# Patient Record
Sex: MALE | Race: WHITE | NOT HISPANIC OR LATINO | Employment: OTHER | URBAN - METROPOLITAN AREA
[De-identification: names, ages, dates, MRNs, and addresses within clinical notes are randomized per-mention and may not be internally consistent; named-entity substitution may affect disease eponyms.]

---

## 2017-01-06 DIAGNOSIS — R56.9 CONVULSIONS, UNSPECIFIED CONVULSION TYPE: ICD-10-CM

## 2017-01-06 RX ORDER — LEVETIRACETAM 750 MG/1
750 TABLET ORAL 2 TIMES DAILY
Qty: 60 TABLET | Refills: 11 | Status: SHIPPED | OUTPATIENT
Start: 2017-01-06 | End: 2018-01-15 | Stop reason: SDUPTHER

## 2017-01-18 ENCOUNTER — PATIENT MESSAGE (OUTPATIENT)
Dept: FAMILY MEDICINE | Facility: CLINIC | Age: 47
End: 2017-01-18

## 2017-01-18 RX ORDER — DEXTROAMPHETAMINE SACCHARATE, AMPHETAMINE ASPARTATE MONOHYDRATE, DEXTROAMPHETAMINE SULFATE AND AMPHETAMINE SULFATE 7.5; 7.5; 7.5; 7.5 MG/1; MG/1; MG/1; MG/1
CAPSULE, EXTENDED RELEASE ORAL
Qty: 60 CAPSULE | Refills: 0 | Status: SHIPPED | OUTPATIENT
Start: 2017-01-18 | End: 2017-03-14 | Stop reason: SDUPTHER

## 2017-02-13 ENCOUNTER — PATIENT MESSAGE (OUTPATIENT)
Dept: SPORTS MEDICINE | Facility: CLINIC | Age: 47
End: 2017-02-13

## 2017-02-17 ENCOUNTER — TELEPHONE (OUTPATIENT)
Dept: SPORTS MEDICINE | Facility: CLINIC | Age: 47
End: 2017-02-17

## 2017-02-20 RX ORDER — HYDROCHLOROTHIAZIDE 25 MG/1
TABLET ORAL
Qty: 90 TABLET | Refills: 0 | Status: SHIPPED | OUTPATIENT
Start: 2017-02-20 | End: 2017-05-14 | Stop reason: SDUPTHER

## 2017-02-24 ENCOUNTER — HOSPITAL ENCOUNTER (OUTPATIENT)
Dept: RADIOLOGY | Facility: HOSPITAL | Age: 47
Discharge: HOME | End: 2017-02-24
Attending: ORTHOPAEDIC SURGERY
Payer: COMMERCIAL

## 2017-02-24 ENCOUNTER — OFFICE VISIT (OUTPATIENT)
Dept: SPORTS MEDICINE | Facility: CLINIC | Age: 47
End: 2017-02-24
Payer: COMMERCIAL

## 2017-02-24 VITALS
WEIGHT: 229 LBS | HEIGHT: 71 IN | DIASTOLIC BLOOD PRESSURE: 98 MMHG | SYSTOLIC BLOOD PRESSURE: 153 MMHG | BODY MASS INDEX: 32.06 KG/M2 | HEART RATE: 96 BPM

## 2017-02-24 DIAGNOSIS — M25.561 RIGHT KNEE PAIN, UNSPECIFIED CHRONICITY: ICD-10-CM

## 2017-02-24 DIAGNOSIS — M25.561 ACUTE PAIN OF RIGHT KNEE: Primary | ICD-10-CM

## 2017-02-24 DIAGNOSIS — M25.461 KNEE EFFUSION, RIGHT: ICD-10-CM

## 2017-02-24 DIAGNOSIS — M17.11 PRIMARY OSTEOARTHRITIS OF RIGHT KNEE: ICD-10-CM

## 2017-02-24 PROCEDURE — 73564 X-RAY EXAM KNEE 4 OR MORE: CPT | Mod: 26,50,, | Performed by: RADIOLOGY

## 2017-02-24 PROCEDURE — 20610 DRAIN/INJ JOINT/BURSA W/O US: CPT | Mod: S$PBB,RT,, | Performed by: PHYSICIAN ASSISTANT

## 2017-02-24 PROCEDURE — 99214 OFFICE O/P EST MOD 30 MIN: CPT | Mod: 25,S$PBB,, | Performed by: PHYSICIAN ASSISTANT

## 2017-02-24 PROCEDURE — 73564 X-RAY EXAM KNEE 4 OR MORE: CPT | Mod: TC,50,PO

## 2017-02-24 PROCEDURE — 97110 THERAPEUTIC EXERCISES: CPT | Mod: PBBFAC,PO | Performed by: PHYSICIAN ASSISTANT

## 2017-02-24 PROCEDURE — 20610 DRAIN/INJ JOINT/BURSA W/O US: CPT | Mod: PBBFAC,PO | Performed by: PHYSICIAN ASSISTANT

## 2017-02-24 PROCEDURE — 99999 PR PBB SHADOW E&M-EST. PATIENT-LVL IV: CPT | Mod: PBBFAC,,, | Performed by: PHYSICIAN ASSISTANT

## 2017-02-24 RX ORDER — TRIAMCINOLONE ACETONIDE 40 MG/ML
80 INJECTION, SUSPENSION INTRA-ARTICULAR; INTRAMUSCULAR
Status: COMPLETED | OUTPATIENT
Start: 2017-02-24 | End: 2017-02-24

## 2017-02-24 RX ORDER — LIDOCAINE HYDROCHLORIDE 10 MG/ML
4 INJECTION INFILTRATION; PERINEURAL
Status: COMPLETED | OUTPATIENT
Start: 2017-02-24 | End: 2017-02-24

## 2017-02-24 RX ORDER — BUPIVACAINE HYDROCHLORIDE 2.5 MG/ML
1 INJECTION, SOLUTION INFILTRATION; PERINEURAL
Status: COMPLETED | OUTPATIENT
Start: 2017-02-24 | End: 2017-02-24

## 2017-02-24 RX ADMIN — TRIAMCINOLONE ACETONIDE 80 MG: 40 INJECTION, SUSPENSION INTRA-ARTICULAR; INTRAMUSCULAR at 05:02

## 2017-02-24 RX ADMIN — LIDOCAINE HYDROCHLORIDE 4 ML: 10 INJECTION INFILTRATION; PERINEURAL at 05:02

## 2017-02-24 RX ADMIN — BUPIVACAINE HYDROCHLORIDE 2.5 MG: 2.5 INJECTION, SOLUTION INFILTRATION; PERINEURAL at 05:02

## 2017-02-24 NOTE — LETTER
February 25, 2017      Gwendolyn Jeong MD  [address]  [place] [address]           [facility]  [address]  [facility] [address]  Phone: [phone]          Patient: Nirmal Moss   MR Number: [id number]   YOB: 1970   Date of Visit: 2/24/2017       Dear Dr. Gwendolyn Jeong:    Thank you for referring Nirmal Moss to me for evaluation. Attached you will find relevant portions of my assessment and plan of care.    If you have questions, please do not hesitate to call me. I look forward to following Nirmal Moss along with you.    Sincerely,    Jayant Flores III, PA-C    Enclosure  CC:  No Recipients    If you would like to receive this communication electronically, please contact [email or web address] or [phone] to request more information on [employer] access.    For providers and/or their staff who would like to refer a patient to [facility], please contact us through our one-stop-shop provider referral line, [facility] , at [phone].    If you feel you have received this communication in error or would no longer like to receive these types of communications, please e-mail [email or web address]

## 2017-02-24 NOTE — PROGRESS NOTES
CC: Right knee pain    46 y.o. Male with a history of Right knee pain. He states that the pain is severe and not responding to any conservative care.  Pain began on 2/4/17 when he was laying on the floor working on his washing machine and twisted his knee in the wrong direction and felt a pop in the knee. He describes his pain as sharp and aching of the anterior and posterior knee and it feels similar to his previous pain in his right knee which was 3 years ago when he has a medial and lateral meniscectomy with Dr. Jeong. He has noticed intermittent swelling in his knee recently. He has tried ibuprofen, tylenol, and ice with limited knee pain improvement. His pain has been worsening since onset.     He has been wearing a hinged knee brace with some improvement but he is still having pain.    Knee surgery from 2013 revealed medial and lateral meniscus tears and  In the patellofemoral compartment, there was chondral damage to:  Patella 10 x 10 mm grade 2  Chondroplasty was performed using arthroscopic shaver.     There was chondral damage to:  Medial femoral condyle 30 x 20 mm grade 2  Chondroplasty was performed using arthroscopic shaver.     There was chondral damage to:  Lateral tibial plateau 15 x 5 mm grade 2  Chondroplasty was performed using arthroscopic shaver.    + mechanical symptoms, no instability    He is feeling popping in the knee.     Is affecting ADLs.      Review of Systems   Constitution: Negative. Negative for chills, fever and night sweats.   HENT: Negative for congestion and headaches.    Eyes: Negative for blurred vision, left vision loss and right vision loss.   Cardiovascular: Negative for chest pain and syncope.   Respiratory: Negative for cough and shortness of breath.    Endocrine: Negative for polydipsia, polyphagia and polyuria.   Hematologic/Lymphatic: Negative for bleeding problem. Does not bruise/bleed easily.   Skin: Negative for dry skin, itching and rash.   Musculoskeletal: Negative  for falls. Positive for knee pain and muscle weakness.   Gastrointestinal: Negative for abdominal pain and bowel incontinence.   Genitourinary: Negative for bladder incontinence and nocturia.   Neurological: Negative for disturbances in coordination, loss of balance and seizures.   Psychiatric/Behavioral: Negative for depression. The patient does not have insomnia.    Allergic/Immunologic: Negative for hives and persistent infections.     PAST MEDICAL HISTORY:   Past Medical History:   Diagnosis Date    ADD (attention deficit disorder)     Anxiety     Depression     Gastritis     EGD 2014    HDL deficiency     Hypertension     Kidney stone     Seizures      PAST SURGICAL HISTORY:   Past Surgical History:   Procedure Laterality Date    KNEE SURGERY       FAMILY HISTORY:   Family History   Problem Relation Age of Onset    Diabetes Father     Cancer Father     Skin cancer Father     Cancer Maternal Grandfather      SOCIAL HISTORY:   Social History     Social History    Marital status:      Spouse name: N/A    Number of children: 1    Years of education: N/A     Occupational History     [employer]     Social History Main Topics    Smoking status: Never Smoker    Smokeless tobacco: Not on file    Alcohol use 0.0 oz/week     0 Standard drinks or equivalent per week      Comment: social    Drug use: No    Sexual activity: Yes     Partners: Male     Other Topics Concern    Not on file     Social History Narrative    [employer] out of [place],  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       MEDICATIONS:   Current Outpatient Prescriptions:     amlodipine (NORVASC) 10 MG tablet, Take 1 tablet (10 mg total) by mouth once daily., Disp: 90 tablet, Rfl: 12    dextroamphetamine-amphetamine (ADDERALL XR) 30 MG 24 hr capsule, 2 a day, Disp: 60 capsule, Rfl: 0    dextroamphetamine-amphetamine (ADDERALL XR) 30 MG 24 hr capsule, 2 a day, Disp: 60 capsule, Rfl: 0     "dextroamphetamine-amphetamine (ADDERALL XR) 30 MG 24 hr capsule, 2 a day, Disp: 60 capsule, Rfl: 0    hydrochlorothiazide (HYDRODIURIL) 25 MG tablet, TAKE ONE Tablet BY MOUTH DAILY, Disp: 90 tablet, Rfl: 0    irbesartan (AVAPRO) 300 MG tablet, , Disp: , Rfl:     irbesartan (AVAPRO) 300 MG tablet, TAKE ONE TABLET BY MOUTH EVERY EVENING *IN PLACE OF LOSARTAN*, Disp: 30 tablet, Rfl: 12    levetiracetam (KEPPRA) 750 MG Tab, Take 1 tablet (750 mg total) by mouth 2 (two) times daily., Disp: 60 tablet, Rfl: 11    losartan (COZAAR) 100 MG tablet, TAKE ONE TABLET BY MOUTH DAILY, Disp: 90 tablet, Rfl: 3    montelukast (SINGULAIR) 10 mg tablet, TAKE ONE TABLET BY MOUTH EVERY EVENING, Disp: 30 tablet, Rfl: 12    omeprazole (PRILOSEC OTC) 20 MG tablet, Take 20 mg by mouth once daily., Disp: , Rfl:     oxycodone (ROXICODONE) 15 MG Tab, Take 1 tablet (15 mg total) by mouth every 4 (four) hours as needed., Disp: 30 tablet, Rfl: 0    oxycodone-acetaminophen (PERCOCET) 5-325 mg per tablet, , Disp: , Rfl:     paroxetine (PAXIL) 40 MG tablet, Take 1 tablet (40 mg total) by mouth every morning., Disp: 90 tablet, Rfl: 11    paroxetine (PAXIL) 40 MG tablet, TAKE ONE Tablet BY MOUTH EVERY MORNING, Disp: 90 tablet, Rfl: 12    zaleplon (SONATA) 5 MG Cap, , Disp: , Rfl:   ALLERGIES:   Review of patient's allergies indicates:   Allergen Reactions    Hydrocodone Rash       VITAL SIGNS: BP (!) 153/98  Pulse 96  Ht 5' 11" (1.803 m)  Wt 103.9 kg (229 lb)  BMI 31.94 kg/m2     PHYSICAL EXAMINATION  VITAL SIGNS: BP (!) 153/98  Pulse 96  Ht 5' 11" (1.803 m)  Wt 103.9 kg (229 lb)  BMI 31.94 kg/m2   General:  The patient is alert and oriented x 3.  Mood is pleasant.  Observation of ears, eyes and nose reveal no gross abnormalities.  HEENT: NCAT, sclera nonicteric  Lungs: Respirations are equal and unlabored.    Right KNEE EXAMINATION     OBSERVATION / INSPECTION   Gait:   Nonantalgic   Alignment:  Neutral   Scars:   None   Muscle " atrophy: Mild  Effusion:  moderate  Warmth:  None   Discoloration:   none     TENDERNESS / CREPITUS (T / C):          T / C      T / C   Patella   - / -   Lateral joint line   + / -    Peripatellar medial  +  Medial joint line    + / -    Peripatellar lateral +  Medial plica   - / -    Patellar tendon -   Popliteal fossa  - / -    Quad tendon   -   Gastrocnemius   -   Prepatellar Bursa - / -   Quadricep   -   Tibial tubercle  -  Thigh/hamstring  -   Pes anserine/HS -  Fibula    -   ITB   - / -  Tibia     -   Tib/fib joint  - / -  LCL    -     MFC   - / -   MCL: Proximal  -    LFC   - / -    Distal   -          ROM: (* = pain)  PASSIVE   ACTIVE    Left :   5 / 0 / 135   5 / 0 / 135*     Right :    5 / 0 / 145   5 / 0 / 145    Patellofemoral examination:  See above noted areas of tenderness.   Patella position    Subluxation / dislocation: Centered           Sup. / Inf;   Normal   Crepitus (PF):    Absent   Patellar Mobility:       Medial-lateral:   Normal    Superior-inferior:  Normal    Inferior tilt   Normal    Patellar tendon:  Normal   Lateral tilt:    Normal   J-sign:     None   Patellofemoral grind:   No pain       MENISCAL SIGNS:     Pain on terminal extension:  +  Pain on terminal flexion:  +  Rosas maneuver:  + for pain  Squat     + posterior joint pain    LIGAMENT EXAMINATION:  ACL / Lachman:  normal (-1 to 2mm)    PCL-Post.  drawer: normal 0 to 2mm  MCL- Valgus:  normal 0 to 2mm  LCL- Varus:  normal 0 to 2mm  Pivot shift: normal (Equal)   Dial Test: difference c/w other side   At 30° flexion: normal (< 5°)    At 90° flexion: normal (< 5°)   Reverse Pivot Shift:   normal (Equal)     STRENGTH: (* = with pain) PAINFUL SIDE   Quadricep   4/5   Hamstrin/5    EXTREMITY NEURO-VASCULAR EXAMINATION:   Sensation:  Grossly intact to light touch all dermatomal regions.   Motor Function:  Fully intact motor function at hip, knee, foot and ankle    DTRs;  quadriceps and  achilles 2+.  No clonus and  downgoing Babinski.    Vascular status:  DP and PT pulses 2+, brisk capillary refill, symmetric.     Other Findings:  + bridge and step down test     I feel that some of his popping is due to patella hitting the anterior lateral femoral condyle. Could have a new meniscus tear       X-rays:  including standing, weight bearing AP and flexion bilateral knees, lateral and merchant views ordered and images reviewed by me show:  No fracture, dislocation.     ASSESSMENT:    Acute Right Knee pain, Probable Meniscus tear  lateral     Right knee effusion  Right knee osteoarthritis     PLAN:   1. PROCEDURE NOTE:   right KNEE ASPIRATION and INJECTION  After consent was obtained, time out was performed, including verification of patient ID, procedure, site and side, availability of information and equipment, review of safety issues, and agreement with consent, the procedure site was marked and the patient was prepped aseptically.    Using a sterile technique the right knee was prepped and 3 ml's of 1% plain Lidocaine used to anesthetize the needle tract into the joint from the superior lateral knee approach. The knee joint was entered with a 18 gauge needle and 0 ml's withdrawn. Could not drain any fluid after multiple attempts. The procedure was well tolerated.       A diagnostic and therapeutic injection of 2cc 40 mg kenalog and 1% lidocaine/0.5% sensorcaine was given under sterile technique using the same 18 gauge needle into the superior lateral knee site with patient in supine position.     The patient had no adverse reactions to the medication. Pain decreased. The patient was instructed to apply ice to the joint for 20 minutes and avoid strenuous activities for 24-36 hours following the injection. Patient was warned of possible blood sugar and/or blood pressure changes during that time. Following that time, patient can resume regular activities.  Watch for fever, or increased swelling or persistent pain in knee. Call or  return to clinic prn if such symptoms occur or the knee fails to improve as anticipated.    2. RICE  3. Continue wearing hinged knee brace.  4. HEP 05033 - Mello Flores PA-C, instructed and demonstrated a patellofemoral HEP. The patient then demonstrated understanding of exercises and proper technique. This program was performed for 15 minutes.   5. RTC in 1-2 week; if no improvement then will get MRI of knee

## 2017-02-24 NOTE — MR AVS SNAPSHOT
[facility]  [address]  [facility] [address]  Phone: [phone]                  Nirmal Moss   2017 1:00 PM   Appointment    Description:  Male : 1970   Provider:  Jayant Flores III, PA-C   Department:  [facility]                To Do List           Goals (5 Years of Data)     None      [facility] On Call     [facility] On Call Nurse Care Line -  Assistance  Registered nurses in the [facility] On Call Center provide clinical advisement, health education, appointment booking, and other advisory services.  Call for this free service at [phone].             Medications           Message regarding Medications     Verify the changes and/or additions to your medication regime listed below are the same as discussed with your clinician today.  If any of these changes or additions are incorrect, please notify your healthcare provider.             Verify that the below list of medications is an accurate representation of the medications you are currently taking.  If none reported, the list may be blank. If incorrect, please contact your healthcare provider. Carry this list with you in case of emergency.           Current Medications     amlodipine (NORVASC) 10 MG tablet Take 1 tablet (10 mg total) by mouth once daily.    dextroamphetamine-amphetamine (ADDERALL XR) 30 MG 24 hr capsule 2 a day    dextroamphetamine-amphetamine (ADDERALL XR) 30 MG 24 hr capsule 2 a day    dextroamphetamine-amphetamine (ADDERALL XR) 30 MG 24 hr capsule 2 a day    hydrochlorothiazide (HYDRODIURIL) 25 MG tablet TAKE ONE Tablet BY MOUTH DAILY    irbesartan (AVAPRO) 300 MG tablet     irbesartan (AVAPRO) 300 MG tablet TAKE ONE TABLET BY MOUTH EVERY EVENING *IN PLACE OF LOSARTAN*    levetiracetam (KEPPRA) 750 MG Tab Take 1 tablet (750 mg total) by mouth 2 (two) times daily.    losartan (COZAAR) 100 MG tablet TAKE ONE TABLET BY MOUTH DAILY    montelukast (SINGULAIR) 10 mg  tablet TAKE ONE TABLET BY MOUTH EVERY EVENING    omeprazole (PRILOSEC OTC) 20 MG tablet Take 20 mg by mouth once daily.    oxycodone (ROXICODONE) 15 MG Tab Take 1 tablet (15 mg total) by mouth every 4 (four) hours as needed.    oxycodone-acetaminophen (PERCOCET) 5-325 mg per tablet     paroxetine (PAXIL) 40 MG tablet Take 1 tablet (40 mg total) by mouth every morning.    paroxetine (PAXIL) 40 MG tablet TAKE ONE Tablet BY MOUTH EVERY MORNING    zaleplon (SONATA) 5 MG Cap            Clinical Reference Information           Allergies as of 2/24/2017     Hydrocodone      Immunizations Administered on Date of Encounter - 2/24/2017     None      Language Assistance Services     ATTENTION: Language assistance services are available, free of charge. Please call [phone].      ATENCIÓN: Si wendie remigio, tiene a johnson disposición servicios gratuitos de asistencia lingüística. Llame al [phone].     CHÚ Ý: N?u b?n nói Ti?ng Vi?t, có các d?ch v? h? tr? ngôn ng? mi?n phí dành cho b?n. G?i s? [phone].         [facility] complies with applicable Federal civil rights laws and does not discriminate on the basis of race, color, national origin, age, disability, or sex.

## 2017-03-06 ENCOUNTER — OFFICE VISIT (OUTPATIENT)
Dept: SPORTS MEDICINE | Facility: CLINIC | Age: 47
End: 2017-03-06
Payer: COMMERCIAL

## 2017-03-06 VITALS
HEIGHT: 71 IN | DIASTOLIC BLOOD PRESSURE: 82 MMHG | WEIGHT: 229 LBS | HEART RATE: 87 BPM | SYSTOLIC BLOOD PRESSURE: 128 MMHG | BODY MASS INDEX: 32.06 KG/M2

## 2017-03-06 DIAGNOSIS — M25.561 ACUTE PAIN OF RIGHT KNEE: Primary | ICD-10-CM

## 2017-03-06 DIAGNOSIS — M17.11 PRIMARY OSTEOARTHRITIS OF RIGHT KNEE: ICD-10-CM

## 2017-03-06 PROCEDURE — 3074F SYST BP LT 130 MM HG: CPT | Mod: S$GLB,,, | Performed by: PHYSICIAN ASSISTANT

## 2017-03-06 PROCEDURE — 99999 PR PBB SHADOW E&M-EST. PATIENT-LVL IV: CPT | Mod: PBBFAC,,, | Performed by: PHYSICIAN ASSISTANT

## 2017-03-06 PROCEDURE — 3079F DIAST BP 80-89 MM HG: CPT | Mod: S$GLB,,, | Performed by: PHYSICIAN ASSISTANT

## 2017-03-06 PROCEDURE — 99214 OFFICE O/P EST MOD 30 MIN: CPT | Mod: S$GLB,,, | Performed by: PHYSICIAN ASSISTANT

## 2017-03-06 PROCEDURE — 1160F RVW MEDS BY RX/DR IN RCRD: CPT | Mod: S$GLB,,, | Performed by: PHYSICIAN ASSISTANT

## 2017-03-06 RX ORDER — MELOXICAM 15 MG/1
TABLET ORAL
Qty: 14 TABLET | Refills: 0 | Status: SHIPPED | OUTPATIENT
Start: 2017-03-06 | End: 2017-03-24 | Stop reason: CLARIF

## 2017-03-07 ENCOUNTER — PATIENT MESSAGE (OUTPATIENT)
Dept: SPORTS MEDICINE | Facility: CLINIC | Age: 47
End: 2017-03-07

## 2017-03-08 ENCOUNTER — TELEPHONE (OUTPATIENT)
Dept: SPORTS MEDICINE | Facility: CLINIC | Age: 47
End: 2017-03-08

## 2017-03-08 NOTE — TELEPHONE ENCOUNTER
"       Patient states that....     "The doctor that performed my wrist surgery indicated the plate and screws are surgical-grade stainless steel.  Therefore, he stated that I should be fine receiving an MRI. "    Patient checked with his doctor and emailed me this response.        "

## 2017-03-09 ENCOUNTER — HOSPITAL ENCOUNTER (OUTPATIENT)
Dept: RADIOLOGY | Facility: HOSPITAL | Age: 47
Discharge: HOME | End: 2017-03-09
Attending: ORTHOPAEDIC SURGERY
Payer: COMMERCIAL

## 2017-03-09 DIAGNOSIS — M25.561 ACUTE PAIN OF RIGHT KNEE: ICD-10-CM

## 2017-03-09 PROCEDURE — 73721 MRI JNT OF LWR EXTRE W/O DYE: CPT | Mod: TC,RT

## 2017-03-09 PROCEDURE — 73721 MRI JNT OF LWR EXTRE W/O DYE: CPT | Mod: 26,RT,, | Performed by: RADIOLOGY

## 2017-03-09 NOTE — PROGRESS NOTES
CC: Right knee pain    46 y.o. Male with a history of Right knee pain. He states that the pain is severe and not responding to any conservative care.  Pain began on 2/4/17 when he was laying on the floor working on his washing machine and twisted his knee in the wrong direction and felt a pop in the knee. He describes his pain as sharp and aching of the anterior and posterior knee and it feels similar to his previous pain in his right knee which was 3 years ago when he has a medial and lateral meniscectomy with Dr. Jeong. He has noticed intermittent swelling in his knee recently. He has tried ibuprofen, tylenol, and ice with limited knee pain improvement. His pain has been worsening since onset. He was given a intra-articular steroid injection at his last visit 2 weeks ago and it did not help to improve his knee pain.   Rates his pain at a 7/10 on a pain scale today.     He has been wearing a hinged knee brace with some improvement but he is still having pain.    Knee surgery from 2013 revealed medial and lateral meniscus tears and  In the patellofemoral compartment, there was chondral damage to:  Patella 10 x 10 mm grade 2  Chondroplasty was performed using arthroscopic shaver.     There was chondral damage to:  Medial femoral condyle 30 x 20 mm grade 2  Chondroplasty was performed using arthroscopic shaver.     There was chondral damage to:  Lateral tibial plateau 15 x 5 mm grade 2  Chondroplasty was performed using arthroscopic shaver.    + mechanical symptoms, no instability    He is feeling popping in the knee.     Is affecting ADLs.      Review of Systems   Constitution: Negative. Negative for chills, fever and night sweats.   HENT: Negative for congestion and headaches.    Eyes: Negative for blurred vision, left vision loss and right vision loss.   Cardiovascular: Negative for chest pain and syncope.   Respiratory: Negative for cough and shortness of breath.    Endocrine: Negative for polydipsia, polyphagia  and polyuria.   Hematologic/Lymphatic: Negative for bleeding problem. Does not bruise/bleed easily.   Skin: Negative for dry skin, itching and rash.   Musculoskeletal: Negative for falls. Positive for knee pain and muscle weakness.   Gastrointestinal: Negative for abdominal pain and bowel incontinence.   Genitourinary: Negative for bladder incontinence and nocturia.   Neurological: Negative for disturbances in coordination, loss of balance and seizures.   Psychiatric/Behavioral: Negative for depression. The patient does not have insomnia.    Allergic/Immunologic: Negative for hives and persistent infections.     PAST MEDICAL HISTORY:   Past Medical History:   Diagnosis Date    ADD (attention deficit disorder)     Anxiety     Depression     Gastritis     EGD 2014    HDL deficiency     Hypertension     Kidney stone     Seizures      PAST SURGICAL HISTORY:   Past Surgical History:   Procedure Laterality Date    KNEE SURGERY       FAMILY HISTORY:   Family History   Problem Relation Age of Onset    Diabetes Father     Cancer Father     Skin cancer Father     Cancer Maternal Grandfather      SOCIAL HISTORY:   Social History     Social History    Marital status:      Spouse name: N/A    Number of children: 1    Years of education: N/A     Occupational History     [employer]     Social History Main Topics    Smoking status: Never Smoker    Smokeless tobacco: Not on file    Alcohol use 0.0 oz/week     0 Standard drinks or equivalent per week      Comment: social    Drug use: No    Sexual activity: Yes     Partners: Male     Other Topics Concern    Not on file     Social History Narrative    [email or web address] out of [place],  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       MEDICATIONS:   Current Outpatient Prescriptions:     amlodipine (NORVASC) 10 MG tablet, Take 1 tablet (10 mg total) by mouth once daily., Disp: 90 tablet, Rfl: 12    dextroamphetamine-amphetamine (ADDERALL  "XR) 30 MG 24 hr capsule, 2 a day, Disp: 60 capsule, Rfl: 0    dextroamphetamine-amphetamine (ADDERALL XR) 30 MG 24 hr capsule, 2 a day, Disp: 60 capsule, Rfl: 0    dextroamphetamine-amphetamine (ADDERALL XR) 30 MG 24 hr capsule, 2 a day, Disp: 60 capsule, Rfl: 0    hydrochlorothiazide (HYDRODIURIL) 25 MG tablet, TAKE ONE Tablet BY MOUTH DAILY, Disp: 90 tablet, Rfl: 0    irbesartan (AVAPRO) 300 MG tablet, , Disp: , Rfl:     irbesartan (AVAPRO) 300 MG tablet, TAKE ONE TABLET BY MOUTH EVERY EVENING *IN PLACE OF LOSARTAN*, Disp: 30 tablet, Rfl: 12    levetiracetam (KEPPRA) 750 MG Tab, Take 1 tablet (750 mg total) by mouth 2 (two) times daily., Disp: 60 tablet, Rfl: 11    losartan (COZAAR) 100 MG tablet, TAKE ONE TABLET BY MOUTH DAILY, Disp: 90 tablet, Rfl: 3    montelukast (SINGULAIR) 10 mg tablet, TAKE ONE TABLET BY MOUTH EVERY EVENING, Disp: 30 tablet, Rfl: 12    omeprazole (PRILOSEC OTC) 20 MG tablet, Take 20 mg by mouth once daily., Disp: , Rfl:     oxycodone (ROXICODONE) 15 MG Tab, Take 1 tablet (15 mg total) by mouth every 4 (four) hours as needed., Disp: 30 tablet, Rfl: 0    oxycodone-acetaminophen (PERCOCET) 5-325 mg per tablet, , Disp: , Rfl:     paroxetine (PAXIL) 40 MG tablet, Take 1 tablet (40 mg total) by mouth every morning., Disp: 90 tablet, Rfl: 11    paroxetine (PAXIL) 40 MG tablet, TAKE ONE Tablet BY MOUTH EVERY MORNING, Disp: 90 tablet, Rfl: 12    zaleplon (SONATA) 5 MG Cap, , Disp: , Rfl:     meloxicam (MOBIC) 15 MG tablet, Take 1 tablet by mouth once daily with food. Take 20mg prilosec once daily on days that you are taking the mobic to protect your stomach., Disp: 14 tablet, Rfl: 0  ALLERGIES:   Review of patient's allergies indicates:   Allergen Reactions    Hydrocodone Rash       VITAL SIGNS: /82  Pulse 87  Ht 5' 11" (1.803 m)  Wt 103.9 kg (229 lb)  BMI 31.94 kg/m2     PHYSICAL EXAMINATION  VITAL SIGNS: /82  Pulse 87  Ht 5' 11" (1.803 m)  Wt 103.9 kg (229 lb)  " BMI 31.94 kg/m2   General:  The patient is alert and oriented x 3.  Mood is pleasant.  Observation of ears, eyes and nose reveal no gross abnormalities.  HEENT: NCAT, sclera nonicteric  Lungs: Respirations are equal and unlabored.    Right KNEE EXAMINATION     OBSERVATION / INSPECTION   Gait:   Nonantalgic   Alignment:  Neutral   Scars:   None   Muscle atrophy: Mild  Effusion:  moderate  Warmth:  None   Discoloration:   none     TENDERNESS / CREPITUS (T / C):          T / C      T / C   Patella   - / -   Lateral joint line   + / -    Peripatellar medial  +  Medial joint line    + / -    Peripatellar lateral +  Medial plica   - / -    Patellar tendon -   Popliteal fossa  - / -    Quad tendon   -   Gastrocnemius   -   Prepatellar Bursa - / -   Quadricep   -   Tibial tubercle  -  Thigh/hamstring  -   Pes anserine/HS -  Fibula    -   ITB   - / -  Tibia     -   Tib/fib joint  - / -  LCL    -     MFC   - / -   MCL: Proximal  -    LFC   - / -    Distal   -          ROM: (* = pain)  PASSIVE   ACTIVE    Left :   5 / 0 / 135   5 / 0 / 135*     Right :    5 / 0 / 145   5 / 0 / 145    Patellofemoral examination:  See above noted areas of tenderness.   Patella position    Subluxation / dislocation: Centered           Sup. / Inf;   Normal   Crepitus (PF):    Absent   Patellar Mobility:       Medial-lateral:   Normal    Superior-inferior:  Normal    Inferior tilt   Normal    Patellar tendon:  Normal   Lateral tilt:    Normal   J-sign:     None   Patellofemoral grind:   No pain       MENISCAL SIGNS:     Pain on terminal extension:  +  Pain on terminal flexion:  +  Rosas maneuver:  + for pain  Squat     + posterior joint pain    LIGAMENT EXAMINATION:  ACL / Lachman:  normal (-1 to 2mm)    PCL-Post.  drawer: normal 0 to 2mm  MCL- Valgus:  normal 0 to 2mm  LCL- Varus:  normal 0 to 2mm  Pivot shift: normal (Equal)   Dial Test: difference c/w other side   At 30° flexion: normal (< 5°)    At 90° flexion: normal (< 5°)   Reverse  Pivot Shift:   normal (Equal)     STRENGTH: (* = with pain) PAINFUL SIDE   Quadricep   4/5   Hamstrin/5    EXTREMITY NEURO-VASCULAR EXAMINATION:   Sensation:  Grossly intact to light touch all dermatomal regions.   Motor Function:  Fully intact motor function at hip, knee, foot and ankle    DTRs;  quadriceps and  achilles 2+.  No clonus and downgoing Babinski.    Vascular status:  DP and PT pulses 2+, brisk capillary refill, symmetric.     Other Findings:  + bridge and step down test     I feel that some of his popping is due to patella hitting the anterior lateral femoral condyle. Could have a new meniscus tear       X-rays:  including standing, weight bearing AP and flexion bilateral knees, lateral and merchant views ordered and images reviewed by me show:  No fracture, dislocation.     ASSESSMENT:    Acute Right Knee pain, Probable Meniscus tear  lateral     Right knee effusion  Right knee osteoarthritis     PLAN:   1. MRI of right knee ordered.   2. RICE  3. Continue wearing hinged knee brace.  4. Continue HEP   5. Will call him with MRI results to develop treatment plan.   All patients questions were answered. Patient was advised to call us with any concerns or questions.

## 2017-03-13 ENCOUNTER — PATIENT MESSAGE (OUTPATIENT)
Dept: SPORTS MEDICINE | Facility: CLINIC | Age: 47
End: 2017-03-13

## 2017-03-13 ENCOUNTER — TELEPHONE (OUTPATIENT)
Dept: SPORTS MEDICINE | Facility: CLINIC | Age: 47
End: 2017-03-13

## 2017-03-13 NOTE — TELEPHONE ENCOUNTER
MRI reviewed personally by me:  Shows right knee bucket handle medial meniscus tear, chondromalacia.     ASSESSMENT:    Right Knee lateral meniscus bucket handle tear/     he would benefit from knee arthroscopy, possible plica excision, possible chondroplasty with possible meniscectomy given the above.     PLAN: We have discussed the surgery and recovery of arthroscopic knee surgery. he understands that there may be limited weightbearing up to several weeks after surgery depending on procedures that are performed at the time of surgery.    The spectrum of treatment options were discussed with the patient, including nonoperative and operative options.  After thorough discussion, the patient has elected to undergo surgical treatment to include:  right      A. Knee arthroscopic lateral meniscectomy  possible meniscus repair with possible medial meniscectomy   B. Knee arthroscopic possible synovectomy   C. Knee arthroscopic possible plica excision   D. Knee arthroscopic possible chondroplasty    The details of the surgical procedure were explained, including the location of probable incisions and a description of likely hardware and/or grafts to be used.  The patient understands the likely convalescence after surgery.  Also, we have thoroughly discussed the risks, benefits and alternatives to surgery, including, but not limited to, the risk of infection, joint stiffness, blood clot (including DVT and/or pulmonary embolus), neurologic and vascular injury.  It was explained that, if tissue has been repaired or reconstructed, there is a chance of failure, which may require further management.

## 2017-03-14 ENCOUNTER — PATIENT MESSAGE (OUTPATIENT)
Dept: FAMILY MEDICINE | Facility: CLINIC | Age: 47
End: 2017-03-14

## 2017-03-14 RX ORDER — LOSARTAN POTASSIUM 100 MG/1
100 TABLET ORAL DAILY
Qty: 90 TABLET | Refills: 3 | Status: SHIPPED | OUTPATIENT
Start: 2017-03-14 | End: 2017-08-29

## 2017-03-14 RX ORDER — DEXTROAMPHETAMINE SACCHARATE, AMPHETAMINE ASPARTATE MONOHYDRATE, DEXTROAMPHETAMINE SULFATE AND AMPHETAMINE SULFATE 7.5; 7.5; 7.5; 7.5 MG/1; MG/1; MG/1; MG/1
CAPSULE, EXTENDED RELEASE ORAL
Qty: 60 CAPSULE | Refills: 0 | Status: SHIPPED | OUTPATIENT
Start: 2017-03-14 | End: 2017-03-20 | Stop reason: SDUPTHER

## 2017-03-14 NOTE — TELEPHONE ENCOUNTER
"Looks like patient may need a "referral" for surgery.  There is nothing there about needing a preop.  Clarify that.        Also, please do not send such messages as a refill request as the message will be overlooked.  I happened to see this message.      "

## 2017-03-15 ENCOUNTER — OFFICE VISIT (OUTPATIENT)
Dept: SPORTS MEDICINE | Facility: CLINIC | Age: 47
End: 2017-03-15
Payer: COMMERCIAL

## 2017-03-15 ENCOUNTER — TELEPHONE (OUTPATIENT)
Dept: SPORTS MEDICINE | Facility: CLINIC | Age: 47
End: 2017-03-15

## 2017-03-15 ENCOUNTER — CLINICAL SUPPORT (OUTPATIENT)
Dept: CARDIOLOGY | Facility: CLINIC | Age: 47
End: 2017-03-15
Payer: COMMERCIAL

## 2017-03-15 VITALS
WEIGHT: 229 LBS | BODY MASS INDEX: 32.06 KG/M2 | DIASTOLIC BLOOD PRESSURE: 101 MMHG | SYSTOLIC BLOOD PRESSURE: 149 MMHG | HEART RATE: 74 BPM | HEIGHT: 71 IN

## 2017-03-15 DIAGNOSIS — M79.661 RIGHT CALF PAIN: ICD-10-CM

## 2017-03-15 DIAGNOSIS — M79.89 PAIN AND SWELLING OF RIGHT LOWER LEG: Primary | ICD-10-CM

## 2017-03-15 DIAGNOSIS — M79.661 PAIN AND SWELLING OF RIGHT LOWER LEG: Primary | ICD-10-CM

## 2017-03-15 DIAGNOSIS — M25.561 ACUTE PAIN OF RIGHT KNEE: ICD-10-CM

## 2017-03-15 DIAGNOSIS — M79.89 PAIN AND SWELLING OF RIGHT LOWER LEG: ICD-10-CM

## 2017-03-15 DIAGNOSIS — S83.203A COMPLEX TEAR OF MENISCUS OF RIGHT KNEE AS CURRENT INJURY, UNSPECIFIED MENISCUS, INITIAL ENCOUNTER: ICD-10-CM

## 2017-03-15 DIAGNOSIS — M79.661 PAIN AND SWELLING OF RIGHT LOWER LEG: ICD-10-CM

## 2017-03-15 PROCEDURE — 3080F DIAST BP >= 90 MM HG: CPT | Mod: S$GLB,,, | Performed by: PHYSICIAN ASSISTANT

## 2017-03-15 PROCEDURE — 99214 OFFICE O/P EST MOD 30 MIN: CPT | Mod: S$GLB,,, | Performed by: PHYSICIAN ASSISTANT

## 2017-03-15 PROCEDURE — 93971 EXTREMITY STUDY: CPT | Mod: S$GLB,,, | Performed by: INTERNAL MEDICINE

## 2017-03-15 PROCEDURE — 1160F RVW MEDS BY RX/DR IN RCRD: CPT | Mod: S$GLB,,, | Performed by: PHYSICIAN ASSISTANT

## 2017-03-15 PROCEDURE — 3077F SYST BP >= 140 MM HG: CPT | Mod: S$GLB,,, | Performed by: PHYSICIAN ASSISTANT

## 2017-03-15 PROCEDURE — 99999 PR PBB SHADOW E&M-EST. PATIENT-LVL IV: CPT | Mod: PBBFAC,,, | Performed by: PHYSICIAN ASSISTANT

## 2017-03-15 RX ORDER — TRAMADOL HYDROCHLORIDE 50 MG/1
50-100 TABLET ORAL EVERY 6 HOURS PRN
Qty: 30 TABLET | Refills: 0 | Status: SHIPPED | OUTPATIENT
Start: 2017-03-15 | End: 2017-03-24 | Stop reason: CLARIF

## 2017-03-15 NOTE — MR AVS SNAPSHOT
[facility]  [address]  [facility] [address]  Phone: [phone]                  Nirmal Reardonen   3/15/2017 11:30 AM   Appointment    Description:  Male : 1970   Provider:  Jayant Flores III, PA-C   Department:  [facility]                To Do List           Future Appointments        Provider Department Dept Phone    3/15/2017 11:30 AM Jayant Flores III, PA-C [facility] [phone]      Goals (5 Years of Data)     None      [facility]     [facility] -  Assistance  Registered nurses in the [facility] provide clinical advisement, health education, appointment booking, and other advisory services.  Call for this free service at [phone].             Medications           Message regarding Medications     Verify the changes and/or additions to your medication regime listed below are the same as discussed with your clinician today.  If any of these changes or additions are incorrect, please notify your healthcare provider.             Verify that the below list of medications is an accurate representation of the medications you are currently taking.  If none reported, the list may be blank. If incorrect, please contact your healthcare provider. Carry this list with you in case of emergency.           Current Medications     amlodipine (NORVASC) 10 MG tablet Take 1 tablet (10 mg total) by mouth once daily.    dextroamphetamine-amphetamine (ADDERALL XR) 30 MG 24 hr capsule 2 a day    dextroamphetamine-amphetamine (ADDERALL XR) 30 MG 24 hr capsule 2 a day    dextroamphetamine-amphetamine (ADDERALL XR) 30 MG 24 hr capsule 2 a day    hydrochlorothiazide (HYDRODIURIL) 25 MG tablet TAKE ONE Tablet BY MOUTH DAILY    irbesartan (AVAPRO) 300 MG tablet     irbesartan (AVAPRO) 300 MG tablet TAKE ONE TABLET BY MOUTH EVERY EVENING *IN PLACE OF LOSARTAN*    levetiracetam (KEPPRA) 750 MG Tab Take 1  tablet (750 mg total) by mouth 2 (two) times daily.    losartan (COZAAR) 100 MG tablet Take 1 tablet (100 mg total) by mouth once daily.    meloxicam (MOBIC) 15 MG tablet Take 1 tablet by mouth once daily with food. Take 20mg prilosec once daily on days that you are taking the mobic to protect your stomach.    montelukast (SINGULAIR) 10 mg tablet TAKE ONE TABLET BY MOUTH EVERY EVENING    omeprazole (PRILOSEC OTC) 20 MG tablet Take 20 mg by mouth once daily.    oxycodone (ROXICODONE) 15 MG Tab Take 1 tablet (15 mg total) by mouth every 4 (four) hours as needed.    oxycodone-acetaminophen (PERCOCET) 5-325 mg per tablet     paroxetine (PAXIL) 40 MG tablet Take 1 tablet (40 mg total) by mouth every morning.    paroxetine (PAXIL) 40 MG tablet TAKE ONE Tablet BY MOUTH EVERY MORNING    zaleplon (SONATA) 5 MG Cap            Clinical Reference Information           Allergies as of 3/15/2017     Hydrocodone      Immunizations Administered on Date of Encounter - 3/15/2017     None      Language Assistance Services     ATTENTION: Language assistance services are available, free of charge. Please call [phone].      ATENCIÓN: Si samla remigio, tiene a johnson disposición servicios gratuitos de asistencia lingüística. Llame al [phone].     [facility] Ý: N?u b?n nói Ti?ng Vi?t, có các d?ch v? h? tr? ngôn ng? mi?n phí dành cho b?n. G?i s? [phone].         [facility] complies with applicable Federal civil rights laws and does not discriminate on the basis of race, color, national origin, age, disability, or sex.

## 2017-03-15 NOTE — PROGRESS NOTES
CC: Right knee pain and sudden bruising and swelling of right calf    46 y.o. Male with a history of Right knee pain. He presents to clinic today due to sudden and unexplained bruising, swelling, and pain of the right lower leg and calf. He has never had this before and denies previous blood clots. He has been more sedentary since his knee injury about 3 weeks ago. He denies numbness, tingling, palor or the lower legs.      He states that the pain is severe and not responding to any conservative care.  Pain began on 2/4/17 when he was laying on the floor working on his washing machine and twisted his knee in the wrong direction and felt a pop in the knee. He describes his pain as sharp and aching of the anterior and posterior knee and it feels similar to his previous pain in his right knee which was 3 years ago when he has a medial and lateral meniscectomy with Dr. Jeong. He has noticed intermittent swelling in his knee recently. He has tried ibuprofen, tylenol, and ice with limited knee pain improvement. His pain has been worsening since onset. He was given a intra-articular steroid injection at his last visit 2-3 weeks ago and it did not help to improve his knee pain.   Rates his pain at a 7/10 on a pain scale today.     He has been wearing a hinged knee brace with some improvement but he is still having pain.    Knee surgery from 2013 revealed medial and lateral meniscus tears and  In the patellofemoral compartment, there was chondral damage to:  Patella 10 x 10 mm grade 2  Chondroplasty was performed using arthroscopic shaver.     There was chondral damage to:  Medial femoral condyle 30 x 20 mm grade 2  Chondroplasty was performed using arthroscopic shaver.     There was chondral damage to:  Lateral tibial plateau 15 x 5 mm grade 2  Chondroplasty was performed using arthroscopic shaver.    + mechanical symptoms, no instability    He is feeling popping in the knee.     Is affecting ADLs.      Review of Systems    Constitution: Negative. Negative for chills, fever and night sweats.   HENT: Negative for congestion and headaches.    Eyes: Negative for blurred vision, left vision loss and right vision loss.   Cardiovascular: Negative for chest pain and syncope.   Respiratory: Negative for cough and shortness of breath.    Endocrine: Negative for polydipsia, polyphagia and polyuria.   Hematologic/Lymphatic: Negative for bleeding problem. Does not bruise/bleed easily.   Skin: Negative for dry skin, itching and rash.   Musculoskeletal: Negative for falls. Positive for knee pain and muscle weakness.   Gastrointestinal: Negative for abdominal pain and bowel incontinence.   Genitourinary: Negative for bladder incontinence and nocturia.   Neurological: Negative for disturbances in coordination, loss of balance and seizures.   Psychiatric/Behavioral: Negative for depression. The patient does not have insomnia.    Allergic/Immunologic: Negative for hives and persistent infections.     PAST MEDICAL HISTORY:   Past Medical History:   Diagnosis Date    ADD (attention deficit disorder)     Anxiety     Depression     Gastritis     EGD 2014    HDL deficiency     Hypertension     Kidney stone     Seizures      PAST SURGICAL HISTORY:   Past Surgical History:   Procedure Laterality Date    KNEE SURGERY       FAMILY HISTORY:   Family History   Problem Relation Age of Onset    Diabetes Father     Cancer Father     Skin cancer Father     Cancer Maternal Grandfather      SOCIAL HISTORY:   Social History     Social History    Marital status:      Spouse name: N/A    Number of children: 1    Years of education: N/A     Occupational History     [employer]     Social History Main Topics    Smoking status: Never Smoker    Smokeless tobacco: Not on file    Alcohol use 0.0 oz/week     0 Standard drinks or equivalent per week      Comment: social    Drug use: No    Sexual activity: Yes     Partners: Male      Other Topics Concern    Not on file     Social History Narrative    [employer] out of [place],  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       MEDICATIONS:   Current Outpatient Prescriptions:     amlodipine (NORVASC) 10 MG tablet, Take 1 tablet (10 mg total) by mouth once daily., Disp: 90 tablet, Rfl: 12    dextroamphetamine-amphetamine (ADDERALL XR) 30 MG 24 hr capsule, 2 a day, Disp: 60 capsule, Rfl: 0    dextroamphetamine-amphetamine (ADDERALL XR) 30 MG 24 hr capsule, 2 a day, Disp: 60 capsule, Rfl: 0    dextroamphetamine-amphetamine (ADDERALL XR) 30 MG 24 hr capsule, 2 a day, Disp: 60 capsule, Rfl: 0    hydrochlorothiazide (HYDRODIURIL) 25 MG tablet, TAKE ONE Tablet BY MOUTH DAILY, Disp: 90 tablet, Rfl: 0    irbesartan (AVAPRO) 300 MG tablet, , Disp: , Rfl:     irbesartan (AVAPRO) 300 MG tablet, TAKE ONE TABLET BY MOUTH EVERY EVENING *IN PLACE OF LOSARTAN*, Disp: 30 tablet, Rfl: 12    levetiracetam (KEPPRA) 750 MG Tab, Take 1 tablet (750 mg total) by mouth 2 (two) times daily., Disp: 60 tablet, Rfl: 11    losartan (COZAAR) 100 MG tablet, Take 1 tablet (100 mg total) by mouth once daily., Disp: 90 tablet, Rfl: 3    meloxicam (MOBIC) 15 MG tablet, Take 1 tablet by mouth once daily with food. Take 20mg prilosec once daily on days that you are taking the mobic to protect your stomach., Disp: 14 tablet, Rfl: 0    montelukast (SINGULAIR) 10 mg tablet, TAKE ONE TABLET BY MOUTH EVERY EVENING, Disp: 30 tablet, Rfl: 12    omeprazole (PRILOSEC OTC) 20 MG tablet, Take 20 mg by mouth once daily., Disp: , Rfl:     paroxetine (PAXIL) 40 MG tablet, Take 1 tablet (40 mg total) by mouth every morning., Disp: 90 tablet, Rfl: 11    paroxetine (PAXIL) 40 MG tablet, TAKE ONE Tablet BY MOUTH EVERY MORNING, Disp: 90 tablet, Rfl: 12    oxycodone (ROXICODONE) 15 MG Tab, Take 1 tablet (15 mg total) by mouth every 4 (four) hours as needed., Disp: 30 tablet, Rfl: 0    oxycodone-acetaminophen (PERCOCET) 5-325 mg  "per tablet, , Disp: , Rfl:     tramadol (ULTRAM) 50 mg tablet, Take 1-2 tablets ( mg total) by mouth every 6 (six) hours as needed for Pain., Disp: 30 tablet, Rfl: 0    zaleplon (SONATA) 5 MG Cap, , Disp: , Rfl:   ALLERGIES:   Review of patient's allergies indicates:   Allergen Reactions    Hydrocodone Rash       VITAL SIGNS: BP (!) 149/101  Pulse 74  Ht 5' 11" (1.803 m)  Wt 103.9 kg (229 lb)  BMI 31.94 kg/m2     PHYSICAL EXAMINATION  VITAL SIGNS: BP (!) 149/101  Pulse 74  Ht 5' 11" (1.803 m)  Wt 103.9 kg (229 lb)  BMI 31.94 kg/m2   General:  The patient is alert and oriented x 3.  Mood is pleasant.  Observation of ears, eyes and nose reveal no gross abnormalities.  HEENT: NCAT, sclera nonicteric  Lungs: Respirations are equal and unlabored.    Right KNEE EXAMINATION     OBSERVATION / INSPECTION   Gait:   Nonantalgic   Alignment:  Neutral   Scars:   None   Muscle atrophy: Mild  Effusion:  moderate  Warmth:  None   Discoloration:   none     TENDERNESS / CREPITUS (T / C):          T / C      T / C   Patella   - / -   Lateral joint line   + / -    Peripatellar medial  +  Medial joint line    + / -    Peripatellar lateral +  Medial plica   - / -    Patellar tendon -   Popliteal fossa  - / -    Quad tendon   -   Gastrocnemius   +   Prepatellar Bursa - / -   Quadricep   -   Tibial tubercle  -  Thigh/hamstring  -   Pes anserine/HS -  Fibula    -   ITB   - / -  Tibia     -   Tib/fib joint  - / -  LCL    -     MFC   - / -   MCL: Proximal  -    LFC   - / -    Distal   -          ROM: (* = pain)  PASSIVE   ACTIVE    Left :   5 / 0 / 135   5 / 0 / 135*     Right :    5 / 0 / 145   5 / 0 / 145    Patellofemoral examination:  See above noted areas of tenderness.   Patella position    Subluxation / dislocation: Centered           Sup. / Inf;   Normal   Crepitus (PF):    Absent   Patellar Mobility:       Medial-lateral:   Normal    Superior-inferior:  Normal    Inferior tilt   Normal    Patellar tendon:  Normal "   Lateral tilt:    Normal   J-sign:     None   Patellofemoral grind:   No pain       MENISCAL SIGNS:     Pain on terminal extension:  +  Pain on terminal flexion:  +  Rosas maneuver:  + for pain  Squat     + posterior joint pain    LIGAMENT EXAMINATION:  ACL / Lachman:  normal (-1 to 2mm)    PCL-Post.  drawer: normal 0 to 2mm  MCL- Valgus:  normal 0 to 2mm  LCL- Varus:  normal 0 to 2mm  Pivot shift: normal (Equal)   Dial Test: difference c/w other side   At 30° flexion: normal (< 5°)    At 90° flexion: normal (< 5°)   Reverse Pivot Shift:   normal (Equal)     STRENGTH: (* = with pain) PAINFUL SIDE   Quadricep   4/5   Hamstrin/5    EXTREMITY NEURO-VASCULAR EXAMINATION:   Sensation:  Grossly intact to light touch all dermatomal regions.   Motor Function:  Fully intact motor function at hip, knee, foot and ankle    DTRs;  quadriceps and  achilles 2+.  No clonus and downgoing Babinski.    Vascular status:  DP and PT pulses 2+, brisk capillary refill, symmetric.     Other Findings:  + juarez sign on right  + ecchymosis of the posterior medial right lower leg  + 2cm circumference difference of right lower leg compared to leg.   No signs of vascular compromise or compartment syndrome today of lower legs.     + bridge and step down test     I feel that some of his popping is due to patella hitting the anterior lateral femoral condyle. He has a new meniscus tear       X-rays:  including standing, weight bearing AP and flexion bilateral knees, lateral and merchant views reviewed by me show:  No fracture, dislocation.    MRI: 3/9/17  Impression     1. Complex global tear of the lateral meniscus with associated bucket-handle component and displaced meniscal fragment within the intercondylar notch.    2. Truncated appearance of the posterior horn of the medial meniscus presumably reflecting partial meniscectomy.    3. Mild patellofemoral and lateral tibiofemoral chondromalacia.    4. Small joint effusion. Popliteal  cyst.            ASSESSMENT:    Acute Right Knee pain, Meniscus tear  lateral     Right lower leg calf pain and swelling    PLAN:   1. MRI results have been reviewed. We have decided to proceed with surgery on the knee, however we will postpone this for now.   2. RICE  3. Continue wearing hinged knee brace.  4. Continue HEP   5. STAT DVT ultrasound of lower extremities with plan to start Xarelto and have him f/u with vascular clinic if positive  6. Pulmonary embolism precautions given  All patients questions were answered. Patient was advised to call us with any concerns or questions.

## 2017-03-15 NOTE — TELEPHONE ENCOUNTER
Spoke to patient about his negative right lower extremity DVT study. No blood clots seen.     I have told him to elevated the leg and use gentle compression for now. Monitor things and call us if issues worsen.     He was given precautions to watch for the development of worsening or severe lower leg pain, pallor of his foot, or if he develops numbness or tingling of the right lower leg or extremity.     Will discuss with Dr. Jeong. Bleeding and ecchymosis could be coming from knee joint and possible ruptured hemarthrosis or synovial leak

## 2017-03-16 ENCOUNTER — TELEPHONE (OUTPATIENT)
Dept: SPORTS MEDICINE | Facility: CLINIC | Age: 47
End: 2017-03-16

## 2017-03-16 NOTE — TELEPHONE ENCOUNTER
Pt informed that script ready for pickup here at clinic  Also pt set pre op appt for Monday with Dr. LEA

## 2017-03-20 ENCOUNTER — OFFICE VISIT (OUTPATIENT)
Dept: FAMILY MEDICINE | Facility: CLINIC | Age: 47
End: 2017-03-20
Payer: COMMERCIAL

## 2017-03-20 ENCOUNTER — LAB VISIT (OUTPATIENT)
Dept: LAB | Facility: HOSPITAL | Age: 47
End: 2017-03-20
Attending: INTERNAL MEDICINE
Payer: COMMERCIAL

## 2017-03-20 VITALS
DIASTOLIC BLOOD PRESSURE: 90 MMHG | OXYGEN SATURATION: 98 % | HEART RATE: 78 BPM | BODY MASS INDEX: 32.29 KG/M2 | TEMPERATURE: 98 F | SYSTOLIC BLOOD PRESSURE: 132 MMHG | WEIGHT: 230.63 LBS | HEIGHT: 71 IN

## 2017-03-20 DIAGNOSIS — I10 ESSENTIAL HYPERTENSION: ICD-10-CM

## 2017-03-20 DIAGNOSIS — Z83.3 FAMILY HISTORY OF DIABETES MELLITUS: ICD-10-CM

## 2017-03-20 DIAGNOSIS — Z01.818 PREOPERATIVE EXAMINATION, UNSPECIFIED: Primary | ICD-10-CM

## 2017-03-20 DIAGNOSIS — M25.561 ACUTE PAIN OF RIGHT KNEE: ICD-10-CM

## 2017-03-20 DIAGNOSIS — F98.8 ADD (ATTENTION DEFICIT DISORDER): ICD-10-CM

## 2017-03-20 DIAGNOSIS — E78.6 HIGH-DENSITY LIPOPROTEIN DEFICIENCY: ICD-10-CM

## 2017-03-20 LAB
ALBUMIN SERPL BCP-MCNC: 3.9 G/DL
ALP SERPL-CCNC: 87 U/L
ALT SERPL W/O P-5'-P-CCNC: 31 U/L
ANION GAP SERPL CALC-SCNC: 10 MMOL/L
AST SERPL-CCNC: 26 U/L
BASOPHILS # BLD AUTO: 0.02 K/UL
BASOPHILS NFR BLD: 0.3 %
BILIRUB SERPL-MCNC: 0.8 MG/DL
BUN SERPL-MCNC: 23 MG/DL
CALCIUM SERPL-MCNC: 8.9 MG/DL
CHLORIDE SERPL-SCNC: 108 MMOL/L
CHOLEST/HDLC SERPL: 3.8 {RATIO}
CO2 SERPL-SCNC: 22 MMOL/L
CREAT SERPL-MCNC: 1.1 MG/DL
DIFFERENTIAL METHOD: ABNORMAL
EOSINOPHIL # BLD AUTO: 0 K/UL
EOSINOPHIL NFR BLD: 0.2 %
ERYTHROCYTE [DISTWIDTH] IN BLOOD BY AUTOMATED COUNT: 15.1 %
EST. GFR  (AFRICAN AMERICAN): >60 ML/MIN/1.73 M^2
EST. GFR  (NON AFRICAN AMERICAN): >60 ML/MIN/1.73 M^2
GLUCOSE SERPL-MCNC: 87 MG/DL
HCT VFR BLD AUTO: 45.5 %
HDL/CHOLESTEROL RATIO: 26.2 %
HDLC SERPL-MCNC: 149 MG/DL
HDLC SERPL-MCNC: 39 MG/DL
HGB BLD-MCNC: 14.6 G/DL
LDLC SERPL CALC-MCNC: 90.6 MG/DL
LYMPHOCYTES # BLD AUTO: 1.4 K/UL
LYMPHOCYTES NFR BLD: 23.7 %
MCH RBC QN AUTO: 31.8 PG
MCHC RBC AUTO-ENTMCNC: 32.1 %
MCV RBC AUTO: 99 FL
MONOCYTES # BLD AUTO: 0.9 K/UL
MONOCYTES NFR BLD: 15.1 %
NEUTROPHILS # BLD AUTO: 3.6 K/UL
NEUTROPHILS NFR BLD: 60.5 %
NONHDLC SERPL-MCNC: 110 MG/DL
PLATELET # BLD AUTO: 275 K/UL
PMV BLD AUTO: 11.2 FL
POTASSIUM SERPL-SCNC: 4.7 MMOL/L
PROT SERPL-MCNC: 7.1 G/DL
RBC # BLD AUTO: 4.59 M/UL
SODIUM SERPL-SCNC: 140 MMOL/L
TRIGL SERPL-MCNC: 97 MG/DL
TSH SERPL DL<=0.005 MIU/L-ACNC: 1.95 UIU/ML
WBC # BLD AUTO: 5.91 K/UL

## 2017-03-20 PROCEDURE — 99999 PR PBB SHADOW E&M-EST. PATIENT-LVL III: CPT | Mod: PBBFAC,,, | Performed by: INTERNAL MEDICINE

## 2017-03-20 PROCEDURE — 80061 LIPID PANEL: CPT

## 2017-03-20 PROCEDURE — 85025 COMPLETE CBC W/AUTO DIFF WBC: CPT

## 2017-03-20 PROCEDURE — 36415 COLL VENOUS BLD VENIPUNCTURE: CPT | Mod: PO

## 2017-03-20 PROCEDURE — 80053 COMPREHEN METABOLIC PANEL: CPT

## 2017-03-20 PROCEDURE — 83036 HEMOGLOBIN GLYCOSYLATED A1C: CPT

## 2017-03-20 PROCEDURE — 99244 OFF/OP CNSLTJ NEW/EST MOD 40: CPT | Mod: S$GLB,,, | Performed by: INTERNAL MEDICINE

## 2017-03-20 PROCEDURE — 84443 ASSAY THYROID STIM HORMONE: CPT

## 2017-03-20 RX ORDER — OXYCODONE AND ACETAMINOPHEN 5; 325 MG/1; MG/1
1 TABLET ORAL EVERY 6 HOURS PRN
Qty: 80 TABLET | Refills: 0 | Status: SHIPPED | OUTPATIENT
Start: 2017-03-20 | End: 2017-08-29

## 2017-03-20 RX ORDER — DEXTROAMPHETAMINE SACCHARATE, AMPHETAMINE ASPARTATE MONOHYDRATE, DEXTROAMPHETAMINE SULFATE AND AMPHETAMINE SULFATE 7.5; 7.5; 7.5; 7.5 MG/1; MG/1; MG/1; MG/1
CAPSULE, EXTENDED RELEASE ORAL
Qty: 60 CAPSULE | Refills: 0 | Status: SHIPPED | OUTPATIENT
Start: 2017-04-16 | End: 2017-06-21 | Stop reason: SDUPTHER

## 2017-03-20 RX ORDER — DEXTROAMPHETAMINE SACCHARATE, AMPHETAMINE ASPARTATE MONOHYDRATE, DEXTROAMPHETAMINE SULFATE AND AMPHETAMINE SULFATE 7.5; 7.5; 7.5; 7.5 MG/1; MG/1; MG/1; MG/1
CAPSULE, EXTENDED RELEASE ORAL
Qty: 60 CAPSULE | Refills: 0 | Status: SHIPPED | OUTPATIENT
Start: 2017-05-16 | End: 2017-06-21 | Stop reason: SDUPTHER

## 2017-03-20 RX ORDER — DEXTROAMPHETAMINE SACCHARATE, AMPHETAMINE ASPARTATE MONOHYDRATE, DEXTROAMPHETAMINE SULFATE AND AMPHETAMINE SULFATE 7.5; 7.5; 7.5; 7.5 MG/1; MG/1; MG/1; MG/1
CAPSULE, EXTENDED RELEASE ORAL
Qty: 60 CAPSULE | Refills: 0 | Status: SHIPPED | OUTPATIENT
Start: 2017-06-16 | End: 2017-03-24 | Stop reason: CLARIF

## 2017-03-20 NOTE — PROGRESS NOTES
Chief complaint: Discuss preop, seen in consultation from Dr. Jeong in orthopedics    46-year-old white male who a few weeks ago while twisting injury to his right knee.  He apparently has a complex medial meniscus tear.  He has a moderate amount of pain swelling locking and the sensation of giving out.  He will be having general anesthesia and surgery on the meniscus.  He will have a reduction in activity for about 6 weeks and I encouraged him to communicate further with orthopedics about the details of that.  Tramadol is not controlling his pain and he tolerated Percocet in the past and is ALLERGIC to hydrocodone.  I will represcribed his Percocet.  We will update his labs which have not been checked in about 2 years.  He has a remote history of a low HDL.  He has a family history diabetes with a slight glucose elevation in the past.  He has no other cardiopulmonary or infectious symptoms.  He does not need any medication adjustment prior to surgery.  His prior EKG was reviewed and was normal.  Stress testing reviewed and was normal in 2015.    ROS:   CONST: weight stable. EYES: no vision change. ENT: no sore throat. CV: no chest pain w/ exertion. RESP: no shortness of breath. GI: no nausea, vomiting, diarrhea. No dysphagia. : no urinary issues. MUSCULOSKELETAL: no new myalgias or arthralgias. SKIN: no new changes. NEURO: no focal deficits. PSYCH: no new issues. ENDOCRINE: no polyuria. HEME: no lymph nodes. ALLERGY: no general pruritis.    PAST MEDICAL HISTORY:                                                        1.  Hypertension.                                                            2.  Depression, sees Psychiatry.                                             3.  ADD, sees Psychiatry.  Dr Emery                                                  4.  Right knee scope.   5.  Low HDL 1999   6.  Insomnia  7.  Sleep eating with Ambien   8.  probable seizure disorder 2015  9. Orthostatic hypotension, he worked  up by cardiology 2015, may relate to seizures   10.  Left Colles' fracture with surgical repair 2016  11.  Right medial meniscus injury 2017                                                                                                                                 SOCIAL HISTORY:  Does not smoke, social alcohol, never smoked.                                                                                            FAMILY HISTORY:  Mom with seizures.  Father with hypertension, diabetes, .     He has no siblings.           Vital signs as above,Gen: no distress  EYES: conjunctiva clear, non-icteric, PERRL  ENT: nose clear, nasal mucosa normal, oropharynx clear and moist, teeth good  NECK:supple, thyroid non-palpable  RESP: effort is good, lungs clear  CV: heart RRR w/o murmur, gallops or rubs; no carotid bruits, no edema  GI: abdomen soft, non-distended, non-tender, no hepatosplenomegaly  MS: gait normal, no clubbing or cyanosis of the digits, no effusions  SKIN: no rashes, warm to touch, some bruising in the anterior and posterior right lower extremity but nontender and no edema         EKG personally reviewed by me, normal previously by my interpretation.  Labs and prior x-ray reports reviewed      Nirmal was seen today for pre-op exam.    Diagnoses and all orders for this visit:    Preoperative examination, unspecified, low cardiopulmonary risk and patient is cleared for surgery.  I will update his labs    Acute pain of right knee, prescription pain control was done    ADD (attention deficit disorder) prescription management done    Essential hypertension, chronic and stable  -     Comprehensive metabolic panel; Future  -     CBC auto differential; Future  -     TSH; Future  -     Lipid panel; Future    Family history of diabetes mellitus  -     Hemoglobin A1c; Future    High-density lipoprotein deficiency, reassess    Other orders  -     Cancel: Lipid panel; Future  -     Cancel: Influenza - Quadrivalent (3  "years & older) (PF)  -     dextroamphetamine-amphetamine (ADDERALL XR) 30 MG 24 hr capsule; 2 a day  -     dextroamphetamine-amphetamine (ADDERALL XR) 30 MG 24 hr capsule; 2 a day  -     dextroamphetamine-amphetamine (ADDERALL XR) 30 MG 24 hr capsule; 2 a day  -     oxycodone-acetaminophen (PERCOCET) 5-325 mg per tablet; Take 1 tablet by mouth every 6 (six) hours as needed for Pain.        Clinical note will be sensitive as doing controlled substance management/monitoring."This note will not be shared with the patient."  "

## 2017-03-21 LAB
ESTIMATED AVG GLUCOSE: 105 MG/DL
HBA1C MFR BLD HPLC: 5.3 %

## 2017-03-23 DIAGNOSIS — S83.289S TEAR OF LATERAL CARTILAGE OR MENISCUS OF KNEE, CURRENT, UNSPECIFIED LATERALITY, SEQUELA: Primary | ICD-10-CM

## 2017-03-23 DIAGNOSIS — M23.90 UNSPECIFIED INTERNAL DERANGEMENT OF KNEE: ICD-10-CM

## 2017-03-23 DIAGNOSIS — M17.10 LOCALIZED OSTEOARTHROSIS NOT SPECIFIED WHETHER PRIMARY OR SECONDARY, LOWER LEG: ICD-10-CM

## 2017-03-24 ENCOUNTER — OFFICE VISIT (OUTPATIENT)
Dept: SPORTS MEDICINE | Facility: CLINIC | Age: 47
End: 2017-03-24
Payer: COMMERCIAL

## 2017-03-24 ENCOUNTER — ANESTHESIA EVENT (OUTPATIENT)
Dept: SURGERY | Facility: OTHER | Age: 47
End: 2017-03-24
Payer: COMMERCIAL

## 2017-03-24 ENCOUNTER — HOSPITAL ENCOUNTER (OUTPATIENT)
Dept: PREADMISSION TESTING | Facility: OTHER | Age: 47
Discharge: HOME | End: 2017-03-24
Attending: ORTHOPAEDIC SURGERY
Payer: COMMERCIAL

## 2017-03-24 VITALS
HEIGHT: 71 IN | WEIGHT: 229 LBS | BODY MASS INDEX: 32.06 KG/M2 | HEART RATE: 116 BPM | SYSTOLIC BLOOD PRESSURE: 164 MMHG | DIASTOLIC BLOOD PRESSURE: 105 MMHG

## 2017-03-24 VITALS
WEIGHT: 229 LBS | SYSTOLIC BLOOD PRESSURE: 156 MMHG | TEMPERATURE: 98 F | HEIGHT: 71 IN | HEART RATE: 119 BPM | DIASTOLIC BLOOD PRESSURE: 97 MMHG | OXYGEN SATURATION: 100 % | BODY MASS INDEX: 32.06 KG/M2

## 2017-03-24 DIAGNOSIS — S83.271A COMPLEX TEAR OF LATERAL MENISCUS OF RIGHT KNEE AS CURRENT INJURY, INITIAL ENCOUNTER: Primary | ICD-10-CM

## 2017-03-24 PROCEDURE — 99999 PR PBB SHADOW E&M-EST. PATIENT-LVL IV: CPT | Mod: PBBFAC,,, | Performed by: PHYSICIAN ASSISTANT

## 2017-03-24 PROCEDURE — 99499 UNLISTED E&M SERVICE: CPT | Mod: S$GLB,,, | Performed by: PHYSICIAN ASSISTANT

## 2017-03-24 RX ORDER — OXYCODONE HYDROCHLORIDE 5 MG/1
5 TABLET ORAL ONCE AS NEEDED
Status: CANCELLED | OUTPATIENT
Start: 2017-03-24 | End: 2017-03-24

## 2017-03-24 RX ORDER — SODIUM CHLORIDE, SODIUM LACTATE, POTASSIUM CHLORIDE, CALCIUM CHLORIDE 600; 310; 30; 20 MG/100ML; MG/100ML; MG/100ML; MG/100ML
INJECTION, SOLUTION INTRAVENOUS CONTINUOUS
Status: CANCELLED | OUTPATIENT
Start: 2017-03-24

## 2017-03-24 RX ORDER — LIDOCAINE HYDROCHLORIDE 10 MG/ML
1 INJECTION, SOLUTION EPIDURAL; INFILTRATION; INTRACAUDAL; PERINEURAL ONCE
Status: CANCELLED | OUTPATIENT
Start: 2017-03-24 | End: 2017-03-24

## 2017-03-24 RX ORDER — MIDAZOLAM HYDROCHLORIDE 5 MG/ML
4 INJECTION INTRAMUSCULAR; INTRAVENOUS
Status: CANCELLED | OUTPATIENT
Start: 2017-03-24

## 2017-03-24 RX ORDER — FAMOTIDINE 20 MG/1
20 TABLET, FILM COATED ORAL
Status: CANCELLED | OUTPATIENT
Start: 2017-03-24 | End: 2017-03-24

## 2017-03-24 RX ORDER — OXYCODONE AND ACETAMINOPHEN 10; 325 MG/1; MG/1
TABLET ORAL
Qty: 30 TABLET | Refills: 0 | Status: SHIPPED | OUTPATIENT
Start: 2017-03-24 | End: 2017-08-29

## 2017-03-24 RX ORDER — TRAMADOL HYDROCHLORIDE 50 MG/1
50-100 TABLET ORAL EVERY 6 HOURS PRN
Qty: 30 TABLET | Refills: 0 | Status: SHIPPED | OUTPATIENT
Start: 2017-03-24 | End: 2017-08-29

## 2017-03-24 NOTE — DISCHARGE INSTRUCTIONS
PRE-ADMIT TESTING -  [phone]    [address]  [facility]        OUTPATIENT SURGERY UNIT - [phone]    Your surgery has been scheduled at [facility]. We are pleased to have the opportunity to serve you. For Further Information please call [phone].    On the day of surgery please report to the Information Desk on the 1st floor.    CONTACT YOUR PHYSICIAN'S OFFICE THE DAY PRIOR TO YOUR SURGERY TO OBTAIN YOUR ARRIVAL TIME.     The evening before surgery do not eat anything after 9 p.m. ( this includes hard candy, chewing gum and mints).  You may have GATORADE, POWERADE AND WATER FROM 9 p.m. until leaving home to come to the hospital.   DO NOT DRINK ANY LIQUIDS ON THE WAY TO THE HOSPITAL.     SPECIAL MEDICATION INSTRUCTIONS: TAKE medications checked off by the Anesthesiologist on your Medication List.    Angiogram Patients: Take medications as instructed by your physician, including aspirin.     Surgery Patients:    If you take ASPIRIN - Your PHYSICIAN/SURGEON will need to inform you IF/OR when you need to stop taking aspirin prior to your surgery.     Do Not take any medications containing IBUPROFEN.  Do Not Wear any make-up or dark nail polish   (especially eye make-up) to surgery. If you come to surgery with makeup on you will be required to remove the makeup or nail polish.    Do not shave your surgical area at least 5 days prior to your surgery. The surgical prep will be performed at the hospital according to Infection Control regulations.    Leave all valuables at home.   Do Not wear any jewelry or watches, including any metal in body piercings.  Contact Lens must be removed before surgery. Either do not wear the contact lens or bring a case and solution for storage.  Please bring a container for eyeglasses or dentures as required.  Bring any paperwork your physician has provided, such as consent forms,  history and physicals, doctor's orders, etc.   Bring comfortable  clothes that are loose fitting to wear upon discharge. Take into consideration the type of surgery being performed.  Maintain your diet as advised per your physician the day prior to surgery.      Adequate rest the night before surgery is advised.   Park in the Parking lot behind the hospital or in the [facility] across the street from the parking lot. Parking is complimentary.  If you will be discharged the same day as your procedure, please arrange for a responsible adult to drive you home or to accompany you if traveling by taxi.   YOU WILL NOT BE PERMITTED TO DRIVE OR TO LEAVE THE HOSPITAL ALONE AFTER SURGERY.   It is strongly recommended that you arrange for someone to remain with you for the first 24 hrs following your surgery.       Thank you for your cooperation.  The Staff of [facility].        Bathing Instructions                                                                 Please shower the evening before and morning of your procedure with    ANTIBACTERIAL SOAP. ( DIAL, etc )  Concentrate on the surgical area   for at least 3 minutes and rinse completely. Dry off as usual.   Do not use any deodorant, powder, body lotions, perfume, after shave or    cologne.

## 2017-03-24 NOTE — MR AVS SNAPSHOT
[facility]  [address]  [facility] [address]  Phone: [phone]                  Nirmal Reardonen   3/24/2017 2:30 PM   Appointment    Description:  Male : 1970   Provider:  Jayant Flores III, PA-C   Department:  [facility]                To Do List           Future Appointments        Provider Department Dept Phone    3/24/2017 2:30 PM Jayant Flores III, PA-C [facility] [phone]    2017 8:15 AM Gwendolyn Jeong MD [facility] [phone]      Your Future Surgeries/Procedures     Mar 28, 2017   Surgery with Gwendolyn Jeong MD   [facility]    [address]  [facility] [id number] [phone]              Goals (5 Years of Data)     None      [facility]     [facility] Line -  Assistance  Registered nurses in the [facility] provide clinical advisement, health education, appointment booking, and other advisory services.  Call for this free service at [phone].             Medications           Message regarding Medications     Verify the changes and/or additions to your medication regime listed below are the same as discussed with your clinician today.  If any of these changes or additions are incorrect, please notify your healthcare provider.             Verify that the below list of medications is an accurate representation of the medications you are currently taking.  If none reported, the list may be blank. If incorrect, please contact your healthcare provider. Carry this list with you in case of emergency.           Current Medications     amlodipine (NORVASC) 10 MG tablet Take 1 tablet (10 mg total) by mouth once daily.    dextroamphetamine-amphetamine (ADDERALL XR) 30 MG 24 hr capsule Starting on 2017. 2 a day    dextroamphetamine-amphetamine (ADDERALL XR) 30 MG 24 hr capsule Starting on May 16, 2017. 2 a day     hydrochlorothiazide (HYDRODIURIL) 25 MG tablet TAKE ONE Tablet BY MOUTH DAILY    irbesartan (AVAPRO) 300 MG tablet TAKE ONE TABLET BY MOUTH EVERY EVENING *IN PLACE OF LOSARTAN*    levetiracetam (KEPPRA) 750 MG Tab Take 1 tablet (750 mg total) by mouth 2 (two) times daily.    losartan (COZAAR) 100 MG tablet Take 1 tablet (100 mg total) by mouth once daily.    montelukast (SINGULAIR) 10 mg tablet TAKE ONE TABLET BY MOUTH EVERY EVENING    oxycodone-acetaminophen (PERCOCET) 5-325 mg per tablet Take 1 tablet by mouth every 6 (six) hours as needed for Pain.    paroxetine (PAXIL) 40 MG tablet TAKE ONE Tablet BY MOUTH EVERY MORNING           Clinical Reference Information           Allergies as of 3/24/2017     Hydrocodone      Immunizations Administered on Date of Encounter - 3/24/2017     None      Language Assistance Services     ATTENTION: Language assistance services are available, free of charge. Please call [phone].      ATENCIÓN: Si samla remigio, tiene a johnson disposición servicios gratuitos de asistencia lingüística. Llame al [phone].     ANNIE Ý: N?u b?n nói Ti?ng Vi?t, có các d?ch v? h? tr? ngôn ng? mi?n phí dành cho b?n. G?i s? [phone].         [facility] complies with applicable Federal civil rights laws and does not discriminate on the basis of race, color, national origin, age, disability, or sex.

## 2017-03-24 NOTE — IP AVS SNAPSHOT
[facility] Location ([facility])  [address] [place], [place] [address]  Phone: [phone]           Patient Discharge Instructions    Our goal is to set you up for success. This packet includes information on your condition, medications, and your home care. It will help you to care for yourself so you don't get sicker.     Please ask your nurse if you have any questions.        There are many details to remember when preparing for your surgery. Here is what you will need to do, please ask your nurse if there are more specific instructions and if you have any questions:    1. 24 hours before procedure Do not smoke or drink alcoholic beverages 24 hours prior to your procedure    2. Eating before procedure Do not eat or drink anything 8 hours before your procedure - this includes gum, mints, and candy.     3. Day of procedure Please remove all jewelry for the procedure. If you wear contact lenses, dentures, hearing aids or glasses, bring a container to put them in during your surgery and give to a family member for safekeeping.  If your doctor has scheduled you for an overnight stay, bring a small overnight bag with any personal items that you need.    4. After procedure Make arrangements in advance for transportation home by a responsible adult. It is not safe to drive a vehicle during the 24 hours following surgery.     PLEASE NOTE: You may be contacted the day before your surgery to confirm your surgery date and arrival time. The Surgery schedule has many variables which may affect the time of your surgery case. Family members should be available if your surgery time changes.                [facility]  Unless otherwise directed by your provider, please contact [facility], our nurse care line that is available for 24/7 assistance.     [phone] (toll-free)    Registered nurses in the [facility] provide clinical advisement, health education, appointment booking, and other  advisory services.                    ** Verify the list of medication(s) below is accurate and up to date. Carry this with you in case of emergency. If your medications have changed, please notify your healthcare provider.             Medication List      TAKE these medications        Additional Info                      amlodipine 10 MG tablet   Commonly known as:  NORVASC   Quantity:  90 tablet   Refills:  12   Dose:  10 mg    Instructions:  Take 1 tablet (10 mg total) by mouth once daily.     Begin Date    AM    Noon    PM    Bedtime       * dextroamphetamine-amphetamine 30 MG 24 hr capsule   Commonly known as:  ADDERALL XR   Quantity:  60 capsule   Refills:  0   What changed:  Another medication with the same name was removed. Continue taking this medication, and follow the directions you see here.    Start Date:  4/16/2017   Instructions:  2 a day     Begin Date    AM    Noon    PM    Bedtime       * dextroamphetamine-amphetamine 30 MG 24 hr capsule   Commonly known as:  ADDERALL XR   Quantity:  60 capsule   Refills:  0   What changed:  Another medication with the same name was removed. Continue taking this medication, and follow the directions you see here.    Start Date:  5/16/2017   Instructions:  2 a day     Begin Date    AM    Noon    PM    Bedtime       hydrochlorothiazide 25 MG tablet   Commonly known as:  HYDRODIURIL   Quantity:  90 tablet   Refills:  0    Instructions:  TAKE ONE Tablet BY MOUTH DAILY     Begin Date    AM    Noon    PM    Bedtime       irbesartan 300 MG tablet   Commonly known as:  AVAPRO   Quantity:  30 tablet   Refills:  12    Instructions:  TAKE ONE TABLET BY MOUTH EVERY EVENING *IN PLACE OF LOSARTAN*     Begin Date    AM    Noon    PM    Bedtime       levetiracetam 750 MG Tab   Commonly known as:  KEPPRA   Quantity:  60 tablet   Refills:  11   Dose:  750 mg    Instructions:  Take 1 tablet (750 mg total) by mouth 2 (two) times daily.     Begin Date    AM    Noon    PM    Bedtime        losartan 100 MG tablet   Commonly known as:  COZAAR   Quantity:  90 tablet   Refills:  3   Dose:  100 mg    Instructions:  Take 1 tablet (100 mg total) by mouth once daily.     Begin Date    AM    Noon    PM    Bedtime       montelukast 10 mg tablet   Commonly known as:  SINGULAIR   Quantity:  30 tablet   Refills:  12    Instructions:  TAKE ONE TABLET BY MOUTH EVERY EVENING     Begin Date    AM    Noon    PM    Bedtime       oxycodone-acetaminophen 5-325 mg per tablet   Commonly known as:  PERCOCET   Quantity:  80 tablet   Refills:  0   Dose:  1 tablet    Instructions:  Take 1 tablet by mouth every 6 (six) hours as needed for Pain.     Begin Date    AM    Noon    PM    Bedtime       paroxetine 40 MG tablet   Commonly known as:  PAXIL   Quantity:  90 tablet   Refills:  12    Instructions:  TAKE ONE Tablet BY MOUTH EVERY MORNING     Begin Date    AM    Noon    PM    Bedtime       * Notice:  This list has 2 medication(s) that are the same as other medications prescribed for you. Read the directions carefully, and ask your doctor or other care provider to review them with you.               Please bring to all follow up appointments:    1. A copy of your discharge instructions.  2. All medicines you are currently taking in their original bottles.  3. Identification and insurance card.    Please arrive 15 minutes ahead of scheduled appointment time.    Please call 24 hours in advance if you must reschedule your appointment and/or time.        Your Scheduled Appointments     Mar 24, 2017  2:30 PM CDT   Pre OP with Jayant Flores III, PA-C   [facility]    [address]  [facility] [address]   [phone]            Apr 12, 2017  8:15 AM CDT   Post OP with Gwendolyn Jeong MD   [facility])    [address]  [facility] [address]   [phone]              Your Future Surgeries/Procedures     Mar 28, 2017   Surgery with Gwendolyn Jeong MD   [facility] ([facility])    [address]  [facility] [address]   [phone]                  Discharge Instructions       PRE-ADMIT TESTING -  [phone]    [address]  [facility]        OUTPATIENT SURGERY UNIT - [phone]    Your surgery has been scheduled at [facility]. We are pleased to have the opportunity to serve you. For Further Information please call [phone].    On the day of surgery please report to the Information Desk on the 1st floor.    CONTACT YOUR PHYSICIAN'S OFFICE THE DAY PRIOR TO YOUR SURGERY TO OBTAIN YOUR ARRIVAL TIME.     The evening before surgery do not eat anything after 9 p.m. ( this includes hard candy, chewing gum and mints).  You may have GATORADE, POWERADE AND WATER FROM 9 p.m. until leaving home to come to the hospital.   DO NOT DRINK ANY LIQUIDS ON THE WAY TO THE HOSPITAL.     SPECIAL MEDICATION INSTRUCTIONS: TAKE medications checked off by the Anesthesiologist on your Medication List.    Angiogram Patients: Take medications as instructed by your physician, including aspirin.     Surgery Patients:    If you take ASPIRIN - Your PHYSICIAN/SURGEON will need to inform you IF/OR when you need to stop taking aspirin prior to your surgery.     Do Not take any medications containing IBUPROFEN.  Do Not Wear any make-up or dark nail polish   (especially eye make-up) to surgery. If you come to surgery with makeup on you will be required to remove the makeup or nail polish.    Do not shave your surgical area at least 5 days prior to your surgery. The surgical prep will be performed at the hospital according to Infection Control regulations.    Leave all valuables at home.   Do Not wear any jewelry or watches, including any metal in body piercings.  Contact Lens must be removed before surgery. Either do not wear the contact lens or bring a case and solution for storage.  Please bring a container for eyeglasses or dentures as  "required.  Bring any paperwork your physician has provided, such as consent forms,  history and physicals, doctor's orders, etc.   Bring comfortable clothes that are loose fitting to wear upon discharge. Take into consideration the type of surgery being performed.  Maintain your diet as advised per your physician the day prior to surgery.      Adequate rest the night before surgery is advised.   Park in the Parking lot behind the hospital or in the [facility] across the street from the parking lot. Parking is complimentary.  If you will be discharged the same day as your procedure, please arrange for a responsible adult to drive you home or to accompany you if traveling by taxi.   YOU WILL NOT BE PERMITTED TO DRIVE OR TO LEAVE THE HOSPITAL ALONE AFTER SURGERY.   It is strongly recommended that you arrange for someone to remain with you for the first 24 hrs following your surgery.       Thank you for your cooperation.  The Staff of [facility].        Bathing Instructions                                                                 Please shower the evening before and morning of your procedure with    ANTIBACTERIAL SOAP. ( DIAL, etc )  Concentrate on the surgical area   for at least 3 minutes and rinse completely. Dry off as usual.   Do not use any deodorant, powder, body lotions, perfume, after shave or    cologne.                                                Admission Information     Date & Time Provider Department CSN    3/24/2017 11:30 AM Gwendolyn Jeong MD [facility] [id number]      Care Providers     Provider Role Specialty Primary office phone    Gwendolyn Jeong MD Attending Provider Sports Medicine [phone]      Your Vitals Were     BP Pulse Temp Height Weight SpO2    156/97 (BP Location: Right arm, Patient Position: Sitting, BP Method: Automatic) 119 98.3 °F (36.8 °C) (Oral) 5' 11" (1.803 m) 103.9 kg (229 lb) 100%    BMI                31.94 kg/m2        "   Recent Lab Values        3/20/2017                           9:19 AM           A1C 5.3           Comment for A1C at  9:19 AM on 3/20/2017:  According to ADA guidelines, hemoglobin A1C <7.0% represents  optimal control in non-pregnant diabetic patients.  Different  metrics may apply to specific populations.   Standards of Medical Care in Diabetes - 2016.  For the purpose of screening for the presence of diabetes:  <5.7%     Consistent with the absence of diabetes  5.7-6.4%  Consistent with increasing risk for diabetes   (prediabetes)  >or=6.5%  Consistent with diabetes  Currently no consensus exists for use of hemoglobin A1C  for diagnosis of diabetes for children.        Allergies as of 3/24/2017        Reactions    Hydrocodone Rash      Advance Directives     An advance directive is a document which, in the event you are no longer able to make decisions for yourself, tells your healthcare team what kind of treatment you do or do not want to receive, or who you would like to make those decisions for you.  If you do not currently have an advance directive, [facility] encourages you to create one.  For more information call:  [phone]), [phone] ([phone]),  or log on to [email or web address].        Language Assistance Services     ATTENTION: Language assistance services are available, free of charge. Please call [phone].      ATENCIÓN: Si habla español, tiene a johnson disposición servicios gratuitos de asistencia lingüística. Llame al [phone].     CHÚ Ý: N?u b?n nói Ti?ng Vi?t, có các d?ch v? h? tr? ngôn ng? mi?n phí dành cho b?n. G?i s? [phone].         [facility] complies with applicable Federal civil rights laws and does not discriminate on the basis of race, color, national origin, age, disability, or sex.

## 2017-03-24 NOTE — ANESTHESIA PREPROCEDURE EVALUATION
03/24/2017  Nirmal Moss is a 46 y.o., male.    [facility] Anesthesia Evaluation    I have reviewed the Patient Summary Reports.    I have reviewed the Nursing Notes.   I have reviewed the Medications.     Review of Systems  Anesthesia Hx:  No problems with previous Anesthesia    Social:  Non-Smoker, Social Alcohol Use    Hematology/Oncology:  Hematology Normal   Oncology Normal     EENT/Dental:EENT/Dental Normal   Cardiovascular:   Exercise tolerance: good Hypertension, well controlled    Pulmonary:  Pulmonary Normal    Renal/:   renal calculi    Hepatic/GI:  Hepatic/GI Normal    Musculoskeletal:  Musculoskeletal Normal    Neurological:   Seizures, well controlled    Endocrine:  Endocrine Normal    Psych:   Psychiatric History anxiety depression          Physical Exam  General:  Well nourished, Obesity    Airway/Jaw/Neck:  Airway Findings: Mouth Opening: Normal Tongue: Normal  General Airway Assessment: Adult, Good  Mallampati: I  Jaw/Neck Findings:  Neck ROM: Normal ROM      Dental:  Dental Findings: In tact        Mental Status:  Mental Status Findings:  Cooperative, Alert and Oriented         Anesthesia Plan  Type of Anesthesia, risks & benefits discussed:  Anesthesia Type:  general  Patient's Preference:   Intra-op Monitoring Plan:   Intra-op Monitoring Plan Comments:   Post Op Pain Control Plan:   Post Op Pain Control Plan Comments:   Induction:    Beta Blocker:         Informed Consent: Patient understands risks and agrees with Anesthesia plan.  Questions answered. Anesthesia consent signed with patient.  ASA Score: 2     Day of Surgery Review of History & Physical:    H&P update referred to the surgeon.     Anesthesia Plan Notes: No labs.        Ready For Surgery From Anesthesia Perspective.

## 2017-03-25 RX ORDER — PROMETHAZINE HYDROCHLORIDE 25 MG/1
25 TABLET ORAL EVERY 6 HOURS PRN
Qty: 30 TABLET | Refills: 0 | Status: SHIPPED | OUTPATIENT
Start: 2017-03-25 | End: 2017-08-29

## 2017-03-25 NOTE — H&P
Nirmal Moss  is here for a completion of his perioperative paperwork. he  Is scheduled to undergo     right      A. Knee arthroscopic lateral meniscectomy possible meniscus repair with possible medial meniscectomy  B. Knee arthroscopic possible synovectomy  C. Knee arthroscopic possible plica excision  D. Knee arthroscopic possible chondroplasty on 3/28/17      He is a healthy individual and does need clearance for this procedure, which he has received from his [facility] PCP.     Risks, indications and benefits of the surgical procedure were discussed with the patient. All questions with regard to surgery, rehab, expected return to functional activities, activities of daily living and recreational endeavors were answered to his satisfaction.    Once no other questions were asked, a brief history and physical exam was then performed.      PHYSICAL EXAM:  GEN: A&Ox3, WD WN NAD  HEENT: WNL  CHEST: CTAB, no W/R/R  HEART: RRR, no M/R/G  ABD: Soft, NT ND, BS x4 QUADS  MS; See Epic  NEURO: CN II-XII intact       The surgical consent was then reviewed with the patient, who agreed with all the contents of the consent form and it was signed. he was then given the [facility] surgery packet to bring with him to [facility] for the anesthesia portion of his perioperative paperwork.   For all physicians except for Dr. Connelly, we will email and possibly fax the consent forms and booking sheets to [facility] pre-[facility]mit.    PHYSICAL THERAPY:  He was also instructed regarding physical therapy and will begin on  POD1. He was given a copy of the original prescription to schedule. Another copy of this prescription was also faxed to [facility] [address].    POST OP CARE:instructions were reviewed including care of the wound and dressing after surgery and when he can shower.     PAIN MANAGEMENT: Nirmal Moss was also given his pain management regime, which includes the TENS unit given to him by angelica along with the  education required for its use. He was also instructed regarding the Polar ice unit that will be in place after surgery and his postoperative pain medications.     PAIN MEDICATION:  Percocet 10/325mg 1 po q 4-6 hours prn pain  Ultram 50 mg one p.o. q.4-6 hours p.r.n. breakthrough pain,   Phenergan 25 mg one p.o. q.4-6 hours p.r.n. nausea and vomiting.  Aspirin 325mg BID x 6 weeks for DVT prophylaxis starting on the evening after surgery .    As there were no other questions to be asked, he was given my business card along with Gwendolyn Jeong MD business card if he has any questions or concerns prior to surgery or in the postop period.

## 2017-03-28 ENCOUNTER — HOSPITAL ENCOUNTER (OUTPATIENT)
Facility: OTHER | Age: 47
Discharge: HOME | End: 2017-03-28
Attending: ORTHOPAEDIC SURGERY | Admitting: ORTHOPAEDIC SURGERY
Payer: COMMERCIAL

## 2017-03-28 ENCOUNTER — ANESTHESIA (OUTPATIENT)
Dept: SURGERY | Facility: OTHER | Age: 47
End: 2017-03-28
Payer: COMMERCIAL

## 2017-03-28 VITALS
HEART RATE: 97 BPM | DIASTOLIC BLOOD PRESSURE: 72 MMHG | BODY MASS INDEX: 32.06 KG/M2 | SYSTOLIC BLOOD PRESSURE: 153 MMHG | RESPIRATION RATE: 18 BRPM | WEIGHT: 229 LBS | OXYGEN SATURATION: 99 % | TEMPERATURE: 98 F | HEIGHT: 71 IN

## 2017-03-28 DIAGNOSIS — S83.271A COMPLEX TEAR OF LATERAL MENISCUS OF RIGHT KNEE AS CURRENT INJURY, INITIAL ENCOUNTER: ICD-10-CM

## 2017-03-28 DIAGNOSIS — S83.271D COMPLEX TEAR OF LATERAL MENISCUS OF RIGHT KNEE AS CURRENT INJURY, SUBSEQUENT ENCOUNTER: Primary | ICD-10-CM

## 2017-03-28 PROCEDURE — 25000003 PHARM REV CODE 250: Performed by: NURSE ANESTHETIST, CERTIFIED REGISTERED

## 2017-03-28 PROCEDURE — 36000710: Performed by: ORTHOPAEDIC SURGERY

## 2017-03-28 PROCEDURE — 25000003 PHARM REV CODE 250: Performed by: ANESTHESIOLOGY

## 2017-03-28 PROCEDURE — 71000039 HC RECOVERY, EACH ADD'L HOUR: Performed by: ORTHOPAEDIC SURGERY

## 2017-03-28 PROCEDURE — 63600175 PHARM REV CODE 636 W HCPCS: Performed by: NURSE ANESTHETIST, CERTIFIED REGISTERED

## 2017-03-28 PROCEDURE — 27201423 OPTIME MED/SURG SUP & DEVICES STERILE SUPPLY: Performed by: ORTHOPAEDIC SURGERY

## 2017-03-28 PROCEDURE — 63600175 PHARM REV CODE 636 W HCPCS: Performed by: ORTHOPAEDIC SURGERY

## 2017-03-28 PROCEDURE — 29880 ARTHRS KNE SRG MNISECTMY M&L: CPT | Mod: RT,,, | Performed by: ORTHOPAEDIC SURGERY

## 2017-03-28 PROCEDURE — 71000033 HC RECOVERY, INTIAL HOUR: Performed by: ORTHOPAEDIC SURGERY

## 2017-03-28 PROCEDURE — 36000711: Performed by: ORTHOPAEDIC SURGERY

## 2017-03-28 PROCEDURE — 63600175 PHARM REV CODE 636 W HCPCS: Performed by: SPECIALIST

## 2017-03-28 PROCEDURE — 63600175 PHARM REV CODE 636 W HCPCS: Performed by: ANESTHESIOLOGY

## 2017-03-28 PROCEDURE — 37000008 HC ANESTHESIA 1ST 15 MINUTES: Performed by: ORTHOPAEDIC SURGERY

## 2017-03-28 PROCEDURE — 71000015 HC POSTOP RECOV 1ST HR: Performed by: ORTHOPAEDIC SURGERY

## 2017-03-28 PROCEDURE — 71000016 HC POSTOP RECOV ADDL HR: Performed by: ORTHOPAEDIC SURGERY

## 2017-03-28 PROCEDURE — 25000003 PHARM REV CODE 250: Performed by: SPECIALIST

## 2017-03-28 PROCEDURE — 37000009 HC ANESTHESIA EA ADD 15 MINS: Performed by: ORTHOPAEDIC SURGERY

## 2017-03-28 RX ORDER — MEPERIDINE HYDROCHLORIDE 50 MG/ML
12.5 INJECTION INTRAMUSCULAR; INTRAVENOUS; SUBCUTANEOUS ONCE AS NEEDED
Status: DISCONTINUED | OUTPATIENT
Start: 2017-03-28 | End: 2017-03-28 | Stop reason: HOSPADM

## 2017-03-28 RX ORDER — GLYCOPYRROLATE 0.2 MG/ML
INJECTION INTRAMUSCULAR; INTRAVENOUS
Status: DISCONTINUED | OUTPATIENT
Start: 2017-03-28 | End: 2017-03-28

## 2017-03-28 RX ORDER — SODIUM CHLORIDE, SODIUM LACTATE, POTASSIUM CHLORIDE, CALCIUM CHLORIDE 600; 310; 30; 20 MG/100ML; MG/100ML; MG/100ML; MG/100ML
INJECTION, SOLUTION INTRAVENOUS CONTINUOUS PRN
Status: DISCONTINUED | OUTPATIENT
Start: 2017-03-28 | End: 2017-03-28

## 2017-03-28 RX ORDER — PROPOFOL 10 MG/ML
VIAL (ML) INTRAVENOUS
Status: DISCONTINUED | OUTPATIENT
Start: 2017-03-28 | End: 2017-03-28

## 2017-03-28 RX ORDER — KETOROLAC TROMETHAMINE 30 MG/ML
INJECTION, SOLUTION INTRAMUSCULAR; INTRAVENOUS
Status: DISCONTINUED | OUTPATIENT
Start: 2017-03-28 | End: 2017-03-28 | Stop reason: HOSPADM

## 2017-03-28 RX ORDER — CEFAZOLIN SODIUM 2 G/50ML
2 SOLUTION INTRAVENOUS
Status: DISCONTINUED | OUTPATIENT
Start: 2017-03-28 | End: 2017-03-28 | Stop reason: HOSPADM

## 2017-03-28 RX ORDER — KETAMINE HYDROCHLORIDE 10 MG/ML
INJECTION, SOLUTION INTRAMUSCULAR; INTRAVENOUS
Status: DISCONTINUED | OUTPATIENT
Start: 2017-03-28 | End: 2017-03-28 | Stop reason: HOSPADM

## 2017-03-28 RX ORDER — HYDROMORPHONE HYDROCHLORIDE 2 MG/ML
0.4 INJECTION, SOLUTION INTRAMUSCULAR; INTRAVENOUS; SUBCUTANEOUS EVERY 5 MIN PRN
Status: DISCONTINUED | OUTPATIENT
Start: 2017-03-28 | End: 2017-03-28 | Stop reason: HOSPADM

## 2017-03-28 RX ORDER — OXYCODONE HYDROCHLORIDE 5 MG/1
5 TABLET ORAL
Status: DISCONTINUED | OUTPATIENT
Start: 2017-03-28 | End: 2017-03-28 | Stop reason: HOSPADM

## 2017-03-28 RX ORDER — SODIUM CHLORIDE 0.9 % (FLUSH) 0.9 %
3 SYRINGE (ML) INJECTION
Status: DISCONTINUED | OUTPATIENT
Start: 2017-03-28 | End: 2017-03-28 | Stop reason: HOSPADM

## 2017-03-28 RX ORDER — EPINEPHRINE 1 MG/ML
INJECTION, SOLUTION INTRACARDIAC; INTRAMUSCULAR; INTRAVENOUS; SUBCUTANEOUS
Status: DISCONTINUED | OUTPATIENT
Start: 2017-03-28 | End: 2017-03-28 | Stop reason: HOSPADM

## 2017-03-28 RX ORDER — SODIUM CHLORIDE 9 MG/ML
INJECTION, SOLUTION INTRAVENOUS CONTINUOUS
Status: DISCONTINUED | OUTPATIENT
Start: 2017-03-28 | End: 2017-03-28 | Stop reason: HOSPADM

## 2017-03-28 RX ORDER — ONDANSETRON 2 MG/ML
4 INJECTION INTRAMUSCULAR; INTRAVENOUS ONCE AS NEEDED
Status: DISCONTINUED | OUTPATIENT
Start: 2017-03-28 | End: 2017-03-28 | Stop reason: HOSPADM

## 2017-03-28 RX ORDER — ROPIVACAINE HYDROCHLORIDE 5 MG/ML
INJECTION, SOLUTION EPIDURAL; INFILTRATION; PERINEURAL
Status: DISCONTINUED | OUTPATIENT
Start: 2017-03-28 | End: 2017-03-28 | Stop reason: HOSPADM

## 2017-03-28 RX ORDER — MIDAZOLAM HYDROCHLORIDE 5 MG/ML
4 INJECTION INTRAMUSCULAR; INTRAVENOUS
Status: DISCONTINUED | OUTPATIENT
Start: 2017-03-28 | End: 2017-03-28 | Stop reason: HOSPADM

## 2017-03-28 RX ORDER — FENTANYL CITRATE 50 UG/ML
25 INJECTION, SOLUTION INTRAMUSCULAR; INTRAVENOUS EVERY 5 MIN PRN
Status: DISCONTINUED | OUTPATIENT
Start: 2017-03-28 | End: 2017-03-28 | Stop reason: HOSPADM

## 2017-03-28 RX ORDER — SODIUM CHLORIDE, SODIUM LACTATE, POTASSIUM CHLORIDE, CALCIUM CHLORIDE 600; 310; 30; 20 MG/100ML; MG/100ML; MG/100ML; MG/100ML
INJECTION, SOLUTION INTRAVENOUS CONTINUOUS
Status: DISCONTINUED | OUTPATIENT
Start: 2017-03-28 | End: 2017-03-28 | Stop reason: HOSPADM

## 2017-03-28 RX ORDER — LIDOCAINE HYDROCHLORIDE 10 MG/ML
1 INJECTION, SOLUTION EPIDURAL; INFILTRATION; INTRACAUDAL; PERINEURAL ONCE
Status: DISCONTINUED | OUTPATIENT
Start: 2017-03-28 | End: 2017-03-28 | Stop reason: HOSPADM

## 2017-03-28 RX ORDER — FENTANYL CITRATE 50 UG/ML
INJECTION, SOLUTION INTRAMUSCULAR; INTRAVENOUS
Status: DISCONTINUED | OUTPATIENT
Start: 2017-03-28 | End: 2017-03-28

## 2017-03-28 RX ORDER — FAMOTIDINE 20 MG/1
20 TABLET, FILM COATED ORAL
Status: COMPLETED | OUTPATIENT
Start: 2017-03-28 | End: 2017-03-28

## 2017-03-28 RX ORDER — OXYCODONE HYDROCHLORIDE 5 MG/1
5 TABLET ORAL ONCE AS NEEDED
Status: DISCONTINUED | OUTPATIENT
Start: 2017-03-28 | End: 2017-03-28 | Stop reason: HOSPADM

## 2017-03-28 RX ORDER — PHENYLEPHRINE HYDROCHLORIDE 10 MG/ML
INJECTION INTRAVENOUS
Status: DISCONTINUED | OUTPATIENT
Start: 2017-03-28 | End: 2017-03-28

## 2017-03-28 RX ORDER — ONDANSETRON 2 MG/ML
INJECTION INTRAMUSCULAR; INTRAVENOUS
Status: DISCONTINUED | OUTPATIENT
Start: 2017-03-28 | End: 2017-03-28

## 2017-03-28 RX ADMIN — OXYCODONE HYDROCHLORIDE 5 MG: 5 TABLET ORAL at 02:03

## 2017-03-28 RX ADMIN — PHENYLEPHRINE HYDROCHLORIDE 100 MCG: 10 INJECTION INTRAVENOUS at 12:03

## 2017-03-28 RX ADMIN — SODIUM CHLORIDE, SODIUM LACTATE, POTASSIUM CHLORIDE, AND CALCIUM CHLORIDE: 600; 310; 30; 20 INJECTION, SOLUTION INTRAVENOUS at 10:03

## 2017-03-28 RX ADMIN — ONDANSETRON 4 MG: 2 INJECTION INTRAMUSCULAR; INTRAVENOUS at 01:03

## 2017-03-28 RX ADMIN — GLYCOPYRROLATE 0.2 MG: 0.2 INJECTION, SOLUTION INTRAMUSCULAR; INTRAVENOUS at 12:03

## 2017-03-28 RX ADMIN — FAMOTIDINE 20 MG: 20 TABLET, FILM COATED ORAL at 09:03

## 2017-03-28 RX ADMIN — MIDAZOLAM HYDROCHLORIDE 4 MG: 5 INJECTION, SOLUTION INTRAMUSCULAR; INTRAVENOUS at 09:03

## 2017-03-28 RX ADMIN — FENTANYL CITRATE 100 MCG: 50 INJECTION, SOLUTION INTRAMUSCULAR; INTRAVENOUS at 01:03

## 2017-03-28 RX ADMIN — HYDROMORPHONE HYDROCHLORIDE 0.4 MG: 2 INJECTION, SOLUTION INTRAMUSCULAR; INTRAVENOUS; SUBCUTANEOUS at 01:03

## 2017-03-28 RX ADMIN — FENTANYL CITRATE 100 MCG: 50 INJECTION, SOLUTION INTRAMUSCULAR; INTRAVENOUS at 12:03

## 2017-03-28 RX ADMIN — CARBOXYMETHYLCELLULOSE SODIUM 2 DROP: 2.5 SOLUTION/ DROPS OPHTHALMIC at 12:03

## 2017-03-28 RX ADMIN — FENTANYL CITRATE 50 MCG: 50 INJECTION, SOLUTION INTRAMUSCULAR; INTRAVENOUS at 01:03

## 2017-03-28 RX ADMIN — GLYCOPYRROLATE 0.2 MG: 0.2 INJECTION, SOLUTION INTRAMUSCULAR; INTRAVENOUS at 01:03

## 2017-03-28 RX ADMIN — HYDROMORPHONE HYDROCHLORIDE 0.4 MG: 2 INJECTION, SOLUTION INTRAMUSCULAR; INTRAVENOUS; SUBCUTANEOUS at 02:03

## 2017-03-28 RX ADMIN — PROPOFOL 200 MG: 10 INJECTION, EMULSION INTRAVENOUS at 12:03

## 2017-03-28 NOTE — DISCHARGE INSTRUCTIONS
Discharge Instructions: After Your Surgery  Youve just had surgery. During surgery you were given medicine called anesthesia to keep you relaxed and free of pain. After surgery you may have some pain or nausea. This is common. Here are some tips for feeling better and getting well after surgery.  Going home  Your doctor or nurse will show you how to take care of yourself when you go home. He or she will also answer your questions. Have an adult family member or friend drive you home. For the first 24 hours after your surgery:  · Do not drive or use heavy equipment.  · Do not make important decisions or sign legal papers.  · Do not drink alcohol.  · Have someone stay with you, if needed. He or she can watch for problems and help keep you safe.  Be sure to go to all follow-up visits with your doctor. And rest after your surgery for as long as your doctor tells you to.  Coping with pain  If you have pain after surgery, pain medicine will help you feel better. Take it as told, before pain becomes severe. Also, ask your doctor or pharmacist about other ways to control pain. This might be with heat, ice, or relaxation. And follow any other instructions your surgeon or nurse gives you.  Tips for taking pain medicine  To get the best relief possible, remember these points:  · Pain medicines can upset your stomach. Taking them with a little food may help.  · Most pain relievers taken by mouth need at least 20 to 30 minutes to start to work.  · Taking medicine on a schedule can help you remember to take it. Try to time your medicine so that you can take it before starting an activity. This might be before you get dressed, go for a walk, or sit down for dinner.  · Constipation is a common side effect of pain medicines. Call your doctor before taking any medicines such as laxatives or stool softeners to help ease constipation. Also ask if you should skip any foods. Drinking lots of fluids and eating foods such as fruits and  vegetables that are high in fiber can also help. Remember, do not take laxatives unless your surgeon has prescribed them.  · Drinking alcohol and taking pain medicine can cause dizziness and slow your breathing. It can even be deadly. Do not drink alcohol while taking pain medicine.  · Pain medicine can make you react more slowly to things. Do not drive or run machinery while taking pain medicine.  Your health care provider may tell you to take acetaminophen to help ease your pain. Ask him or her how much you are supposed to take each day. Acetaminophen or other pain relievers may interact with your prescription medicines or other over-the-counter (OTC) drugs. Some prescription medicines have acetaminophen and other ingredients. Using both prescription and OTC acetaminophen for pain can cause you to overdose. Read the labels on your OTC medicines with care. This will help you to clearly know the list of ingredients, how much to take, and any warnings. It may also help you not take too much acetaminophen. If you have questions or do not understand the information, ask your pharmacist or health care provider to explain it to you before you take the OTC medicine.  Managing nausea  Some people have an upset stomach after surgery. This is often because of anesthesia, pain, or pain medicine, or the stress of surgery. These tips will help you handle nausea and eat healthy foods as you get better. If you were on a special food plan before surgery, ask your doctor if you should follow it while you get better. These tips may help:  · Do not push yourself to eat. Your body will tell you when to eat and how much.  · Start off with clear liquids and soup. They are easier to digest.  · Next try semi-solid foods, such as mashed potatoes, applesauce, and gelatin, as you feel ready.  · Slowly move to solid foods. Dont eat fatty, rich, or spicy foods at first.  · Do not force yourself to have 3 large meals a day. Instead eat smaller  amounts more often.  · Take pain medicines with a small amount of solid food, such as crackers or toast, to avoid nausea.     Call your surgeon if  · You still have pain an hour after taking medicine. The medicine may not be strong enough.  · You feel too sleepy, dizzy, or groggy. The medicine may be too strong.  · You have side effects like nausea, vomiting, or skin changes, such as rash, itching, or hives.       If you have obstructive sleep apnea  You were given anesthesia medicine during surgery to keep you comfortable and free of pain. After surgery, you may have more apnea spells because of this medicine and other medicines you were given. The spells may last longer than usual.   At home:  · Keep using the continuous positive airway pressure (CPAP) device when you sleep. Unless your health care provider tells you not to, use it when you sleep, day or night. CPAP is a common device used to treat obstructive sleep apnea.  · Talk with your provider before taking any pain medicine, muscle relaxants, or sedatives. Your provider will tell you about the possible dangers of taking these medicines.  Date Last Reviewed: 10/16/2014  © 7319-1109 [employer]. [address] [place] [address], [place] [address]. All rights reserved. This information is not intended as a substitute for professional medical care. Always follow your healthcare professional's instructions.

## 2017-03-28 NOTE — ANESTHESIA POSTPROCEDURE EVALUATION
"Anesthesia Post Evaluation    Patient: Nirmal Moss    Procedure(s) Performed: Procedure(s) (LRB):  [id number] (Right)  [id number] & LATERAL (Right)    Final Anesthesia Type: general  Patient location during evaluation: PACU  Patient participation: Yes- Able to Participate  Level of consciousness: awake and alert  Post-procedure vital signs: reviewed and stable  Pain management: adequate  Airway patency: patent  PONV status at discharge: No PONV  Anesthetic complications: no      Cardiovascular status: blood pressure returned to baseline  Respiratory status: unassisted  Hydration status: euvolemic  Follow-up not needed.        Visit Vitals    /87    Pulse 102    Temp 36.3 °C (97.4 °F) (Oral)    Resp 18    Ht 5' 11" (1.803 m)    Wt 103.9 kg (229 lb)    SpO2 96%    BMI 31.94 kg/m2       Pain/Gilda Score: Pain Assessment Performed: Yes (3/28/2017  9:17 AM)  Presence of Pain: denies (3/28/2017  9:17 AM)  Pain Rating Prior to Med Admin: 5 (3/28/2017  2:00 PM)  Gilda Score: 10 (3/28/2017  2:00 PM)      "

## 2017-03-28 NOTE — TRANSFER OF CARE
"Anesthesia Transfer of Care Note    Patient: Nirmal Moss    Procedure(s) Performed: Procedure(s) (LRB):  [id number]-[id number] (Right)  [id number] & LATERAL (Right)    Patient location: PACU    Anesthesia Type: general    Transport from OR: Transported from OR on 2-3 L/min O2 by NC with adequate spontaneous ventilation    Post pain: adequate analgesia    Post assessment: no apparent anesthetic complications    Post vital signs: stable    Level of consciousness: responds to stimulation    Nausea/Vomiting: no nausea/vomiting    Complications: none          Last vitals:   Visit Vitals    BP (!) 136/90    Pulse 84    Temp 36.3 °C (97.4 °F)    Resp 18    Ht 5' 11" (1.803 m)    Wt 103.9 kg (229 lb)    SpO2 97%    BMI 31.94 kg/m2     "

## 2017-03-28 NOTE — IP AVS SNAPSHOT
[facility] Location ([place])  [address] [place], [place] [address]  Phone: [phone]           Patient Discharge Instructions   Our goal is to set you up for success. This packet includes information on your condition, medications, and your home care.  It will help you care for yourself to prevent having to return to the hospital.     Please ask your nurse if you have any questions.      There are many details to remember when preparing to leave the hospital. Here is what you will need to do:    1. Take your medicine. If you are prescribed medications, review your Medication List on the following pages. You may have new medications to  at the pharmacy and others that you'll need to stop taking. Review the instructions for how and when to take your medications. Talk with your doctor or nurses if you are unsure of what to do.     2. Go to your follow-up appointments. Specific follow-up information is listed in the following pages. Your may be contacted by a nurse or clinical provider about future appointments. Be sure we have all of the phone numbers to reach you. Please contact your provider's office if you are unable to make an appointment.     3. Watch for warning signs. Your doctor or nurse will give you detailed warning signs to watch for and when to call for assistance. These instructions may also include educational information about your condition. If you experience any of warning signs to your health, call your doctor.               ** Verify the list of medication(s) below is accurate and up to date. Carry this with you in case of emergency. If your medications have changed, please notify your healthcare provider.             Medication List      CONTINUE taking these medications        Additional Info                      amlodipine 10 MG tablet   Commonly known as:  NORVASC   Quantity:  90 tablet   Refills:  12   Dose:  10 mg    Instructions:  Take 1 tablet (10 mg total) by mouth  once daily.     Begin Date    AM    Noon    PM    Bedtime       * dextroamphetamine-amphetamine 30 MG 24 hr capsule   Commonly known as:  ADDERALL XR   Quantity:  60 capsule   Refills:  0    Start Date:  4/16/2017   Instructions:  2 a day     Begin Date    AM    Noon    PM    Bedtime       * dextroamphetamine-amphetamine 30 MG 24 hr capsule   Commonly known as:  ADDERALL XR   Quantity:  60 capsule   Refills:  0    Start Date:  5/16/2017   Instructions:  2 a day     Begin Date    AM    Noon    PM    Bedtime       hydrochlorothiazide 25 MG tablet   Commonly known as:  HYDRODIURIL   Quantity:  90 tablet   Refills:  0    Instructions:  TAKE ONE Tablet BY MOUTH DAILY     Begin Date    AM    Noon    PM    Bedtime       irbesartan 300 MG tablet   Commonly known as:  AVAPRO   Quantity:  30 tablet   Refills:  12    Instructions:  TAKE ONE TABLET BY MOUTH EVERY EVENING *IN PLACE OF LOSARTAN*     Begin Date    AM    Noon    PM    Bedtime       levetiracetam 750 MG Tab   Commonly known as:  KEPPRA   Quantity:  60 tablet   Refills:  11   Dose:  750 mg    Instructions:  Take 1 tablet (750 mg total) by mouth 2 (two) times daily.     Begin Date    AM    Noon    PM    Bedtime       losartan 100 MG tablet   Commonly known as:  COZAAR   Quantity:  90 tablet   Refills:  3   Dose:  100 mg    Instructions:  Take 1 tablet (100 mg total) by mouth once daily.     Begin Date    AM    Noon    PM    Bedtime       montelukast 10 mg tablet   Commonly known as:  SINGULAIR   Quantity:  30 tablet   Refills:  12    Instructions:  TAKE ONE TABLET BY MOUTH EVERY EVENING     Begin Date    AM    Noon    PM    Bedtime       * oxycodone-acetaminophen 5-325 mg per tablet   Commonly known as:  PERCOCET   Quantity:  80 tablet   Refills:  0   Dose:  1 tablet    Instructions:  Take 1 tablet by mouth every 6 (six) hours as needed for Pain.     Begin Date    AM    Noon    PM    Bedtime       * oxycodone-acetaminophen  mg per tablet   Commonly known as:   PERCOCET   Quantity:  30 tablet   Refills:  0    Instructions:  Take 1 tablet by mouth every 4-6 hours as needed for pain. Take stool softener with this medication.     Begin Date    AM    Noon    PM    Bedtime       paroxetine 40 MG tablet   Commonly known as:  PAXIL   Quantity:  90 tablet   Refills:  12    Instructions:  TAKE ONE Tablet BY MOUTH EVERY MORNING     Begin Date    AM    Noon    PM    Bedtime       promethazine 25 MG tablet   Commonly known as:  PHENERGAN   Quantity:  30 tablet   Refills:  0   Dose:  25 mg    Instructions:  Take 1 tablet (25 mg total) by mouth every 6 (six) hours as needed for Nausea.     Begin Date    AM    Noon    PM    Bedtime       tramadol 50 mg tablet   Commonly known as:  ULTRAM   Quantity:  30 tablet   Refills:  0   Dose:   mg    Instructions:  Take 1-2 tablets ( mg total) by mouth every 6 (six) hours as needed for Pain.     Begin Date    AM    Noon    PM    Bedtime       * Notice:  This list has 4 medication(s) that are the same as other medications prescribed for you. Read the directions carefully, and ask your doctor or other care provider to review them with you.               Please bring to all follow up appointments:    1. A copy of your discharge instructions.  2. All medicines you are currently taking in their original bottles.  3. Identification and insurance card.    Please arrive 15 minutes ahead of scheduled appointment time.    Please call 24 hours in advance if you must reschedule your appointment and/or time.        Your Scheduled Appointments     Apr 12, 2017 11:45 AM CDT   Post OP with Gwendolyn Jeong MD   [place] [facility])    [address]  [facility] [address]   [phone]              Follow-up Information     Follow up with Gwendolyn Jeong MD.    Specialties:  Sports Medicine, Orthopedic Surgery    Why:  as scheduled preop    Contact information:    [address]  [facility] [address]  [phone]       "    Discharge Instructions     Future Orders    Activity as tolerated     Call MD for:  persistent nausea and vomiting or diarrhea     Call MD for:  redness, tenderness, or signs of infection (pain, swelling, redness, odor or green/yellow discharge around incision site)     Call MD for:  severe uncontrolled pain     Call MD for:  temperature >100.4     CRUTCHES FOR HOME USE     Questions:    Type:  Axillary    Height:  5' 11" (1.803 m)    Weight:  103.9 kg (229 lb)    Does patient have medical equipment at home?:      Other:      Length of need (1-99 months):  2    Diet general     Questions:    Total calories:      Fat restriction, if any:      Protein restriction, if any:      Na restriction, if any:      Fluid restriction:      Additional restrictions:          Discharge Instructions         Discharge Instructions: After Your Surgery  Youve just had surgery. During surgery you were given medicine called anesthesia to keep you relaxed and free of pain. After surgery you may have some pain or nausea. This is common. Here are some tips for feeling better and getting well after surgery.  Going home  Your doctor or nurse will show you how to take care of yourself when you go home. He or she will also answer your questions. Have an adult family member or friend drive you home. For the first 24 hours after your surgery:  · Do not drive or use heavy equipment.  · Do not make important decisions or sign legal papers.  · Do not drink alcohol.  · Have someone stay with you, if needed. He or she can watch for problems and help keep you safe.  Be sure to go to all follow-up visits with your doctor. And rest after your surgery for as long as your doctor tells you to.  Coping with pain  If you have pain after surgery, pain medicine will help you feel better. Take it as told, before pain becomes severe. Also, ask your doctor or pharmacist about other ways to control pain. This might be with heat, ice, or relaxation. And follow " any other instructions your surgeon or nurse gives you.  Tips for taking pain medicine  To get the best relief possible, remember these points:  · Pain medicines can upset your stomach. Taking them with a little food may help.  · Most pain relievers taken by mouth need at least 20 to 30 minutes to start to work.  · Taking medicine on a schedule can help you remember to take it. Try to time your medicine so that you can take it before starting an activity. This might be before you get dressed, go for a walk, or sit down for dinner.  · Constipation is a common side effect of pain medicines. Call your doctor before taking any medicines such as laxatives or stool softeners to help ease constipation. Also ask if you should skip any foods. Drinking lots of fluids and eating foods such as fruits and vegetables that are high in fiber can also help. Remember, do not take laxatives unless your surgeon has prescribed them.  · Drinking alcohol and taking pain medicine can cause dizziness and slow your breathing. It can even be deadly. Do not drink alcohol while taking pain medicine.  · Pain medicine can make you react more slowly to things. Do not drive or run machinery while taking pain medicine.  Your health care provider may tell you to take acetaminophen to help ease your pain. Ask him or her how much you are supposed to take each day. Acetaminophen or other pain relievers may interact with your prescription medicines or other over-the-counter (OTC) drugs. Some prescription medicines have acetaminophen and other ingredients. Using both prescription and OTC acetaminophen for pain can cause you to overdose. Read the labels on your OTC medicines with care. This will help you to clearly know the list of ingredients, how much to take, and any warnings. It may also help you not take too much acetaminophen. If you have questions or do not understand the information, ask your pharmacist or health care provider to explain it to you  before you take the OTC medicine.  Managing nausea  Some people have an upset stomach after surgery. This is often because of anesthesia, pain, or pain medicine, or the stress of surgery. These tips will help you handle nausea and eat healthy foods as you get better. If you were on a special food plan before surgery, ask your doctor if you should follow it while you get better. These tips may help:  · Do not push yourself to eat. Your body will tell you when to eat and how much.  · Start off with clear liquids and soup. They are easier to digest.  · Next try semi-solid foods, such as mashed potatoes, applesauce, and gelatin, as you feel ready.  · Slowly move to solid foods. Dont eat fatty, rich, or spicy foods at first.  · Do not force yourself to have 3 large meals a day. Instead eat smaller amounts more often.  · Take pain medicines with a small amount of solid food, such as crackers or toast, to avoid nausea.     Call your surgeon if  · You still have pain an hour after taking medicine. The medicine may not be strong enough.  · You feel too sleepy, dizzy, or groggy. The medicine may be too strong.  · You have side effects like nausea, vomiting, or skin changes, such as rash, itching, or hives.       If you have obstructive sleep apnea  You were given anesthesia medicine during surgery to keep you comfortable and free of pain. After surgery, you may have more apnea spells because of this medicine and other medicines you were given. The spells may last longer than usual.   At home:  · Keep using the continuous positive airway pressure (CPAP) device when you sleep. Unless your health care provider tells you not to, use it when you sleep, day or night. CPAP is a common device used to treat obstructive sleep apnea.  · Talk with your provider before taking any pain medicine, muscle relaxants, or sedatives. Your provider will tell you about the possible dangers of taking these medicines.  Date Last Reviewed:  "10/16/2014  © 9367-6835 [employer]. [address] [place] [address], [place] [address]. All rights reserved. This information is not intended as a substitute for professional medical care. Always follow your healthcare professional's instructions.            Primary Diagnosis     Your primary diagnosis was:  Tear Of Lateral Meniscus Of Knee      Admission Information     Date & Time Provider Department CSN    3/28/2017  8:51 AM Gwendolyn Jeong MD [facility] [id number]      Care Providers     Provider Role Specialty Primary office phone    Gwendolyn Jeong MD Attending Provider Sports Medicine [phone]    Gwendolyn Jeong MD Surgeon  Sports Medicine [phone]      Your Vitals Were     BP Pulse Temp Resp Height Weight    154/86 102 97.7 °F (36.5 °C) (Oral) 18 5' 11" (1.803 m) 103.9 kg (229 lb)    SpO2 BMI             98% 31.94 kg/m2         Recent Lab Values        3/20/2017                           9:19 AM           A1C 5.3           Comment for A1C at  9:19 AM on 3/20/2017:  According to ADA guidelines, hemoglobin A1C <7.0% represents  optimal control in non-pregnant diabetic patients.  Different  metrics may apply to specific populations.   Standards of Medical Care in Diabetes - 2016.  For the purpose of screening for the presence of diabetes:  <5.7%     Consistent with the absence of diabetes  5.7-6.4%  Consistent with increasing risk for diabetes   (prediabetes)  >or=6.5%  Consistent with diabetes  Currently no consensus exists for use of hemoglobin A1C  for diagnosis of diabetes for children.        Allergies as of 3/28/2017        Reactions    Hydrocodone Rash      [facility]     [facility] Nurse Care Line - 24/7 Assistance  Unless otherwise directed by your provider, please contact [facility], our nurse care line that is available for 24/7 assistance.     Registered nurses in the [facility] provide clinical advisement, health education, appointment booking, and " other advisory services.  Call for this free service at [phone].        Advance Directives     An advance directive is a document which, in the event you are no longer able to make decisions for yourself, tells your healthcare team what kind of treatment you do or do not want to receive, or who you would like to make those decisions for you.  If you do not currently have an advance directive, [facility] encourages you to create one.  For more information call:  [phone]), [phone] ([phone]),  or log on to [email or web address].        Language Assistance Services     ATTENTION: Language assistance services are available, free of charge. Please call [phone].      ATENCIÓN: Si habla español, tiene a johnson disposición servicios gratuitos de asistencia lingüística. Llame al [phone].     CHÚ Ý: N?u b?n nói Ti?ng Vi?t, có các d?ch v? h? tr? ngôn ng? mi?n phí dành cho b?n. G?i s? [phone].         [facility] complies with applicable Federal civil rights laws and does not discriminate on the basis of race, color, national origin, age, disability, or sex.

## 2017-03-28 NOTE — DISCHARGE SUMMARY
[facility]    Brief Operative Note     SUMMARY     Surgery Date: 3/28/2017     Surgeon(s) and Role:     * Gwendolyn Jeong MD - Primary    Assisting Surgeon: None    Pre-op Diagnosis:  Localized osteoarthrosis not specified whether primary or secondary, lower leg [M17.9]  Unspecified internal derangement of knee [M23.90]  Tear of lateral cartilage or meniscus of knee, current, unspecified laterality, sequela [S83.289S]    Post-op Diagnosis:  Post-Op Diagnosis Codes:     * Localized osteoarthrosis not specified whether primary or secondary, lower leg [M17.9]     * Unspecified internal derangement of knee [M23.90]     * Tear of lateral cartilage or meniscus of knee, current, unspecified laterality, sequela [S83.289S]    Procedure: Procedure(s) (LRB):  [id number] (Right)  [id number] & LATERAL (Right)    Anesthesia: General    Description of the findings of the procedure: See Dictation    Findings/Key Components: see op note    Estimated Blood Loss: * No values recorded between 3/28/2017 12:55 PM and 3/28/2017  1:25 PM *         Specimens:   Specimen     None          Disposition: Patient tolerated the procedure well and was transferred to PACU in stable condition.      Discharge Note    SUMMARY     Admit Date: 3/28/2017    Discharge Date and Time:   03/28/2017 2:15 PM    Pre-op Diagnosis:  Localized osteoarthrosis not specified whether primary or secondary, lower leg [M17.9]  Unspecified internal derangement of knee [M23.90]  Tear of lateral cartilage or meniscus of knee, current, unspecified laterality, sequela [S83.289S]    Post-op Diagnosis:  Post-Op Diagnosis Codes:     * Localized osteoarthrosis not specified whether primary or secondary, lower leg [M17.9]     * Unspecified internal derangement of knee [M23.90]     * Tear of lateral cartilage or meniscus of knee, current, unspecified laterality, sequela [S83.289S]    Procedure: Procedure(s)  (LRB):  [id number] (Right)  [id number] & LATERAL (Right)    Hospital Course (synopsis of major diagnoses, care, treatment, and services provided during the course of the hospital stay): Patient underwent outpatient knee surgery and was transferred to PACU in stable condition.  In PACU, patient received appropriate post-operative care and discharged home with plans for physical therapy and follow-up with the operative surgeon.    Diet: Regular       Final Diagnosis: Post-Op Diagnosis Codes:     * Localized osteoarthrosis not specified whether primary or secondary, lower leg [M17.9]     * Unspecified internal derangement of knee [M23.90]     * Tear of lateral cartilage or meniscus of knee, current, unspecified laterality, sequela [S83.289S]    Disposition: Home or Self Care    Follow Up/Patient Instructions:     Medications:  Reconciled Home Medications:   Current Discharge Medication List      CONTINUE these medications which have NOT CHANGED    Details   amlodipine (NORVASC) 10 MG tablet Take 1 tablet (10 mg total) by mouth once daily.  Qty: 90 tablet, Refills: 12      !! dextroamphetamine-amphetamine (ADDERALL XR) 30 MG 24 hr capsule 2 a day  Qty: 60 capsule, Refills: 0      hydrochlorothiazide (HYDRODIURIL) 25 MG tablet TAKE ONE Tablet BY MOUTH DAILY  Qty: 90 tablet, Refills: 0      irbesartan (AVAPRO) 300 MG tablet TAKE ONE TABLET BY MOUTH EVERY EVENING *IN PLACE OF LOSARTAN*  Qty: 30 tablet, Refills: 12      levetiracetam (KEPPRA) 750 MG Tab Take 1 tablet (750 mg total) by mouth 2 (two) times daily.  Qty: 60 tablet, Refills: 11    Associated Diagnoses: Convulsions, unspecified convulsion type      losartan (COZAAR) 100 MG tablet Take 1 tablet (100 mg total) by mouth once daily.  Qty: 90 tablet, Refills: 3      montelukast (SINGULAIR) 10 mg tablet TAKE ONE TABLET BY MOUTH EVERY EVENING  Qty: 30 tablet, Refills: 12      paroxetine (PAXIL) 40 MG tablet TAKE ONE Tablet BY MOUTH  "EVERY MORNING  Qty: 90 tablet, Refills: 12      !! dextroamphetamine-amphetamine (ADDERALL XR) 30 MG 24 hr capsule 2 a day  Qty: 60 capsule, Refills: 0      oxycodone-acetaminophen (PERCOCET)  mg per tablet Take 1 tablet by mouth every 4-6 hours as needed for pain. Take stool softener with this medication.  Qty: 30 tablet, Refills: 0    Associated Diagnoses: Complex tear of lateral meniscus of right knee as current injury, initial encounter      oxycodone-acetaminophen (PERCOCET) 5-325 mg per tablet Take 1 tablet by mouth every 6 (six) hours as needed for Pain.  Qty: 80 tablet, Refills: 0      promethazine (PHENERGAN) 25 MG tablet Take 1 tablet (25 mg total) by mouth every 6 (six) hours as needed for Nausea.  Qty: 30 tablet, Refills: 0    Associated Diagnoses: Complex tear of lateral meniscus of right knee as current injury, initial encounter      tramadol (ULTRAM) 50 mg tablet Take 1-2 tablets ( mg total) by mouth every 6 (six) hours as needed for Pain.  Qty: 30 tablet, Refills: 0    Associated Diagnoses: Complex tear of lateral meniscus of right knee as current injury, initial encounter       !! - Potential duplicate medications found. Please discuss with provider.          Discharge Procedure Orders  CRUTCHES FOR HOME USE   Order Specific Question Answer Comments   Type: Axillary    Height: 5' 11" (1.803 m)    Weight: 103.9 kg (229 lb)    Length of need (1-99 months): 2      Diet general     Activity as tolerated     Call MD for:  temperature >100.4     Call MD for:  persistent nausea and vomiting or diarrhea     Call MD for:  severe uncontrolled pain     Call MD for:  redness, tenderness, or signs of infection (pain, swelling, redness, odor or green/yellow discharge around incision site)     Remove dressing in 72 hours       Follow-up Information     Follow up with Gwendolyn Jeong MD.    Specialties:  Sports Medicine, Orthopedic Surgery    Why:  as scheduled preop    Contact information:    [address]  [place] [address]  [phone]

## 2017-03-28 NOTE — INTERVAL H&P NOTE
The patient has been examined and the H&P has been reviewed:    I concur with the findings and no changes have occurred since H&P was written.    Anesthesia/Surgery risks, benefits and alternative options discussed and understood by patient/family.          Active Hospital Problems    Diagnosis  POA    Complex tear of lateral meniscus of right knee as current injury [S83.097A]  Yes      Resolved Hospital Problems    Diagnosis Date Resolved POA   No resolved problems to display.

## 2017-03-28 NOTE — H&P (VIEW-ONLY)
Nirmal Moss  is here for a completion of his perioperative paperwork. he  Is scheduled to undergo     right      A. Knee arthroscopic lateral meniscectomy possible meniscus repair with possible medial meniscectomy  B. Knee arthroscopic possible synovectomy  C. Knee arthroscopic possible plica excision  D. Knee arthroscopic possible chondroplasty on 3/28/17      He is a healthy individual and does need clearance for this procedure, which he has received from his [facility] PCP.     Risks, indications and benefits of the surgical procedure were discussed with the patient. All questions with regard to surgery, rehab, expected return to functional activities, activities of daily living and recreational endeavors were answered to his satisfaction.    Once no other questions were asked, a brief history and physical exam was then performed.      PHYSICAL EXAM:  GEN: A&Ox3, WD WN NAD  HEENT: WNL  CHEST: CTAB, no W/R/R  HEART: RRR, no M/R/G  ABD: Soft, NT ND, BS x4 QUADS  MS; See Epic  NEURO: CN II-XII intact       The surgical consent was then reviewed with the patient, who agreed with all the contents of the consent form and it was signed. he was then given the [facility] surgery packet to bring with him to [facility] for the anesthesia portion of his perioperative paperwork.   For all physicians except for Dr. Connelly, we will email and possibly fax the consent forms and booking sheets to [facility] pre-[facility].    PHYSICAL THERAPY:  He was also instructed regarding physical therapy and will begin on  POD1. He was given a copy of the original prescription to schedule. Another copy of this prescription was also faxed to [facility] [address].    POST OP CARE:instructions were reviewed including care of the wound and dressing after surgery and when he can shower.     PAIN MANAGEMENT: Nirmal Moss was also given his pain management regime, which includes the TENS unit given to him by angelica along with the  education required for its use. He was also instructed regarding the Polar ice unit that will be in place after surgery and his postoperative pain medications.     PAIN MEDICATION:  Percocet 10/325mg 1 po q 4-6 hours prn pain  Ultram 50 mg one p.o. q.4-6 hours p.r.n. breakthrough pain,   Phenergan 25 mg one p.o. q.4-6 hours p.r.n. nausea and vomiting.  Aspirin 325mg BID x 6 weeks for DVT prophylaxis starting on the evening after surgery .    As there were no other questions to be asked, he was given my business card along with Gwendolyn Jeong MD business card if he has any questions or concerns prior to surgery or in the postop period.

## 2017-03-29 NOTE — OP NOTE
DATE OF PROCEDURE: 03/29/2017    SURGEON:  Gwendolyn Jeong M.D    ASSISTANT:  BAYRON Pederson MD, PGY-6    PREOPERATIVE DIAGNOSES:   right  1. knee medial and lateral meniscus tear   2. knee plica.   3. knee possible chondromalacia  4. knee synovitis    POSTOPERATIVE DIAGNOSES:   right  1. knee medial and lateral meniscus tear   2. knee plica.   3. knee chondromalacia  4. knee synovitis.     PROCEDURE PERFORMED:   right  1. knee arthroscopic medial and lateral meniscectomy   2. knee arthroscopic partial synovectomy/debridement.   3. knee arthroscopic plica excision.   4. knee arthroscopic chondroplasty     ANESTHESIA: General with local 0.5% ropivicaine 30ml (5mg/ml), 60 mg ketamine, 60mg toradol (2ml toradol (30mg/ml))    BLOOD LOSS: Minimal.     DRAINS: None.     TOURNIQUET TIME: None.     COMPLICATIONS: None.     CONDITION ON TRANSFER: The patient was extubated and moved to the recovery room in stable condition, with compartments soft and capillary refill less than a   second in all digits.     BRIEF INDICATION OF MEDICAL NECESSITY: The patient is a 46 y.o. year-old male who has history and physical examination findings consistent with the above. Nonoperative versus operative options were discussed. The risks and benefits were discussed with the patient. The patient acknowledged understanding and wished to proceed with operative intervention. Informed consent was obtained prior to the procedure. Details of the surgical procedure were explained, including incisions and probable rehabilitation course. The patient understands the likely length of convalescence after surgery; and we have explained the risks, benefits, and alternatives of surgery. Reasonable expectations and potential complications were discussed and acknowledged, including but not limited to infection, bleeding, blood clots, (DVT and/or PE), nerve injury, retear, instability, continued pain and stiffness. It was also explained that there was a chance of  failure which may require further management. The patient agreed and understood and wished to proceed.     EXAMINATION UNDER ANESTHESIA of the OPERATIVE right KNEE: ROM 0-145 degrees, negative Lachman, negative pivot shift, stable to varus-valgus stress testing, negative effusion.     EXAMINATION UNDER ANESTHESIA of the NON-OPERATED left KNEE: ROM 0-145 degrees, negative Lachman, negative pivot shift, stable to varus-valgus stress testing, negative effusion.     PROCEDURE IN DETAIL: The correct operative site was marked by the operative surgeon.  The patient was then taken to the operating room and placed supine on the operating room table. General anesthesia was administered by the anesthesia team. All pressure points were carefully padded and checked. Preoperative antibiotics were administered. A well-padded tourniquet was placed high on the operative thigh. Examination was begun with the above findings. The non-operative leg was then placed a well-padded well-leg munoz, in a comfortable position. The operative leg was placed in an arthroscopic leg munoz at the level of the tourniquet. The operative left leg was prepped and draped in the usual sterile fashion. After prepping and draping, the appropriate landmarks were noted on the skin.  2cc skin and sub-cutaneous tissue was infilttrated with local anesthetic mixture superolaterally at needle insufflation site. The knee was insufflated supero-laterally with saline. 9cc skin wheal and sub-cutaneous tissue and fat pad was infilttrated with local anesthetic mixture at both portal sites; mid-lateral followed by infero-medial portals were created, and a systematic examination of the joint revealed the following:    There was no evidence of any suprapatellar pouch adhesions or compartmentalization.  There was no evidence of any loose bodies in the medial or lateral gutters.     There was no evidence of any chondral damage to the patella or trochlea.       In the  patellofemoral compartment, there was chondral damage to:  Patella 10 x 10 mm grade 2  Trochlea 10 x 20 mm grade 3  Chondroplasty was performed using arthroscopic shaver.    There was chondral damage to:  Medial femoral condyle 10 x 20 mm grade 2  Chondroplasty was performed using arthroscopic shaver.    There was chondral damage to:  Lateral tibial plateau 10 x 20 mm grade 3  Chondroplasty was performed using arthroscopic shaver.    In the medial compartment there was no evidence of meniscal instability.   Middle horn medial meniscus tear,  parrot-beak was debrided with arthroscopic shaver and biter.  10% was debrided over an area of .5 cm.  Root and hoop fibers remained intact.    Attention was then turned to the notch. The ACL and PCL were probed carefully and found to be stable.     There was a hypertrophic infrapatellar plica.  This was debrided using arthroscopic biter and shaver.    There was some scar about the ACL.  This was debrided with thermal device and lysis of adhesions was performed.    In the lateral compartment there was no evidence of meniscal instability.   Posterior horn lateral meniscus tear,  complex bucket-handle was debrided with arthroscopic shaver and biter.  50% was debrided over an area of 2.5 cm.  Root and hoop fibers remained intact.    Synovitis was debrided in the knee as needed to the areas of concern in medial and lateral compartments.      The knee and incisions were then copiously irrigated and fluid was extravasated using suction.  The arthroscopic portal incisions were closed using inverted 4-0 Monocryl suture.  5cc skin and sub-cutaneous tissue and fat pad was infilttrated with local anesthetic mixture at both portal sites.  Steri-Strips were placed with Mastisol. Sterile TENS unit pads were placed which were medically necessary for pain relief. Wounds were dressed with Xeroform, 4x4s, and cast padding. SYDNI hose was placed on the operative leg to match that of the SYDNI hose  placed preoperatively on the non-operative leg. Iceman was secured.  The patient was extubated and moved to the recovery room in stable condition with compartments soft and capillary refill less than a second in all digits.     POSTOPERATIVE PLAN: We will be following the arthroscopic partial meniscectomy guidelines with emphasis on patellar mobility.  This was discussed this with the patient's family after surgery.

## 2017-04-12 ENCOUNTER — OFFICE VISIT (OUTPATIENT)
Dept: SPORTS MEDICINE | Facility: CLINIC | Age: 47
End: 2017-04-12
Payer: COMMERCIAL

## 2017-04-12 VITALS
DIASTOLIC BLOOD PRESSURE: 97 MMHG | WEIGHT: 229 LBS | HEART RATE: 117 BPM | SYSTOLIC BLOOD PRESSURE: 153 MMHG | HEIGHT: 71 IN | BODY MASS INDEX: 32.06 KG/M2

## 2017-04-12 DIAGNOSIS — S83.231D COMPLEX TEAR OF MEDIAL MENISCUS OF RIGHT KNEE AS CURRENT INJURY, SUBSEQUENT ENCOUNTER: Primary | ICD-10-CM

## 2017-04-12 PROCEDURE — 99024 POSTOP FOLLOW-UP VISIT: CPT | Mod: S$GLB,,, | Performed by: ORTHOPAEDIC SURGERY

## 2017-04-12 PROCEDURE — 99999 PR PBB SHADOW E&M-EST. PATIENT-LVL III: CPT | Mod: PBBFAC,,, | Performed by: ORTHOPAEDIC SURGERY

## 2017-04-12 NOTE — PROGRESS NOTES
HISTORY OF PRESENT ILLNESS:   Pt is here today for first post-operative followup of his knee arthroscopy.  he is doing well.  We have reviewed his findings and discussed plan of care and future treatment options.                                            DATE OF PROCEDURE: 03/29/2017   PROCEDURE PERFORMED:   right  1. knee arthroscopic medial and lateral meniscectomy   2. knee arthroscopic partial synovectomy/debridement.   3. knee arthroscopic plica excision.   4. knee arthroscopic chondroplasty      In the patellofemoral compartment, there was chondral damage to:  Patella 10 x 10 mm grade 2  Trochlea 10 x 20 mm grade 3  Chondroplasty was performed using arthroscopic shaver.     There was chondral damage to:  Medial femoral condyle 10 x 20 mm grade 2  Chondroplasty was performed using arthroscopic shaver.     There was chondral damage to:  Lateral tibial plateau 10 x 20 mm grade 3  Chondroplasty was performed using arthroscopic shaver.                                           PHYSICAL EXAMINATION:     Incision sites healed well  No evidence of any erythema, infection or induration  Range of motion 0-120 degrees  Minimal effusion  2+ DP pulse  No swelling, no calf tenderness  - Jagdeep's sign  Negative medial joint line tendernes  Moderate quad atrophy                                                                                 ASSESSMENT:                                                                                                                                               1. Status post above, doing well.                                                                                                                               PLAN:                                                                                                                                                     1. Continue with PT  2. Emphasized quad function.  3. I have discussed return to activity in detail.  4. he will see us back  PRN                                     5. All questions were answered and he should contact us if he  has any questions or concerns in the interim.

## 2017-05-14 DIAGNOSIS — I10 ESSENTIAL HYPERTENSION: Primary | ICD-10-CM

## 2017-05-15 RX ORDER — HYDROCHLOROTHIAZIDE 25 MG/1
25 TABLET ORAL DAILY
Qty: 90 TABLET | Refills: 0 | Status: SHIPPED | OUTPATIENT
Start: 2017-05-15 | End: 2017-08-08 | Stop reason: SDUPTHER

## 2017-06-21 ENCOUNTER — PATIENT MESSAGE (OUTPATIENT)
Dept: FAMILY MEDICINE | Facility: CLINIC | Age: 47
End: 2017-06-21

## 2017-06-21 DIAGNOSIS — F98.8 ADD (ATTENTION DEFICIT DISORDER): Primary | ICD-10-CM

## 2017-06-22 RX ORDER — DEXTROAMPHETAMINE SACCHARATE, AMPHETAMINE ASPARTATE MONOHYDRATE, DEXTROAMPHETAMINE SULFATE AND AMPHETAMINE SULFATE 7.5; 7.5; 7.5; 7.5 MG/1; MG/1; MG/1; MG/1
CAPSULE, EXTENDED RELEASE ORAL
Qty: 60 CAPSULE | Refills: 0 | Status: SHIPPED | OUTPATIENT
Start: 2017-06-22 | End: 2017-07-17 | Stop reason: SDUPTHER

## 2017-07-17 ENCOUNTER — PATIENT MESSAGE (OUTPATIENT)
Dept: FAMILY MEDICINE | Facility: CLINIC | Age: 47
End: 2017-07-17

## 2017-07-17 DIAGNOSIS — F98.8 ATTENTION DEFICIT DISORDER, UNSPECIFIED HYPERACTIVITY PRESENCE: ICD-10-CM

## 2017-07-17 RX ORDER — DEXTROAMPHETAMINE SACCHARATE, AMPHETAMINE ASPARTATE MONOHYDRATE, DEXTROAMPHETAMINE SULFATE AND AMPHETAMINE SULFATE 7.5; 7.5; 7.5; 7.5 MG/1; MG/1; MG/1; MG/1
CAPSULE, EXTENDED RELEASE ORAL
Qty: 60 CAPSULE | Refills: 0 | Status: SHIPPED | OUTPATIENT
Start: 2017-07-22 | End: 2017-07-21 | Stop reason: SDUPTHER

## 2017-07-20 ENCOUNTER — PATIENT MESSAGE (OUTPATIENT)
Dept: FAMILY MEDICINE | Facility: CLINIC | Age: 47
End: 2017-07-20

## 2017-07-21 ENCOUNTER — PATIENT MESSAGE (OUTPATIENT)
Dept: FAMILY MEDICINE | Facility: CLINIC | Age: 47
End: 2017-07-21

## 2017-07-21 DIAGNOSIS — F98.8 ATTENTION DEFICIT DISORDER, UNSPECIFIED HYPERACTIVITY PRESENCE: ICD-10-CM

## 2017-07-21 RX ORDER — DEXTROAMPHETAMINE SACCHARATE, AMPHETAMINE ASPARTATE MONOHYDRATE, DEXTROAMPHETAMINE SULFATE AND AMPHETAMINE SULFATE 7.5; 7.5; 7.5; 7.5 MG/1; MG/1; MG/1; MG/1
CAPSULE, EXTENDED RELEASE ORAL
Qty: 60 CAPSULE | Refills: 0 | Status: SHIPPED | OUTPATIENT
Start: 2017-07-22 | End: 2017-08-29 | Stop reason: SDUPTHER

## 2017-07-24 ENCOUNTER — TELEPHONE (OUTPATIENT)
Dept: FAMILY MEDICINE | Facility: CLINIC | Age: 47
End: 2017-07-24

## 2017-07-24 NOTE — TELEPHONE ENCOUNTER
----- Message from Myla Toro sent at 7/24/2017  2:18 PM CDT -----  Contact: Wiley Albert with [facility] in [place] called to verify the last day the patient was seen before she can fill his script. Please call at [phone]

## 2017-08-08 DIAGNOSIS — I10 ESSENTIAL HYPERTENSION: ICD-10-CM

## 2017-08-09 RX ORDER — HYDROCHLOROTHIAZIDE 25 MG/1
TABLET ORAL
Qty: 90 TABLET | Refills: 1 | Status: SHIPPED | OUTPATIENT
Start: 2017-08-09 | End: 2018-02-14 | Stop reason: SDUPTHER

## 2017-08-22 ENCOUNTER — PATIENT MESSAGE (OUTPATIENT)
Dept: FAMILY MEDICINE | Facility: CLINIC | Age: 47
End: 2017-08-22

## 2017-08-24 RX ORDER — IRBESARTAN 300 MG/1
TABLET ORAL
Qty: 30 TABLET | Refills: 0 | Status: SHIPPED | OUTPATIENT
Start: 2017-08-24 | End: 2017-08-29 | Stop reason: SDUPTHER

## 2017-08-24 RX ORDER — MONTELUKAST SODIUM 10 MG/1
TABLET ORAL
Qty: 30 TABLET | Refills: 0 | Status: SHIPPED | OUTPATIENT
Start: 2017-08-24 | End: 2018-11-06

## 2017-08-29 ENCOUNTER — OFFICE VISIT (OUTPATIENT)
Dept: FAMILY MEDICINE | Facility: CLINIC | Age: 47
End: 2017-08-29
Payer: COMMERCIAL

## 2017-08-29 VITALS
BODY MASS INDEX: 29.1 KG/M2 | TEMPERATURE: 98 F | DIASTOLIC BLOOD PRESSURE: 82 MMHG | HEART RATE: 104 BPM | WEIGHT: 207.88 LBS | SYSTOLIC BLOOD PRESSURE: 120 MMHG | HEIGHT: 71 IN

## 2017-08-29 DIAGNOSIS — I10 ESSENTIAL HYPERTENSION: Primary | ICD-10-CM

## 2017-08-29 DIAGNOSIS — F98.8 ATTENTION DEFICIT DISORDER, UNSPECIFIED HYPERACTIVITY PRESENCE: ICD-10-CM

## 2017-08-29 PROCEDURE — 3008F BODY MASS INDEX DOCD: CPT | Mod: S$GLB,,, | Performed by: INTERNAL MEDICINE

## 2017-08-29 PROCEDURE — 99999 PR PBB SHADOW E&M-EST. PATIENT-LVL III: CPT | Mod: PBBFAC,,, | Performed by: INTERNAL MEDICINE

## 2017-08-29 PROCEDURE — 3079F DIAST BP 80-89 MM HG: CPT | Mod: S$GLB,,, | Performed by: INTERNAL MEDICINE

## 2017-08-29 PROCEDURE — 99214 OFFICE O/P EST MOD 30 MIN: CPT | Mod: S$GLB,,, | Performed by: INTERNAL MEDICINE

## 2017-08-29 PROCEDURE — 3074F SYST BP LT 130 MM HG: CPT | Mod: S$GLB,,, | Performed by: INTERNAL MEDICINE

## 2017-08-29 RX ORDER — DEXTROAMPHETAMINE SACCHARATE, AMPHETAMINE ASPARTATE MONOHYDRATE, DEXTROAMPHETAMINE SULFATE AND AMPHETAMINE SULFATE 7.5; 7.5; 7.5; 7.5 MG/1; MG/1; MG/1; MG/1
CAPSULE, EXTENDED RELEASE ORAL
Qty: 60 CAPSULE | Refills: 0 | Status: SHIPPED | OUTPATIENT
Start: 2017-10-29 | End: 2017-11-13 | Stop reason: SDUPTHER

## 2017-08-29 RX ORDER — DEXTROAMPHETAMINE SACCHARATE, AMPHETAMINE ASPARTATE MONOHYDRATE, DEXTROAMPHETAMINE SULFATE AND AMPHETAMINE SULFATE 7.5; 7.5; 7.5; 7.5 MG/1; MG/1; MG/1; MG/1
CAPSULE, EXTENDED RELEASE ORAL
Qty: 60 CAPSULE | Refills: 0 | Status: SHIPPED | OUTPATIENT
Start: 2017-09-29 | End: 2017-11-13 | Stop reason: SDUPTHER

## 2017-08-29 RX ORDER — IRBESARTAN 300 MG/1
300 TABLET ORAL DAILY
Qty: 90 TABLET | Refills: 12 | Status: SHIPPED | OUTPATIENT
Start: 2017-08-29 | End: 2018-09-08 | Stop reason: SDUPTHER

## 2017-08-29 RX ORDER — DEXTROAMPHETAMINE SACCHARATE, AMPHETAMINE ASPARTATE MONOHYDRATE, DEXTROAMPHETAMINE SULFATE AND AMPHETAMINE SULFATE 7.5; 7.5; 7.5; 7.5 MG/1; MG/1; MG/1; MG/1
CAPSULE, EXTENDED RELEASE ORAL
Qty: 60 CAPSULE | Refills: 0 | Status: SHIPPED | OUTPATIENT
Start: 2017-08-29 | End: 2017-11-13 | Stop reason: SDUPTHER

## 2017-08-29 NOTE — PROGRESS NOTES
Chief complaint: Follow-up on ADD and blood pressure medications    46-year-old white male with long-standing ADD.  He requires a high-dose of the Adderall and apparently his son does as well as and is a high metabolizer of the medication so patient himself can assumedly is the same.  He finds benefit from medication.  We printed in the 3 prescriptions and I encouraged him to message us with the exact date see needs for the next 3 months and we will see him every 6 months.  He review of his blood pressure it has been running under good control and he lost a lot of weight.  He actually is taking both the irbesartan and the losartan by the way was written was somewhat confusing.  We discussed the redundancy of that and we will have him just use the irbesartan and he will need to monitor his blood pressure the next 3 weeks to make sure it does not rise somewhat.  We will adjust accordingly.  Controlled substance management and counseling done.Total time over 25 minutes with over 50% counseling.    He is underlying left stress having had his custody issues somewhat more resolved.      ROS:   CONST: Losing weight with exercise, no chest pains or palpitations    PAST MEDICAL HISTORY:                                                        1.  Hypertension.                                                            2.  Depression, sees Psychiatry.                                             3.  ADD, sees Psychiatry.  Dr Emery                                                  4.  Right knee scope.   5.  Low HDL 1999   6.  Insomnia  7.  Sleep eating with Ambien   8.  probable seizure disorder 2015  9. Orthostatic hypotension, he worked up by cardiology 2015, may relate to seizures   10.  Left Colles' fracture with surgical repair 2016  11.  Right medial meniscus injury 2017                                                                                                                                 SOCIAL HISTORY:  Does  "not smoke, social alcohol, never smoked.                                                                                            FAMILY HISTORY:  Mom with seizures.  Father with hypertension, diabetes, .     He has no siblings.           Vital signs as above,Gen: no distress  Exam otherwise deferred    Diagnoses and all orders for this visit:    Essential hypertension, medication adjustments as above    Attention deficit disorder, unspecified hyperactivity presence, controlled substance monitoring and counseling done  -     dextroamphetamine-amphetamine (ADDERALL XR) 30 MG 24 hr capsule; 2 a day  -     dextroamphetamine-amphetamine (ADDERALL XR) 30 MG 24 hr capsule; 2 a day  -     dextroamphetamine-amphetamine (ADDERALL XR) 30 MG 24 hr capsule; 2 a day    Other orders  -     irbesartan (AVAPRO) 300 MG tablet; Take 1 tablet (300 mg total) by mouth once daily.           Clinical note will be sensitive as doing controlled substance management/monitoring."This note will not be shared with the patient."  "

## 2017-09-12 RX ORDER — MONTELUKAST SODIUM 10 MG/1
TABLET ORAL
Qty: 30 TABLET | Refills: 6 | Status: SHIPPED | OUTPATIENT
Start: 2017-09-12 | End: 2018-03-26 | Stop reason: SDUPTHER

## 2017-11-13 ENCOUNTER — PATIENT MESSAGE (OUTPATIENT)
Dept: FAMILY MEDICINE | Facility: CLINIC | Age: 47
End: 2017-11-13

## 2017-11-13 DIAGNOSIS — F98.8 ATTENTION DEFICIT DISORDER, UNSPECIFIED HYPERACTIVITY PRESENCE: ICD-10-CM

## 2017-11-13 RX ORDER — DEXTROAMPHETAMINE SACCHARATE, AMPHETAMINE ASPARTATE MONOHYDRATE, DEXTROAMPHETAMINE SULFATE AND AMPHETAMINE SULFATE 7.5; 7.5; 7.5; 7.5 MG/1; MG/1; MG/1; MG/1
CAPSULE, EXTENDED RELEASE ORAL
Qty: 60 CAPSULE | Refills: 0 | Status: SHIPPED | OUTPATIENT
Start: 2017-11-29 | End: 2017-11-18 | Stop reason: SDUPTHER

## 2017-11-13 RX ORDER — DEXTROAMPHETAMINE SACCHARATE, AMPHETAMINE ASPARTATE MONOHYDRATE, DEXTROAMPHETAMINE SULFATE AND AMPHETAMINE SULFATE 7.5; 7.5; 7.5; 7.5 MG/1; MG/1; MG/1; MG/1
CAPSULE, EXTENDED RELEASE ORAL
Qty: 60 CAPSULE | Refills: 0 | Status: SHIPPED | OUTPATIENT
Start: 2018-01-29 | End: 2018-01-18 | Stop reason: SDUPTHER

## 2017-11-13 RX ORDER — DEXTROAMPHETAMINE SACCHARATE, AMPHETAMINE ASPARTATE MONOHYDRATE, DEXTROAMPHETAMINE SULFATE AND AMPHETAMINE SULFATE 7.5; 7.5; 7.5; 7.5 MG/1; MG/1; MG/1; MG/1
CAPSULE, EXTENDED RELEASE ORAL
Qty: 60 CAPSULE | Refills: 0 | Status: SHIPPED | OUTPATIENT
Start: 2017-12-29 | End: 2018-01-18 | Stop reason: SDUPTHER

## 2017-11-14 ENCOUNTER — PATIENT MESSAGE (OUTPATIENT)
Dept: FAMILY MEDICINE | Facility: CLINIC | Age: 47
End: 2017-11-14

## 2017-11-18 DIAGNOSIS — F98.8 ATTENTION DEFICIT DISORDER, UNSPECIFIED HYPERACTIVITY PRESENCE: ICD-10-CM

## 2017-11-20 RX ORDER — PAROXETINE HYDROCHLORIDE 40 MG/1
TABLET, FILM COATED ORAL
Qty: 90 TABLET | Refills: 1 | Status: SHIPPED | OUTPATIENT
Start: 2017-11-20 | End: 2018-05-16 | Stop reason: SDUPTHER

## 2017-11-21 RX ORDER — DEXTROAMPHETAMINE SACCHARATE, AMPHETAMINE ASPARTATE MONOHYDRATE, DEXTROAMPHETAMINE SULFATE AND AMPHETAMINE SULFATE 7.5; 7.5; 7.5; 7.5 MG/1; MG/1; MG/1; MG/1
CAPSULE, EXTENDED RELEASE ORAL
Qty: 60 CAPSULE | Refills: 0 | Status: SHIPPED | OUTPATIENT
Start: 2017-11-21 | End: 2018-01-18 | Stop reason: SDUPTHER

## 2017-11-21 RX ORDER — AMLODIPINE BESYLATE 10 MG/1
TABLET ORAL
Qty: 90 TABLET | Refills: 12 | Status: SHIPPED | OUTPATIENT
Start: 2017-11-21 | End: 2018-11-29 | Stop reason: SDUPTHER

## 2018-01-15 DIAGNOSIS — R56.9 CONVULSIONS, UNSPECIFIED CONVULSION TYPE: ICD-10-CM

## 2018-01-16 RX ORDER — LEVETIRACETAM 750 MG/1
750 TABLET ORAL 2 TIMES DAILY
Qty: 60 TABLET | Refills: 0 | Status: SHIPPED | OUTPATIENT
Start: 2018-01-16 | End: 2018-03-08 | Stop reason: SDUPTHER

## 2018-01-18 ENCOUNTER — OFFICE VISIT (OUTPATIENT)
Dept: FAMILY MEDICINE | Facility: CLINIC | Age: 48
End: 2018-01-18
Payer: COMMERCIAL

## 2018-01-18 ENCOUNTER — LAB VISIT (OUTPATIENT)
Dept: LAB | Facility: HOSPITAL | Age: 48
End: 2018-01-18
Attending: INTERNAL MEDICINE
Payer: COMMERCIAL

## 2018-01-18 VITALS
BODY MASS INDEX: 27.75 KG/M2 | HEIGHT: 71 IN | SYSTOLIC BLOOD PRESSURE: 146 MMHG | TEMPERATURE: 99 F | OXYGEN SATURATION: 99 % | WEIGHT: 198.19 LBS | HEART RATE: 89 BPM | DIASTOLIC BLOOD PRESSURE: 88 MMHG

## 2018-01-18 DIAGNOSIS — F39 MOOD DISORDER: ICD-10-CM

## 2018-01-18 DIAGNOSIS — E55.9 VITAMIN D DEFICIENCY DISEASE: ICD-10-CM

## 2018-01-18 DIAGNOSIS — F98.8 ATTENTION DEFICIT DISORDER, UNSPECIFIED HYPERACTIVITY PRESENCE: ICD-10-CM

## 2018-01-18 DIAGNOSIS — Z00.00 ROUTINE MEDICAL EXAM: Primary | ICD-10-CM

## 2018-01-18 DIAGNOSIS — I10 ESSENTIAL HYPERTENSION: ICD-10-CM

## 2018-01-18 DIAGNOSIS — Z00.00 ROUTINE MEDICAL EXAM: ICD-10-CM

## 2018-01-18 DIAGNOSIS — G40.909 SEIZURE DISORDER: ICD-10-CM

## 2018-01-18 DIAGNOSIS — F41.9 ANXIETY DISORDER, UNSPECIFIED TYPE: ICD-10-CM

## 2018-01-18 LAB
BASOPHILS # BLD AUTO: 0.02 K/UL
BASOPHILS NFR BLD: 0.4 %
DIFFERENTIAL METHOD: ABNORMAL
EOSINOPHIL # BLD AUTO: 0 K/UL
EOSINOPHIL NFR BLD: 0 %
ERYTHROCYTE [DISTWIDTH] IN BLOOD BY AUTOMATED COUNT: 13 %
HCT VFR BLD AUTO: 40.3 %
HGB BLD-MCNC: 13.6 G/DL
IMM GRANULOCYTES # BLD AUTO: 0.01 K/UL
IMM GRANULOCYTES NFR BLD AUTO: 0.2 %
LYMPHOCYTES # BLD AUTO: 1 K/UL
LYMPHOCYTES NFR BLD: 20.7 %
MCH RBC QN AUTO: 32.5 PG
MCHC RBC AUTO-ENTMCNC: 33.7 G/DL
MCV RBC AUTO: 96 FL
MONOCYTES # BLD AUTO: 0.5 K/UL
MONOCYTES NFR BLD: 9.3 %
NEUTROPHILS # BLD AUTO: 3.3 K/UL
NEUTROPHILS NFR BLD: 69.4 %
NRBC BLD-RTO: 0 /100 WBC
PLATELET # BLD AUTO: 226 K/UL
PMV BLD AUTO: 10.7 FL
RBC # BLD AUTO: 4.18 M/UL
WBC # BLD AUTO: 4.82 K/UL

## 2018-01-18 PROCEDURE — 99396 PREV VISIT EST AGE 40-64: CPT | Mod: S$GLB,,, | Performed by: INTERNAL MEDICINE

## 2018-01-18 PROCEDURE — 83036 HEMOGLOBIN GLYCOSYLATED A1C: CPT

## 2018-01-18 PROCEDURE — 85025 COMPLETE CBC W/AUTO DIFF WBC: CPT

## 2018-01-18 PROCEDURE — 80053 COMPREHEN METABOLIC PANEL: CPT

## 2018-01-18 PROCEDURE — 82306 VITAMIN D 25 HYDROXY: CPT

## 2018-01-18 PROCEDURE — 36415 COLL VENOUS BLD VENIPUNCTURE: CPT | Mod: PO

## 2018-01-18 PROCEDURE — 84443 ASSAY THYROID STIM HORMONE: CPT

## 2018-01-18 PROCEDURE — 99999 PR PBB SHADOW E&M-EST. PATIENT-LVL III: CPT | Mod: PBBFAC,,, | Performed by: INTERNAL MEDICINE

## 2018-01-18 RX ORDER — DEXTROAMPHETAMINE SACCHARATE, AMPHETAMINE ASPARTATE MONOHYDRATE, DEXTROAMPHETAMINE SULFATE AND AMPHETAMINE SULFATE 7.5; 7.5; 7.5; 7.5 MG/1; MG/1; MG/1; MG/1
CAPSULE, EXTENDED RELEASE ORAL
Qty: 60 CAPSULE | Refills: 0 | Status: SHIPPED | OUTPATIENT
Start: 2018-03-18 | End: 2018-04-30 | Stop reason: SDUPTHER

## 2018-01-18 RX ORDER — DEXTROAMPHETAMINE SACCHARATE, AMPHETAMINE ASPARTATE MONOHYDRATE, DEXTROAMPHETAMINE SULFATE AND AMPHETAMINE SULFATE 7.5; 7.5; 7.5; 7.5 MG/1; MG/1; MG/1; MG/1
CAPSULE, EXTENDED RELEASE ORAL
Qty: 60 CAPSULE | Refills: 0 | Status: SHIPPED | OUTPATIENT
Start: 2018-02-18 | End: 2020-11-18 | Stop reason: SDUPTHER

## 2018-01-18 RX ORDER — DEXTROAMPHETAMINE SACCHARATE, AMPHETAMINE ASPARTATE MONOHYDRATE, DEXTROAMPHETAMINE SULFATE AND AMPHETAMINE SULFATE 7.5; 7.5; 7.5; 7.5 MG/1; MG/1; MG/1; MG/1
CAPSULE, EXTENDED RELEASE ORAL
Qty: 60 CAPSULE | Refills: 0 | Status: SHIPPED | OUTPATIENT
Start: 2018-02-28 | End: 2018-01-18 | Stop reason: SDUPTHER

## 2018-01-18 RX ORDER — DEXTROAMPHETAMINE SACCHARATE, AMPHETAMINE ASPARTATE MONOHYDRATE, DEXTROAMPHETAMINE SULFATE AND AMPHETAMINE SULFATE 7.5; 7.5; 7.5; 7.5 MG/1; MG/1; MG/1; MG/1
CAPSULE, EXTENDED RELEASE ORAL
Qty: 60 CAPSULE | Refills: 0 | Status: SHIPPED | OUTPATIENT
Start: 2018-01-18 | End: 2020-11-18 | Stop reason: SDUPTHER

## 2018-01-18 NOTE — PROGRESS NOTES
Chief complaint: physical, Follow-up on ADD and blood pressure medications    47-year-old white male with long-standing ADD.  He requires a high-dose of the Adderall and apparently his son does as well as and is a high metabolizer of the medication so patient himself can assumedly is the same.  He finds benefit from medication.  We printed in the 3 prescriptions and I encouraged him to message us with the exact date see needs for the next 3 months and we will see him every 6 months.  He review of his blood pressure had been running better with some weight loss which he needs to work on again.  I encouraged him to keep monitoring at home since he is on full doses of 3 medications.          ROS:   CONST: weight stable. EYES: no vision change. ENT: no sore throat. CV: no chest pain w/ exertion. RESP: no shortness of breath. GI: no nausea, vomiting, diarrhea. No dysphagia. : no urinary issues. MUSCULOSKELETAL: no new myalgias or arthralgias. SKIN: no new changes. NEURO: no focal deficits. PSYCH: no new issues. ENDOCRINE: no polyuria. HEME: no lymph nodes. ALLERGY: no general pruritis.    PAST MEDICAL HISTORY:                                                        1.  Hypertension.                                                            2.  Depression, sees Psychiatry.                                             3.  ADD, sees Psychiatry.  Dr Emery                                                  4.  Right knee scope.   5.  Low HDL 1999   6.  Insomnia  7.  Sleep eating with Ambien   8.  probable seizure disorder 2015  9. Orthostatic hypotension, he worked up by cardiology 2015, may relate to seizures   10.  Left Colles' fracture with surgical repair 2016  11.  Right medial meniscus injury 2017                                                                                                                                 SOCIAL HISTORY:  Does not smoke, social alcohol, never smoked.                                   "                                                          FAMILY HISTORY:  Mom with seizures.  Father with hypertension, diabetes, .     He has no siblings.           Vital signs as above,  Gen: no distress  EYES: conjunctiva clear, non-icteric, PERRL  ENT: nose clear, nasal mucosa normal, oropharynx clear and moist, teeth good  NECK:supple, thyroid non-palpable  RESP: effort is good, lungs clear  CV: heart RRR w/o murmur, gallops or rubs; no carotid bruits, no edema  GI: abdomen soft, non-distended, non-tender, no hepatosplenomegaly  MS: gait normal, no clubbing or cyanosis of the digits  SKIN: no rashes, warm to touch     Nirmal was seen today for blood pressure check and medication refill.    Diagnoses and all orders for this visit:    Routine medical exam, prostate and colon screening after 50, work on weight loss  -     Vitamin D; Future  -     Comprehensive metabolic panel; Future  -     Hemoglobin A1c; Future  -     CBC auto differential; Future  -     TSH; Future    Attention deficit disorder, unspecified hyperactivity presence  -     dextroamphetamine-amphetamine (ADDERALL XR) 30 MG 24 hr capsule; 2 a day  -     Discontinue: dextroamphetamine-amphetamine (ADDERALL XR) 30 MG 24 hr capsule; 2 a day  -     dextroamphetamine-amphetamine (ADDERALL XR) 30 MG 24 hr capsule; TAKE TWO CAPSULES BY MOUTH EVERY DAY  -     dextroamphetamine-amphetamine (ADDERALL XR) 30 MG 24 hr capsule; 2 a day    Essential hypertension, monitor at home    Vitamin D deficiency disease, reassess    Anxiety disorder, unspecified type, chronic and stable on meds    Mood disorder    Seizure disorder, apparently chronic and stable           Clinical note will be sensitive as doing controlled substance management/monitoring"This note will not be shared with the patient."  "

## 2018-01-19 LAB
25(OH)D3+25(OH)D2 SERPL-MCNC: 11 NG/ML
ALBUMIN SERPL BCP-MCNC: 4 G/DL
ALP SERPL-CCNC: 95 U/L
ALT SERPL W/O P-5'-P-CCNC: 29 U/L
ANION GAP SERPL CALC-SCNC: 9 MMOL/L
AST SERPL-CCNC: 19 U/L
BILIRUB SERPL-MCNC: 1.1 MG/DL
BUN SERPL-MCNC: 15 MG/DL
CALCIUM SERPL-MCNC: 9.2 MG/DL
CHLORIDE SERPL-SCNC: 103 MMOL/L
CO2 SERPL-SCNC: 27 MMOL/L
CREAT SERPL-MCNC: 0.8 MG/DL
EST. GFR  (AFRICAN AMERICAN): >60 ML/MIN/1.73 M^2
EST. GFR  (NON AFRICAN AMERICAN): >60 ML/MIN/1.73 M^2
ESTIMATED AVG GLUCOSE: 97 MG/DL
GLUCOSE SERPL-MCNC: 110 MG/DL
HBA1C MFR BLD HPLC: 5 %
POTASSIUM SERPL-SCNC: 4 MMOL/L
PROT SERPL-MCNC: 6.9 G/DL
SODIUM SERPL-SCNC: 139 MMOL/L
TSH SERPL DL<=0.005 MIU/L-ACNC: 2.21 UIU/ML

## 2018-02-14 DIAGNOSIS — I10 ESSENTIAL HYPERTENSION: ICD-10-CM

## 2018-02-15 RX ORDER — HYDROCHLOROTHIAZIDE 25 MG/1
TABLET ORAL
Qty: 90 TABLET | Refills: 1 | Status: SHIPPED | OUTPATIENT
Start: 2018-02-15 | End: 2018-08-04 | Stop reason: SDUPTHER

## 2018-03-08 ENCOUNTER — OFFICE VISIT (OUTPATIENT)
Dept: NEUROLOGY | Facility: CLINIC | Age: 48
End: 2018-03-08
Payer: COMMERCIAL

## 2018-03-08 VITALS
DIASTOLIC BLOOD PRESSURE: 98 MMHG | BODY MASS INDEX: 27.75 KG/M2 | WEIGHT: 198.19 LBS | SYSTOLIC BLOOD PRESSURE: 172 MMHG | HEIGHT: 71 IN | HEART RATE: 87 BPM

## 2018-03-08 DIAGNOSIS — R56.9 CONVULSIONS, UNSPECIFIED CONVULSION TYPE: Primary | ICD-10-CM

## 2018-03-08 PROCEDURE — 99214 OFFICE O/P EST MOD 30 MIN: CPT | Mod: S$GLB,,, | Performed by: NEUROLOGICAL SURGERY

## 2018-03-08 PROCEDURE — 3080F DIAST BP >= 90 MM HG: CPT | Mod: S$GLB,,, | Performed by: NEUROLOGICAL SURGERY

## 2018-03-08 PROCEDURE — 99999 PR PBB SHADOW E&M-EST. PATIENT-LVL III: CPT | Mod: PBBFAC,,, | Performed by: NEUROLOGICAL SURGERY

## 2018-03-08 PROCEDURE — 3077F SYST BP >= 140 MM HG: CPT | Mod: S$GLB,,, | Performed by: NEUROLOGICAL SURGERY

## 2018-03-08 RX ORDER — LEVETIRACETAM 750 MG/1
750 TABLET ORAL 2 TIMES DAILY
Qty: 60 TABLET | Refills: 11 | Status: SHIPPED | OUTPATIENT
Start: 2018-03-08 | End: 2019-01-13

## 2018-03-08 NOTE — PROGRESS NOTES
"Name: Nirmal Moss  MRN: [id number]   CSN: [id number]      Date: 03/08/2018    HISTORY OF PRESENT ILLNESS (HPI)  The patient is a 47 y.o. yo RHWM   The patient was initially referred for consultation by Dr. Baird.     Nirmal Moss is a 44 y.o. male with a history of multiple medical diagnoses as listed below that presents for evaluation of suspected seizures. He is accompanied today by his wife. He has had episodes where he has lost consciousness most recently about one week ago. The first episode was around June 2014 when he was in [place]. They were outside but they were in the shade per his wife who provided much of the details about the events. He was opening her door and he suddenly had a blank stare. She asked "are you okay?" and he fell and bumped his head on the car door as he fell. He was unconscious for a few minutes and was diaphoretic before regain consciousness. In December 2014 he had a very similar experience where he was unconscious for about 4-5 minutes while in [place]. He had one other episode while in an otrt clinic helping his wife to balance in preparation for an injection. The most recent episode was at a restaurant after they had recently closed on a house. He says that he remembers sitting down to have a meal. He suddenly began to feel a sensation of heat coming over him so he went to drink some water and had a sip of his tea. He grabbed a cracker to eat but his wife said that he again had a blank stare and froze suddenly. She asked if he was okay but he suddenly went limp and has some sounds as if he were gasping for air. He had some jerking movements in the arms as he was unconscious. He says that he remembers his wife calling asking if he was okay and he remembers his sister-in-law calling EMS before he lost consciousness. He was out for about 10 minutes before he regained consciousness. He was somewhat confused when he came back around. He denies any chest pain or " "palpitations with these events. EMS reported that his BP was somewhat low and his heart rate was low as well. He was transported to the ED and was released after he stabilized. He had no episodes of LOC before. No incontinence during the event. Since he was last seen in clinic he has been taking Keppra. He admits that initially he was frequently forgetting to take the second dose of the medication and then when his wife became aware he has been more compliant with the medication as scheduled. He has not had any complications with the medication. He has not been sleeping well due to a persistent cough that has been noted and evaluated by Dr. Baird. He and his wife express concern that they will be flying to [place] again soon to have a court hearing with his ongoing custody shukla that he is having with his ex-wife. He typically does not sleep well and is undergoing much stress during that time.    Interim History  Last clinic visit - 2/2/2016 11/2/2015  He has recently returned from [place] and a short stay in [facility] afterwards and says that overall the trip was uneventful. He says that he did have to experience a lot of stress due to the court proceedings and was not sleeping very much during his time there. His wife was not able to accompany him to this visit but he says that she noticed that he seemed to be distant on a few occasions when he seemed to be overheated. As long as he drank a lot of fluids and tried to avoid over exerting himself he did well while on the trip. He says that he did feel like those times that he had seizures in the past he had an "exhausted" feeling that preceded the events, but he never noticed them before this most recent trip. He has been taking Keppra and says that he has been able to tolerate the medication well without any problems. His wife has not expressed any other concerns since he was last seen about one month ago.    2/2/2016  Since he was last seen " "he has been having increased stress from family issues. He says that he has been having ongoing litigation over custody of his son, but the most recent ruling determined that his son was to stay in the full custody of his wife. He has been upset over the ruling and has said that though he was granted visitation his ex-wife has been making visitation with his son difficult. He has been traveling to [place] at least once per month so that he can spend an extended weekend with his son. He is accompanied again by his wife. She says that he had two episodes since he was last seen that they feel may have been suspicious for a seizure. He says that he had episodes where he became excessively hot and had an "ashen" appearance per his wife. They were visiting [facility] at the time and decided that they should leave early. Once they left they made sure that he was hydrated. He began to become excessively fatigued and slept about 9 hours before he woke to eat dinner and then slept another 8 hours. He had another very similar episode where he slept for almost 3 days straight only waking to eat. He does feel that the episode in [facility] may have been prompted by the court judgement that was made earlier that day. He may have had a cold when he slept those several days in a row.    3/8/2018  He has been doing well overall since he has been granted visitation rights with his son. He was not able to get custody as he wanted, but is comfortable with his progress. His ex-wife is currently in another contentious relationship which has helped his and his ex's relationship with regards to parenting their son. He has been taking his medications as directed without any complaints of side effects. He has not had any seizure like events.       Epilepsy History    ED visits  Episodes of SE  Change in meds    Seizure Seminology  Seizure Type 1  Classification:   Aura -   Ictus  - Nonconv -  - Conv -  - Duration -   Post-ictal  - " Symptoms  - Duration  Age of onset   Current Seizure Frequency -    Last Seizure      sz per month 2012 2013 2014 2015 2016 2017   Pal         Feb         Mar         Apr         May         Rojelio         Jul         Aug         Sep         Oct         Nov         Dec         Tot           Seizure Triggers  Sleep Deprivation - None  Other medications - None  Psych/stress - None  Photic stimulation - None  Hyperventilation - None  Medical Problems - None  Menses - No  Sensory Stimulation (light, sound, etc) - None  Missed dose of meds - None    AED Treatments  Present regimen  levetiracetam (Keppra, LEV)    Prior treatments      Not tried  acetazolamide (Diamox, AZM)  amantadine  carbamazepine (Tegretol, CBZ)  clobezam (Onfi or Frizium, CLB)  ethosuximide (Zarontin, ESM)  eslicarbazine (Aptiom, ESL)  felbamate (felbatol, FBM)  gabapentin (Neurontin, GPN)  lacosamide (Vimpat, LCS)   lamotrigine (Lamictal, LTG)   methsuximide (Celontin, MSM)  methyphenytoin (Mesantion, MHT)  oxcarbazepine (Trileptal OXC)  perampanel (Fycompa, FCP)   phenobarbital (Pb)  phenytoin (Dilantin, PHT)  pregabalin (Lyrica, PGB)  primidone (Mysoline, PRM)  retigabine (Potiga, RTG)  rufinamide (Banzel, RUF)  tiagabine (Gabatril,  TGB)  topiramate (Topamax, TPM)  viagabatrin, (Sabril, VGB)  vagal nerve stimulator (VNS)  valproic acid (Depakote, VPA)  zonisamide (Zonegran, ZNA)  Benzodiazepines  diazepam - rectal (Diastatl)  diazepam - oral (Valium, DZ)  clonazepam (Klonopin, CZP)  clorazepate (Tranxene, CLZ)  Ativan  Brain Stimulation  Vagal Nerve Stimulation-n/a  DBS- n/a    Compliance method  Memory - yes  Mom or Spouse - Yes  Pill Box - no  Ishmael calendar - no  Turn over medication bottle - no  Phone alarm - no    Seizure Evaluation  EEG Routine - (10/12/2015) This is a normal EEG in an awake, drowsy, and asleep adult. Please be aware that a normal EEG does not exclude the possibility of an underlying seizure disorder.  EEG Ambulatory -    EEG\Video Monitoring -   MRI/MRA -   CT/CTA Scan -   PET Scan -   Neuropsychological evaluation -   DEXA Scan    Potential Epilepsy Risk Factors:   Pregnancy/Labor/Delivery - full term uncomplicated pregnancy labor and vaginal delivery  Febrile seizures - none  Head injury  - none  CNS infection - none     Stroke - none  Family Hx of Sz - none    PAST MEDICAL HISTORY:   Active Ambulatory Problems     Diagnosis Date Noted    HTN (hypertension) 12/03/2013    Knee pain 12/05/2013    Anxiety disorder 01/15/2014    Insomnia 01/15/2014    Dyspepsia 04/04/2014    ADD (attention deficit disorder) 07/30/2015    Colles' fracture 05/15/2016    Closed fracture of left ulnar styloid 05/15/2016    Complex tear of lateral meniscus of right knee as current injury 03/28/2017    Mood disorder 01/18/2018    Seizure disorder 01/18/2018    Vitamin D deficiency disease 01/18/2018     Resolved Ambulatory Problems     Diagnosis Date Noted    No Resolved Ambulatory Problems     Past Medical History:   Diagnosis Date    ADD (attention deficit disorder)     Anxiety     Depression     Gastritis     HDL deficiency     Hypertension     Kidney stone     Meniscus tear 09/2013    Meniscus tear 01/2017    Seizure disorder 1/18/2018    Seizures 06/2014    Vitamin D deficiency disease 1/18/2018        PAST SURGICAL HISTORY:   Past Surgical History:   Procedure Laterality Date    FRACTURE SURGERY Left 05/2016    wrist fracture    KNEE SURGERY          FAMILY HISTORY:   Family History   Problem Relation Age of Onset    Diabetes Father     Cancer Father     Skin cancer Father     Cancer Maternal Grandfather          SOCIAL HISTORY:   Social History     Social History    Marital status:      Spouse name: N/A    Number of children: 1    Years of education: N/A     Occupational History     [employer]     Social History Main Topics    Smoking status: Never Smoker    Smokeless tobacco: Never  "Used    Alcohol use 0.0 oz/week      Comment: social    Drug use: No    Sexual activity: Yes     Partners: Male     Other Topics Concern    Not on file     Social History Narrative    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        SUBSTANCE USE:  Social History     Social History Main Topics    Smoking status: Never Smoker    Smokeless tobacco: Never Used    Alcohol use 0.0 oz/week      Comment: social    Drug use: No    Sexual activity: Yes     Partners: Male      Social History   Substance Use Topics    Smoking status: Never Smoker    Smokeless tobacco: Never Used    Alcohol use 0.0 oz/week      Comment: social        ALLERGIES: Hydrocodone     BP (!) 172/98 (BP Location: Left arm, Patient Position: Sitting, BP Method: Large (Automatic))   Pulse 87   Ht 5' 11" (1.803 m)   Wt 89.9 kg (198 lb 3.1 oz)   BMI 27.64 kg/m²     Higher Cortical Function:    Patient is a well developed, pleasant, well groomed individual appearing their stated age  Oriented - intact to person, place and time and followed two step instruction correctly.    Spell WORLD - Patients response: forward - WORLD; backwards - DLROW  Subtraction of serial 7s from 100 - 3 steps performed correct  Memory - Patient recalled 3 of 3 objects after 5 minutes  Fund of knowledge was appropriate.    R-L Orientation - Intact  Language - Speech was fluent without evidence for an aphasia.  Agnosias, agraphesthesia, or astereognosis - not present.   Extinction with double simultaneous stimulation:        Proximal-distal stimulation - Not present        Right-left stimulation - Not present  Cranial Nerves II - XII:    EOMs were intact with normal smooth and no nystagmus.    PERRLA. D/C   Funduscopic exam - disc were flat with normal A/V ratio and no exudates or hemorrhages. Visual fields were full to confrontation.    Motor - facial movement was symmetrical and normal.    Facial sensory - Light touch and pin prick " "sensations were normal.    Hearing was normal to finger rub.  Palate moved well and was symmetrical with normal palatal and oral sensation.    Tongue movement was full & the patient could say "la la la" and "Ka Ka Ka" without  difficulty. Patient repeated Denominational and Confucianist without difficulty. Normal power and bulk was found in the massiter and rotator muscles of the neck.  Motor: Power, bulk and tone were normal in all extremities.  Sensory: Light touch, pin prick, vibration and position senses were normal in all extremities.    Coordination:       Rapid alternating movements and rapid finger tapping - normal.       Finger to nose - nl.       Arm roll - symmetrical.    Gait:  Station, gait and tandem walking were done without difficulty and Romberg was negative.  Frontal release signs  Sign Left Right   Glabellar absent absent   Snout absent absent   Root absent absent   Suck absent absent   Palmomental absent absent          Deep tendon reflexes:    Reflex L R   Bicpets 2+ 2+   Tricepts 2+ 2+   Brachio-radialis 2+ 2+   Knee 2+ 2+   Ankle 2+ 2+   Babinski No No     Tremor: resting, postural, intentional - none    Pulses    Carotids - strong without bruits    Peripheral - strong and symmetrical      IMPRESSION  1. Complex partial seizures with secondary generalization      DISPOSITION:   Continue LEV to 750 mg BID  2. Counseled on avoidance of common triggers  3. Repeat EEG in 9 months  4. Seizure precautions discussed including: No driving for six months after the last seizure per [place] law, no swimming, no bathing unattended, no climbing to heights greater than 4 feet, no operation of heavy machinery, and no activity where loss of consciousness can result in injury to yourself or others.    More than 50% of this 25 minute encounter was spent counseling and coordinating his care.      "

## 2018-03-26 RX ORDER — MONTELUKAST SODIUM 10 MG/1
TABLET ORAL
Qty: 30 TABLET | Refills: 1 | Status: SHIPPED | OUTPATIENT
Start: 2018-03-26 | End: 2018-05-21 | Stop reason: SDUPTHER

## 2018-04-30 ENCOUNTER — PATIENT MESSAGE (OUTPATIENT)
Dept: FAMILY MEDICINE | Facility: CLINIC | Age: 48
End: 2018-04-30

## 2018-04-30 DIAGNOSIS — F98.8 ATTENTION DEFICIT DISORDER, UNSPECIFIED HYPERACTIVITY PRESENCE: ICD-10-CM

## 2018-05-01 RX ORDER — DEXTROAMPHETAMINE SACCHARATE, AMPHETAMINE ASPARTATE MONOHYDRATE, DEXTROAMPHETAMINE SULFATE AND AMPHETAMINE SULFATE 7.5; 7.5; 7.5; 7.5 MG/1; MG/1; MG/1; MG/1
CAPSULE, EXTENDED RELEASE ORAL
Qty: 60 CAPSULE | Refills: 0 | Status: SHIPPED | OUTPATIENT
Start: 2018-05-01 | End: 2018-05-29 | Stop reason: SDUPTHER

## 2018-05-01 RX ORDER — DEXTROAMPHETAMINE SACCHARATE, AMPHETAMINE ASPARTATE MONOHYDRATE, DEXTROAMPHETAMINE SULFATE AND AMPHETAMINE SULFATE 7.5; 7.5; 7.5; 7.5 MG/1; MG/1; MG/1; MG/1
CAPSULE, EXTENDED RELEASE ORAL
Qty: 60 CAPSULE | Refills: 0 | Status: SHIPPED | OUTPATIENT
Start: 2018-06-01 | End: 2018-05-29 | Stop reason: SDUPTHER

## 2018-05-01 RX ORDER — DEXTROAMPHETAMINE SACCHARATE, AMPHETAMINE ASPARTATE MONOHYDRATE, DEXTROAMPHETAMINE SULFATE AND AMPHETAMINE SULFATE 7.5; 7.5; 7.5; 7.5 MG/1; MG/1; MG/1; MG/1
CAPSULE, EXTENDED RELEASE ORAL
Qty: 60 CAPSULE | Refills: 0 | Status: SHIPPED | OUTPATIENT
Start: 2018-07-01 | End: 2018-05-29 | Stop reason: SDUPTHER

## 2018-05-17 RX ORDER — PAROXETINE HYDROCHLORIDE 40 MG/1
40 TABLET, FILM COATED ORAL EVERY MORNING
Qty: 90 TABLET | Refills: 1 | Status: SHIPPED | OUTPATIENT
Start: 2018-05-17 | End: 2018-10-23 | Stop reason: SDUPTHER

## 2018-05-21 RX ORDER — MONTELUKAST SODIUM 10 MG/1
TABLET ORAL
Qty: 30 TABLET | Refills: 5 | Status: SHIPPED | OUTPATIENT
Start: 2018-05-21 | End: 2018-11-20 | Stop reason: SDUPTHER

## 2018-05-29 ENCOUNTER — OFFICE VISIT (OUTPATIENT)
Dept: FAMILY MEDICINE | Facility: CLINIC | Age: 48
End: 2018-05-29
Payer: COMMERCIAL

## 2018-05-29 ENCOUNTER — LAB VISIT (OUTPATIENT)
Dept: LAB | Facility: HOSPITAL | Age: 48
End: 2018-05-29
Attending: INTERNAL MEDICINE
Payer: COMMERCIAL

## 2018-05-29 VITALS
WEIGHT: 198 LBS | HEIGHT: 71 IN | BODY MASS INDEX: 27.72 KG/M2 | TEMPERATURE: 98 F | SYSTOLIC BLOOD PRESSURE: 132 MMHG | OXYGEN SATURATION: 99 % | HEART RATE: 80 BPM | DIASTOLIC BLOOD PRESSURE: 88 MMHG

## 2018-05-29 DIAGNOSIS — E55.9 VITAMIN D DEFICIENCY DISEASE: ICD-10-CM

## 2018-05-29 DIAGNOSIS — D64.9 ANEMIA, UNSPECIFIED TYPE: ICD-10-CM

## 2018-05-29 DIAGNOSIS — N20.0 KIDNEY STONE: ICD-10-CM

## 2018-05-29 DIAGNOSIS — F98.8 ATTENTION DEFICIT DISORDER, UNSPECIFIED HYPERACTIVITY PRESENCE: Primary | ICD-10-CM

## 2018-05-29 DIAGNOSIS — F39 MOOD DISORDER: ICD-10-CM

## 2018-05-29 DIAGNOSIS — I10 ESSENTIAL HYPERTENSION: ICD-10-CM

## 2018-05-29 LAB
25(OH)D3+25(OH)D2 SERPL-MCNC: 57 NG/ML
ANION GAP SERPL CALC-SCNC: 8 MMOL/L
BASOPHILS # BLD AUTO: 0.02 K/UL
BASOPHILS NFR BLD: 0.3 %
BUN SERPL-MCNC: 22 MG/DL
CALCIUM SERPL-MCNC: 9.3 MG/DL
CHLORIDE SERPL-SCNC: 104 MMOL/L
CO2 SERPL-SCNC: 26 MMOL/L
CREAT SERPL-MCNC: 0.8 MG/DL
DIFFERENTIAL METHOD: ABNORMAL
EOSINOPHIL # BLD AUTO: 0 K/UL
EOSINOPHIL NFR BLD: 0.2 %
ERYTHROCYTE [DISTWIDTH] IN BLOOD BY AUTOMATED COUNT: 13.6 %
EST. GFR  (AFRICAN AMERICAN): >60 ML/MIN/1.73 M^2
EST. GFR  (NON AFRICAN AMERICAN): >60 ML/MIN/1.73 M^2
GLUCOSE SERPL-MCNC: 98 MG/DL
HCT VFR BLD AUTO: 40.9 %
HGB BLD-MCNC: 13.6 G/DL
IMM GRANULOCYTES # BLD AUTO: 0.02 K/UL
IMM GRANULOCYTES NFR BLD AUTO: 0.3 %
LYMPHOCYTES # BLD AUTO: 1.2 K/UL
LYMPHOCYTES NFR BLD: 20.6 %
MCH RBC QN AUTO: 33.2 PG
MCHC RBC AUTO-ENTMCNC: 33.3 G/DL
MCV RBC AUTO: 100 FL
MONOCYTES # BLD AUTO: 0.6 K/UL
MONOCYTES NFR BLD: 10.7 %
NEUTROPHILS # BLD AUTO: 4 K/UL
NEUTROPHILS NFR BLD: 67.9 %
NRBC BLD-RTO: 0 /100 WBC
PLATELET # BLD AUTO: 242 K/UL
PMV BLD AUTO: 11 FL
POTASSIUM SERPL-SCNC: 4.2 MMOL/L
RBC # BLD AUTO: 4.1 M/UL
SODIUM SERPL-SCNC: 138 MMOL/L
WBC # BLD AUTO: 5.91 K/UL

## 2018-05-29 PROCEDURE — 85025 COMPLETE CBC W/AUTO DIFF WBC: CPT

## 2018-05-29 PROCEDURE — 3008F BODY MASS INDEX DOCD: CPT | Mod: CPTII,S$GLB,, | Performed by: INTERNAL MEDICINE

## 2018-05-29 PROCEDURE — 82306 VITAMIN D 25 HYDROXY: CPT

## 2018-05-29 PROCEDURE — 99214 OFFICE O/P EST MOD 30 MIN: CPT | Mod: S$GLB,,, | Performed by: INTERNAL MEDICINE

## 2018-05-29 PROCEDURE — 3075F SYST BP GE 130 - 139MM HG: CPT | Mod: CPTII,S$GLB,, | Performed by: INTERNAL MEDICINE

## 2018-05-29 PROCEDURE — 80048 BASIC METABOLIC PNL TOTAL CA: CPT

## 2018-05-29 PROCEDURE — 99999 PR PBB SHADOW E&M-EST. PATIENT-LVL III: CPT | Mod: PBBFAC,,, | Performed by: INTERNAL MEDICINE

## 2018-05-29 PROCEDURE — 36415 COLL VENOUS BLD VENIPUNCTURE: CPT | Mod: PO

## 2018-05-29 PROCEDURE — 3079F DIAST BP 80-89 MM HG: CPT | Mod: CPTII,S$GLB,, | Performed by: INTERNAL MEDICINE

## 2018-05-29 RX ORDER — DEXTROAMPHETAMINE SACCHARATE, AMPHETAMINE ASPARTATE MONOHYDRATE, DEXTROAMPHETAMINE SULFATE AND AMPHETAMINE SULFATE 7.5; 7.5; 7.5; 7.5 MG/1; MG/1; MG/1; MG/1
CAPSULE, EXTENDED RELEASE ORAL
Qty: 60 CAPSULE | Refills: 0 | Status: SHIPPED | OUTPATIENT
Start: 2018-06-29 | End: 2018-08-30 | Stop reason: SDUPTHER

## 2018-05-29 RX ORDER — DEXTROAMPHETAMINE SACCHARATE, AMPHETAMINE ASPARTATE MONOHYDRATE, DEXTROAMPHETAMINE SULFATE AND AMPHETAMINE SULFATE 7.5; 7.5; 7.5; 7.5 MG/1; MG/1; MG/1; MG/1
CAPSULE, EXTENDED RELEASE ORAL
Qty: 60 CAPSULE | Refills: 0 | Status: SHIPPED | OUTPATIENT
Start: 2018-07-28 | End: 2018-08-28 | Stop reason: SDUPTHER

## 2018-05-29 RX ORDER — DEXTROAMPHETAMINE SACCHARATE, AMPHETAMINE ASPARTATE MONOHYDRATE, DEXTROAMPHETAMINE SULFATE AND AMPHETAMINE SULFATE 7.5; 7.5; 7.5; 7.5 MG/1; MG/1; MG/1; MG/1
CAPSULE, EXTENDED RELEASE ORAL
Qty: 60 CAPSULE | Refills: 0 | Status: SHIPPED | OUTPATIENT
Start: 2018-05-29 | End: 2018-08-30 | Stop reason: SDUPTHER

## 2018-05-29 NOTE — PROGRESS NOTES
Chief complaint:  Follow-up on ADD and blood pressure medications    47-year-old white male with long-standing ADD.  He requires a high-dose of the Adderall and apparently his son does as well as and is a high metabolizer of the medication so patient himself can assumedly is the same.  He finds benefit from medication.  We printed in the 3 prescriptions and I encouraged him to message us with the exact date see needs for the next 3 months and we will see him every 6 months.  He review of his blood pressure had been running better with some weight loss which he needs to work on again.  I encouraged him to keep monitoring at home since he is on full doses of 3 medications.    He is taking a vitamin D supplement.  He's had some problems with passing some kidney stones.  He's past, still has a little bit of discomfort in the right lower quadrant similar to passing stones before.  We discussed if it persists we may need to image for obstruction.  We'll check kidney function.  He also had a slight hemoglobin reduction has no clinical bleeding and no blood donation.  We will recheck.  Counseled regarding all these issues and also discussed a lot of social issues now that he has a mother-in-law living with him but all seem to be going. well.Total time over 25 minutes with over 50% counseling.          ROS:   CONST: weight stable. EYES: no vision change. ENT: no sore throat. CV: no chest pain w/ exertion. RESP: no shortness of breath. GI: no nausea, vomiting, diarrhea. No dysphagia. : no urinary issues. MUSCULOSKELETAL: no new myalgias or arthralgias. SKIN: no new changes. NEURO: no focal deficits. PSYCH: no new issues. ENDOCRINE: no polyuria. HEME: no lymph nodes. ALLERGY: no general pruritis.    PAST MEDICAL HISTORY:                                                        1.  Hypertension.                                                            2.  Depression, sees Psychiatry.                                            "  3.  ADD, sees Psychiatry.  Dr Emery                                                  4.  Right knee scope.   5.  Low HDL 1999   6.  Insomnia  7.  Sleep eating with Ambien   8.  probable seizure disorder 2015  9. Orthostatic hypotension, he worked up by cardiology 2015, may relate to seizures   10.  Left Colles' fracture with surgical repair 2016  11.  Right medial meniscus injury 2017                                                                                                                                 SOCIAL HISTORY:  Does not smoke, social alcohol, never smoked.                                                                                            FAMILY HISTORY:  Mom with seizures.  Father with hypertension, diabetes, .     He has no siblings.           Vital signs as above,  Gen: no distress    Nirmal was seen today for medication refill.    Diagnoses and all orders for this visit:    Attention deficit disorder, unspecified hyperactivity presence, chronic and stable use of medication  -     dextroamphetamine-amphetamine (ADDERALL XR) 30 MG 24 hr capsule; TAKE TWO CAPSULES BY MOUTH EVERY DAY  -     dextroamphetamine-amphetamine (ADDERALL XR) 30 MG 24 hr capsule; TAKE TWO CAPSULES BY MOUTH EVERY DAY  -     dextroamphetamine-amphetamine (ADDERALL XR) 30 MG 24 hr capsule; TAKE TWO CAPSULES BY MOUTH EVERY DAY    Essential hypertension, chronic and stable    Mood disorder, chronic and stable and situational stress discussed    Vitamin D deficiency disease, reassess on supplement  -     Vitamin D; Future    Anemia, unspecified type, reassess for trending  -     CBC auto differential; Future    Kidney stone, check kidney function, continue with fluids  -     Basic metabolic panel; Future                  Clinical note will be sensitive as doing controlled substance management/monitoring"//"This note will not be shared with the patient."  "

## 2018-06-24 ENCOUNTER — PATIENT MESSAGE (OUTPATIENT)
Dept: DERMATOLOGY | Facility: CLINIC | Age: 48
End: 2018-06-24

## 2018-06-26 ENCOUNTER — TELEPHONE (OUTPATIENT)
Dept: DERMATOLOGY | Facility: CLINIC | Age: 48
End: 2018-06-26

## 2018-08-04 DIAGNOSIS — I10 ESSENTIAL HYPERTENSION: ICD-10-CM

## 2018-08-05 RX ORDER — HYDROCHLOROTHIAZIDE 25 MG/1
TABLET ORAL
Qty: 90 TABLET | Refills: 1 | Status: SHIPPED | OUTPATIENT
Start: 2018-08-05 | End: 2019-01-31 | Stop reason: SDUPTHER

## 2018-08-08 ENCOUNTER — OFFICE VISIT (OUTPATIENT)
Dept: PLASTIC SURGERY | Facility: CLINIC | Age: 48
End: 2018-08-08
Payer: COMMERCIAL

## 2018-08-08 VITALS
HEIGHT: 71 IN | TEMPERATURE: 98 F | DIASTOLIC BLOOD PRESSURE: 79 MMHG | SYSTOLIC BLOOD PRESSURE: 121 MMHG | WEIGHT: 198.31 LBS | BODY MASS INDEX: 27.76 KG/M2 | HEART RATE: 94 BPM

## 2018-08-08 DIAGNOSIS — L72.9 SCALP CYST: Primary | ICD-10-CM

## 2018-08-08 PROCEDURE — 3074F SYST BP LT 130 MM HG: CPT | Mod: CPTII,S$GLB,, | Performed by: PHYSICIAN ASSISTANT

## 2018-08-08 PROCEDURE — 99999 PR PBB SHADOW E&M-EST. PATIENT-LVL III: CPT | Mod: PBBFAC,,, | Performed by: PHYSICIAN ASSISTANT

## 2018-08-08 PROCEDURE — 99203 OFFICE O/P NEW LOW 30 MIN: CPT | Mod: S$GLB,,, | Performed by: PHYSICIAN ASSISTANT

## 2018-08-08 PROCEDURE — 3008F BODY MASS INDEX DOCD: CPT | Mod: CPTII,S$GLB,, | Performed by: PHYSICIAN ASSISTANT

## 2018-08-08 PROCEDURE — 3078F DIAST BP <80 MM HG: CPT | Mod: CPTII,S$GLB,, | Performed by: PHYSICIAN ASSISTANT

## 2018-08-08 NOTE — PROGRESS NOTES
[facility]irmal [facility]2018 for consult regarding cyst of left posterior scalp. He reports having this cyst x 3 years. He denies erythema or drainage. He does report pain when he lays on a pillow at night with occasional headaches.     Review of patient's allergies indicates:   Allergen Reactions    Hydrocodone Rash     Current Outpatient Prescriptions on File Prior to Visit   Medication Sig Dispense Refill    amLODIPine (NORVASC) 10 MG tablet TAKE ONE Tablet BY MOUTH DAILY 90 tablet 12    dextroamphetamine-amphetamine (ADDERALL XR) 30 MG 24 hr capsule TAKE TWO CAPSULES BY MOUTH EVERY DAY 60 capsule 0    dextroamphetamine-amphetamine (ADDERALL XR) 30 MG 24 hr capsule TAKE TWO CAPSULES BY MOUTH EVERY DAY 60 capsule 0    dextroamphetamine-amphetamine (ADDERALL XR) 30 MG 24 hr capsule TAKE TWO CAPSULES BY MOUTH EVERY DAY 60 capsule 0    hydroCHLOROthiazide (HYDRODIURIL) 25 MG tablet TAKE ONE TABLET BY MOUTH EVERY DAY 90 tablet 1    irbesartan (AVAPRO) 300 MG tablet Take 1 tablet (300 mg total) by mouth once daily. 90 tablet 12    levETIRAcetam (KEPPRA) 750 MG Tab Take 1 tablet (750 mg total) by mouth 2 (two) times daily. 60 tablet 11    montelukast (SINGULAIR) 10 mg tablet TAKE ONE TABLET BY MOUTH EVERY EVENING 30 tablet 0    montelukast (SINGULAIR) 10 mg tablet TAKE ONE TABLET BY MOUTH EVERY EVENING 30 tablet 5    paroxetine (PAXIL) 40 MG tablet Take 1 tablet (40 mg total) by mouth every morning. 90 tablet 1     No current facility-administered medications on file prior to visit.      Patient Active Problem List   Diagnosis    HTN (hypertension)    Knee pain    Anxiety disorder    Insomnia    Dyspepsia    ADD (attention deficit disorder)    Colles' fracture    Closed fracture of left ulnar styloid    Complex tear of lateral meniscus of right knee as current injury    Mood disorder    Seizure disorder    Vitamin D deficiency disease     Past Surgical  History:   Procedure Laterality Date    FRACTURE SURGERY Left 05/2016    wrist fracture    KNEE SURGERY       PHYSICAL EXAMINATION  Vitals:    08/08/18 0840   BP: 121/79   Pulse: 94   Temp: 98 °F (36.7 °C)     WD WN NAD  VSS  Normal resp effort  Scalp - 1.5cm x 1.5cm cyst of left posterior scalp, non tender, mobile, no erythema/fluctuance, overlying allopecia    ASSESSMENT/PLAN  47 y.o. M with cyst of L posterior scalp  - Discussed removal under local anesthesia. He understands that the risks include but are not limited to bleeding, scarring, infection, pain, numbness, asymmetry, deformity, open wound, permanent nubness/tingling, recurrence, hematoma and need for further surgery  - He would like to proceed with removal of L posterior scalp cyst under local anesthesia  - Office staff to call and coordinate surgery once insurance authorization obtained    All questions were answered. The patient was advised to call the clinic with any questions or concerns prior to their next visit.

## 2018-08-08 NOTE — LETTER
August 8, 2018      Iris Barajas MD  [address]  [facility] [address]           [facility] [place]  [address]  [facility] [address]  Phone: [phone]  Fax: [phone]          Patient: Nirmal Moss   MR Number: [id number]   YOB: 1970   Date of Visit: 8/8/2018       Dear Dr. Iris Barajas:    Thank you for referring Nirmal Moss to me for evaluation. Attached you will find relevant portions of my assessment and plan of care.    If you have questions, please do not hesitate to call me. I look forward to following Nirmal Moss along with you.    Sincerely,    DANIEL Sibley    Enclosure  CC:  No Recipients    If you would like to receive this communication electronically, please contact [email or web address] or [phone] to request more information on [facility] Link access.    For providers and/or their staff who would like to refer a patient to [facility], please contact us through our one-stop-shop provider referral line, [facility], at [phone].    If you feel you have received this communication in error or would no longer like to receive these types of communications, please e-mail [email or web address]

## 2018-08-28 ENCOUNTER — OFFICE VISIT (OUTPATIENT)
Dept: DERMATOLOGY | Facility: CLINIC | Age: 48
End: 2018-08-28
Payer: COMMERCIAL

## 2018-08-28 ENCOUNTER — PATIENT MESSAGE (OUTPATIENT)
Dept: FAMILY MEDICINE | Facility: CLINIC | Age: 48
End: 2018-08-28

## 2018-08-28 VITALS — BODY MASS INDEX: 27.62 KG/M2 | WEIGHT: 198 LBS

## 2018-08-28 DIAGNOSIS — D22.9 NEVUS OF MULTIPLE SITES: ICD-10-CM

## 2018-08-28 DIAGNOSIS — L72.11 PILAR CYST: ICD-10-CM

## 2018-08-28 DIAGNOSIS — F98.8 ATTENTION DEFICIT DISORDER, UNSPECIFIED HYPERACTIVITY PRESENCE: ICD-10-CM

## 2018-08-28 DIAGNOSIS — L81.4 LENTIGINES: Primary | ICD-10-CM

## 2018-08-28 PROCEDURE — 99213 OFFICE O/P EST LOW 20 MIN: CPT | Mod: S$GLB,,, | Performed by: DERMATOLOGY

## 2018-08-28 PROCEDURE — 99999 PR PBB SHADOW E&M-EST. PATIENT-LVL II: CPT | Mod: PBBFAC,,, | Performed by: DERMATOLOGY

## 2018-08-28 PROCEDURE — 3008F BODY MASS INDEX DOCD: CPT | Mod: CPTII,S$GLB,, | Performed by: DERMATOLOGY

## 2018-08-28 NOTE — PROGRESS NOTES
Subjective:       Patient ID:  Nirmal Moss is a 47 y.o. male who presents for   Chief Complaint   Patient presents with    Spot     scalp    Skin Check     UBSE     History of Present Illness: The patient presents with chief complaint of cyst.  Location: scalp  Duration: months  Signs/Symptoms: growing    Prior treatments: none  Also would like skin check          Review of Systems   Constitutional: Negative for fever.   Skin: Negative for itching and rash.   Hematologic/Lymphatic: Does not bruise/bleed easily.        Objective:    Physical Exam   Constitutional: He appears well-developed and well-nourished. No distress.   Neurological: He is alert and oriented to person, place, and time. He is not disoriented.   Psychiatric: He has a normal mood and affect.   Skin:   Areas Examined (abnormalities noted in diagram):   Scalp / Hair Palpated and Inspected  Head / Face Inspection Performed  Neck Inspection Performed  Chest / Axilla Inspection Performed  Abdomen Inspection Performed  Back Inspection Performed  RUE Inspected  LUE Inspection Performed  Nails and Digits Inspection Performed                   Diagram Legend     Erythematous scaling macule/papule c/w actinic keratosis       Vascular papule c/w angioma      Pigmented verrucoid papule/plaque c/w seborrheic keratosis      Yellow umbilicated papule c/w sebaceous hyperplasia      Irregularly shaped tan macule c/w lentigo     1-2 mm smooth white papules consistent with Milia      Movable subcutaneous cyst with punctum c/w epidermal inclusion cyst      Subcutaneous movable cyst c/w pilar cyst      Firm pink to brown papule c/w dermatofibroma      Pedunculated fleshy papule(s) c/w skin tag(s)      Evenly pigmented macule c/w junctional nevus     Mildly variegated pigmented, slightly irregular-bordered macule c/w mildly atypical nevus      Flesh colored to evenly pigmented papule c/w intradermal nevus       Pink pearly papule/plaque c/w basal cell  "carcinoma      Erythematous hyperkeratotic cursted plaque c/w SCC      Surgical scar with no sign of skin cancer recurrence      Open and closed comedones      Inflammatory papules and pustules      Verrucoid papule consistent consistent with wart     Erythematous eczematous patches and plaques     Dystrophic onycholytic nail with subungual debris c/w onychomycosis     Umbilicated papule    Erythematous-base heme-crusted tan verrucoid plaque consistent with inflamed seborrheic keratosis     Erythematous Silvery Scaling Plaque c/w Psoriasis     See annotation      Assessment / Plan:        Lentigines  The "ABCD" rules to observe pigmented lesions were reviewed.      Pilar cyst  .has appt for removal   Nevus of multiple sites  The "ABCD" rules to observe pigmented lesions were reviewed.                 "

## 2018-08-28 NOTE — TELEPHONE ENCOUNTER
As requested by patient, please process 3 prescriptions for the next 3 months with the appropriate dates    Also confirm pharmacy is correct

## 2018-08-30 RX ORDER — DEXTROAMPHETAMINE SACCHARATE, AMPHETAMINE ASPARTATE MONOHYDRATE, DEXTROAMPHETAMINE SULFATE AND AMPHETAMINE SULFATE 7.5; 7.5; 7.5; 7.5 MG/1; MG/1; MG/1; MG/1
CAPSULE, EXTENDED RELEASE ORAL
Qty: 60 CAPSULE | Refills: 0 | Status: SHIPPED | OUTPATIENT
Start: 2018-08-30 | End: 2018-11-06 | Stop reason: SDUPTHER

## 2018-08-30 RX ORDER — DEXTROAMPHETAMINE SACCHARATE, AMPHETAMINE ASPARTATE MONOHYDRATE, DEXTROAMPHETAMINE SULFATE AND AMPHETAMINE SULFATE 7.5; 7.5; 7.5; 7.5 MG/1; MG/1; MG/1; MG/1
CAPSULE, EXTENDED RELEASE ORAL
Qty: 60 CAPSULE | Refills: 0 | Status: SHIPPED | OUTPATIENT
Start: 2018-10-30 | End: 2018-11-06 | Stop reason: SDUPTHER

## 2018-08-30 RX ORDER — DEXTROAMPHETAMINE SACCHARATE, AMPHETAMINE ASPARTATE MONOHYDRATE, DEXTROAMPHETAMINE SULFATE AND AMPHETAMINE SULFATE 7.5; 7.5; 7.5; 7.5 MG/1; MG/1; MG/1; MG/1
CAPSULE, EXTENDED RELEASE ORAL
Qty: 60 CAPSULE | Refills: 0 | Status: SHIPPED | OUTPATIENT
Start: 2018-09-30 | End: 2018-11-06 | Stop reason: SDUPTHER

## 2018-09-10 RX ORDER — IRBESARTAN 300 MG/1
TABLET ORAL
Qty: 90 TABLET | Refills: 12 | Status: SHIPPED | OUTPATIENT
Start: 2018-09-10 | End: 2019-09-16 | Stop reason: SDUPTHER

## 2018-10-17 ENCOUNTER — OFFICE VISIT (OUTPATIENT)
Dept: SPORTS MEDICINE | Facility: CLINIC | Age: 48
End: 2018-10-17
Payer: COMMERCIAL

## 2018-10-17 ENCOUNTER — HOSPITAL ENCOUNTER (OUTPATIENT)
Dept: RADIOLOGY | Facility: HOSPITAL | Age: 48
Discharge: HOME | End: 2018-10-17
Attending: ORTHOPAEDIC SURGERY
Payer: COMMERCIAL

## 2018-10-17 VITALS
SYSTOLIC BLOOD PRESSURE: 154 MMHG | WEIGHT: 198 LBS | HEIGHT: 71 IN | DIASTOLIC BLOOD PRESSURE: 95 MMHG | HEART RATE: 108 BPM | BODY MASS INDEX: 27.72 KG/M2

## 2018-10-17 DIAGNOSIS — M25.562 LEFT KNEE PAIN, UNSPECIFIED CHRONICITY: ICD-10-CM

## 2018-10-17 DIAGNOSIS — M25.562 ACUTE PAIN OF LEFT KNEE: Primary | ICD-10-CM

## 2018-10-17 PROCEDURE — 73564 X-RAY EXAM KNEE 4 OR MORE: CPT | Mod: TC,50,FY,PO

## 2018-10-17 PROCEDURE — 99214 OFFICE O/P EST MOD 30 MIN: CPT | Mod: S$GLB,,, | Performed by: ORTHOPAEDIC SURGERY

## 2018-10-17 PROCEDURE — 3008F BODY MASS INDEX DOCD: CPT | Mod: CPTII,S$GLB,, | Performed by: ORTHOPAEDIC SURGERY

## 2018-10-17 PROCEDURE — 3077F SYST BP >= 140 MM HG: CPT | Mod: CPTII,S$GLB,, | Performed by: ORTHOPAEDIC SURGERY

## 2018-10-17 PROCEDURE — 3080F DIAST BP >= 90 MM HG: CPT | Mod: CPTII,S$GLB,, | Performed by: ORTHOPAEDIC SURGERY

## 2018-10-17 PROCEDURE — 73564 X-RAY EXAM KNEE 4 OR MORE: CPT | Mod: 26,50,, | Performed by: INTERNAL MEDICINE

## 2018-10-17 PROCEDURE — 99999 PR PBB SHADOW E&M-EST. PATIENT-LVL IV: CPT | Mod: PBBFAC,,, | Performed by: ORTHOPAEDIC SURGERY

## 2018-10-17 NOTE — PROGRESS NOTES
CC: Left knee pain, patient is a      47 y.o. Male with a history of Left pain who He states that the pain is severe and not responding to any conservative care.      Pain has been present for 2 months   Denies any injury or trauma     + mechanical symptoms, no instability  He describes his pain as lateral    He has tried and failed ice, NSAIDs, rest, elevation     Is affecting ADLs.      He notes that this feels similar to his other knee when he tore his meniscus before surgery       Patient had 2 previous right knee scopes with me   Review of Systems   Constitution: Negative. Negative for chills, fever and night sweats.   HENT: Negative for congestion and headaches.    Eyes: Negative for blurred vision, left vision loss and right vision loss.   Cardiovascular: Negative for chest pain and syncope.   Respiratory: Negative for cough and shortness of breath.    Endocrine: Negative for polydipsia, polyphagia and polyuria.   Hematologic/Lymphatic: Negative for bleeding problem. Does not bruise/bleed easily.   Skin: Negative for dry skin, itching and rash.   Musculoskeletal: Negative for falls. Positive for knee pain and muscle weakness.   Gastrointestinal: Negative for abdominal pain and bowel incontinence.   Genitourinary: Negative for bladder incontinence and nocturia.   Neurological: Negative for disturbances in coordination, loss of balance and seizures.   Psychiatric/Behavioral: Negative for depression. The patient does not have insomnia.    Allergic/Immunologic: Negative for hives and persistent infections.     PAST MEDICAL HISTORY:   Past Medical History:   Diagnosis Date    ADD (attention deficit disorder)     Anxiety     Depression     Gastritis     EGD 2014    HDL deficiency     Hypertension     Kidney stone     Meniscus tear 09/2013    right    Meniscus tear 01/2017    right    Seizure disorder 1/18/2018    Seizures 06/2014    last seizure in late 2014    Vitamin D deficiency  disease 1/18/2018     PAST SURGICAL HISTORY:   Past Surgical History:   Procedure Laterality Date    ARTHROSCOPY, KNEE, WITH DEBRIDEMENT Right 12/5/2013    [facility]    BZHNOPJGYLT-TVTKATPIAULE-SLEZJP & LATERAL Right 3/28/2017    [facility]    CHONDRECTOMY, KNEE, SEMILUNAR CARTILAGE Right 12/5/2013    [facility]    EGD (ESOPHAGOGASTRODUODENOSCOPY) N/A 4/4/2014    Performed by Ephraim Crawford [facility] (4TH FLR)    EXCISION, PLICA, KNEE, ARTHROSCOPIC Right 12/5/2013    [facility]    CJNZLDEI-SBQOH-UYKWQYHEPMWC Right 3/28/2017    [facility]    FRACTURE SURGERY Left 05/2016    wrist fracture    KNEE SURGERY      SYNOVECTOMY, KNEE Right 12/5/2013    [facility]     FAMILY HISTORY:   Family History   Problem Relation Age of Onset    Diabetes Father     Cancer Father     Skin cancer Father     Cancer Maternal Grandfather      SOCIAL HISTORY:   Social History     Socioeconomic History    Marital status:      Spouse name: Not on file    Number of children: 1    Years of education: Not on file    Highest education level: Not on file   Social Needs    Financial resource strain: Not on file    Food insecurity - worry: Not on file    Food insecurity - inability: Not on file    Transportation needs - medical: Not on file    Transportation needs - non-medical: Not on file   Occupational History     Employer: [employer]   Tobacco Use    Smoking status: Never Smoker    Smokeless tobacco: Never Used   Substance and Sexual Activity    Alcohol use: Yes     Alcohol/week: 0.0 oz     Comment: social    Drug use: No    Sexual activity: Yes     Partners: Male   Other Topics Concern    Not on file   Social History Narrative    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  "      MEDICATIONS:   Current Outpatient Medications:     amLODIPine (NORVASC) 10 MG tablet, TAKE ONE Tablet BY MOUTH DAILY, Disp: 90 tablet, Rfl: 12    [START ON 10/30/2018] dextroamphetamine-amphetamine (ADDERALL XR) 30 MG 24 hr capsule, TAKE TWO CAPSULES BY MOUTH EVERY DAY, Disp: 60 capsule, Rfl: 0    dextroamphetamine-amphetamine (ADDERALL XR) 30 MG 24 hr capsule, TAKE TWO CAPSULES BY MOUTH EVERY DAY, Disp: 60 capsule, Rfl: 0    dextroamphetamine-amphetamine (ADDERALL XR) 30 MG 24 hr capsule, TAKE TWO CAPSULES BY MOUTH EVERY DAY, Disp: 60 capsule, Rfl: 0    hydroCHLOROthiazide (HYDRODIURIL) 25 MG tablet, TAKE ONE TABLET BY MOUTH EVERY DAY, Disp: 90 tablet, Rfl: 1    irbesartan (AVAPRO) 300 MG tablet, TAKE ONE Tablet BY MOUTH DAILY, Disp: 90 tablet, Rfl: 12    levETIRAcetam (KEPPRA) 750 MG Tab, Take 1 tablet (750 mg total) by mouth 2 (two) times daily., Disp: 60 tablet, Rfl: 11    montelukast (SINGULAIR) 10 mg tablet, TAKE ONE TABLET BY MOUTH EVERY EVENING, Disp: 30 tablet, Rfl: 0    montelukast (SINGULAIR) 10 mg tablet, TAKE ONE TABLET BY MOUTH EVERY EVENING, Disp: 30 tablet, Rfl: 5    paroxetine (PAXIL) 40 MG tablet, Take 1 tablet (40 mg total) by mouth every morning., Disp: 90 tablet, Rfl: 1  ALLERGIES:   Review of patient's allergies indicates:   Allergen Reactions    Hydrocodone Rash       VITAL SIGNS: BP (!) 154/95   Pulse 108   Ht 5' 11" (1.803 m)   Wt 89.8 kg (198 lb)   BMI 27.62 kg/m²      PHYSICAL EXAMINATION  VITAL SIGNS: BP (!) 154/95   Pulse 108   Ht 5' 11" (1.803 m)   Wt 89.8 kg (198 lb)   BMI 27.62 kg/m²    General:  The patient is alert and oriented x 3.  Mood is pleasant.  Observation of ears, eyes and nose reveal no gross abnormalities.  HEENT: NCAT, sclera nonicteric  Lungs: Respirations are equal and unlabored.    Left KNEE EXAMINATION     OBSERVATION / INSPECTION   Gait:   Nonantalgic   Alignment:  Neutral   Scars:   None   Muscle atrophy: Mild  Effusion:  Minimal  "   Warmth:  None   Discoloration:   none     TENDERNESS / CREPITUS (T / C):          T / C      T / C   Patella   - / -   Lateral joint line   + / -    Peripatellar medial  -  Medial joint line    + / -    Peripatellar lateral -  Medial plica   - / -    Patellar tendon -   Popliteal fossa   - / -    Quad tendon   -   Gastrocnemius   -   Prepatellar Bursa - / -   Quadricep   -   Tibial tubercle  -  Thigh/hamstring  -   Pes anserine/HS -  Fibula    -   ITB   - / -  Tibia     -   Tib/fib joint  - / -  LCL    -     MFC   - / -   MCL: Proximal  -    LFC   - / -    Distal   -          ROM: (* = pain)  PASSIVE   ACTIVE    Left :   5 / 0 / 145   5 / 0 / 145     Right :    5 / 0 / 145   5 / 0 / 145    Patellofemoral examination:  See above noted areas of tenderness.   Patella position    Subluxation / dislocation: Centered           Sup. / Inf;   Normal   Crepitus (PF):    Absent   Patellar Mobility:       Medial-lateral:   Normal    Superior-inferior:  Normal    Inferior tilt   Normal    Patellar tendon:  Normal   Lateral tilt:    Normal   J-sign:     None   Patellofemoral grind:   No pain       MENISCAL SIGNS:     Pain on terminal extension:  +  Pain on terminal flexion:  +  Rosas maneuver:  + for pain  Squat     NT    LIGAMENT EXAMINATION:  ACL / Lachman:  normal (-1 to 2mm)    PCL-Post.  drawer: normal 0 to 2mm  MCL- Valgus:  normal 0 to 2mm  LCL- Varus:  normal 0 to 2mm  Pivot shift: normal (Equal)   Dial Test: difference c/w other side   At 30° flexion: normal (< 5°)    At 90° flexion: normal (< 5°)   Reverse Pivot Shift:   normal (Equal)     STRENGTH: (* = with pain) PAINFUL SIDE   Quadricep   5/5   Hamstrin/5    EXTREMITY NEURO-VASCULAR EXAMINATION:   Sensation:  Grossly intact to light touch all dermatomal regions.   Motor Function:  Fully intact motor function at hip, knee, foot and ankle    DTRs;  quadriceps and  achilles 2+.  No clonus and downgoing Babinski.    Vascular status:  DP and PT pulses  2+, brisk capillary refill, symmetric.     Other Findings:       X-rays:  including standing, weight bearing AP and flexion bilateral knees, lateral and merchant views ordered and images reviewed by me show:  No fracture, dislocation     ASSESSMENT:    Left Knee  Probable Meniscus tear  medial and lateral       PLAN:   MRI Left knee  We discussed the following surgical plan based off of MRI results   We will call the patient with his MRI results   All questions were answered, pt will contact us for questions or concerns in the interim.         ASSESSMENT:    Left Knee Meniscus tear.      he would benefit from knee arthroscopy, possible plica excision, possible chondroplasty with possible meniscectomy given the above.     PLAN: We have discussed the surgery and recovery of arthroscopic knee surgery. he understands that there may be limited weightbearing up to several weeks after surgery depending on procedures that are performed at the time of surgery.    The spectrum of treatment options were discussed with the patient, including nonoperative and operative options.  After thorough discussion, the patient has elected to undergo surgical treatment to include:  left   a. Knee arthroscopic medial and lateral meniscectomy     b. Knee arthroscopic possible plica excision   c. Knee arthroscopic possible chondroplasty   d. Knee arthroscopic possible microfracture    e. Knee arthroscopic-assisted Bio-D arthrocentesis    The details of the surgical procedure were explained, including the location of probable incisions and a description of likely hardware and/or grafts to be used.  The patient understands the likely convalescence after surgery.  Also, we have thoroughly discussed the risks, benefits and alternatives to surgery, including, but not limited to, the risk of infection, joint stiffness, blood clot (including DVT and/or pulmonary embolus), neurologic and vascular injury.  It was explained that, if tissue has been  repaired or reconstructed, there is a chance of failure, which may require further management.      Patient has chondral damage to knee.  Therefore, i can offer no guarantee whatsoever to the results of a knee arthroscopic surgery.  I believe that arthroscopic surgery will benefit the patient.  I explained this in detail today and patient acknowledged understanding and wishes to proceed.

## 2018-10-19 ENCOUNTER — HOSPITAL ENCOUNTER (OUTPATIENT)
Dept: RADIOLOGY | Facility: HOSPITAL | Age: 48
Discharge: HOME | End: 2018-10-19
Attending: ORTHOPAEDIC SURGERY
Payer: COMMERCIAL

## 2018-10-19 DIAGNOSIS — M25.562 ACUTE PAIN OF LEFT KNEE: ICD-10-CM

## 2018-10-19 PROCEDURE — 73721 MRI JNT OF LWR EXTRE W/O DYE: CPT | Mod: 26,LT,, | Performed by: RADIOLOGY

## 2018-10-19 PROCEDURE — 73721 MRI JNT OF LWR EXTRE W/O DYE: CPT | Mod: TC,LT

## 2018-10-22 ENCOUNTER — PATIENT MESSAGE (OUTPATIENT)
Dept: SPORTS MEDICINE | Facility: CLINIC | Age: 48
End: 2018-10-22

## 2018-10-23 ENCOUNTER — TELEPHONE (OUTPATIENT)
Dept: SPORTS MEDICINE | Facility: CLINIC | Age: 48
End: 2018-10-23

## 2018-10-23 RX ORDER — PAROXETINE HYDROCHLORIDE 40 MG/1
TABLET, FILM COATED ORAL
Qty: 90 TABLET | Refills: 0 | Status: SHIPPED | OUTPATIENT
Start: 2018-10-23 | End: 2019-02-08 | Stop reason: SDUPTHER

## 2018-10-23 NOTE — TELEPHONE ENCOUNTER
MRI reviewed personally by me:  Shows Left knee knee medial and lateral meniscal tear, chondromalacia.     ASSESSMENT:    Left Knee Meniscus tear.      he would benefit from knee arthroscopy, possible plica excision, possible chondroplasty with possible meniscectomy given the above.     PLAN: We have discussed the surgery and recovery of arthroscopic knee surgery. he understands that there may be limited weightbearing up to several weeks after surgery depending on procedures that are performed at the time of surgery.    The spectrum of treatment options were discussed with the patient, including nonoperative and operative options.  After thorough discussion, the patient has elected to undergo surgical treatment to include:  left   a. Knee arthroscopic medial and lateral meniscectomy     b. Knee arthroscopic possible plica excision   c. Knee arthroscopic possible chondroplasty   d. Knee arthroscopic-assisted Bio-D arthrocentesis    The details of the surgical procedure were explained, including the location of probable incisions and a description of likely hardware and/or grafts to be used.  The patient understands the likely convalescence after surgery.  Also, we have thoroughly discussed the risks, benefits and alternatives to surgery, including, but not limited to, the risk of infection, joint stiffness, blood clot (including DVT and/or pulmonary embolus), neurologic and vascular injury.  It was explained that, if tissue has been repaired or reconstructed, there is a chance of failure, which may require further management.      Patient has chondral damage to knee.  Therefore, i can offer no guarantee whatsoever to the results of a knee arthroscopic surgery.  I believe that arthroscopic surgery will benefit the patient.  I explained this in detail today and patient acknowledged understanding and wishes to proceed.    The patient was informed that he would require clearance prior to surgery from his PCP.     He will  contact us to schedule surgery

## 2018-10-25 ENCOUNTER — PATIENT MESSAGE (OUTPATIENT)
Dept: FAMILY MEDICINE | Facility: CLINIC | Age: 48
End: 2018-10-25

## 2018-11-05 ENCOUNTER — PATIENT MESSAGE (OUTPATIENT)
Dept: PLASTIC SURGERY | Facility: CLINIC | Age: 48
End: 2018-11-05

## 2018-11-05 ENCOUNTER — OFFICE VISIT (OUTPATIENT)
Dept: PLASTIC SURGERY | Facility: CLINIC | Age: 48
End: 2018-11-05
Payer: COMMERCIAL

## 2018-11-05 VITALS
SYSTOLIC BLOOD PRESSURE: 158 MMHG | HEIGHT: 71 IN | DIASTOLIC BLOOD PRESSURE: 97 MMHG | WEIGHT: 205.25 LBS | HEART RATE: 89 BPM | TEMPERATURE: 98 F | BODY MASS INDEX: 28.73 KG/M2

## 2018-11-05 DIAGNOSIS — L72.9 SCALP CYST: Primary | ICD-10-CM

## 2018-11-05 PROCEDURE — 3077F SYST BP >= 140 MM HG: CPT | Mod: CPTII,S$GLB,, | Performed by: SURGERY

## 2018-11-05 PROCEDURE — 99999 PR PBB SHADOW E&M-EST. PATIENT-LVL III: CPT | Mod: PBBFAC,,, | Performed by: SURGERY

## 2018-11-05 PROCEDURE — 99201 PR OFFICE/OUTPT VISIT,NEW,LEVL I: CPT | Mod: S$GLB,,, | Performed by: SURGERY

## 2018-11-05 PROCEDURE — 3008F BODY MASS INDEX DOCD: CPT | Mod: CPTII,S$GLB,, | Performed by: SURGERY

## 2018-11-05 PROCEDURE — 3080F DIAST BP >= 90 MM HG: CPT | Mod: CPTII,S$GLB,, | Performed by: SURGERY

## 2018-11-05 NOTE — LETTER
November 14, 2018      Ravi Baird MD  [address]  [place] [address]           [facility] [place]  [address]  [facility] [address]  Phone: [phone]  Fax: [phone]          Patient: Nirmal Moss   MR Number: [id number]   YOB: 1970   Date of Visit: 11/5/2018       Dear Dr. Ravi Baird:    Thank you for referring Nirmal Moss to me for evaluation. Attached you will find relevant portions of my assessment and plan of care.    If you have questions, please do not hesitate to call me. I look forward to following Nirmal Moss along with you.    Sincerely,    Brayden Lamar MD    Enclosure  CC:  No Recipients    If you would like to receive this communication electronically, please contact [email or web address] or [phone] to request more information on [employer] access.    For providers and/or their staff who would like to refer a patient to [facility], please contact us through our one-stop-shop provider referral line, [facility], at [phone].    If you feel you have received this communication in error or would no longer like to receive these types of communications, please e-mail [email or web address]

## 2018-11-06 ENCOUNTER — PROCEDURE VISIT (OUTPATIENT)
Dept: PLASTIC SURGERY | Facility: CLINIC | Age: 48
End: 2018-11-06
Payer: COMMERCIAL

## 2018-11-06 ENCOUNTER — OFFICE VISIT (OUTPATIENT)
Dept: FAMILY MEDICINE | Facility: CLINIC | Age: 48
End: 2018-11-06
Payer: COMMERCIAL

## 2018-11-06 VITALS
OXYGEN SATURATION: 99 % | WEIGHT: 204.13 LBS | DIASTOLIC BLOOD PRESSURE: 88 MMHG | HEART RATE: 102 BPM | TEMPERATURE: 98 F | SYSTOLIC BLOOD PRESSURE: 150 MMHG | BODY MASS INDEX: 28.58 KG/M2 | HEIGHT: 71 IN

## 2018-11-06 VITALS — HEART RATE: 100 BPM

## 2018-11-06 DIAGNOSIS — D64.9 ANEMIA, UNSPECIFIED TYPE: ICD-10-CM

## 2018-11-06 DIAGNOSIS — E55.9 VITAMIN D DEFICIENCY DISEASE: ICD-10-CM

## 2018-11-06 DIAGNOSIS — I10 ESSENTIAL HYPERTENSION: ICD-10-CM

## 2018-11-06 DIAGNOSIS — F98.8 ATTENTION DEFICIT DISORDER, UNSPECIFIED HYPERACTIVITY PRESENCE: Primary | ICD-10-CM

## 2018-11-06 DIAGNOSIS — R22.0 MASS OF SCALP: Primary | ICD-10-CM

## 2018-11-06 PROCEDURE — 3079F DIAST BP 80-89 MM HG: CPT | Mod: CPTII,S$GLB,, | Performed by: INTERNAL MEDICINE

## 2018-11-06 PROCEDURE — 3077F SYST BP >= 140 MM HG: CPT | Mod: CPTII,S$GLB,, | Performed by: INTERNAL MEDICINE

## 2018-11-06 PROCEDURE — 3008F BODY MASS INDEX DOCD: CPT | Mod: CPTII,S$GLB,, | Performed by: INTERNAL MEDICINE

## 2018-11-06 PROCEDURE — 11423 EXC H-F-NK-SP B9+MARG 2.1-3: CPT | Mod: S$GLB,,, | Performed by: SURGERY

## 2018-11-06 PROCEDURE — 88304 TISSUE EXAM BY PATHOLOGIST: CPT | Performed by: PATHOLOGY

## 2018-11-06 PROCEDURE — 99999 PR PBB SHADOW E&M-EST. PATIENT-LVL IV: CPT | Mod: PBBFAC,,, | Performed by: INTERNAL MEDICINE

## 2018-11-06 PROCEDURE — 99214 OFFICE O/P EST MOD 30 MIN: CPT | Mod: S$GLB,,, | Performed by: INTERNAL MEDICINE

## 2018-11-06 PROCEDURE — 88304 TISSUE EXAM BY PATHOLOGIST: CPT | Mod: 26,,, | Performed by: PATHOLOGY

## 2018-11-06 RX ORDER — OXYCODONE HYDROCHLORIDE 5 MG/1
5 TABLET ORAL EVERY 12 HOURS PRN
Qty: 5 TABLET | Refills: 0 | Status: ON HOLD | OUTPATIENT
Start: 2018-11-06 | End: 2018-12-11 | Stop reason: HOSPADM

## 2018-11-06 RX ORDER — DEXTROAMPHETAMINE SACCHARATE, AMPHETAMINE ASPARTATE MONOHYDRATE, DEXTROAMPHETAMINE SULFATE AND AMPHETAMINE SULFATE 7.5; 7.5; 7.5; 7.5 MG/1; MG/1; MG/1; MG/1
CAPSULE, EXTENDED RELEASE ORAL
Qty: 60 CAPSULE | Refills: 0 | Status: SHIPPED | OUTPATIENT
Start: 2018-12-05 | End: 2019-03-14 | Stop reason: SDUPTHER

## 2018-11-06 RX ORDER — METOPROLOL SUCCINATE 25 MG/1
25 TABLET, EXTENDED RELEASE ORAL DAILY
Qty: 30 TABLET | Refills: 11 | Status: SHIPPED | OUTPATIENT
Start: 2018-11-06 | End: 2019-03-14 | Stop reason: SDUPTHER

## 2018-11-06 RX ORDER — DEXTROAMPHETAMINE SACCHARATE, AMPHETAMINE ASPARTATE MONOHYDRATE, DEXTROAMPHETAMINE SULFATE AND AMPHETAMINE SULFATE 7.5; 7.5; 7.5; 7.5 MG/1; MG/1; MG/1; MG/1
CAPSULE, EXTENDED RELEASE ORAL
Qty: 60 CAPSULE | Refills: 0 | Status: SHIPPED | OUTPATIENT
Start: 2019-01-05 | End: 2019-03-10 | Stop reason: SDUPTHER

## 2018-11-06 RX ORDER — DEXTROAMPHETAMINE SACCHARATE, AMPHETAMINE ASPARTATE MONOHYDRATE, DEXTROAMPHETAMINE SULFATE AND AMPHETAMINE SULFATE 7.5; 7.5; 7.5; 7.5 MG/1; MG/1; MG/1; MG/1
CAPSULE, EXTENDED RELEASE ORAL
Qty: 60 CAPSULE | Refills: 0 | Status: SHIPPED | OUTPATIENT
Start: 2019-02-05 | End: 2019-02-04 | Stop reason: SDUPTHER

## 2018-11-06 NOTE — PATIENT INSTRUCTIONS
AFTER VISIT INSTRUCTIONS    Name: Nirmal Moss  Medical Record Number: [id number]  Allergies:   Review of patient's allergies indicates:   Allergen Reactions    Hydrocodone Rash     FOLLOW-UP:  Please call the office to confirm a follow up time.     CONTACT NUMBERS:    [facility]  [address] [place] [id number] [phone]    Plastic & Reconstructive Surgery  Microsurgery  [facility]  c/o Brayden Lamar M.D.  [facility]  Second Floor [address]  [address] [place] [address]]  Work [phone]  Toll free [phone]  If no answer [phone]    To schedule appointment:  For all life-threatening emergencies, please call [phone]  For all other concerns regarding your plastic surgery, you may call the office at: [phone], toll free [phone].  If there is no answer at these numbers, you may call [phone].    BATHING  No tub soaking, lotions of creams to surgical sites until cleared by your doctor.  No scrubbing or soaking of incisions.  Pat wounds dry.    SUTURES  Do not remove any sutures.  These will be removed in the office.      Wound Care After Minor Procedure  You can remove the dressing placed in the procedure room after 24 hours to shower.  You may use shampoo at the scalp surgery site starting Post Op Day 3.  Avoid scrubbing the incision with shampoo until healed.  Pat the incisions dry.  Place thin layer of bacitracin to incision twice daily.  No tub soaking of incisions.  No lotions or creams on the incision otherwise.      You may feel some discomfort after the procedure.  You may experience some discomfort once the local anesthesia wears off in a few hours.  Take Tylenol for pain.  Avoid non-steroidal anti-inflammatory medications such as ibuprofen or Naprosyn for post-operative pain because they are associated with an increased risk of bleeding.  Take it only as directed and do not drive while taking this medication.       You may feel or notice bunching around the incision for a few weeks.  This should flatten over time.  These are from internal sutures used to prevent dead space fluid accumulation.    Avoid direct sun exposure whenever possible.  After you are cleared at your follow up visit, you may apply sun block around the incision to prevent it from becoming darkened with direct sun exposure.  Wear a hat or other form of sun blocking garment as often as possible to prevent direct sun light.      After you are cleared at your follow up visit, you may also be instructed to perform scar massage to allow for flattening of your incision.      You should avoid lifting anything heavier than a milk jug at least 3 weeks post-procedure if possible.  This will allow the wound adequate time to heal.  The longer you can avoid strenuous activity, the more likely you will be to have a well healed incision at your surgical site.  The tensile strength of your incision is maximum at 4-6 months post operatively.      It is best if you can sleep with your head of bed elevated.  Use cool compresses 10 minutes on, 10 minutes off to the surgery site to relieve post-operative swelling.  Avoid direct contact with your skin to avoid a cold burn.  Make sure to use a barrier like a piece of gauze or bandaid.  You may apply ice water-soaked gauze to your eyelids.  You can also put a cool pack over the dressing on your forehead.      Sleeping with your head of bed elevated above 45 degrees may help dependent edema from setting in during the first week after your procedure.      Follow up in 2-3 weeks for a wound check and to review the pathology report, or sooner if problems arise.        Feel free to call the office with any additional questions or concerns.

## 2018-11-06 NOTE — PROGRESS NOTES
Chief complaint:  Follow-up on ADD and blood pressure medications    47-year-old white male with long-standing ADD.  He requires a high-dose of the Adderall and apparently his son does as well as and is a high metabolizer of the medication so patient himself can assumedly is the same.  He finds benefit from medication.  We printed in the 3 prescriptions and I encouraged him to message us with the exact date see needs for the next 3 months and we will see him every 6 months.  He review of his blood pressure had been running better with some weight loss which he needs to work on again.  I encouraged him to keep monitoring at home since he is on full doses of 3 medications.    He is taking a vitamin D supplement.  Labs in may all ok.   He's had some problems with passing some kidney stones.    He also had a slight hemoglobin reduction has no clinical bleeding and no blood donation. Counseled regarding all these issues and also discussed a lot of social issues now that he has a mother-in-law living with him but all seem to be going. well.Total time over 25 minutes with over 50% counseling.    Left knee scope to be done.       ROS:   CONST: weight stable. EYES: no vision change. ENT: no sore throat. CV: no chest pain w/ exertion. RESP: no shortness of breath. GI: no nausea, vomiting, diarrhea. No dysphagia. : no urinary issues. MUSCULOSKELETAL: no new myalgias or arthralgias. SKIN: no new changes. NEURO: no focal deficits. PSYCH: no new issues. ENDOCRINE: no polyuria. HEME: no lymph nodes. ALLERGY: no general pruritis.    PAST MEDICAL HISTORY:                                                        1.  Hypertension.                                                            2.  Depression, sees Psychiatry.                                             3.  ADD, sees Psychiatry.  Dr Emery                                                  4.  Right knee scope.   5.  Low HDL 1999   6.  Insomnia  7.  Sleep eating with  "Ambien   8.  probable seizure disorder 2015  9. Orthostatic hypotension, he worked up by cardiology 2015, may relate to seizures   10.  Left Colles' fracture with surgical repair 2016  11.  Right medial meniscus injury 2017                                                                                                                                 SOCIAL HISTORY:  Does not smoke, social alcohol, never smoked.                                                                                            FAMILY HISTORY:  Mom with seizures.  Father with hypertension, diabetes, .     He has no siblings.           Vital signs as above,  Gen: no distress     Nirmal was seen today for medication refill.    Diagnoses and all orders for this visit:    Attention deficit disorder, unspecified hyperactivity presence  -     dextroamphetamine-amphetamine (ADDERALL XR) 30 MG 24 hr capsule; TAKE TWO CAPSULES BY MOUTH EVERY DAY  -     dextroamphetamine-amphetamine (ADDERALL XR) 30 MG 24 hr capsule; TAKE TWO CAPSULES BY MOUTH EVERY DAY  -     dextroamphetamine-amphetamine (ADDERALL XR) 30 MG 24 hr capsule; TAKE TWO CAPSULES BY MOUTH EVERY DAY    Essential hypertension, [facility]- add toprol 25, watch for ED    Vitamin D deficiency disease, better    Anemia, unspecified type, slight, stable    Other orders  -     metoprolol succinate (TOPROL-XL) 25 MG 24 hr tablet; Take 1 tablet (25 mg total) by mouth once daily.                  Clinical note will be sensitive as doing controlled substance management/monitoring"This note will not be shared with the patient."  "

## 2018-11-17 NOTE — PROCEDURES
PRS OFFICE PROCEDURE    Nirmal Moss  [id number]  Date of Procedure 11/6/18     PRE-OP DIAGNOSIS:       Encounter Diagnoses   Name Primary?    Mass of scalp Yes         POST-OP DIAGNOSIS:  Same     PROCEDURES:  EXCISION, Left Occipital Scalp Mass 3 cm     ANESTHESIA:  Lidocaine 1% with epinephrine 1:100,000 25 ml     FINDINGS: Well circumscribed cystic structure identified  SURGEON: Everardo Lamar MD  EBL: minimal  COMPLICATIONS: none  SPECIMENS: Occipital scalp mass as noted above  DISPOSITION: good condition, discharged to home.     The patient tolerated the procedure without adverse consequences     INDICATIONS:  The risks, benefits, alternatives, and expected outcomes were discussed with the patient's parents; the risks including but not limited to poor scarring, wound healing problem, infection, bleeding, keloid, hypertrophic scarring, incomplete removal, recurrence of mass, sensory nerve injury, discovery of unanticipated pathology. Informed consent was obtained.  All questions were answered.       PROCEDURE:  The patient was brought to the procedure room and placed in a supine position.  Time out and verification procedure were performed. 25 cc 1% lidocaine with 1:200,000 epinephrine solution was injected in a field block fashion.  After 15 minutes of transfer to procedure room and transfer to the procedure table, a 3 cm incision was marked across the palpable middle point of the cyst.  There was no visible punctum. The patient was prepped and draped in sterile fashion. The mass was taken off under direct visualization with blunt dissection.  There was a well defined capsule. There was soft tissue left down beneath the capsule over calvarium.  Hemostasis was achieved. The dimensions of the mass were listed above. The wound was irrigated copiously.  Layered closure with dermal 3-0 monocryl buried and then running 5-0 chromic in the skin with good approximation.  The specimen was sent to pathology.  A  dressing of bacitracin, abd pad, maira was placed.  The patient tolerating the procedure well without complication.     AFTERCARE  See Patient Instructions.    All of his questions were answered.    Can return to the clinic in 2-3 weeks for wound check.

## 2018-11-18 NOTE — PROGRESS NOTES
PLASTIC & RECONSTRUCTIVE CONSULTATION NOTE    CC  No chief complaint on file.  Left occipital mass    Referring Provider-   PCP: Ravi Baird MD    HPI  History from patient, chart, referring provider  Nirmal Moss is a 47 y.o. male presenting with progressively enlarging left occipital mass.  Has been in left occipital scalp for years.  Never drains is baseline tender to touch.  The hair in this area has thinned.  He has never had a mass excised like this before.  His father passed away from Merkel's cell, unknown primary location, but metastasized to the groin.      [facility]  Patient Active Problem List    Diagnosis Date Noted    Mood disorder 01/18/2018    Seizure disorder 01/18/2018    Vitamin D deficiency disease 01/18/2018    Complex tear of lateral meniscus of right knee as current injury 03/28/2017    Colles' fracture 05/15/2016    Closed fracture of left ulnar styloid 05/15/2016    ADD (attention deficit disorder) 07/30/2015    Dyspepsia 04/04/2014    Anxiety disorder 01/15/2014    Insomnia 01/15/2014    Knee pain 12/05/2013    HTN (hypertension) 12/03/2013   As noted above    PSH  Past Surgical History:   Procedure Laterality Date    ARTHROSCOPY, KNEE, WITH DEBRIDEMENT Right 12/5/2013    [facility]    [id number] & LATERAL Right 3/28/2017    [facility] at [facility]    CHONDRECTOMY, KNEE, SEMILUNAR CARTILAGE Right 12/5/2013    [facility] at [facility]    EGD (ESOPHAGOGASTRODUODENOSCOPY) N/A 4/4/2014    [facility] by [facility] ([facility])    EXCISION, PLICA, KNEE, ARTHROSCOPIC Right 12/5/2013    [facility] at [facility]    [id number] Right 3/28/2017    [facility] at [facility]    FRACTURE SURGERY Left 05/2016    wrist fracture    KNEE SURGERY      SYNOVECTOMY, KNEE Right 12/5/2013    Performed by [facility] at [facility] OR   As noted  "above    FH  Family History   Problem Relation Age of Onset    Diabetes Father     Cancer Father     Skin cancer Father     Cancer Maternal Grandfather        MEDICATIONS  No outpatient medications have been marked as taking for the 11/5/18 encounter (Office Visit) with Brayden Lamar MD.       ALLERGIES   Review of patient's allergies indicates:   Allergen Reactions    Hydrocodone Rash       SOCIAL HISTORY  Tobacco:   Social History     Tobacco Use   Smoking Status Never Smoker   Smokeless Tobacco Never Used     EtOH:   Social History     Substance and Sexual Activity   Alcohol Use Yes    Alcohol/week: 0.0 oz    Comment: social       ROS  Pertinent 12 point ROS negative except as listed above.  She denies history of weight change, fatigue, eye problems, ear problems, hoarseness/voice change, chest discomfort, palpitations, vein inflammation, swollen feet, breathing problems, chronic cough, indigestion, trouble swallowing, abdominal pain, blood in stool, urinary problems, prostate problems, sexual problems, joint problems, back pain, musculoskeletal pain, skin problems, dizziness, headaches, muscle weakness, trouble sleeping, abnormal bruising, abnormal thirst, abnormal sweating, depression, anxiety, or any prior psychiatry consultation.        PHYSICAL EXAM  BP (!) 158/97   Pulse 89   Temp 98.1 °F (36.7 °C)   Ht 5' 11" (1.803 m)   Wt 93.1 kg (205 lb 4 oz)   BMI 28.63 kg/m²       LABS  Lab Results   Component Value Date    WBC 5.91 05/29/2018    HGB 13.6 (L) 05/29/2018    HCT 40.9 05/29/2018     (H) 05/29/2018     05/29/2018     Lab Results   Component Value Date     05/29/2018    K 4.2 05/29/2018     05/29/2018    CO2 26 05/29/2018     Lab Results   Component Value Date    ALBUMIN 4.0 01/18/2018     Lab Results   Component Value Date    CREATININE 0.8 05/29/2018     Lab Results   Component Value Date    HGBA1C 5.0 01/18/2018       ASSESSMENT  No diagnosis found.  1.  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  INFORMED CONSENT FOR SURGERY  Risks, benefits, outcomes, possible complications, perioperative restrictions, recovery, and potential disability were reviewed. Permission to obtain photographs before, during, and after surgery was also granted. Questions were answered. Written preoperative instructions and potential complications were given.    Risks, benefits, alternatives and the nature of the procedure were discussed.  Risks include bleeding, infection, altered sensation in operative sites, hematoma, hypertrophic scarring, seroma, wound dehiscence, delayed wound healing.  Discovery of unanticipated pathology, paresthesia in donor sites, hair loss along the incision line, recurrence of the mass.  All of his questions were answered, and he decided to proceed.      PLAN  Procedure visit for excisional biopsy of left occipital scalp mass     Plastic & Reconstructive Surgery  Microsurgery  [facility]  c/o Brayden Lamar M.D.  [facility]  [address]  [address] [place] [address]    Work [phone]  Toll free [phone]

## 2018-11-19 ENCOUNTER — OFFICE VISIT (OUTPATIENT)
Dept: PLASTIC SURGERY | Facility: CLINIC | Age: 48
End: 2018-11-19
Payer: COMMERCIAL

## 2018-11-19 VITALS
HEIGHT: 71 IN | DIASTOLIC BLOOD PRESSURE: 87 MMHG | TEMPERATURE: 98 F | BODY MASS INDEX: 28.39 KG/M2 | WEIGHT: 202.81 LBS | SYSTOLIC BLOOD PRESSURE: 141 MMHG | RESPIRATION RATE: 12 BRPM | HEART RATE: 79 BPM

## 2018-11-19 DIAGNOSIS — R22.0 MASS OF SCALP: Primary | ICD-10-CM

## 2018-11-19 PROCEDURE — 3008F BODY MASS INDEX DOCD: CPT | Mod: CPTII,S$GLB,, | Performed by: SURGERY

## 2018-11-19 PROCEDURE — 3077F SYST BP >= 140 MM HG: CPT | Mod: CPTII,S$GLB,, | Performed by: SURGERY

## 2018-11-19 PROCEDURE — 99999 PR PBB SHADOW E&M-EST. PATIENT-LVL III: CPT | Mod: PBBFAC,,, | Performed by: SURGERY

## 2018-11-19 PROCEDURE — 99212 OFFICE O/P EST SF 10 MIN: CPT | Mod: S$GLB,,, | Performed by: SURGERY

## 2018-11-19 PROCEDURE — 3079F DIAST BP 80-89 MM HG: CPT | Mod: CPTII,S$GLB,, | Performed by: SURGERY

## 2018-11-19 NOTE — PROGRESS NOTES
Plastic Update    He is having minimal pain  Reviewed pathology.  Pilar cyst.  Provided him with a copy of the report  No evidence of infection.    1 week post op.  Doing well  Discussed wound care as outlined below  He can follow up as needed    BATHING  No tub soaking, lotions of creams to surgical sites until cleared by your doctor.  No scrubbing or soaking of incisions.  Pat wounds dry.     SUTURES  Do not remove any sutures.  These will be removed in the office.     Wound Care After Minor Procedure  You can remove the dressing placed in the procedure room after 24 hours to shower.  You may use shampoo at the scalp surgery site starting Post Op Day 3.  Avoid scrubbing the incision with shampoo until healed.  Pat the incisions dry.  Place thin layer of bacitracin to incision twice daily.  No tub soaking of incisions.  No lotions or creams on the incision otherwise.       You may feel some discomfort after the procedure.  You may experience some discomfort once the local anesthesia wears off in a few hours.  Take Tylenol for pain.  Avoid non-steroidal anti-inflammatory medications such as ibuprofen or Naprosyn for post-operative pain because they are associated with an increased risk of bleeding.  Take it only as directed and do not drive while taking this medication.       You may feel or notice bunching around the incision for a few weeks.  This should flatten over time.  These are from internal sutures used to prevent dead space fluid accumulation.     Avoid direct sun exposure whenever possible.  After you are cleared at your follow up visit, you may apply sun block around the incision to prevent it from becoming darkened with direct sun exposure.  Wear a hat or other form of sun blocking garment as often as possible to prevent direct sun light.       Can perform scar massage to allow for flattening of incision         Plastic & Reconstructive Surgery  [facility]  c/o Brayden Lamar  M.D.  [facility]  [address]  [address] [place] [address]    Work [phone]  Toll free [phone]  If no answer [phone]

## 2018-11-20 ENCOUNTER — TELEPHONE (OUTPATIENT)
Dept: PLASTIC SURGERY | Facility: CLINIC | Age: 48
End: 2018-11-20

## 2018-11-20 RX ORDER — MONTELUKAST SODIUM 10 MG/1
TABLET ORAL
Qty: 30 TABLET | Refills: 5 | Status: SHIPPED | OUTPATIENT
Start: 2018-11-20 | End: 2019-06-17 | Stop reason: SDUPTHER

## 2018-11-20 NOTE — TELEPHONE ENCOUNTER
spoke with pt in regards to wife misplacing ring. I informed him that nothing was found we'd be sure to give him a call if anything turns up      ----- Message from Brendan Talley sent at 11/20/2018  1:09 PM CST -----  Contact: Tina/wife  Communication Preference:[phone]    Additional Information:Caller states she was here on yesterday with pt and she misplaced her ring and want to know if anyone turned it in.

## 2018-11-25 ENCOUNTER — PATIENT MESSAGE (OUTPATIENT)
Dept: SPORTS MEDICINE | Facility: CLINIC | Age: 48
End: 2018-11-25

## 2018-11-26 ENCOUNTER — OFFICE VISIT (OUTPATIENT)
Dept: SPORTS MEDICINE | Facility: CLINIC | Age: 48
End: 2018-11-26
Payer: COMMERCIAL

## 2018-11-26 VITALS
WEIGHT: 200 LBS | SYSTOLIC BLOOD PRESSURE: 137 MMHG | BODY MASS INDEX: 28 KG/M2 | DIASTOLIC BLOOD PRESSURE: 93 MMHG | HEIGHT: 71 IN | HEART RATE: 89 BPM

## 2018-11-26 DIAGNOSIS — M25.561 ACUTE PAIN OF RIGHT KNEE: Primary | ICD-10-CM

## 2018-11-26 PROCEDURE — 99999 PR PBB SHADOW E&M-EST. PATIENT-LVL III: CPT | Mod: PBBFAC,,, | Performed by: ORTHOPAEDIC SURGERY

## 2018-11-26 PROCEDURE — 3080F DIAST BP >= 90 MM HG: CPT | Mod: CPTII,S$GLB,, | Performed by: ORTHOPAEDIC SURGERY

## 2018-11-26 PROCEDURE — 3075F SYST BP GE 130 - 139MM HG: CPT | Mod: CPTII,S$GLB,, | Performed by: ORTHOPAEDIC SURGERY

## 2018-11-26 PROCEDURE — 99214 OFFICE O/P EST MOD 30 MIN: CPT | Mod: S$GLB,,, | Performed by: ORTHOPAEDIC SURGERY

## 2018-11-26 PROCEDURE — 3008F BODY MASS INDEX DOCD: CPT | Mod: CPTII,S$GLB,, | Performed by: ORTHOPAEDIC SURGERY

## 2018-11-26 NOTE — PROGRESS NOTES
CC: Bilateral right worse than left knee pain, patient is a      47 y.o. Male with a history of Bilateral right worse than left knee pain who states that the pain is severe and not responding to any conservative care.     Pain has been present for 2 months   Denies any injury or trauma     + mechanical symptoms, no instability  He describes his pain as lateral    He has tried and failed ice, NSAIDs, rest, elevation     Is affecting ADLs.      He notes that this feels similar to his other knee when he tore his meniscus before surgery     Patient had 2 previous right knee scopes with me     Interval History: His right knee pain and swelling has worsened. He has pain with any activity, worse with ambulation. Endorses mechanical symptoms. Has had previous right knee scope in March 2017. He has tried NSAIDs. He had upcoming left knee arthrosocpic surgery planned but has not had surgery yet.        Review of Systems   Constitution: Negative. Negative for chills, fever and night sweats.   HENT: Negative for congestion and headaches.    Eyes: Negative for blurred vision, left vision loss and right vision loss.   Cardiovascular: Negative for chest pain and syncope.   Respiratory: Negative for cough and shortness of breath.    Endocrine: Negative for polydipsia, polyphagia and polyuria.   Hematologic/Lymphatic: Negative for bleeding problem. Does not bruise/bleed easily.   Skin: Negative for dry skin, itching and rash.   Musculoskeletal: Negative for falls. Positive for knee pain and muscle weakness.   Gastrointestinal: Negative for abdominal pain and bowel incontinence.   Genitourinary: Negative for bladder incontinence and nocturia.   Neurological: Negative for disturbances in coordination, loss of balance and seizures.   Psychiatric/Behavioral: Negative for depression. The patient does not have insomnia.    Allergic/Immunologic: Negative for hives and persistent infections.     PAST MEDICAL HISTORY:   Past  Medical History:   Diagnosis Date    ADD (attention deficit disorder)     Anxiety     Depression     Gastritis     EGD 2014    HDL deficiency     Hypertension     Kidney stone     Meniscus tear 09/2013    right    Meniscus tear 01/2017    right    Seizure disorder 1/18/2018    Seizures 06/2014    last seizure in late 2014    Vitamin D deficiency disease 1/18/2018     PAST SURGICAL HISTORY:   Past Surgical History:   Procedure Laterality Date    ARTHROSCOPY, KNEE, WITH DEBRIDEMENT Right 12/5/2013    [facility]    [id number] & LATERAL Right 3/28/2017    [facility] at [facility]    CHONDRECTOMY, KNEE, SEMILUNAR CARTILAGE Right 12/5/2013    [facility] at [facility] OR    EGD (ESOPHAGOGASTRODUODENOSCOPY) N/A 4/4/2014    Performed by Ephraim Crawford MD at [facility] END[facility] (4TH FLR)    EXCISION, PLICA, KNEE, ARTHROSCOPIC Right 12/5/2013    [facility] at [facility]    [id number] Right 3/28/2017    [facility] at [facility] OR    FRACTURE SURGERY Left 05/2016    wrist fracture    KNEE SURGERY      SYNOVECTOMY, KNEE Right 12/5/2013    [facility] at [facility] OR     FAMILY HISTORY:   Family History   Problem Relation Age of Onset    Diabetes Father     Cancer Father     Skin cancer Father     Cancer Maternal Grandfather      SOCIAL HISTORY:   Social History     Socioeconomic History    Marital status:      Spouse name: Not on file    Number of children: 1    Years of education: Not on file    Highest education level: Not on file   Social Needs    Financial resource strain: Not on file    Food insecurity - worry: Not on file    Food insecurity - inability: Not on file    Transportation needs - medical: Not on file    Transportation needs - non-medical: Not on file   Occupational History     Employer: [employer]   Tobacco Use    Smoking status:  Never Smoker    Smokeless tobacco: Never Used   Substance and Sexual Activity    Alcohol use: Yes     Alcohol/week: 0.0 oz     Comment: social    Drug use: No    Sexual activity: Yes     Partners: Male   Other Topics Concern    Not on file   Social History Narrative    [employer] out of [place],  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       MEDICATIONS:   Current Outpatient Medications:     amLODIPine (NORVASC) 10 MG tablet, TAKE ONE Tablet BY MOUTH DAILY, Disp: 90 tablet, Rfl: 12    [START ON 2/5/2019] dextroamphetamine-amphetamine (ADDERALL XR) 30 MG 24 hr capsule, TAKE TWO CAPSULES BY MOUTH EVERY DAY, Disp: 60 capsule, Rfl: 0    [START ON 1/5/2019] dextroamphetamine-amphetamine (ADDERALL XR) 30 MG 24 hr capsule, TAKE TWO CAPSULES BY MOUTH EVERY DAY, Disp: 60 capsule, Rfl: 0    [START ON 12/5/2018] dextroamphetamine-amphetamine (ADDERALL XR) 30 MG 24 hr capsule, TAKE TWO CAPSULES BY MOUTH EVERY DAY, Disp: 60 capsule, Rfl: 0    hydroCHLOROthiazide (HYDRODIURIL) 25 MG tablet, TAKE ONE TABLET BY MOUTH EVERY DAY, Disp: 90 tablet, Rfl: 1    irbesartan (AVAPRO) 300 MG tablet, TAKE ONE Tablet BY MOUTH DAILY, Disp: 90 tablet, Rfl: 12    levETIRAcetam (KEPPRA) 750 MG Tab, Take 1 tablet (750 mg total) by mouth 2 (two) times daily., Disp: 60 tablet, Rfl: 11    metoprolol succinate (TOPROL-XL) 25 MG 24 hr tablet, Take 1 tablet (25 mg total) by mouth once daily., Disp: 30 tablet, Rfl: 11    montelukast (SINGULAIR) 10 mg tablet, TAKE ONE TABLET BY MOUTH EVERY EVENING, Disp: 30 tablet, Rfl: 5    oxyCODONE (ROXICODONE) 5 MG immediate release tablet, Take 1 tablet (5 mg total) by mouth every 12 (twelve) hours as needed for Pain (severe pain.  do not drive or drink alcohol while taking narcotics)., Disp: 5 tablet, Rfl: 0    paroxetine (PAXIL) 40 MG tablet, TAKE ONE TABLET BY MOUTH EVERY MORNING, Disp: 90 tablet, Rfl: 0  ALLERGIES:   Review of patient's allergies indicates:   Allergen Reactions     "Hydrocodone Rash       VITAL SIGNS: BP (!) 137/93   Pulse 89   Ht 5' 11" (1.803 m)   Wt 90.7 kg (200 lb)   BMI 27.89 kg/m²      PHYSICAL EXAMINATION  VITAL SIGNS: BP (!) 137/93   Pulse 89   Ht 5' 11" (1.803 m)   Wt 90.7 kg (200 lb)   BMI 27.89 kg/m²    General:  The patient is alert and oriented x 3.  Mood is pleasant.  Observation of ears, eyes and nose reveal no gross abnormalities.  HEENT: NCAT, sclera nonicteric  Lungs: Respirations are equal and unlabored.    Bilateral KNEE EXAMINATION     OBSERVATION / INSPECTION   Gait:   Nonantalgic   Alignment:  Neutral   Scars:   None   Muscle atrophy: Mild  Effusion:  Moderate right, mild left  Warmth:  None   Discoloration:   none     TENDERNESS / CREPITUS (T / C):          T / C      T / C   Patella   - / -   Lateral joint line   + / -    Peripatellar medial  -  Medial joint line    + / -    Peripatellar lateral -  Medial plica   - / -    Patellar tendon -   Popliteal fossa   - / -    Quad tendon   -   Gastrocnemius   -   Prepatellar Bursa - / -   Quadricep   -   Tibial tubercle  -  Thigh/hamstring  -   Pes anserine/HS -  Fibula    -   ITB   - / -  Tibia     -   Tib/fib joint  - / -  LCL    -     MFC   - / -   MCL: Proximal  -    LFC   - / -    Distal   -          ROM: (* = pain)  PASSIVE   ACTIVE    Left :   5 / 0 / 125   5 / 0 / 125     Right :    5 / 0 / 125   5 / 0 / 125    Patellofemoral examination:  See above noted areas of tenderness.   Patella position    Subluxation / dislocation: Centered           Sup. / Inf;   Normal   Crepitus (PF):    Absent   Patellar Mobility:       Medial-lateral:   Normal    Superior-inferior:  Normal    Inferior tilt   Normal    Patellar tendon:  Normal   Lateral tilt:    Normal   J-sign:     None   Patellofemoral grind:   No pain       MENISCAL SIGNS:     Pain on terminal extension:  +  Pain on terminal flexion:  +  Rosas maneuver:  + for pain  Squat     NT    LIGAMENT EXAMINATION:  ACL / Lachman:  normal (-1 to 2mm)  "   PCL-Post.  drawer: normal 0 to 2mm  MCL- Valgus:  normal 0 to 2mm  LCL- Varus:  normal 0 to 2mm  Pivot shift: normal (Equal)   Dial Test: difference c/w other side   At 30° flexion: normal (< 5°)    At 90° flexion: normal (< 5°)   Reverse Pivot Shift:   normal (Equal)     STRENGTH: (* = with pain) PAINFUL SIDE   Quadricep   5/5   Hamstrin/5    EXTREMITY NEURO-VASCULAR EXAMINATION:   Sensation:  Grossly intact to light touch all dermatomal regions.   Motor Function:  Fully intact motor function at hip, knee, foot and ankle    DTRs;  quadriceps and  achilles 2+.  No clonus and downgoing Babinski.    Vascular status:  DP and PT pulses 2+, brisk capillary refill, symmetric.     Other Findings:       X-rays:  including standing, weight bearing AP and flexion bilateral knees, lateral and merchant views ordered and images reviewed by me show:  No fracture, dislocation     ASSESSMENT:    Right Knee Probable Meniscus tear  medial and lateral       PLAN:   MRI Right knee  We discussed the following surgical plan based off of MRI results   We will call the patient with his MRI results   All questions were answered, pt will contact us for questions or concerns in the interim.    We will hold off on left knee arthroscopic meniscectomy until MRI results of right knee.

## 2018-11-29 ENCOUNTER — HOSPITAL ENCOUNTER (OUTPATIENT)
Dept: RADIOLOGY | Facility: HOSPITAL | Age: 48
Discharge: HOME | End: 2018-11-29
Attending: ORTHOPAEDIC SURGERY
Payer: COMMERCIAL

## 2018-11-29 DIAGNOSIS — M25.561 ACUTE PAIN OF RIGHT KNEE: ICD-10-CM

## 2018-11-29 PROCEDURE — 73721 MRI JNT OF LWR EXTRE W/O DYE: CPT | Mod: 26,RT,, | Performed by: RADIOLOGY

## 2018-11-29 PROCEDURE — 73721 MRI JNT OF LWR EXTRE W/O DYE: CPT | Mod: TC,RT

## 2018-11-29 RX ORDER — AMLODIPINE BESYLATE 10 MG/1
TABLET ORAL
Qty: 90 TABLET | Refills: 12 | Status: SHIPPED | OUTPATIENT
Start: 2018-11-29 | End: 2019-12-11 | Stop reason: SDUPTHER

## 2018-12-03 ENCOUNTER — PATIENT MESSAGE (OUTPATIENT)
Dept: SPORTS MEDICINE | Facility: CLINIC | Age: 48
End: 2018-12-03

## 2018-12-04 ENCOUNTER — TELEPHONE (OUTPATIENT)
Dept: SPORTS MEDICINE | Facility: CLINIC | Age: 48
End: 2018-12-04

## 2018-12-04 DIAGNOSIS — M17.11 OSTEOARTHRITIS OF RIGHT KNEE, UNSPECIFIED OSTEOARTHRITIS TYPE: Primary | ICD-10-CM

## 2018-12-04 DIAGNOSIS — S83.282A ACUTE LATERAL MENISCUS TEAR OF LEFT KNEE, INITIAL ENCOUNTER: ICD-10-CM

## 2018-12-04 DIAGNOSIS — M94.262 CHONDROMALACIA OF LEFT KNEE: ICD-10-CM

## 2018-12-04 DIAGNOSIS — M67.50 PLICA SYNDROME: ICD-10-CM

## 2018-12-04 DIAGNOSIS — S83.242A ACUTE MEDIAL MENISCUS TEAR OF LEFT KNEE, INITIAL ENCOUNTER: Primary | ICD-10-CM

## 2018-12-04 NOTE — TELEPHONE ENCOUNTER
I called and informed the patient of his MRI results, they are as follows:    Right knee    1. Advanced degenerative change lateral knee compartment including meniscal degeneration and chondromalacia.  Please see details above.  2. Severe focal area of chondromalacia superior patellar apex, and lateral trochlea.  3. Partially ruptured moderate-sized Baker cyst.  4. Moderate-sized joint effusion.    Dr. Jeong would like to proceed with Euflexxa injection series with Mello Flores. The patient will call to schedule the injections when he is ready to proceed with them.

## 2018-12-05 ENCOUNTER — PATIENT MESSAGE (OUTPATIENT)
Dept: FAMILY MEDICINE | Facility: CLINIC | Age: 48
End: 2018-12-05

## 2018-12-05 ENCOUNTER — TELEPHONE (OUTPATIENT)
Dept: FAMILY MEDICINE | Facility: CLINIC | Age: 48
End: 2018-12-05

## 2018-12-05 NOTE — TELEPHONE ENCOUNTER
----- Message from Grace Lewis sent at 12/4/2018  2:25 PM CST -----  Contact: self [phone]  Pt was seen on 11-06 and is requesting surgery ale for his L knee.

## 2018-12-05 NOTE — TELEPHONE ENCOUNTER
Please always get more information such as type of surgery, type of anesthesia, who is doing the surgery, date of surgery and then we can assess if indeed he can be cleared with or without an appointment    Also always ask if patient has paperwork or just needs a clearance note and to who it goes to etc etc

## 2018-12-06 ENCOUNTER — PATIENT MESSAGE (OUTPATIENT)
Dept: FAMILY MEDICINE | Facility: CLINIC | Age: 48
End: 2018-12-06

## 2018-12-10 ENCOUNTER — ANESTHESIA EVENT (OUTPATIENT)
Dept: SURGERY | Facility: OTHER | Age: 48
End: 2018-12-10
Payer: COMMERCIAL

## 2018-12-10 ENCOUNTER — HOSPITAL ENCOUNTER (OUTPATIENT)
Dept: PREADMISSION TESTING | Facility: OTHER | Age: 48
Discharge: HOME | End: 2018-12-10
Attending: ORTHOPAEDIC SURGERY
Payer: COMMERCIAL

## 2018-12-10 ENCOUNTER — OFFICE VISIT (OUTPATIENT)
Dept: SPORTS MEDICINE | Facility: CLINIC | Age: 48
End: 2018-12-10
Payer: COMMERCIAL

## 2018-12-10 VITALS
BODY MASS INDEX: 28 KG/M2 | WEIGHT: 200 LBS | HEART RATE: 81 BPM | OXYGEN SATURATION: 100 % | SYSTOLIC BLOOD PRESSURE: 162 MMHG | DIASTOLIC BLOOD PRESSURE: 97 MMHG | HEIGHT: 71 IN | TEMPERATURE: 98 F

## 2018-12-10 VITALS
HEIGHT: 71 IN | BODY MASS INDEX: 28 KG/M2 | HEART RATE: 89 BPM | DIASTOLIC BLOOD PRESSURE: 98 MMHG | WEIGHT: 200 LBS | SYSTOLIC BLOOD PRESSURE: 176 MMHG

## 2018-12-10 DIAGNOSIS — M94.262 CHONDROMALACIA OF LEFT KNEE: ICD-10-CM

## 2018-12-10 DIAGNOSIS — S83.282A ACUTE LATERAL MENISCUS TEAR OF LEFT KNEE, INITIAL ENCOUNTER: ICD-10-CM

## 2018-12-10 DIAGNOSIS — G89.18 POST-OP PAIN: ICD-10-CM

## 2018-12-10 DIAGNOSIS — S83.242A ACUTE MEDIAL MENISCUS TEAR OF LEFT KNEE, INITIAL ENCOUNTER: Primary | ICD-10-CM

## 2018-12-10 PROCEDURE — 99999 PR PBB SHADOW E&M-EST. PATIENT-LVL IV: CPT | Mod: PBBFAC,,, | Performed by: PHYSICIAN ASSISTANT

## 2018-12-10 PROCEDURE — 99499 UNLISTED E&M SERVICE: CPT | Mod: S$GLB,,, | Performed by: PHYSICIAN ASSISTANT

## 2018-12-10 RX ORDER — LIDOCAINE HYDROCHLORIDE 10 MG/ML
0.5 INJECTION, SOLUTION EPIDURAL; INFILTRATION; INTRACAUDAL; PERINEURAL ONCE
Status: CANCELLED | OUTPATIENT
Start: 2018-12-10 | End: 2018-12-10

## 2018-12-10 RX ORDER — OXYCODONE HYDROCHLORIDE 5 MG/1
5 TABLET ORAL ONCE AS NEEDED
Status: CANCELLED | OUTPATIENT
Start: 2018-12-10 | End: 2018-12-10

## 2018-12-10 RX ORDER — SODIUM CHLORIDE, SODIUM LACTATE, POTASSIUM CHLORIDE, CALCIUM CHLORIDE 600; 310; 30; 20 MG/100ML; MG/100ML; MG/100ML; MG/100ML
INJECTION, SOLUTION INTRAVENOUS CONTINUOUS
Status: CANCELLED | OUTPATIENT
Start: 2018-12-10

## 2018-12-10 RX ORDER — MIDAZOLAM HYDROCHLORIDE 5 MG/ML
5 INJECTION INTRAMUSCULAR; INTRAVENOUS ONCE AS NEEDED
Status: CANCELLED | OUTPATIENT
Start: 2018-12-10 | End: 2018-12-10

## 2018-12-10 RX ORDER — PROMETHAZINE HYDROCHLORIDE 25 MG/1
25 TABLET ORAL EVERY 6 HOURS PRN
Qty: 16 TABLET | Refills: 0 | Status: SHIPPED | OUTPATIENT
Start: 2018-12-10 | End: 2019-01-13

## 2018-12-10 RX ORDER — OXYCODONE AND ACETAMINOPHEN 7.5; 325 MG/1; MG/1
1 TABLET ORAL
Qty: 24 TABLET | Refills: 0 | Status: SHIPPED | OUTPATIENT
Start: 2018-12-10 | End: 2019-01-30

## 2018-12-10 RX ORDER — FAMOTIDINE 20 MG/1
20 TABLET, FILM COATED ORAL
Status: CANCELLED | OUTPATIENT
Start: 2018-12-10 | End: 2018-12-10

## 2018-12-10 NOTE — DISCHARGE INSTRUCTIONS
PRE-ADMIT TESTING -  [phone]    [address]  [place] [facility]          Your surgery has been scheduled at [facility]. We are pleased to have the opportunity to serve you. For Further Information please call [phone].    On the day of surgery please report to the Information Desk on the 1st floor.    · CONTACT YOUR PHYSICIAN'S OFFICE THE DAY PRIOR TO YOUR SURGERY TO OBTAIN YOUR ARRIVAL TIME.     · The evening before surgery do not eat anything after 9 p.m. ( this includes hard candy, chewing gum and mints).  You may only have GATORADE, POWERADE AND WATER  from 9 p.m. until you leave your home.   DO NOT DRINK ANY LIQUIDS ON THE WAY TO THE HOSPITAL.      SPECIAL MEDICATION INSTRUCTIONS: TAKE medications checked off by the Anesthesiologist on your Medication List.    Angiogram Patients: Take medications as instructed by your physician, including aspirin.     Surgery Patients:    If you take ASPIRIN - Your PHYSICIAN/SURGEON will need to inform you IF/OR when you need to stop taking aspirin prior to your surgery.     Do Not take any medications containing IBUPROFEN.  Do Not Wear any make-up or dark nail polish   (especially eye make-up) to surgery. If you come to surgery with makeup on you will be required to remove the makeup or nail polish.    Do not shave your surgical area at least 5 days prior to your surgery. The surgical prep will be performed at the hospital according to Infection Control regulations.    Leave all valuables at home.   Do Not wear any jewelry or watches, including any metal in body piercings.  Contact Lens must be removed before surgery. Either do not wear the contact lens or bring a case and solution for storage.  Please bring a container for eyeglasses or dentures as required.  Bring any paperwork your physician has provided, such as consent forms,  history and physicals, doctor's orders, etc.   Bring comfortable clothes that are loose fitting to wear upon  discharge. Take into consideration the type of surgery being performed.  Maintain your diet as advised per your physician the day prior to surgery.      Adequate rest the night before surgery is advised.   Park in the Parking lot behind the hospital or in the [facility] across the street from the parking lot. Parking is complimentary.  If you will be discharged the same day as your procedure, please arrange for a responsible adult to drive you home or to accompany you if traveling by taxi.   YOU WILL NOT BE PERMITTED TO DRIVE OR TO LEAVE THE HOSPITAL ALONE AFTER SURGERY.   It is strongly recommended that you arrange for someone to remain with you for the first 24 hrs following your surgery.       Thank you for your cooperation.  The Staff of [facility].        Bathing Instructions                                                                 Please shower the evening before and morning of your procedure with    ANTIBACTERIAL SOAP. ( DIAL, etc )  Concentrate on the surgical area   for at least 3 minutes and rinse completely. Dry off as usual.   Do not use any deodorant, powder, body lotions, perfume, after shave or    cologne.

## 2018-12-10 NOTE — ANESTHESIA PREPROCEDURE EVALUATION
12/10/2018  Nirmal Moss is a 47 y.o., male.    Anesthesia Evaluation    I have reviewed the Patient Summary Reports.    I have reviewed the Nursing Notes.   I have reviewed the Medications.     Review of Systems  Anesthesia Hx:  No problems with previous Anesthesia    Social:  Non-Smoker, Social Alcohol Use    Hematology/Oncology:  Hematology Normal   Oncology Normal     EENT/Dental:EENT/Dental Normal   Cardiovascular:   Exercise tolerance: good Hypertension, well controlled    Pulmonary:  Pulmonary Normal    Renal/:   renal calculi    Hepatic/GI:  Hepatic/GI Normal    Musculoskeletal:  Musculoskeletal Normal    Neurological:   Seizures, well controlled    Endocrine:  Endocrine Normal    Psych:   Psychiatric History anxiety depression          Physical Exam  General:  Well nourished, Obesity    Airway/Jaw/Neck:  Airway Findings: Mouth Opening: Normal Tongue: Normal  General Airway Assessment: Adult, Good  Mallampati: I  Jaw/Neck Findings:  Neck ROM: Normal ROM      Dental:  Dental Findings: In tact        Mental Status:  Mental Status Findings:  Cooperative, Alert and Oriented         Anesthesia Plan  Type of Anesthesia, risks & benefits discussed:  Anesthesia Type:  general  Patient's Preference:   Intra-op Monitoring Plan: standard ASA monitors  Intra-op Monitoring Plan Comments:   Post Op Pain Control Plan:   Post Op Pain Control Plan Comments:   Induction:    Beta Blocker:         Informed Consent: Patient understands risks and agrees with Anesthesia plan.  Questions answered. Anesthesia consent signed with patient.  ASA Score: 2     Day of Surgery Review of History & Physical:    H&P update referred to the surgeon.     Anesthesia Plan Notes: Cleared by primary.        Ready For Surgery From Anesthesia Perspective.

## 2018-12-11 ENCOUNTER — HOSPITAL ENCOUNTER (OUTPATIENT)
Facility: OTHER | Age: 48
Discharge: HOME | End: 2018-12-11
Attending: ORTHOPAEDIC SURGERY | Admitting: ORTHOPAEDIC SURGERY
Payer: COMMERCIAL

## 2018-12-11 ENCOUNTER — ANESTHESIA (OUTPATIENT)
Dept: SURGERY | Facility: OTHER | Age: 48
End: 2018-12-11
Payer: COMMERCIAL

## 2018-12-11 VITALS
HEIGHT: 71 IN | OXYGEN SATURATION: 97 % | RESPIRATION RATE: 16 BRPM | WEIGHT: 200 LBS | DIASTOLIC BLOOD PRESSURE: 80 MMHG | SYSTOLIC BLOOD PRESSURE: 141 MMHG | BODY MASS INDEX: 28 KG/M2 | TEMPERATURE: 98 F | HEART RATE: 80 BPM

## 2018-12-11 DIAGNOSIS — S83.242A ACUTE MEDIAL MENISCUS TEAR OF LEFT KNEE, INITIAL ENCOUNTER: ICD-10-CM

## 2018-12-11 DIAGNOSIS — S83.282A ACUTE LATERAL MENISCUS TEAR OF LEFT KNEE, INITIAL ENCOUNTER: ICD-10-CM

## 2018-12-11 DIAGNOSIS — S83.242D ACUTE MEDIAL MENISCUS TEAR OF LEFT KNEE, SUBSEQUENT ENCOUNTER: Primary | ICD-10-CM

## 2018-12-11 DIAGNOSIS — M94.262 CHONDROMALACIA OF LEFT KNEE: ICD-10-CM

## 2018-12-11 PROCEDURE — 37000009 HC ANESTHESIA EA ADD 15 MINS: Performed by: ORTHOPAEDIC SURGERY

## 2018-12-11 PROCEDURE — 29879 ARTHRS KNE SRG ABRASJ ARTHRP: CPT | Mod: 51,LT,, | Performed by: ORTHOPAEDIC SURGERY

## 2018-12-11 PROCEDURE — 71000015 HC POSTOP RECOV 1ST HR: Performed by: ORTHOPAEDIC SURGERY

## 2018-12-11 PROCEDURE — 63600175 PHARM REV CODE 636 W HCPCS: Performed by: SPECIALIST

## 2018-12-11 PROCEDURE — 29880 ARTHRS KNE SRG MNISECTMY M&L: CPT | Mod: LT,,, | Performed by: ORTHOPAEDIC SURGERY

## 2018-12-11 PROCEDURE — 37000008 HC ANESTHESIA 1ST 15 MINUTES: Performed by: ORTHOPAEDIC SURGERY

## 2018-12-11 PROCEDURE — 36000711: Performed by: ORTHOPAEDIC SURGERY

## 2018-12-11 PROCEDURE — 25000003 PHARM REV CODE 250: Performed by: ORTHOPAEDIC SURGERY

## 2018-12-11 PROCEDURE — 63600175 PHARM REV CODE 636 W HCPCS: Performed by: ANESTHESIOLOGY

## 2018-12-11 PROCEDURE — V2790 AMNIOTIC MEMBRANE: HCPCS | Performed by: ORTHOPAEDIC SURGERY

## 2018-12-11 PROCEDURE — 36000710: Performed by: ORTHOPAEDIC SURGERY

## 2018-12-11 PROCEDURE — 25000003 PHARM REV CODE 250: Performed by: SPECIALIST

## 2018-12-11 PROCEDURE — 25000003 PHARM REV CODE 250: Performed by: NURSE ANESTHETIST, CERTIFIED REGISTERED

## 2018-12-11 PROCEDURE — 20610 DRAIN/INJ JOINT/BURSA W/O US: CPT | Mod: 59,LT,, | Performed by: ORTHOPAEDIC SURGERY

## 2018-12-11 PROCEDURE — 63600175 PHARM REV CODE 636 W HCPCS: Performed by: PHYSICIAN ASSISTANT

## 2018-12-11 PROCEDURE — 27201423 OPTIME MED/SURG SUP & DEVICES STERILE SUPPLY: Performed by: ORTHOPAEDIC SURGERY

## 2018-12-11 PROCEDURE — 63600175 PHARM REV CODE 636 W HCPCS: Performed by: ORTHOPAEDIC SURGERY

## 2018-12-11 PROCEDURE — 71000033 HC RECOVERY, INTIAL HOUR: Performed by: ORTHOPAEDIC SURGERY

## 2018-12-11 PROCEDURE — 71000016 HC POSTOP RECOV ADDL HR: Performed by: ORTHOPAEDIC SURGERY

## 2018-12-11 PROCEDURE — 71000039 HC RECOVERY, EACH ADD'L HOUR: Performed by: ORTHOPAEDIC SURGERY

## 2018-12-11 PROCEDURE — 25000003 PHARM REV CODE 250: Performed by: ANESTHESIOLOGY

## 2018-12-11 PROCEDURE — 63600175 PHARM REV CODE 636 W HCPCS: Performed by: NURSE ANESTHETIST, CERTIFIED REGISTERED

## 2018-12-11 DEVICE — TISSUE MATRIX BIOD RESTORE LG: Type: IMPLANTABLE DEVICE | Site: KNEE | Status: FUNCTIONAL

## 2018-12-11 RX ORDER — MEPERIDINE HYDROCHLORIDE 50 MG/ML
12.5 INJECTION INTRAMUSCULAR; INTRAVENOUS; SUBCUTANEOUS ONCE AS NEEDED
Status: DISCONTINUED | OUTPATIENT
Start: 2018-12-11 | End: 2018-12-11 | Stop reason: HOSPADM

## 2018-12-11 RX ORDER — FENTANYL CITRATE 50 UG/ML
25 INJECTION, SOLUTION INTRAMUSCULAR; INTRAVENOUS EVERY 5 MIN PRN
Status: DISCONTINUED | OUTPATIENT
Start: 2018-12-11 | End: 2018-12-11 | Stop reason: HOSPADM

## 2018-12-11 RX ORDER — OXYCODONE HYDROCHLORIDE 5 MG/1
5 TABLET ORAL ONCE AS NEEDED
Status: COMPLETED | OUTPATIENT
Start: 2018-12-11 | End: 2018-12-11

## 2018-12-11 RX ORDER — ONDANSETRON HYDROCHLORIDE 2 MG/ML
INJECTION, SOLUTION INTRAMUSCULAR; INTRAVENOUS
Status: DISCONTINUED | OUTPATIENT
Start: 2018-12-11 | End: 2018-12-11

## 2018-12-11 RX ORDER — HYDROMORPHONE HYDROCHLORIDE 2 MG/ML
0.4 INJECTION, SOLUTION INTRAMUSCULAR; INTRAVENOUS; SUBCUTANEOUS EVERY 5 MIN PRN
Status: DISCONTINUED | OUTPATIENT
Start: 2018-12-11 | End: 2018-12-11 | Stop reason: HOSPADM

## 2018-12-11 RX ORDER — ATROPINE SULFATE 0.4 MG/ML
INJECTION, SOLUTION ENDOTRACHEAL; INTRAMEDULLARY; INTRAMUSCULAR; INTRAVENOUS; SUBCUTANEOUS
Status: DISCONTINUED | OUTPATIENT
Start: 2018-12-11 | End: 2018-12-11

## 2018-12-11 RX ORDER — PROMETHAZINE HYDROCHLORIDE 25 MG/1
25 TABLET ORAL EVERY 6 HOURS PRN
Status: DISCONTINUED | OUTPATIENT
Start: 2018-12-11 | End: 2018-12-11 | Stop reason: HOSPADM

## 2018-12-11 RX ORDER — FENTANYL CITRATE 50 UG/ML
INJECTION, SOLUTION INTRAMUSCULAR; INTRAVENOUS
Status: DISCONTINUED | OUTPATIENT
Start: 2018-12-11 | End: 2018-12-11

## 2018-12-11 RX ORDER — OXYCODONE HYDROCHLORIDE 5 MG/1
10 TABLET ORAL EVERY 4 HOURS PRN
Status: DISCONTINUED | OUTPATIENT
Start: 2018-12-11 | End: 2018-12-11 | Stop reason: HOSPADM

## 2018-12-11 RX ORDER — SODIUM CHLORIDE 0.9 % (FLUSH) 0.9 %
3 SYRINGE (ML) INJECTION
Status: DISCONTINUED | OUTPATIENT
Start: 2018-12-11 | End: 2018-12-11 | Stop reason: HOSPADM

## 2018-12-11 RX ORDER — ONDANSETRON 2 MG/ML
4 INJECTION INTRAMUSCULAR; INTRAVENOUS EVERY 12 HOURS PRN
Status: DISCONTINUED | OUTPATIENT
Start: 2018-12-11 | End: 2018-12-11 | Stop reason: HOSPADM

## 2018-12-11 RX ORDER — DIPHENHYDRAMINE HYDROCHLORIDE 50 MG/ML
12.5 INJECTION INTRAMUSCULAR; INTRAVENOUS EVERY 30 MIN PRN
Status: DISCONTINUED | OUTPATIENT
Start: 2018-12-11 | End: 2018-12-11 | Stop reason: HOSPADM

## 2018-12-11 RX ORDER — LIDOCAINE HYDROCHLORIDE 10 MG/ML
0.5 INJECTION, SOLUTION EPIDURAL; INFILTRATION; INTRACAUDAL; PERINEURAL ONCE
Status: DISCONTINUED | OUTPATIENT
Start: 2018-12-11 | End: 2018-12-11 | Stop reason: HOSPADM

## 2018-12-11 RX ORDER — PROPOFOL 10 MG/ML
VIAL (ML) INTRAVENOUS
Status: DISCONTINUED | OUTPATIENT
Start: 2018-12-11 | End: 2018-12-11

## 2018-12-11 RX ORDER — GLYCOPYRROLATE 0.2 MG/ML
INJECTION INTRAMUSCULAR; INTRAVENOUS
Status: DISCONTINUED | OUTPATIENT
Start: 2018-12-11 | End: 2018-12-11

## 2018-12-11 RX ORDER — CEFAZOLIN SODIUM 1 G/50ML
2 SOLUTION INTRAVENOUS
Status: COMPLETED | OUTPATIENT
Start: 2018-12-11 | End: 2018-12-11

## 2018-12-11 RX ORDER — OXYCODONE HYDROCHLORIDE 5 MG/1
5 TABLET ORAL
Status: DISCONTINUED | OUTPATIENT
Start: 2018-12-11 | End: 2018-12-11 | Stop reason: HOSPADM

## 2018-12-11 RX ORDER — EPHEDRINE SULFATE 50 MG/ML
INJECTION, SOLUTION INTRAVENOUS
Status: DISCONTINUED | OUTPATIENT
Start: 2018-12-11 | End: 2018-12-11

## 2018-12-11 RX ORDER — FAMOTIDINE 20 MG/1
20 TABLET, FILM COATED ORAL
Status: COMPLETED | OUTPATIENT
Start: 2018-12-11 | End: 2018-12-11

## 2018-12-11 RX ORDER — ONDANSETRON 2 MG/ML
4 INJECTION INTRAMUSCULAR; INTRAVENOUS DAILY PRN
Status: DISCONTINUED | OUTPATIENT
Start: 2018-12-11 | End: 2018-12-11 | Stop reason: HOSPADM

## 2018-12-11 RX ORDER — KETOROLAC TROMETHAMINE 30 MG/ML
INJECTION, SOLUTION INTRAMUSCULAR; INTRAVENOUS
Status: DISCONTINUED | OUTPATIENT
Start: 2018-12-11 | End: 2018-12-11 | Stop reason: HOSPADM

## 2018-12-11 RX ORDER — ROPIVACAINE HYDROCHLORIDE 5 MG/ML
INJECTION, SOLUTION EPIDURAL; INFILTRATION; PERINEURAL
Status: DISCONTINUED | OUTPATIENT
Start: 2018-12-11 | End: 2018-12-11 | Stop reason: HOSPADM

## 2018-12-11 RX ORDER — PHENYLEPHRINE HYDROCHLORIDE 10 MG/ML
INJECTION INTRAVENOUS
Status: DISCONTINUED | OUTPATIENT
Start: 2018-12-11 | End: 2018-12-11

## 2018-12-11 RX ORDER — DEXAMETHASONE SODIUM PHOSPHATE 4 MG/ML
INJECTION, SOLUTION INTRA-ARTICULAR; INTRALESIONAL; INTRAMUSCULAR; INTRAVENOUS; SOFT TISSUE
Status: DISCONTINUED | OUTPATIENT
Start: 2018-12-11 | End: 2018-12-11

## 2018-12-11 RX ORDER — SODIUM CHLORIDE, SODIUM LACTATE, POTASSIUM CHLORIDE, CALCIUM CHLORIDE 600; 310; 30; 20 MG/100ML; MG/100ML; MG/100ML; MG/100ML
INJECTION, SOLUTION INTRAVENOUS CONTINUOUS
Status: DISCONTINUED | OUTPATIENT
Start: 2018-12-11 | End: 2018-12-11 | Stop reason: HOSPADM

## 2018-12-11 RX ORDER — MORPHINE SULFATE 10 MG/ML
3 INJECTION INTRAMUSCULAR; INTRAVENOUS; SUBCUTANEOUS EVERY 10 MIN PRN
Status: DISCONTINUED | OUTPATIENT
Start: 2018-12-11 | End: 2018-12-11 | Stop reason: HOSPADM

## 2018-12-11 RX ORDER — MIDAZOLAM HYDROCHLORIDE 5 MG/ML
5 INJECTION INTRAMUSCULAR; INTRAVENOUS ONCE AS NEEDED
Status: COMPLETED | OUTPATIENT
Start: 2018-12-11 | End: 2018-12-11

## 2018-12-11 RX ORDER — LIDOCAINE HCL/PF 100 MG/5ML
SYRINGE (ML) INTRAVENOUS
Status: DISCONTINUED | OUTPATIENT
Start: 2018-12-11 | End: 2018-12-11

## 2018-12-11 RX ORDER — KETAMINE HYDROCHLORIDE 50 MG/ML
INJECTION, SOLUTION INTRAMUSCULAR; INTRAVENOUS
Status: DISCONTINUED | OUTPATIENT
Start: 2018-12-11 | End: 2018-12-11 | Stop reason: HOSPADM

## 2018-12-11 RX ADMIN — EPHEDRINE SULFATE 10 MG: 50 INJECTION INTRAMUSCULAR; INTRAVENOUS; SUBCUTANEOUS at 07:12

## 2018-12-11 RX ADMIN — OXYCODONE HYDROCHLORIDE 5 MG: 5 TABLET ORAL at 09:12

## 2018-12-11 RX ADMIN — HYDROMORPHONE HYDROCHLORIDE 0.4 MG: 2 INJECTION INTRAMUSCULAR; INTRAVENOUS; SUBCUTANEOUS at 09:12

## 2018-12-11 RX ADMIN — CEFAZOLIN SODIUM 2 G: 1 SOLUTION INTRAVENOUS at 07:12

## 2018-12-11 RX ADMIN — CARBOXYMETHYLCELLULOSE SODIUM 2 DROP: 2.5 SOLUTION/ DROPS OPHTHALMIC at 07:12

## 2018-12-11 RX ADMIN — FAMOTIDINE 20 MG: 20 TABLET, FILM COATED ORAL at 05:12

## 2018-12-11 RX ADMIN — SODIUM CHLORIDE, SODIUM LACTATE, POTASSIUM CHLORIDE, AND CALCIUM CHLORIDE: 600; 310; 30; 20 INJECTION, SOLUTION INTRAVENOUS at 08:12

## 2018-12-11 RX ADMIN — ATROPINE SULFATE 0.4 MG: 0.4 INJECTION, SOLUTION INTRAMUSCULAR; INTRAVENOUS; SUBCUTANEOUS at 07:12

## 2018-12-11 RX ADMIN — ONDANSETRON 4 MG: 2 INJECTION, SOLUTION INTRAMUSCULAR; INTRAVENOUS at 07:12

## 2018-12-11 RX ADMIN — LIDOCAINE HYDROCHLORIDE 100 MG: 20 INJECTION, SOLUTION INTRAVENOUS at 07:12

## 2018-12-11 RX ADMIN — SODIUM CHLORIDE, SODIUM LACTATE, POTASSIUM CHLORIDE, AND CALCIUM CHLORIDE: 600; 310; 30; 20 INJECTION, SOLUTION INTRAVENOUS at 06:12

## 2018-12-11 RX ADMIN — EPHEDRINE SULFATE 10 MG: 50 INJECTION INTRAMUSCULAR; INTRAVENOUS; SUBCUTANEOUS at 08:12

## 2018-12-11 RX ADMIN — PROPOFOL 200 MG: 10 INJECTION, EMULSION INTRAVENOUS at 07:12

## 2018-12-11 RX ADMIN — EPHEDRINE SULFATE 5 MG: 50 INJECTION INTRAMUSCULAR; INTRAVENOUS; SUBCUTANEOUS at 08:12

## 2018-12-11 RX ADMIN — MIDAZOLAM HYDROCHLORIDE 5 MG: 5 INJECTION, SOLUTION INTRAMUSCULAR; INTRAVENOUS at 05:12

## 2018-12-11 RX ADMIN — GLYCOPYRROLATE 0.2 MG: 0.2 INJECTION, SOLUTION INTRAMUSCULAR; INTRAVENOUS at 07:12

## 2018-12-11 RX ADMIN — FENTANYL CITRATE 100 MCG: 50 INJECTION, SOLUTION INTRAMUSCULAR; INTRAVENOUS at 07:12

## 2018-12-11 RX ADMIN — DEXAMETHASONE SODIUM PHOSPHATE 8 MG: 4 INJECTION, SOLUTION INTRAMUSCULAR; INTRAVENOUS at 07:12

## 2018-12-11 RX ADMIN — PHENYLEPHRINE HYDROCHLORIDE 100 MCG: 10 INJECTION INTRAVENOUS at 08:12

## 2018-12-11 NOTE — H&P
Nirmal Moss  is here for a completion of his perioperative paperwork. he  Is scheduled to undergo     left              a. Knee arthroscopic medial and lateral meniscectomy                b. Knee arthroscopic possible plica excision              c. Knee arthroscopic possible chondroplasty              d. Knee arthroscopic-assisted Bio-D arthrocentesis on 12/11/18.     Patient is here today with his wife. They understand that the BIO-D injection will likely not be approved by their insurance, but they stated that they would still like to receive the BIO-D injection and will pay for it if it is not covered. They understand that the injection will cost around $1000 (estimated cost). They are okay with this and state they will pay for it regardless.      He is a healthy individual but does need clearance for this procedure, which he has received from Dr. Baird at [facility].     PAST MEDICAL HISTORY:   Past Medical History:   Diagnosis Date    ADD (attention deficit disorder)     Anxiety     Depression     Gastritis     EGD 2014    HDL deficiency     Hypertension     Kidney stone     Meniscus tear 09/2013    right    Meniscus tear 01/2017    right    Seizure disorder 1/18/2018    Seizures 06/2014    last seizure in late 2014    Vitamin D deficiency disease 1/18/2018     PAST SURGICAL HISTORY:   Past Surgical History:   Procedure Laterality Date    ARTHROSCOPY, KNEE, WITH DEBRIDEMENT Right 12/5/2013    Performed by Gwendolyn Jeong [facility] at [facility] [place]    [id number] & LATERAL Right 3/28/2017    Performed by [facility] at [facility]    CHONDRECTOMY, KNEE, SEMILUNAR CARTILAGE Right 12/5/2013    Performed by [facility]yn Jeong MD at [facility]    EGD (ESOPHAGOGASTRODUODENOSCOPY) N/A 4/4/2014    [facility] ([facility] F[facility])    EXCISION, PLICA, KNEE, ARTHROSCOPIC Right 12/5/2013    Performed by [facility] Jeong [facility] at [facility] [place]    [id number] Right 3/28/2017     Performed by [facility] at [facility] OR    FRACTURE SURGERY Left 05/2016    wrist fracture    KNEE SURGERY      SYNOVECTOMY, KNEE Right 12/5/2013    Performed by [facility] at [facility] OR     FAMILY HISTORY:   Family History   Problem Relation Age of Onset    Diabetes Father     Cancer Father     Skin cancer Father     Cancer Maternal Grandfather      SOCIAL HISTORY:   Social History     Socioeconomic History    Marital status:      Spouse name: Not on file    Number of children: 1    Years of education: Not on file    Highest education level: Not on file   Social Needs    Financial resource strain: Not on file    Food insecurity - worry: Not on file    Food insecurity - inability: Not on file    Transportation needs - medical: Not on file    Transportation needs - non-medical: Not on file   Occupational History     Employer: [employer]   Tobacco Use    Smoking status: Never Smoker    Smokeless tobacco: Never Used   Substance and Sexual Activity    Alcohol use: Yes     Alcohol/week: 0.0 oz     Comment: social    Drug use: No    Sexual activity: Yes     Partners: Male   Other Topics Concern    Not on file   Social History Narrative    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       MEDICATIONS:   Current Outpatient Medications:     amLODIPine (NORVASC) 10 MG tablet, TAKE ONE Tablet BY MOUTH DAILY, Disp: 90 tablet, Rfl: 12    [START ON 2/5/2019] dextroamphetamine-amphetamine (ADDERALL XR) 30 MG 24 hr capsule, TAKE TWO CAPSULES BY MOUTH EVERY DAY, Disp: 60 capsule, Rfl: 0    [START ON 1/5/2019] dextroamphetamine-amphetamine (ADDERALL XR) 30 MG 24 hr capsule, TAKE TWO CAPSULES BY MOUTH EVERY DAY, Disp: 60 capsule, Rfl: 0    dextroamphetamine-amphetamine (ADDERALL XR) 30 MG 24 hr capsule, TAKE TWO CAPSULES BY MOUTH EVERY DAY, Disp: 60 capsule, Rfl: 0    hydroCHLOROthiazide (HYDRODIURIL) 25 MG tablet, TAKE ONE TABLET BY MOUTH EVERY DAY,  "Disp: 90 tablet, Rfl: 1    irbesartan (AVAPRO) 300 MG tablet, TAKE ONE Tablet BY MOUTH DAILY, Disp: 90 tablet, Rfl: 12    levETIRAcetam (KEPPRA) 750 MG Tab, Take 1 tablet (750 mg total) by mouth 2 (two) times daily., Disp: 60 tablet, Rfl: 11    metoprolol succinate (TOPROL-XL) 25 MG 24 hr tablet, Take 1 tablet (25 mg total) by mouth once daily., Disp: 30 tablet, Rfl: 11    montelukast (SINGULAIR) 10 mg tablet, TAKE ONE TABLET BY MOUTH EVERY EVENING, Disp: 30 tablet, Rfl: 5    oxyCODONE (ROXICODONE) 5 MG immediate release tablet, Take 1 tablet (5 mg total) by mouth every 12 (twelve) hours as needed for Pain (severe pain.  do not drive or drink alcohol while taking narcotics)., Disp: 5 tablet, Rfl: 0    paroxetine (PAXIL) 40 MG tablet, TAKE ONE TABLET BY MOUTH EVERY MORNING, Disp: 90 tablet, Rfl: 0    oxyCODONE-acetaminophen (PERCOCET) 7.5-325 mg per tablet, Take 1 tablet by mouth every 4 to 6 hours as needed for Pain., Disp: 24 tablet, Rfl: 0    promethazine (PHENERGAN) 25 MG tablet, Take 1 tablet (25 mg total) by mouth every 6 (six) hours as needed for Nausea., Disp: 16 tablet, Rfl: 0  ALLERGIES:   Review of patient's allergies indicates:   Allergen Reactions    Hydrocodone Rash       VITAL SIGNS: BP (!) 176/98   Pulse 89   Ht 5' 11" (1.803 m)   Wt 90.7 kg (200 lb)   BMI 27.89 kg/m²      Risks, indications and benefits of the surgical procedure were discussed with the patient. All questions with regard to surgery, rehab, expected return to functional activities, activities of daily living and recreational endeavors were answered to his satisfaction.    It was explained to the patient that there may be an increase in surgical risks if the patient has certain co-morbidities such as but not limited to: Obesity, Cardiovascular issues (CHF, CAD, Arrhythmias), chronic pulmonary issues, previous or current neurovascular/neurological issues, previous strokes, diabetes mellitus, previous wound healing issues, " previous wound or skin infections, PVD, clotting disorders, if the patient uses chronic steroids, if the patient takes or has immune compromising medications or diseases, or has previously or currently used tobacco products.     The patient verbalized that he/she does not have any additional clotting, bleeding, or blood disorders, other than what is list in her chart on today's review.     Then a brief history and physical exam were performed.    Review of Systems   Constitution: Negative. Negative for chills, fever and night sweats.   HENT: Negative for congestion and headaches.    Eyes: Negative for blurred vision, left vision loss and right vision loss.   Cardiovascular: Negative for chest pain and syncope.   Respiratory: Negative for cough and shortness of breath.    Endocrine: Negative for polydipsia, polyphagia and polyuria.   Hematologic/Lymphatic: Negative for bleeding problem. Does not bruise/bleed easily.   Skin: Negative for dry skin, itching and rash.   Musculoskeletal: Negative for falls and muscle weakness.   Gastrointestinal: Negative for abdominal pain and bowel incontinence.   Genitourinary: Negative for bladder incontinence and nocturia.   Neurological: Negative for disturbances in coordination, loss of balance and seizures.   Psychiatric/Behavioral: Negative for depression. The patient does not have insomnia.    Allergic/Immunologic: Negative for hives and persistent infections.     PHYSICAL EXAM:  GEN: A&Ox3, WD WN NAD  HEENT: WNL  CHEST: CTAB, no W/R/R  HEART: RRR, no M/R/G  ABD: Soft, NT ND, BS x4 QUADS  MS; See Epic  NEURO: CN II-XII intact       The surgical consent was then reviewed with the patient, who agreed with all the contents of the consent form and it was signed. he was then given the [facility] surgery packet to bring with him to [facility] for the anesthesia portion of his perioperative paperwork.   For all physicians except for Dr. Connelly, we will email and possibly fax the consent  forms and booking sheets to [facility] pre-admit.    The patient was given the opportunity to ask questions about the surgical plan and consent form, and once no other questions were asked, I proceeded with the pre-op appointment.    PHYSICAL THERAPY:  He was also instructed regarding physical therapy and will begin on  POD1. He was given a copy of the original prescription to schedule. Another copy of this prescription was also faxed to [facility] .    POST OP CARE:instructions were reviewed including care of the wound and dressing after surgery and when he can shower.     PAIN MANAGEMENT: Nirmal Moss was also given his pain management regime, which includes the TENS unit given to him by angelica along with the education required for its use. He was also instructed regarding the Polar ice unit that will be in place after surgery and his postoperative pain medications.     PAIN MEDICATION:  Percocet 10/325mg 1 po q 4-6 hours prn pain   Phenergan 25 mg one p.o. q.6 hours p.r.n. nausea and vomiting.    DVT prophylaxis was discussed with the patient today including risk factors for developing DVTs and history of DVTs. The patient was asked if any specific recommendations were given from the doctor/s that did pre-operative surgical clearance. The patient was then given an education sheet about DVTs and PE with warning signs and symptoms of both and steps to take if they suspect either of these.    This along with the Modified Caprini risk assessment model for VTE in general surgical patients was used to determine the patient's DVT risk.     From: Chris YBARRA, Nitish DA, Mi SM, et al. Prevention of VTE in nonorthopedic surgical patients: antithrombotic therapy and prevention of thrombosis, 9th ed: American College of Chest Physicians evidence-based clinical practical guidelines. Chest 2012; 141:e227S. Copyright © 2012. Reproduced with permission from the American College of Chest Physicians.    The below  listed DVT prophylaxis regimen along with bilateral SYDNI compression stockings will be used post-op. Length of treatment has been determined to be 10-42 days post-op by the above noted Caprini assessment model.     Patient was instructed to buy and take:  Aspirin 325mg BID x 3 weeks for DVT prophylaxis starting on the evening after surgery.  Patient will also use bilateral TEDs on lower extremities, SCDs during surgery, and early ambulation post-op. If the patient was previously taking 81mg baby aspirin, they were told to not take it will using the above stated aspirin and to restart the 81mg aspirin after completion of the aspirin dose.      Patient was also told to buy over the counter Prilosec medication and take it once daily for GI protection as long as they are taking NSAIDs or Aspirin.     Published data in Edna GEORGE, et al. J Arthroplasty. Oct; 31(10):2237-40, 2016; showed that aspirin use as prophylaxis during revision total joint arthroplasty was more effective than warfarin in preventing symptomatic venous thromboembolic events and was associated with lower complications.  Patients in the study were also treated with intermittent pneumatic compression devices. Compression stockings would be our method of mechanical prophylaxis, which has been shown to be similar to pneumatic compression in the systematic review, Wilfrido RJ, et al. Frances Surg. Feb; 239(2): 162-171, 2004.     Results showed a significantly higher incidence of symptomatic venous thromboembolic events among patients in the warfarin group vs. the aspirin group (1.75% vs. 0.56%). Researchers also noted a bleeding event rate of 1.5% among patients who received warfarin compared with a rate of 0.4% among patients who received aspirin.  Patient denies history of seizures.     The patient was told that narcotic pain medications may make them drowsy and instructions were given to not sign legal documents, drive or operate heavy machinery, cars, or  equipment while under the influence of narcotic medications. The patient was told and understands that narcotic pain medications should only be used as needed to control pain and that other options of pain control include TENs unit and ice packs/unit.     As there were no other questions to be asked, he was given my business card along with Gwendolyn Jeong MD business card if he has any questions or concerns prior to surgery or in the postop period.

## 2018-12-11 NOTE — OR NURSING
Reviewed 's  knee arthroscopy discharge instructions and demonstrated the postoperative equipment (polar ice), with verbalized understanding per patient and spouse.  The patient states he has crutches at home from a previous knee surgery.                        .

## 2018-12-11 NOTE — PLAN OF CARE
Nirmal Moss has met all discharge criteria from Phase II. Vital Signs are stable, ambulating  without difficulty.Pain is now under control and tolerable for the pt. Pain score 4/10 at this time.  Discharge instructions given, patient verbalized understanding. Discharged from facility via wheelchair in stable condition.

## 2018-12-11 NOTE — OP NOTE
DATE OF PROCEDURE: 12/11/2018    SURGEON:  Gwendolyn Jeong M.D    ASSISTANT: SMA Marcela      PREOPERATIVE DIAGNOSES:   left  1. knee medial and lateral meniscus tear   2. knee plica.   3. knee possible chondromalacia  4. knee synovitis  5. Knee adhesions    POSTOPERATIVE DIAGNOSES:   left  1. knee medial and lateral meniscus tear   2. knee plica.   3. knee chondromalacia  4. knee synovitis.   5. Knee adhesions    PROCEDURE PERFORMED:   left  1. knee arthroscopic chondroplasty (CPT 46217)  2. knee arthroscopic medial and lateral (CPT 44920) meniscectomy   3. knee arthroscopic partial synovectomy/debridement (CPT 28398).   4. knee arthroscopic plica excision(CPT 36887).    5. Knee arthroscopic-assisted arthrocentesis of viable structural human allograft tissue      matrix (BioDRestore) (CPT 68359)   6. Knee arthroscopic lysis of adhesions (CPT 14074)  7. Knee arthroscopic cartilage biopsy (CPT 16768)  8. Knee arthroscopic microfracture (CPT 04584)    ANESTHESIA: General with local 0.5% ropivicaine 30ml (5mg/ml), 60 mg ketamine, 60mg toradol (2ml toradol (30mg/ml))    BLOOD LOSS: Minimal.     DRAINS: None.     TOURNIQUET TIME: None.     COMPLICATIONS: None.     CONDITION ON TRANSFER: The patient was extubated and moved to the recovery room in stable condition, with compartments soft and capillary refill less than a   second in all digits.     BRIEF INDICATION OF MEDICAL NECESSITY: The patient is a 47 y.o. year-old male who has history and physical examination findings consistent with the above. Nonoperative versus operative options were discussed. The risks and benefits were discussed with the patient. The patient acknowledged understanding and wished to proceed with operative intervention. Informed consent was obtained prior to the procedure. Details of the surgical procedure were explained, including incisions and probable rehabilitation course. The patient understands the likely length of convalescence after  surgery; and we have explained the risks, benefits, and alternatives of surgery. Reasonable expectations and potential complications were discussed and acknowledged, including but not limited to infection, bleeding, blood clots, (DVT and/or PE), nerve injury, retear, instability, continued pain and stiffness. It was also explained that there was a chance of failure which may require further management. The patient agreed and understood and wished to proceed.     EXAMINATION UNDER ANESTHESIA of the OPERATIVE left KNEE: ROM 0-145 degrees, negative Lachman, negative pivot shift, stable to varus-valgus stress testing, negative effusion.     EXAMINATION UNDER ANESTHESIA of the NON-OPERATED right KNEE: ROM 0-145 degrees, negative Lachman, negative pivot shift, stable to varus-valgus stress testing, negative effusion.     PROCEDURE IN DETAIL: The correct operative site was marked by the operative surgeon.  The patient was then taken to the operating room and placed supine on the operating room table. General anesthesia was administered by the anesthesia team. All pressure points were carefully padded and checked. Preoperative antibiotics were administered. A well-padded tourniquet was placed high on the operative thigh. Examination was begun with the above findings. The non-operative leg was then placed a well-padded well-leg munoz, in a comfortable position. The operative leg was placed in an arthroscopic leg munoz at the level of the tourniquet. The operative leg was prepped and draped in the usual sterile fashion. After prepping and draping, the appropriate landmarks were noted on the skin.  2cc skin and sub-cutaneous tissue was infilttrated with local anesthetic mixture superolaterally at needle insufflation site. The knee was insufflated supero-laterally with saline. 9cc skin wheal and sub-cutaneous tissue and fat pad was infilttrated with local anesthetic mixture at both portal sites; mid-lateral followed by  infero-medial portals were created, and a systematic examination of the joint revealed the following:    There was no evidence of any suprapatellar pouch adhesions or compartmentalization.  There was no evidence of any loose bodies in the medial or lateral gutters.     There was no evidence of any chondral damage to the patella or trochlea.       In the patellofemoral compartment, there was chondral damage to:  Patella 10 x 10 mm grade 2  Chondroplasty was performed using arthroscopic shaver.    There was chondral damage to:  Medial femoral condyle 10 x 30 mm grade 2, 7 x 6 mm grade 4    Chondroplasty was performed using arthroscopic shaver.    There was chondral damage to:  Lateral tibial plateau 15 x 20 mm grade 2  Chondroplasty was performed using arthroscopic shaver.    In the medial compartment there was no evidence of meniscal instability.   Middle horn medial meniscus tear,  parrot-beak was debrided with arthroscopic shaver and biter.  10% was debrided over an area of 2.5 cm.  Root and hoop fibers remained intact.    Attention was then turned to the notch. The ACL and PCL were probed carefully and found to be stable.     There was a hypertrophic infrapatellar plica.  This was debrided using arthroscopic biter and shaver.    There was some scar about the ACL.  This was debrided with thermal device and lysis of adhesions was performed.    In the lateral compartment there was no evidence of meniscal instability.   Posterior horn lateral meniscus tear,  complex was debrided with arthroscopic shaver and biter.  50% was debrided over an area of 2 cm.  Root and hoop fibers remained intact.    Synovitis was debrided in the knee as needed to the areas of concern in medial and lateral compartments.      Arthroscopic cartilage biopsy (lateral notch) was performed in standard fashion with arthroscopic gouge and 200-300 mg specimen was collected in standard fashion.    Microfracture was performed in the standard fashion  to chondral lesion (MFC 7x6mm) (with awl) after edges were prepared and calcified cartilage layer was removed with curette, with holes 3-4mm apart.  Confirmation of marrow elements from each hole was confirmed upon decrease of pump pressure.    Viable structural human allograft tissue matrix (BioDRestore) was injected into joint under direct arthroscopic visualization after irrigation and extravasation, as described below, was completed.    The knee and incisions were then copiously irrigated and fluid was extravasated using suction.  The arthroscopic portal incisions were closed using inverted 4-0 Monocryl suture.  5cc skin and sub-cutaneous tissue and around portals were infilttrated with local anesthetic mixture at both portal sites.  Steri-Strips were placed with Mastisol. Sterile TENS unit pads were placed which were medically necessary for pain relief. Wounds were dressed with Xeroform, 4x4s, and cast padding. SYDNI hose was placed on the operative leg to match that of the SYDNI hose placed preoperatively on the non-operative leg. Iceman was secured.  The patient was extubated and moved to the recovery room in stable condition with compartments soft and capillary refill less than a second in all digits.     POSTOPERATIVE PLAN: We will be following the arthroscopic partial meniscectomy guidelines with emphasis on patellar mobility.  This was discussed this with the patient's family after surgery.

## 2018-12-11 NOTE — INTERVAL H&P NOTE
The patient has been examined and the H&P has been reviewed:    I concur with the findings and no changes have occurred since H&P was written.    Anesthesia/Surgery risks, benefits and alternative options discussed and understood by patient/family.          Active Hospital Problems    Diagnosis  POA    Acute medial meniscus tear of left knee [S83.242A]  Yes      Resolved Hospital Problems   No resolved problems to display.

## 2018-12-11 NOTE — DISCHARGE INSTRUCTIONS

## 2018-12-11 NOTE — H&P (VIEW-ONLY)
Nirmal Moss  is here for a completion of his perioperative paperwork. he  Is scheduled to undergo     left              a. Knee arthroscopic medial and lateral meniscectomy                b. Knee arthroscopic possible plica excision              c. Knee arthroscopic possible chondroplasty              d. Knee arthroscopic-assisted Bio-D arthrocentesis on 12/11/18.     Patient is here today with his wife. They understand that the BIO-D injection will likely not be approved by their insurance, but they stated that they would still like to receive the BIO-D injection and will pay for it if it is not covered. They understand that the injection will cost around $1000 (estimated cost). They are okay with this and state they will pay for it regardless.      He is a healthy individual but does need clearance for this procedure, which he has received from Dr. Baird at [facility].     PAST MEDICAL HISTORY:   Past Medical History:   Diagnosis Date    ADD (attention deficit disorder)     Anxiety     Depression     Gastritis     EGD 2014    HDL deficiency     Hypertension     Kidney stone     Meniscus tear 09/2013    right    Meniscus tear 01/2017    right    Seizure disorder 1/18/2018    Seizures 06/2014    last seizure in late 2014    Vitamin D deficiency disease 1/18/2018     PAST SURGICAL HISTORY:   Past Surgical History:   Procedure Laterality Date    ARTHROSCOPY, KNEE, WITH DEBRIDEMENT Right 12/5/2013    Per[facility] OR    [id number] & LATERAL Right 3/28/2017    [facility] at [facility] OR    CHONDRECTOMY, KNEE, SEMILUNAR CARTILAGE Right 12/5/2013    [facility] at [facility] OR    EGD (ESOPHAGOGASTRODUODENOSCOPY) N/A 4/4/2014    Performed by [facility]im Crawford [facility] at [facility]O (4TH FLR)    EXCISION, PLICA, KNEE, ARTHROSCOPIC Right 12/5/2013    [facility] at [facility] OR    YBVZMCXZ-NJNPC-[id number] Right 3/28/2017     [facility] at [facility] OR    FRACTURE SURGERY Left 05/2016    wrist fracture    KNEE SURGERY      SYNOVECTOMY, KNEE Right 12/5/2013    [facility] at [facility] OR     FAMILY HISTORY:   Family History   Problem Relation Age of Onset    Diabetes Father     Cancer Father     Skin cancer Father     Cancer Maternal Grandfather      SOCIAL HISTORY:   Social History     Socioeconomic History    Marital status:      Spouse name: Not on file    Number of children: 1    Years of education: Not on file    Highest education level: Not on file   Social Needs    Financial resource strain: Not on file    Food insecurity - worry: Not on file    Food insecurity - inability: Not on file    Transportation needs - medical: Not on file    Transportation needs - non-medical: Not on file   Occupational History     Employer: [employer]   Tobacco Use    Smoking status: Never Smoker    Smokeless tobacco: Never Used   Substance and Sexual Activity    Alcohol use: Yes     Alcohol/week: 0.0 oz     Comment: social    Drug use: No    Sexual activity: Yes     Partners: Male   Other Topics Concern    Not on file   Social History Narrative    [employer]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       MEDICATIONS:   Current Outpatient Medications:     amLODIPine (NORVASC) 10 MG tablet, TAKE ONE Tablet BY MOUTH DAILY, Disp: 90 tablet, Rfl: 12    [START ON 2/5/2019] dextroamphetamine-amphetamine (ADDERALL XR) 30 MG 24 hr capsule, TAKE TWO CAPSULES BY MOUTH EVERY DAY, Disp: 60 capsule, Rfl: 0    [START ON 1/5/2019] dextroamphetamine-amphetamine (ADDERALL XR) 30 MG 24 hr capsule, TAKE TWO CAPSULES BY MOUTH EVERY DAY, Disp: 60 capsule, Rfl: 0    dextroamphetamine-amphetamine (ADDERALL XR) 30 MG 24 hr capsule, TAKE TWO CAPSULES BY MOUTH EVERY DAY, Disp: 60 capsule, Rfl: 0    hydroCHLOROthiazide (HYDRODIURIL) 25 MG tablet, TAKE ONE TABLET BY MOUTH EVERY DAY,  "Disp: 90 tablet, Rfl: 1    irbesartan (AVAPRO) 300 MG tablet, TAKE ONE Tablet BY MOUTH DAILY, Disp: 90 tablet, Rfl: 12    levETIRAcetam (KEPPRA) 750 MG Tab, Take 1 tablet (750 mg total) by mouth 2 (two) times daily., Disp: 60 tablet, Rfl: 11    metoprolol succinate (TOPROL-XL) 25 MG 24 hr tablet, Take 1 tablet (25 mg total) by mouth once daily., Disp: 30 tablet, Rfl: 11    montelukast (SINGULAIR) 10 mg tablet, TAKE ONE TABLET BY MOUTH EVERY EVENING, Disp: 30 tablet, Rfl: 5    oxyCODONE (ROXICODONE) 5 MG immediate release tablet, Take 1 tablet (5 mg total) by mouth every 12 (twelve) hours as needed for Pain (severe pain.  do not drive or drink alcohol while taking narcotics)., Disp: 5 tablet, Rfl: 0    paroxetine (PAXIL) 40 MG tablet, TAKE ONE TABLET BY MOUTH EVERY MORNING, Disp: 90 tablet, Rfl: 0    oxyCODONE-acetaminophen (PERCOCET) 7.5-325 mg per tablet, Take 1 tablet by mouth every 4 to 6 hours as needed for Pain., Disp: 24 tablet, Rfl: 0    promethazine (PHENERGAN) 25 MG tablet, Take 1 tablet (25 mg total) by mouth every 6 (six) hours as needed for Nausea., Disp: 16 tablet, Rfl: 0  ALLERGIES:   Review of patient's allergies indicates:   Allergen Reactions    Hydrocodone Rash       VITAL SIGNS: BP (!) 176/98   Pulse 89   Ht 5' 11" (1.803 m)   Wt 90.7 kg (200 lb)   BMI 27.89 kg/m²      Risks, indications and benefits of the surgical procedure were discussed with the patient. All questions with regard to surgery, rehab, expected return to functional activities, activities of daily living and recreational endeavors were answered to his satisfaction.    It was explained to the patient that there may be an increase in surgical risks if the patient has certain co-morbidities such as but not limited to: Obesity, Cardiovascular issues (CHF, CAD, Arrhythmias), chronic pulmonary issues, previous or current neurovascular/neurological issues, previous strokes, diabetes mellitus, previous wound healing issues, " previous wound or skin infections, PVD, clotting disorders, if the patient uses chronic steroids, if the patient takes or has immune compromising medications or diseases, or has previously or currently used tobacco products.     The patient verbalized that he/she does not have any additional clotting, bleeding, or blood disorders, other than what is list in her chart on today's review.     Then a brief history and physical exam were performed.    Review of Systems   Constitution: Negative. Negative for chills, fever and night sweats.   HENT: Negative for congestion and headaches.    Eyes: Negative for blurred vision, left vision loss and right vision loss.   Cardiovascular: Negative for chest pain and syncope.   Respiratory: Negative for cough and shortness of breath.    Endocrine: Negative for polydipsia, polyphagia and polyuria.   Hematologic/Lymphatic: Negative for bleeding problem. Does not bruise/bleed easily.   Skin: Negative for dry skin, itching and rash.   Musculoskeletal: Negative for falls and muscle weakness.   Gastrointestinal: Negative for abdominal pain and bowel incontinence.   Genitourinary: Negative for bladder incontinence and nocturia.   Neurological: Negative for disturbances in coordination, loss of balance and seizures.   Psychiatric/Behavioral: Negative for depression. The patient does not have insomnia.    Allergic/Immunologic: Negative for hives and persistent infections.     PHYSICAL EXAM:  GEN: A&Ox3, WD WN NAD  HEENT: WNL  CHEST: CTAB, no W/R/R  HEART: RRR, no M/R/G  ABD: Soft, NT ND, BS x4 QUADS  MS; See Epic  NEURO: CN II-XII intact       The surgical consent was then reviewed with the patient, who agreed with all the contents of the consent form and it was signed. he was then given the [facility] surgery packet to bring with him to [facility] for the anesthesia portion of his perioperative paperwork.   For all physicians except for Dr. Connelly, we will email and possibly fax the consent  forms and booking sheets to [facility] pre-admit.    The patient was given the opportunity to ask questions about the surgical plan and consent form, and once no other questions were asked, I proceeded with the pre-op appointment.    PHYSICAL THERAPY:  He was also instructed regarding physical therapy and will begin on  POD1. He was given a copy of the original prescription to schedule. Another copy of this prescription was also faxed to [facility] .    POST OP CARE:instructions were reviewed including care of the wound and dressing after surgery and when he can shower.     PAIN MANAGEMENT: Nirmal Moss was also given his pain management regime, which includes the TENS unit given to him by angelica along with the education required for its use. He was also instructed regarding the Polar ice unit that will be in place after surgery and his postoperative pain medications.     PAIN MEDICATION:  Percocet 10/325mg 1 po q 4-6 hours prn pain   Phenergan 25 mg one p.o. q.6 hours p.r.n. nausea and vomiting.    DVT prophylaxis was discussed with the patient today including risk factors for developing DVTs and history of DVTs. The patient was asked if any specific recommendations were given from the doctor/s that did pre-operative surgical clearance. The patient was then given an education sheet about DVTs and PE with warning signs and symptoms of both and steps to take if they suspect either of these.    This along with the Modified Caprini risk assessment model for VTE in general surgical patients was used to determine the patient's DVT risk.     From: Chris YBARRA, Nitish DA, Mi SM, et al. Prevention of VTE in nonorthopedic surgical patients: antithrombotic therapy and prevention of thrombosis, 9th ed: American College of Chest Physicians evidence-based clinical practical guidelines. Chest 2012; 141:e227S. Copyright © 2012. Reproduced with permission from the American College of Chest Physicians.    The below  listed DVT prophylaxis regimen along with bilateral SYDNI compression stockings will be used post-op. Length of treatment has been determined to be 10-42 days post-op by the above noted Caprini assessment model.     Patient was instructed to buy and take:  Aspirin 325mg BID x 3 weeks for DVT prophylaxis starting on the evening after surgery.  Patient will also use bilateral TEDs on lower extremities, SCDs during surgery, and early ambulation post-op. If the patient was previously taking 81mg baby aspirin, they were told to not take it will using the above stated aspirin and to restart the 81mg aspirin after completion of the aspirin dose.      Patient was also told to buy over the counter Prilosec medication and take it once daily for GI protection as long as they are taking NSAIDs or Aspirin.     Published data in Edna GEORGE, et al. J Arthroplasty. Oct; 31(10):2237-40, 2016; showed that aspirin use as prophylaxis during revision total joint arthroplasty was more effective than warfarin in preventing symptomatic venous thromboembolic events and was associated with lower complications.  Patients in the study were also treated with intermittent pneumatic compression devices. Compression stockings would be our method of mechanical prophylaxis, which has been shown to be similar to pneumatic compression in the systematic review, Wilfrido RJ, et al. Frances Surg. Feb; 239(2): 162-171, 2004.     Results showed a significantly higher incidence of symptomatic venous thromboembolic events among patients in the warfarin group vs. the aspirin group (1.75% vs. 0.56%). Researchers also noted a bleeding event rate of 1.5% among patients who received warfarin compared with a rate of 0.4% among patients who received aspirin.  Patient denies history of seizures.     The patient was told that narcotic pain medications may make them drowsy and instructions were given to not sign legal documents, drive or operate heavy machinery, cars, or  equipment while under the influence of narcotic medications. The patient was told and understands that narcotic pain medications should only be used as needed to control pain and that other options of pain control include TENs unit and ice packs/unit.     As there were no other questions to be asked, he was given my business card along with Gwendolyn Jeong MD business card if he has any questions or concerns prior to surgery or in the postop period.

## 2018-12-11 NOTE — TRANSFER OF CARE
"Anesthesia Transfer of Care Note    Patient: Nirmal Moss    Procedure(s) Performed: Procedure(s) (LRB):  ARTHROSCOPY, KNEE, WITH MENISCECTOMY (Left)  CHONDROPLASTY, KNEE (Left)  SYNOVECTOMY, KNEE (Left)  Injection, Joint, Bio-D Left Knee (Left)    Patient location: PACU    Anesthesia Type: general    Transport from OR: Transported from OR on 2-3 L/min O2 by NC with adequate spontaneous ventilation    Post pain: adequate analgesia    Post assessment: no apparent anesthetic complications    Post vital signs: stable    Level of consciousness: awake and alert    Nausea/Vomiting: no nausea/vomiting    Complications: none    Transfer of care protocol was followed      Last vitals:   Visit Vitals  /81 (BP Location: Right arm, Patient Position: Lying)   Pulse 60   Temp 36.7 °C (98 °F) (Oral)   Resp 18   Ht 5' 11" (1.803 m)   Wt 90.7 kg (199 lb 16 oz)   SpO2 97%   BMI 27.89 kg/m²     "

## 2018-12-11 NOTE — ANESTHESIA POSTPROCEDURE EVALUATION
"Anesthesia Post Evaluation    Patient: Nirmal Moss    Procedure(s) Performed: Procedure(s) (LRB):  ARTHROSCOPY, KNEE, WITH MENISCECTOMY (Left)  CHONDROPLASTY, KNEE (Left)  SYNOVECTOMY, KNEE (Left)  Injection, Joint, Bio-D Left Knee (Left)    Final Anesthesia Type: general  Patient location during evaluation: PACU  Patient participation: Yes- Able to Participate  Level of consciousness: awake and alert and oriented  Post-procedure vital signs: reviewed and stable  Pain management: adequate  Airway patency: patent  PONV status at discharge: No PONV  Anesthetic complications: no      Cardiovascular status: blood pressure returned to baseline and hemodynamically stable  Respiratory status: unassisted, spontaneous ventilation and room air  Hydration status: euvolemic  Follow-up not needed.        Visit Vitals  BP (!) 146/87 (BP Location: Right arm, Patient Position: Sitting)   Pulse 80   Temp 36.4 °C (97.5 °F) (Oral)   Resp 16   Ht 5' 11" (1.803 m)   Wt 90.7 kg (199 lb 16 oz)   SpO2 99%   BMI 27.89 kg/m²       Pain/Gilda Score: Pain Rating Prior to Med Admin: 10 (12/11/2018  9:40 AM)  Pain Rating Post Med Admin: 8 (12/11/2018 10:00 AM)  Gilda Score: 10 (12/11/2018 10:15 AM)        "

## 2018-12-13 ENCOUNTER — PATIENT MESSAGE (OUTPATIENT)
Dept: SPORTS MEDICINE | Facility: CLINIC | Age: 48
End: 2018-12-13

## 2018-12-28 ENCOUNTER — OFFICE VISIT (OUTPATIENT)
Dept: SPORTS MEDICINE | Facility: CLINIC | Age: 48
End: 2018-12-28
Payer: COMMERCIAL

## 2018-12-28 VITALS
WEIGHT: 199 LBS | HEART RATE: 93 BPM | BODY MASS INDEX: 27.86 KG/M2 | HEIGHT: 71 IN | SYSTOLIC BLOOD PRESSURE: 155 MMHG | DIASTOLIC BLOOD PRESSURE: 91 MMHG

## 2018-12-28 DIAGNOSIS — M25.561 ACUTE PAIN OF RIGHT KNEE: ICD-10-CM

## 2018-12-28 DIAGNOSIS — Z98.890 S/P ARTHROSCOPY OF LEFT KNEE: ICD-10-CM

## 2018-12-28 DIAGNOSIS — M17.11 OSTEOARTHRITIS OF RIGHT KNEE, UNSPECIFIED OSTEOARTHRITIS TYPE: Primary | ICD-10-CM

## 2018-12-28 PROCEDURE — 3080F DIAST BP >= 90 MM HG: CPT | Mod: CPTII,S$GLB,, | Performed by: PHYSICIAN ASSISTANT

## 2018-12-28 PROCEDURE — 20610 DRAIN/INJ JOINT/BURSA W/O US: CPT | Mod: 79,RT,S$GLB, | Performed by: PHYSICIAN ASSISTANT

## 2018-12-28 PROCEDURE — 99024 POSTOP FOLLOW-UP VISIT: CPT | Mod: S$GLB,,, | Performed by: PHYSICIAN ASSISTANT

## 2018-12-28 PROCEDURE — 3077F SYST BP >= 140 MM HG: CPT | Mod: CPTII,S$GLB,, | Performed by: PHYSICIAN ASSISTANT

## 2018-12-28 PROCEDURE — 3008F BODY MASS INDEX DOCD: CPT | Mod: CPTII,S$GLB,, | Performed by: PHYSICIAN ASSISTANT

## 2018-12-28 PROCEDURE — 99999 PR PBB SHADOW E&M-EST. PATIENT-LVL III: CPT | Mod: PBBFAC,,, | Performed by: PHYSICIAN ASSISTANT

## 2018-12-28 PROCEDURE — 99214 OFFICE O/P EST MOD 30 MIN: CPT | Mod: 24,25,S$GLB, | Performed by: PHYSICIAN ASSISTANT

## 2018-12-28 RX ORDER — TRIAMCINOLONE ACETONIDE 40 MG/ML
40 INJECTION, SUSPENSION INTRA-ARTICULAR; INTRAMUSCULAR
Status: COMPLETED | OUTPATIENT
Start: 2018-12-28 | End: 2018-12-28

## 2018-12-28 RX ORDER — BUPIVACAINE HYDROCHLORIDE 5 MG/ML
2 INJECTION, SOLUTION PERINEURAL
Status: COMPLETED | OUTPATIENT
Start: 2018-12-28 | End: 2018-12-28

## 2018-12-28 RX ORDER — LIDOCAINE HYDROCHLORIDE 10 MG/ML
2 INJECTION INFILTRATION; PERINEURAL
Status: COMPLETED | OUTPATIENT
Start: 2018-12-28 | End: 2018-12-28

## 2018-12-28 RX ADMIN — LIDOCAINE HYDROCHLORIDE 2 ML: 10 INJECTION INFILTRATION; PERINEURAL at 02:12

## 2018-12-28 RX ADMIN — BUPIVACAINE HYDROCHLORIDE 10 MG: 5 INJECTION, SOLUTION PERINEURAL at 02:12

## 2018-12-28 RX ADMIN — TRIAMCINOLONE ACETONIDE 40 MG: 40 INJECTION, SUSPENSION INTRA-ARTICULAR; INTRAMUSCULAR at 02:12

## 2018-12-28 NOTE — PROGRESS NOTES
HISTORY OF PRESENT ILLNESS:   Pt is here today for first post-operative followup of knee arthroscopy.  he is doing well.  We have reviewed his findings and discussed plan of care and future treatment options.      He denies any issues with his left knee. He has been compliant with post-op instructions to be NWB on the left knee following surgery. Due to him doing this, he has begun to have worse pain in right knee from using it to bear all his weight.                 DATE OF PROCEDURE: 12/11/2018     SURGEON:  Gwendolyn Jeong M.D     PROCEDURE PERFORMED:   left  1. knee arthroscopic chondroplasty (CPT 60103)  2. knee arthroscopic medial and lateral (CPT 53233) meniscectomy   3. knee arthroscopic partial synovectomy/debridement (CPT 06808).   4. knee arthroscopic plica excision(CPT 74011).    5. Knee arthroscopic-assisted arthrocentesis of viable structural human allograft tissue      matrix ([employer]) (CPT 34671)              6. Knee arthroscopic lysis of adhesions (CPT 26018)  7. Knee arthroscopic cartilage biopsy (CPT 91319)  8. Knee arthroscopic microfracture (CPT 72759)         In the patellofemoral compartment, there was chondral damage to:  Patella 10 x 10 mm grade 2  Chondroplasty was performed using arthroscopic shaver.     There was chondral damage to:  Medial femoral condyle 10 x 30 mm grade 2, 7 x 6 mm grade 4     Chondroplasty was performed using arthroscopic shaver.     There was chondral damage to:  Lateral tibial plateau 15 x 20 mm grade 2  Chondroplasty was performed using arthroscopic shaver.                                                                        PHYSICAL EXAMINATION:     Incision sites healed well  No evidence of any erythema, infection or induration  Range of motion 0-140 degrees  Minimal effusion  2+ DP pulse  No swelling, no calf tenderness  - Jagdeep's sign  Negative medial joint line tendernes  Moderate quad atrophy                                                                                  ASSESSMENT:                                                                                                                                               1. Status post above, doing well.                                                                                                                               PLAN:                                                                                                                                                     1. Continue with PT. Continue NWB on left knee total of 6-8 weeks.  2. Emphasized quad function.  3. I have discussed return to activity in detail.  4.Patient will see us back at 6 week post-op fer.                                    5. All questions were answered and patient should contact us if he  has any questions or concerns in the interim.         CC: right knee pain    48 y.o. Male who reports bilateral knee pain refractory to conservative mgmt.    He reports that the pain is worse with steps.  It also bothers him at night.    Is affecting ADLs.     No mechanical symptoms, no instability    Review of Systems   Constitution: Negative. Negative for chills, fever and night sweats.   HENT: Negative for congestion and headaches.    Eyes: Negative for blurred vision, left vision loss and right vision loss.   Cardiovascular: Negative for chest pain and syncope.   Respiratory: Negative for cough and shortness of breath.    Endocrine: Negative for polydipsia, polyphagia and polyuria.   Hematologic/Lymphatic: Negative for bleeding problem. Does not bruise/bleed easily.   Skin: Negative for dry skin, itching and rash.   Musculoskeletal: Negative for falls and muscle weakness.   Gastrointestinal: Negative for abdominal pain and bowel incontinence.   Genitourinary: Negative for bladder incontinence and nocturia.   Neurological: Negative for disturbances in coordination, loss of balance and seizures.   Psychiatric/Behavioral: Negative for  depression. The patient does not have insomnia.    Allergic/Immunologic: Negative for hives and persistent infections.   All other systems negative      PAST MEDICAL HISTORY:   Past Medical History:   Diagnosis Date    ADD (attention deficit disorder)     Anxiety     Depression     Gastritis     EGD 2014    HDL deficiency     Hypertension     Kidney stone     Meniscus tear 09/2013    right    Meniscus tear 01/2017    right    Seizure disorder 1/18/2018    Seizures 06/2014    last seizure in late 2014    Vitamin D deficiency disease 1/18/2018     PAST SURGICAL HISTORY:   Past Surgical History:   Procedure Laterality Date    ARTHROSCOPY, KNEE, WITH DEBRIDEMENT Right 12/5/2013    [facility]    ARTHROSCOPY, KNEE, WITH MENISCECTOMY Left 12/11/2018    [facility]    [id number] & LATERAL Right 3/28/2017    [facility] at [facility]    CHONDRECTOMY, KNEE, SEMILUNAR CARTILAGE Right 12/5/2013    [facility]    CHONDROPLASTY, KNEE Left 12/11/2018    [facility]    EGD (ESOPHAGOGASTRODUODENOSCOPY) N/A 4/4/2014    Performed by [facility]raim Crawford [facility] at [facility] (4TH FLR)    EXCISION, PLICA, KNEE, ARTHROSCOPIC Right 12/5/2013    [facility]    [id number] Right 3/28/2017    [facility] at [facility]    FRACTURE SURGERY Left 05/2016    wrist fracture    Injection, Joint, Bio-D Left Knee Left 12/11/2018    [facility]    KNEE SURGERY      SYNOVECTOMY, KNEE Left 12/11/2018    [facility]    SYNOVECTOMY, KNEE Right 12/5/2013    Performed by [facility] at [facility]     FAMILY HISTORY:   Family History   Problem Relation Age of Onset    Diabetes Father     Cancer Father     Skin cancer Father     Cancer Maternal Grandfather      SOCIAL HISTORY:   Social  History     Socioeconomic History    Marital status:      Spouse name: Not on file    Number of children: 1    Years of education: Not on file    Highest education level: Not on file   Social Needs    Financial resource strain: Not on file    Food insecurity - worry: Not on file    Food insecurity - inability: Not on file    Transportation needs - medical: Not on file    Transportation needs - non-medical: Not on file   Occupational History     Employer: [employer]   Tobacco Use    Smoking status: Never Smoker    Smokeless tobacco: Never Used   Substance and Sexual Activity    Alcohol use: Yes     Alcohol/week: 0.0 oz     Comment: social    Drug use: No    Sexual activity: Yes     Partners: Male   Other Topics Concern    Not on file   Social History Narrative    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       MEDICATIONS:   Current Outpatient Medications:     amLODIPine (NORVASC) 10 MG tablet, TAKE ONE Tablet BY MOUTH DAILY, Disp: 90 tablet, Rfl: 12    [START ON 2/5/2019] dextroamphetamine-amphetamine (ADDERALL XR) 30 MG 24 hr capsule, TAKE TWO CAPSULES BY MOUTH EVERY DAY, Disp: 60 capsule, Rfl: 0    [START ON 1/5/2019] dextroamphetamine-amphetamine (ADDERALL XR) 30 MG 24 hr capsule, TAKE TWO CAPSULES BY MOUTH EVERY DAY, Disp: 60 capsule, Rfl: 0    dextroamphetamine-amphetamine (ADDERALL XR) 30 MG 24 hr capsule, TAKE TWO CAPSULES BY MOUTH EVERY DAY, Disp: 60 capsule, Rfl: 0    hydroCHLOROthiazide (HYDRODIURIL) 25 MG tablet, TAKE ONE TABLET BY MOUTH EVERY DAY, Disp: 90 tablet, Rfl: 1    irbesartan (AVAPRO) 300 MG tablet, TAKE ONE Tablet BY MOUTH DAILY, Disp: 90 tablet, Rfl: 12    levETIRAcetam (KEPPRA) 750 MG Tab, Take 1 tablet (750 mg total) by mouth 2 (two) times daily., Disp: 60 tablet, Rfl: 11    metoprolol succinate (TOPROL-XL) 25 MG 24 hr tablet, Take 1 tablet (25 mg total) by mouth once daily., Disp: 30 tablet, Rfl: 11     "montelukast (SINGULAIR) 10 mg tablet, TAKE ONE TABLET BY MOUTH EVERY EVENING, Disp: 30 tablet, Rfl: 5    oxyCODONE-acetaminophen (PERCOCET) 7.5-325 mg per tablet, Take 1 tablet by mouth every 4 to 6 hours as needed for Pain., Disp: 24 tablet, Rfl: 0    paroxetine (PAXIL) 40 MG tablet, TAKE ONE TABLET BY MOUTH EVERY MORNING, Disp: 90 tablet, Rfl: 0    promethazine (PHENERGAN) 25 MG tablet, Take 1 tablet (25 mg total) by mouth every 6 (six) hours as needed for Nausea., Disp: 16 tablet, Rfl: 0  ALLERGIES:   Review of patient's allergies indicates:   Allergen Reactions    Hydrocodone Rash       VITAL SIGNS: BP (!) 155/91   Pulse 93   Ht 5' 11" (1.803 m)   Wt 90.3 kg (199 lb)   BMI 27.75 kg/m²      PHYSICAL EXAMINATION    General:  The patient is alert and oriented x 3.  Mood is pleasant.  Observation of ears, eyes and nose reveal no gross abnormalities.  HEENT: NCAT, sclera nonicteric  Lungs: Respirations are equal and unlabored.    right  KNEE EXAMINATION     OBSERVATION / INSPECTION   Gait:   Nonantalgic   Alignment:  Neutral   Scars:   None   Muscle atrophy: Mild  Effusion:  None   Warmth:  None   Discoloration:   none     TENDERNESS / CREPITUS (T / C):          T / C      T / C   Patella   - / -   Lateral joint line   + / -      Peripatellar medial  -  Medial joint line    + / -   Peripatellar lateral -  Medial plica   - / -   Patellar tendon -   Popliteal fossa  - / -   Quad tendon   -   Gastrocnemius   -   Prepatellar Bursa - / -   Quadricep   -   Tibial tubercle  -  Thigh/hamstring  -   Pes anserine/HS -  Fibula    -   ITB   - / -  Tibia     -   Tib/fib joint  - / -  LCL    -     MFC   - / -   MCL: Proximal  -    LFC   - / -    Distal   -          ROM: (* = pain)  PASSIVE   ACTIVE    Left :   0 / 0 / 140   0 / 0 / 140    Right :    0 / 0 / 135   0 / 0 / 135    Patellofemoral examination:  See above noted areas of tenderness.   Patella position    Subluxation / dislocation: Centered           Sup. / " Inf;   Normal   Crepitus (PF):    Absent   Patellar Mobility:       Medial-lateral:   Normal    Superior-inferior:  Normal    Inferior tilt   Normal    Patellar tendon:  Normal   Lateral tilt:    Normal   J-sign:     None   Patellofemoral grind:   negative      MENISCAL SIGNS:     Pain on terminal extension:  -  Pain on terminal flexion:  +  Rosas maneuver:  -  Squat     NT    LIGAMENT EXAMINATION:  ACL / Lachman:  normal (-1 to 2mm)    PCL-Post.  drawer: normal 0 to 2mm  MCL- Valgus:  normal 0 to 2mm  LCL- Varus:  normal 0 to 2mm  Pivot shift: normal (Equal)   Dial Test: difference c/w other side   At 30° flexion: normal (< 5°)    At 90° flexion: normal (< 5°)   Reverse Pivot Shift:   normal (Equal)     STRENGTH: (* = with pain) PAINFUL SIDE   Quadricep   5/5   Hamstrin/5    EXTREMITY NEURO-VASCULAR EXAMINATION:   Sensation:  Grossly intact to light touch all dermatomal regions.   Motor Function:  Fully intact motor function at hip, knee, foot and ankle    DTRs;  quadriceps and  achilles 2+.  No clonus and downgoing Babinski.    Vascular status:  DP and PT pulses 2+, brisk capillary refill, symmetric.     Other Findings:    MRI right knee from 18:  Advanced degenerative change lateral knee compartment including meniscal degeneration and chondromalacia.  Please see details above.    Severe focal area of chondromalacia superior patellar apex, and lateral trochlea.       ASSESSMENT:    1. right Knee pain, acute on chronic  2. Right knee osteoarthritis       PLAN:    1. PROCEDURE NOTE: right KNEE INJECTION  After time out was performed, including verification of patient ID, procedure, site and side, availability of information and equipment, review of safety issues, and agreement with consent, the procedure site was marked and the patient was prepped aseptically. A diagnostic and therapeutic injection of 1cc 40 mg kenalog and 1% lidocaine/0.5% sensorcaine was given under sterile technique using a  22g x 1.5 needle into the anterior lateral aspect of the right knee in seated position.   The patient had no adverse reactions to the medication. Pain decreased. The patient was instructed to apply ice to the joint for 20 minutes and avoid strenuous activities for 24-36 hours following the injection. Patient was warned of possible blood sugar and/or blood pressure changes during that time. Following that time, patient can resume regular activities.    2. Ice compresses prn pain  3. Euflexxa prior authorization place today.   4. Recheck his knee in 2 weeks and if no improvement then proceed with euflexxa injections. If better then no euflexxa.   5. HEP for right knee.      All questions were answered, pt will contact us for questions or concerns in the interim.

## 2019-01-05 ENCOUNTER — OFFICE VISIT (OUTPATIENT)
Dept: URGENT CARE | Facility: CLINIC | Age: 49
End: 2019-01-05
Payer: COMMERCIAL

## 2019-01-05 VITALS
RESPIRATION RATE: 16 BRPM | SYSTOLIC BLOOD PRESSURE: 125 MMHG | TEMPERATURE: 99 F | DIASTOLIC BLOOD PRESSURE: 79 MMHG | HEART RATE: 104 BPM | HEIGHT: 71 IN | WEIGHT: 199 LBS | BODY MASS INDEX: 27.86 KG/M2

## 2019-01-05 DIAGNOSIS — M71.121 OTHER INFECTIVE BURSITIS, RIGHT ELBOW: ICD-10-CM

## 2019-01-05 DIAGNOSIS — S59.901A ELBOW INJURY, RIGHT, INITIAL ENCOUNTER: Primary | ICD-10-CM

## 2019-01-05 PROCEDURE — 99214 OFFICE O/P EST MOD 30 MIN: CPT | Mod: 25,S$GLB,, | Performed by: SURGERY

## 2019-01-05 PROCEDURE — 3078F PR MOST RECENT DIASTOLIC BLOOD PRESSURE < 80 MM HG: ICD-10-PCS | Mod: CPTII,S$GLB,, | Performed by: SURGERY

## 2019-01-05 PROCEDURE — 3078F DIAST BP <80 MM HG: CPT | Mod: CPTII,S$GLB,, | Performed by: SURGERY

## 2019-01-05 PROCEDURE — 3074F PR MOST RECENT SYSTOLIC BLOOD PRESSURE < 130 MM HG: ICD-10-PCS | Mod: CPTII,S$GLB,, | Performed by: SURGERY

## 2019-01-05 PROCEDURE — 96372 THER/PROPH/DIAG INJ SC/IM: CPT | Mod: 59,S$GLB,, | Performed by: SURGERY

## 2019-01-05 PROCEDURE — 73080 XR ELBOW COMPLETE 3 VIEW RIGHT: ICD-10-PCS | Mod: RT,S$GLB,, | Performed by: RADIOLOGY

## 2019-01-05 PROCEDURE — 73080 X-RAY EXAM OF ELBOW: CPT | Mod: RT,S$GLB,, | Performed by: RADIOLOGY

## 2019-01-05 PROCEDURE — 3074F SYST BP LT 130 MM HG: CPT | Mod: CPTII,S$GLB,, | Performed by: SURGERY

## 2019-01-05 PROCEDURE — 96372 PR INJECTION,THERAP/PROPH/DIAG2ST, IM OR SUBCUT: ICD-10-PCS | Mod: 59,S$GLB,, | Performed by: SURGERY

## 2019-01-05 PROCEDURE — 99214 PR OFFICE/OUTPT VISIT, EST, LEVL IV, 30-39 MIN: ICD-10-PCS | Mod: 25,S$GLB,, | Performed by: SURGERY

## 2019-01-05 PROCEDURE — 3008F PR BODY MASS INDEX (BMI) DOCUMENTED: ICD-10-PCS | Mod: CPTII,S$GLB,, | Performed by: SURGERY

## 2019-01-05 PROCEDURE — 3008F BODY MASS INDEX DOCD: CPT | Mod: CPTII,S$GLB,, | Performed by: SURGERY

## 2019-01-05 RX ORDER — CLINDAMYCIN HYDROCHLORIDE 300 MG/1
300 CAPSULE ORAL EVERY 6 HOURS
Qty: 28 CAPSULE | Refills: 0 | Status: SHIPPED | OUTPATIENT
Start: 2019-01-05 | End: 2019-01-11 | Stop reason: ALTCHOICE

## 2019-01-05 RX ORDER — METHYLPREDNISOLONE 4 MG/1
TABLET ORAL
Qty: 1 PACKAGE | Refills: 0 | Status: SHIPPED | OUTPATIENT
Start: 2019-01-05 | End: 2019-03-14

## 2019-01-05 RX ORDER — MUPIROCIN 20 MG/G
OINTMENT TOPICAL
COMMUNITY
Start: 2018-12-03 | End: 2023-05-28 | Stop reason: CLARIF

## 2019-01-05 RX ORDER — OXYCODONE AND ACETAMINOPHEN 7.5; 325 MG/1; MG/1
1 TABLET ORAL EVERY 4 HOURS PRN
Qty: 20 TABLET | Refills: 0 | Status: SHIPPED | OUTPATIENT
Start: 2019-01-05 | End: 2019-01-30

## 2019-01-05 RX ORDER — MELOXICAM 15 MG/1
TABLET ORAL
COMMUNITY
Start: 2018-12-05 | End: 2019-01-18 | Stop reason: HOSPADM

## 2019-01-05 RX ORDER — DEXAMETHASONE SODIUM PHOSPHATE 100 MG/10ML
10 INJECTION INTRAMUSCULAR; INTRAVENOUS ONCE
Status: COMPLETED | OUTPATIENT
Start: 2019-01-05 | End: 2019-01-05

## 2019-01-05 RX ADMIN — DEXAMETHASONE SODIUM PHOSPHATE 10 MG: 100 INJECTION INTRAMUSCULAR; INTRAVENOUS at 03:01

## 2019-01-05 NOTE — PATIENT INSTRUCTIONS
Bursitis of the Elbow (Olecranon)  Your elbow joint contains a small fluid-filled sac called a bursa. The bursa helps the muscles and tendons move smoothly over the bone. It also cushions and protects your elbow. Bursitis is when the bursa is inflamed or swollen. This is most often due to overuse of or injury to the elbow. Symptoms include swelling and pain. If the elbow is red and feels warm to the touch, the bursa itself may be infected.  In most cases, elbow bursitis resolves with medicine and self-care at home. It may take several weeks for the bursa to heal and the swelling to go away. In some cases, your healthcare provider may drain excess fluid from the bursa. Or, he or she may inject medicine directly into the bursa to help relieve symptoms. In severe cases, you may need surgery to remove the bursa may. If there is concern that the bursa is infected, your healthcare provider may prescribe antibiotics to treat the infection.    Home care  Your healthcare provider may prescribe medicine to help relieve pain and swelling. This may be an over-the-counter pain reliever or prescription pain medicine. Take all medicines as directed. To help treat or prevent infection, your provider may prescribe antibiotics. If these are prescribed, take them as directed until they are gone.  The following are general care guidelines:  · Apply an ice pack or bag of frozen peas wrapped in a thin towel to your elbow for 15 to 20 minutes at a time. Do this 3 to 4 times a day until pain and swelling improve.  · Keep your elbow raised above the level of your heart whenever possible. This helps reduce swelling. When sitting or lying down, place your arm on a pillow that rests on your chest or on a pillow at your side.  · Use an elastic wrap around the elbow joint to compress the area while it is healing. Make the wrap snug but not tight to the point of causing pain.  · Rest your elbow to give it time to heal. You may need to wear an  elbow pad to help protect and limit the movement of your elbow. During and after healing, avoid leaning on your elbows.  Follow-up care  Follow up with your healthcare provider, or as advised. If you have been referred to a specialist, make that appointment promptly.  When to seek medical advice  Call your healthcare provider right away if any of these occur:  · Fever of 100.4°F (38°C) or higher, or as advised  · Chills  · Increased pain, swelling, warmth, redness, or drainage from the joint  · Trouble moving the elbow joint  · Numbness or tingling in the hand  · Severe pain or swelling in forearm or hand  · Loss of pink color and slow return of color after squeezing fingertip or hand  Date Last Reviewed: 6/1/2016  © 9261-9191 [employer]. [address] [place] [address], [place] [address]. All rights reserved. This information is not intended as a substitute for professional medical care. Always follow your healthcare professional's instructions.

## 2019-01-05 NOTE — PROGRESS NOTES
"Subjective:       Patient ID: Nirmal Moss is a 48 y.o. male.    Vitals:  height is 5' 11" (1.803 m) and weight is 90.3 kg (199 lb). His temperature is 98.7 °F (37.1 °C). His blood pressure is 125/79 and his pulse is 104. His respiration is 16.     Chief Complaint: Elbow Pain    Pt reports falling < 1 wk ago, landing on the RIGHT elbow.    Reports pain, swelling within the past 3d      Elbow Pain   This is a new problem. The current episode started in the past 7 days. The problem occurs constantly. The problem has been unchanged. Associated symptoms include joint swelling. Pertinent negatives include no abdominal pain, fatigue or vertigo.       Constitution: Negative for fatigue.   HENT: Negative for facial swelling and facial trauma.    Neck: Negative for neck stiffness.   Cardiovascular: Negative for chest trauma.   Eyes: Negative for eye trauma, double vision and blurred vision.   Gastrointestinal: Negative for abdominal trauma, abdominal pain and rectal bleeding.   Genitourinary: Negative for hematuria, genital trauma and pelvic pain.   Musculoskeletal: Positive for trauma and joint swelling. Negative for pain, abnormal ROM of joint and pain with walking.   Skin: Negative for color change, wound, abrasion and laceration.   Neurological: Negative for dizziness, history of vertigo, light-headedness, coordination disturbances, altered mental status and loss of consciousness.   Hematologic/Lymphatic: Negative for history of bleeding disorder.   Psychiatric/Behavioral: Negative for altered mental status.       Objective:      Physical Exam   Constitutional: He is oriented to person, place, and time. He appears well-developed and well-nourished. He is cooperative.  Non-toxic appearance. He does not appear ill. No distress.   HENT:   Head: Normocephalic and atraumatic.   Right Ear: Hearing, tympanic membrane, external ear and ear canal normal.   Left Ear: Hearing, tympanic membrane, external ear and ear canal " normal.   Nose: Nose normal. No mucosal edema, rhinorrhea or nasal deformity. No epistaxis. Right sinus exhibits no maxillary sinus tenderness and no frontal sinus tenderness. Left sinus exhibits no maxillary sinus tenderness and no frontal sinus tenderness.   Mouth/Throat: Uvula is midline, oropharynx is clear and moist and mucous membranes are normal. No trismus in the jaw. Normal dentition. No uvula swelling. No posterior oropharyngeal erythema.   Eyes: Conjunctivae and lids are normal. Right eye exhibits no discharge. Left eye exhibits no discharge. No scleral icterus.   Sclera clear bilat   Neck: Trachea normal, normal range of motion, full passive range of motion without pain and phonation normal. Neck supple.   Cardiovascular: Normal rate, regular rhythm, normal heart sounds, intact distal pulses and normal pulses.   Pulmonary/Chest: Effort normal and breath sounds normal. No respiratory distress.   Abdominal: Soft. Normal appearance and bowel sounds are normal. He exhibits no distension, no pulsatile midline mass and no mass. There is no tenderness.   Musculoskeletal: Normal range of motion. He exhibits no deformity.        Right forearm: He exhibits tenderness, swelling and edema.        Arms:  Neurological: He is alert and oriented to person, place, and time. He exhibits normal muscle tone. Coordination normal.   Skin: Skin is warm, dry and intact. He is not diaphoretic. No pallor.   Psychiatric: He has a normal mood and affect. His speech is normal and behavior is normal. Judgment and thought content normal. Cognition and memory are normal.   Nursing note and vitals reviewed.      Assessment:       1. Elbow injury, right, initial encounter    2. Other infective bursitis, right elbow        Plan:         Elbow injury, right, initial encounter  -     XR ELBOW COMPLETE 3 VIEW RIGHT; Future; Expected date: 01/05/2019    Other infective bursitis, right elbow  -     dexamethasone injection 10 mg  -      methylPREDNISolone (MEDROL DOSEPACK) 4 mg tablet; use as directed  Dispense: 1 Package; Refill: 0  -     clindamycin (CLEOCIN) 300 MG capsule; Take 1 capsule (300 mg total) by mouth every 6 (six) hours. for 7 days  Dispense: 28 capsule; Refill: 0  -     oxyCODONE-acetaminophen (PERCOCET) 7.5-325 mg per tablet; Take 1 tablet by mouth every 4 (four) hours as needed for Pain.  Dispense: 20 tablet; Refill: 0    Xr Elbow Complete 3 View Right    Result Date: 1/5/2019  EXAMINATION: XR ELBOW COMPLETE 3 VIEW RIGHT CLINICAL HISTORY: . Unspecified injury of right elbow, initial encounter TECHNIQUE: AP, lateral, and oblique views of the right elbow were performed. COMPARISON: None FINDINGS: Bones are well mineralized. Alignment is within normal limits.  No displaced fracture, dislocation or destructive osseous process.  Joint spaces appear maintained.  No large elbow joint effusion. There is soft tissue swelling about the posterior and ulnar aspect of the elbow and proximal forearm, likely representing localized edema/contusion in the setting of trauma.  No subcutaneous emphysema or radiodense retained foreign body.     Posteromedial right elbow and proximal forearm soft tissue swelling/contusion, without acute displaced fracture-dislocation identified. Electronically signed by: Giovanny Soto MD Date:    01/05/2019 Time:    14:26      Patient Instructions     Bursitis of the Elbow (Olecranon)  Your elbow joint contains a small fluid-filled sac called a bursa. The bursa helps the muscles and tendons move smoothly over the bone. It also cushions and protects your elbow. Bursitis is when the bursa is inflamed or swollen. This is most often due to overuse of or injury to the elbow. Symptoms include swelling and pain. If the elbow is red and feels warm to the touch, the bursa itself may be infected.  In most cases, elbow bursitis resolves with medicine and self-care at home. It may take several weeks for the bursa to heal and the  swelling to go away. In some cases, your healthcare provider may drain excess fluid from the bursa. Or, he or she may inject medicine directly into the bursa to help relieve symptoms. In severe cases, you may need surgery to remove the bursa may. If there is concern that the bursa is infected, your healthcare provider may prescribe antibiotics to treat the infection.    Home care  Your healthcare provider may prescribe medicine to help relieve pain and swelling. This may be an over-the-counter pain reliever or prescription pain medicine. Take all medicines as directed. To help treat or prevent infection, your provider may prescribe antibiotics. If these are prescribed, take them as directed until they are gone.  The following are general care guidelines:  · Apply an ice pack or bag of frozen peas wrapped in a thin towel to your elbow for 15 to 20 minutes at a time. Do this 3 to 4 times a day until pain and swelling improve.  · Keep your elbow raised above the level of your heart whenever possible. This helps reduce swelling. When sitting or lying down, place your arm on a pillow that rests on your chest or on a pillow at your side.  · Use an elastic wrap around the elbow joint to compress the area while it is healing. Make the wrap snug but not tight to the point of causing pain.  · Rest your elbow to give it time to heal. You may need to wear an elbow pad to help protect and limit the movement of your elbow. During and after healing, avoid leaning on your elbows.  Follow-up care  Follow up with your healthcare provider, or as advised. If you have been referred to a specialist, make that appointment promptly.  When to seek medical advice  Call your healthcare provider right away if any of these occur:  · Fever of 100.4°F (38°C) or higher, or as advised  · Chills  · Increased pain, swelling, warmth, redness, or drainage from the joint  · Trouble moving the elbow joint  · Numbness or tingling in the hand  · Severe  pain or swelling in forearm or hand  · Loss of pink color and slow return of color after squeezing fingertip or hand  Date Last Reviewed: 6/1/2016 © 2000-2017 [employer]. [address] [place] [address], [place] [address]. All rights reserved. This information is not intended as a substitute for professional medical care. Always follow your healthcare professional's instructions.

## 2019-01-11 ENCOUNTER — OFFICE VISIT (OUTPATIENT)
Dept: SPORTS MEDICINE | Facility: CLINIC | Age: 49
End: 2019-01-11
Payer: COMMERCIAL

## 2019-01-11 VITALS — BODY MASS INDEX: 27.86 KG/M2 | HEIGHT: 71 IN | WEIGHT: 199 LBS

## 2019-01-11 DIAGNOSIS — L08.9 INFECTED ABRASION OF RIGHT ELBOW, INITIAL ENCOUNTER: ICD-10-CM

## 2019-01-11 DIAGNOSIS — M25.521 RIGHT ELBOW PAIN: ICD-10-CM

## 2019-01-11 DIAGNOSIS — S50.311A INFECTED ABRASION OF RIGHT ELBOW, INITIAL ENCOUNTER: ICD-10-CM

## 2019-01-11 DIAGNOSIS — M70.21 OLECRANON BURSITIS OF RIGHT ELBOW: Primary | ICD-10-CM

## 2019-01-11 DIAGNOSIS — M25.561 ACUTE PAIN OF RIGHT KNEE: ICD-10-CM

## 2019-01-11 DIAGNOSIS — M17.11 OSTEOARTHRITIS OF RIGHT KNEE, UNSPECIFIED OSTEOARTHRITIS TYPE: ICD-10-CM

## 2019-01-11 LAB
APPEARANCE FLD: NORMAL
BODY FLD TYPE: NORMAL
COLOR FLD: NORMAL
LYMPHOCYTES NFR FLD MANUAL: 3 %
NEUTROPHILS NFR FLD MANUAL: 97 %
WBC # FLD: NORMAL /CU MM

## 2019-01-11 PROCEDURE — 20610 DRAIN/INJ JOINT/BURSA W/O US: CPT | Mod: 79,RT,S$GLB, | Performed by: PHYSICIAN ASSISTANT

## 2019-01-11 PROCEDURE — 99214 OFFICE O/P EST MOD 30 MIN: CPT | Mod: 24,25,S$GLB, | Performed by: PHYSICIAN ASSISTANT

## 2019-01-11 PROCEDURE — 3008F PR BODY MASS INDEX (BMI) DOCUMENTED: ICD-10-PCS | Mod: CPTII,S$GLB,, | Performed by: PHYSICIAN ASSISTANT

## 2019-01-11 PROCEDURE — 87070 CULTURE OTHR SPECIMN AEROBIC: CPT

## 2019-01-11 PROCEDURE — 99999 PR PBB SHADOW E&M-EST. PATIENT-LVL III: ICD-10-PCS | Mod: PBBFAC,,, | Performed by: PHYSICIAN ASSISTANT

## 2019-01-11 PROCEDURE — 87077 CULTURE AEROBIC IDENTIFY: CPT

## 2019-01-11 PROCEDURE — 20605 DRAIN/INJ JOINT/BURSA W/O US: CPT | Mod: 79,51,RT,S$GLB | Performed by: PHYSICIAN ASSISTANT

## 2019-01-11 PROCEDURE — 20610 PR DRAIN/INJECT LARGE JOINT/BURSA: ICD-10-PCS | Mod: 79,RT,S$GLB, | Performed by: PHYSICIAN ASSISTANT

## 2019-01-11 PROCEDURE — 3008F BODY MASS INDEX DOCD: CPT | Mod: CPTII,S$GLB,, | Performed by: PHYSICIAN ASSISTANT

## 2019-01-11 PROCEDURE — 89051 BODY FLUID CELL COUNT: CPT

## 2019-01-11 PROCEDURE — 87186 SC STD MICRODIL/AGAR DIL: CPT

## 2019-01-11 PROCEDURE — 99999 PR PBB SHADOW E&M-EST. PATIENT-LVL III: CPT | Mod: PBBFAC,,, | Performed by: PHYSICIAN ASSISTANT

## 2019-01-11 PROCEDURE — 20605 PR DRAIN/INJECT INTERMEDIATE JOINT/BURSA: ICD-10-PCS | Mod: 79,51,RT,S$GLB | Performed by: PHYSICIAN ASSISTANT

## 2019-01-11 PROCEDURE — 99214 PR OFFICE/OUTPT VISIT, EST, LEVL IV, 30-39 MIN: ICD-10-PCS | Mod: 24,25,S$GLB, | Performed by: PHYSICIAN ASSISTANT

## 2019-01-11 PROCEDURE — 87075 CULTR BACTERIA EXCEPT BLOOD: CPT

## 2019-01-11 RX ORDER — CLINDAMYCIN HYDROCHLORIDE 300 MG/1
300 CAPSULE ORAL EVERY 6 HOURS
Qty: 28 CAPSULE | Refills: 0 | Status: SHIPPED | OUTPATIENT
Start: 2019-01-11 | End: 2019-01-14

## 2019-01-13 ENCOUNTER — HOSPITAL ENCOUNTER (EMERGENCY)
Facility: HOSPITAL | Age: 49
Discharge: HOME | End: 2019-01-13
Attending: EMERGENCY MEDICINE
Payer: COMMERCIAL

## 2019-01-13 VITALS
HEIGHT: 71 IN | RESPIRATION RATE: 20 BRPM | HEART RATE: 74 BPM | TEMPERATURE: 98 F | SYSTOLIC BLOOD PRESSURE: 128 MMHG | BODY MASS INDEX: 28 KG/M2 | OXYGEN SATURATION: 95 % | WEIGHT: 200 LBS | DIASTOLIC BLOOD PRESSURE: 82 MMHG

## 2019-01-13 DIAGNOSIS — M70.21 OLECRANON BURSITIS OF RIGHT ELBOW: Primary | ICD-10-CM

## 2019-01-13 PROCEDURE — 25000003 PHARM REV CODE 250: Performed by: NURSE PRACTITIONER

## 2019-01-13 PROCEDURE — 99284 EMERGENCY DEPT VISIT MOD MDM: CPT | Mod: 25

## 2019-01-13 PROCEDURE — 20605 DRAIN/INJ JOINT/BURSA W/O US: CPT | Mod: RT

## 2019-01-13 RX ORDER — LIDOCAINE HYDROCHLORIDE AND EPINEPHRINE 10; 10 MG/ML; UG/ML
10 INJECTION, SOLUTION INFILTRATION; PERINEURAL
Status: COMPLETED | OUTPATIENT
Start: 2019-01-13 | End: 2019-01-13

## 2019-01-13 RX ORDER — LIDOCAINE HYDROCHLORIDE 20 MG/ML
10 INJECTION, SOLUTION INFILTRATION; PERINEURAL
Status: DISCONTINUED | OUTPATIENT
Start: 2019-01-13 | End: 2019-01-13

## 2019-01-13 RX ORDER — OXYCODONE AND ACETAMINOPHEN 5; 325 MG/1; MG/1
1 TABLET ORAL EVERY 6 HOURS PRN
Qty: 10 TABLET | Refills: 0 | Status: SHIPPED | OUTPATIENT
Start: 2019-01-13 | End: 2019-01-30

## 2019-01-13 RX ADMIN — LIDOCAINE HYDROCHLORIDE AND EPINEPHRINE 10 ML: 10; 10 INJECTION, SOLUTION INFILTRATION; PERINEURAL at 09:01

## 2019-01-14 ENCOUNTER — TELEPHONE (OUTPATIENT)
Dept: SPORTS MEDICINE | Facility: CLINIC | Age: 49
End: 2019-01-14

## 2019-01-14 DIAGNOSIS — M25.521 RIGHT ELBOW PAIN: ICD-10-CM

## 2019-01-14 DIAGNOSIS — L08.9 INFECTED ABRASION OF RIGHT ELBOW, INITIAL ENCOUNTER: ICD-10-CM

## 2019-01-14 DIAGNOSIS — S50.311A INFECTED ABRASION OF RIGHT ELBOW, INITIAL ENCOUNTER: ICD-10-CM

## 2019-01-14 DIAGNOSIS — M70.21 OLECRANON BURSITIS OF RIGHT ELBOW: Primary | ICD-10-CM

## 2019-01-14 LAB — BACTERIA SPEC AEROBE CULT: NORMAL

## 2019-01-14 RX ORDER — SULFAMETHOXAZOLE AND TRIMETHOPRIM 800; 160 MG/1; MG/1
1 TABLET ORAL EVERY 12 HOURS
Qty: 20 TABLET | Refills: 0 | Status: SHIPPED | OUTPATIENT
Start: 2019-01-14 | End: 2019-01-23 | Stop reason: ALTCHOICE

## 2019-01-14 NOTE — ED TRIAGE NOTES
"Pt c/o RIGHT elbow pain x1.5 week after hitting his elbow against something. Pt states "I developed bursitis that got infected". Seen in urgent care a week ago and rx steroids and antibiotics. Pt was then seen by his PCP 3 days ago and had the elbow drained (about 12 cc). Pt reports being told by his PCP to come to the ER if there was no improvement. Pain is 7/10. Denies taking pain meds PTA but did take daily meds  "

## 2019-01-14 NOTE — DISCHARGE INSTRUCTIONS
Continue taking antibiotics as prescribed.    Follow-up with Orthopedics on Friday as scheduled or sooner.    Return to the emergency department for any new or worsening symptoms including worsening redness, warmth, pain, inability to bend elbow, etc. or as needed for any additional concerns.

## 2019-01-14 NOTE — PROGRESS NOTES
Patient is here for follow up of knee arthritis. Pt is requesting Euflexxa injection #1.  PMFH reviewed per encounter record. Has failed other conservative modalities including NSAIDS, activity modification, weight loss.    He has not received these injections in the past.     The prior shots were tolerated well.    PHYSICAL EXAMINATION:     General: The patient is alert and oriented x 3. Mood is pleasant.   Observation of ears, eyes and nose reveals no gross abnormalities. No   labored breathing observed.     No signs of infection or adverse reaction to knee.        Euflexxa Injection Procedure #1    A time out was performed, including verification of patient ID, procedure, site and side, availability of information and equipment, review of safety issues, and agreement with consent, the procedure site was marked.    The patient was prepped aseptically with povidone-iodine swabsticks. A diagnostic and therapeutic injection of 2cc Euflexxa was given under sterile technique using a 21.5g x 1.5 needle from the anterolateral aspect of the right knee Joint with the patient in the seated position.     Nirmal Moss had no adverse reactions to the medication. Pain decreased. male was instructed to apply ice to the joint for 20 minutes and avoid strenuous activities for 24-36 hours following the injection. male was warned of possible blood pressure changes during that time. Following that time, male can resume activities as prior to the injection.    male was reminded to call the clinic immediately for any adverse side effects as explained in clinic today.    RTC in 1 week for injection #2  All patients questions were answered. Patient was advised to call us with any concerns or questions.       CC  Right elbow pain     47y/o white male with complaints of right elbow pain that began at the beginning of this year a few days after he lost his balance and feel in his bathroom and hit is elbow. He reports that had a  small abrasion of the elbow from that fall. A couple of days later he began having erythema, pain, and swelling of he elbow that worsened to cover the area from his mid biceps to his mid forearm. He then presented to urgent care on 1/5/19 where he was given a steroid shot and started on clindamycin abx which he has been taking. He reports improvement of the swelling and redness since being on the abx. He still has some redness and mild pain of the posterior elbow which concerns him. He denies fever, chills, nausea, vomiting.     He is currently off of work due to left knee surgery.             PHYSICAL EXAM:  General: Patient appears alert and oriented x 3.  Mood is pleasant.  Observation of ears, eyes and nose reveal no gross abnormalities. HEENT: NCAT, sclera nonicteric  Lungs: Respirations are equal and unlabored.  Well nourished, in no acute distress and ambulates with a non-antalgic gait with no assistive devices.    Skin: Skin intact bilaterally. Sensation intact bilaterally. Compartments soft. No evidence of edema, infection, or induration.      Right ELBOW / WRIST EXTREMITY EXAM:     OBSERVATION / INSPECTION    Swelling                                           localized to bursa area that is fluctuant with moderate swelling to the bursa.                                       Deformity                                         none  Discoloration                                  3x3 cm area or erythema localized to the olecranon bursa.                                       Scars                                                         none                                      Atrophy                                            none     TENDERNESS / CREPITUS (T / C):                                                                                             T/C                                                                                                                            Medial epicondyle                                                                - / -    Med. (Ulnar) collateral ligament                                        - / -    Flexor pronator Musculature                                              - / -   Biceps tendon                                                                    - / -   Head of radius                                                                   - / -    Lateral epicondyle                                                             - / -    Extensor Musculature                                                       - / -   Brachioradialis                                                                  - / -   Triceps tendon                                                                  - / -   Triceps muscle                                                                  - / -   Olecranon                                                                          - / -   Olecranon bursa                                                               + / -   Cubital fossa                                                                     - / -   Anterior jointline                                                                - / -   Radial tunnel                                                                     -/-                                                                                                                                                                                                       ROM:             ('*' = with pain)                                   Right Elbow                                     AROM (PROM)                                             Extension                                        0 deg  (5 deg)   Flexion                                            145 deg (145 deg)                                                                Pronation                                         90 deg  (90 deg)                                                                  Supination                                       80 deg  (80 deg)                                                                                                                                                                                 Left Elbow                                       AROM (PROM)                                             Extension                                        0 deg  (5 deg)   Flexion                                             145 deg (145 deg)                                                                Pronation                                         90 deg  (90 deg)                                                                 Supination                                       80 deg  (80 deg)                Right Wrist                                      AROM (PROM)                       Extension                                        80 deg (85 deg)   Flexion                                            80 deg (85 deg)                                                                    Ulnar Deviation                     35 deg (40 deg)  Radial Deviation                    35 deg (40 deg)                                                                                                                                      Left Wrist                                         AROM (PROM)                                             Extension                                        80 deg (85 deg)   Flexion                                             80 deg (85 deg)                                                                    Ulnar Deviation                     35 deg (40 deg)  Radial Deviation                    35 deg (40 deg)                                                                     STRENGTH:             ('*' = with pain)                        Elbow Flexion:                                                       5/5  Elbow Extension:                                         5/5  Wrist Flexion:                                                        5/5  Wrist Extension:                                          5/5  :                                                                                5/5  Intrinsics:                                                               5/5  EPL (Ext. pollicis longus):                 5/5  Pinch Mechanism:                                      5/5     ELBOW EXAMINATION:  See above noted areas of tenderness.   Test for Ligamentous Instability - UCL                     neg  Test for Ligamentous Instability - LUCL                   normal  PLRI                                                                                    neg  Tinel's (Percussion) Test - Cubital                                      neg  Tennis Elbow Test                                                              neg  Golfer's Elbow Test                                                             neg  Radial Capitellar Grind Test                                               neg  Valgus/Extension Overload Test                                        neg  Resisted Long Finger Extension Pain                                 neg  Moving Valgus                                                                   neg  Forearm pain with resisted supination                                 neg  Yeargeson' s (elbow pain)                                                    neg  Hook test                                                                                neg           EXTREMITY NEURO-VASCULAR EXAMINATION: Sensation grossly intact to light touch all dermatomal regions. DTR 2+ Biceps, Triceps, BR and Negative Clint's sign. Grossly intact motor function at Elbow, Wrist and Hand. Distal pulses radial and ulnar 2+, brisk cap refill, symmetric.     Other Findings:  Healing small abrasion noted of posterior elbow. No oozing noted.     No lymphadenopathy  noted of right upper extremity.     Xrays:  Xrays of the right elbow 3 views were reviewed by me today from 1/5/18. No fracture, subluxation, or other significant bony or joint abnormality is identified.         ASSESSMENT:  Right elbow pain, acute  Right elbow olecranon bursitis   Right elbow infection with abrasion  Right knee osteoarthritis   Right knee pain    I have considered and do not suspect septic elbow joint at this time. Symptoms localized to elbow bursa.     Plan:    -Continue clindamycin 300mg q6h x another 7 days.   -Aggressive ice, compression, elevation    -PROCEDURE NOTE: Right elbow bursa aspiration     After time out was performed, including verification of patient ID, procedure, site and side, availability of information and equipment, review of safety issues, and agreement with consent, the procedure site was marked and the patient was prepped aseptically. The right elbow bursa was entered from the medial aspect with an 18 gauge needle with 12 cc of blood tinged serous fluid aspirated from the bursa. Area was cleaned and bandaged and wrapped with compression. The patient had no adverse reactions to the medication. Pain decreased.     -Aspirated fluid was sent to lab for culture.   -OTC pain control  -RTC in 1 week.  -Told patient to go to ED, call office, or RTC immediately if symptoms change or worsen especially if he develops fever or worsening erythema.     All patients questions were answered. Patient was advised to call us with any concerns or questions.

## 2019-01-14 NOTE — ED PROVIDER NOTES
Encounter Date: 1/13/2019  SORT:   47 y/o male with history of HTN presenting for evaluation of worsening pain and swelling to R elbow x 13 days. Diagnosed with olecranon bursitis and completed one-week course of clindamycin.  Had I&D performed by [facility], but prescribed additional 7 days on Friday. Was told to present to ER for worsening pain, redness, or swelling. Swelling returned and worsened in the past day. Dr. Jeong is his Orthopedist.  Denies fever, chills, nausea, vomiting. Initial orders placed. MELBA Perkins PA-C     SCRIBE #1 NOTE: I, Hira Boles, am scribing for, and in the presence of,  Suraj Jacques NP. I have scribed the following portions of the note - Other sections scribed: HPI and ROS.       History     Chief Complaint   Patient presents with    Elbow Pain     right elbow pain and swelling since Thursday; dx with bursitis and had it drained at MD office; was told to come to ED if the swelling came back; pt is on abx and steroids r/t knee sx Dec 11th     CC: Elbow Pain     HPI: This 48 y.M with Gastritis, HTN and Seizures presents to the ED c/o gradually worsening and moderate (5/10) right elbow pain, swelling and redness which is worse with ROM x13 days. He states his symptoms have worsened within the past x3 days. The pt was dx'ed with right elbow bursitis. He was Rx'ed a 7 day course of clindamycin and steroids 1/4/19 and was Rx'ed another 7 day course of clindamycin 1/11/18. He was seen in his orthopedic clinic 1/11/19, where is elbow was drained. The pt states that 12 cc's of fluid was removed an cultures. The pt's wife states that the were told to go to come to the ED if his symptoms did not drastically improve by the weekend. The pt states his pain is constant and his redness, swelling and warmth to touch have returned since his elbow was drained. He denies fever, chills, diaphoresis, nausea, emesis, diarrhea, chest pain, abdominal pain, back pain, dysuria, difficulty  urinating, back pain, SOB and rash. He attempted tx with ice and compression.     The pt is s/p left knee arthroscopy 12/11/18.    Orthopedic surgeon- Gwendolyn Jeong MD          The history is provided by the patient and the spouse. No  was used.     Review of patient's allergies indicates:   Allergen Reactions    Hydrocodone Rash     Past Medical History:   Diagnosis Date    ADD (attention deficit disorder)     Anxiety     Depression     Gastritis     EGD 2014    HDL deficiency     Hypertension     Kidney stone     Meniscus tear 09/2013    right    Meniscus tear 01/2017    right    Seizure disorder 1/18/2018    Seizures 06/2014    last seizure in late 2014    Vitamin D deficiency disease 1/18/2018     Past Surgical History:   Procedure Laterality Date    ARTHROSCOPY, KNEE, WITH DEBRIDEMENT Right 12/5/2013    Performed [facility] at [facility]    ARTHROSCOPY, KNEE, WITH MENISCECTOMY Left 12/11/2018    Performed [facility] at [facility] OR    LOBOXFXFCTH-[id number] & LATERAL Right 3/28/2017    Performed by [facility] at [facility] OR    CHONDRECTOMY, KNEE, SEMILUNAR CARTILAGE Right 12/5/2013    Performed by [facility] at [facility] OR    CHONDROPLASTY, KNEE Left 12/11/2018    Performed by [facility] at [facility] OR    EGD (ESOPHAGOGASTRODUODENOSCOPY) N/A 4/4/2014    Per[facility] Ephraim Crawford [facility] ([facility]TH F[facility])    EXCISION, PLICA, KNEE, ARTHROSCOPIC Right 12/5/2013    Per[facility] at [facility] OR    EYHTITMT-[id number] Right 3/28/2017    Per[facility] at [facility] OR    FRACTURE SURGERY Left 05/2016    wrist fracture    Injection, Joint, Bio-D Left Knee Left 12/11/2018    Performed by [facility] at [facility] OR    KNEE SURGERY      SYNOVECTOMY, KNEE Left 12/11/2018    Performed by [facility] at [facility] OR    SYNOVECTOMY, KNEE Right 12/5/2013    Performed by [facility] at [facility] OR     Family History   Problem Relation Age of  Onset    Diabetes Father     Cancer Father     Skin cancer Father     Cancer Maternal Grandfather      Social History     Tobacco Use    Smoking status: Never Smoker    Smokeless tobacco: Never Used   Substance Use Topics    Alcohol use: Yes     Alcohol/week: 0.0 oz     Comment: social    Drug use: No     Review of Systems   Constitutional: Negative for chills and fever.   HENT: Negative for rhinorrhea and sore throat.    Eyes: Negative for redness.   Respiratory: Negative for cough and shortness of breath.    Cardiovascular: Negative for chest pain and leg swelling.   Gastrointestinal: Negative for abdominal pain, diarrhea, nausea and vomiting.   Genitourinary: Negative for dysuria.   Musculoskeletal: Positive for joint swelling (right elbow). Negative for back pain.        (+) right elbow pain   Skin: Negative for color change and rash.   Neurological: Negative for syncope and weakness.   Psychiatric/Behavioral: The patient is not nervous/anxious.        Physical Exam     Initial Vitals [01/13/19 2019]   BP Pulse Resp Temp SpO2   (!) 140/87 91 18 98.4 °F (36.9 °C) 99 %      MAP       --         Physical Exam    Vitals reviewed.  Constitutional: Vital signs are normal. He appears well-developed and well-nourished. He is not diaphoretic. He is active and cooperative.  Non-toxic appearance. He does not have a sickly appearance. He does not appear ill. No distress.   Pleasant, well-appearing, afebrile, in no distress   HENT:   Head: Normocephalic and atraumatic.   Right Ear: External ear normal.   Left Ear: External ear normal.   Nose: Nose normal.   Eyes: EOM are normal. Right eye exhibits no discharge. Left eye exhibits no discharge.   Neck: Normal range of motion. Neck supple. No tracheal deviation present.   Cardiovascular: Normal rate.   Pulmonary/Chest: No stridor. No respiratory distress.   Abdominal: Soft. He exhibits no distension. There is no tenderness.   Musculoskeletal: Normal range of motion.      "   Right elbow: He exhibits swelling. He exhibits normal range of motion, no deformity and no laceration. Tenderness found. Olecranon process tenderness noted.   Flexion area of swelling overlying the olecranon process consistent with bursitis.  Area is very mildly tender to palpation.  No significant appreciable erythema, warmth, or induration.  Active and passive range of motion of the right elbow was fully intact and non-painful.   Neurological: He is alert and oriented to person, place, and time. He has normal strength. No cranial nerve deficit.   Skin: Skin is warm and dry.   Psychiatric: He has a normal mood and affect. His behavior is normal. Judgment and thought content normal.         ED Course   Olecranon Bursitis Aspiration  Date/Time: 2019 11:04 PM  Location procedure was performed: [facility] EMERGENCY DEPARTMENT  Performed by: Suraj Jacques NP  Authorized by: Mariela Nava MD   Pre-op diagnosis: Olecranon Bursitis  Consent Done: Yes  Consent: Verbal consent obtained.  Risks and benefits: risks, benefits and alternatives were discussed  Consent given by: patient  Patient understanding: patient states understanding of the procedure being performed  Patient consent: the patient's understanding of the procedure matches consent given  Procedure consent: procedure consent matches procedure scheduled  Relevant documents: relevant documents present and verified  Site marked: the operative site was marked  Required items: required blood products, implants, devices, and special equipment available  Patient identity confirmed: , name, verbally with patient, provided demographic data and MRN  Time out: Immediately prior to procedure a "time out" was called to verify the correct patient, procedure, equipment, support staff and site/side marked as required.  Indications: joint swelling and pain   Body area: elbow  Joint: right elbow  Local anesthesia used: yes  Anesthesia: local " infiltration    Anesthesia:  Local anesthesia used: yes  Local Anesthetic: lidocaine 1% with epinephrine  Anesthetic total: 5 mL  Preparation: Patient was prepped and draped in the usual sterile fashion.  Needle size: 18 G  Approach: inferior  Aspirate amount: 7.4 mL  Aspirate: bloody  Patient tolerance: Patient tolerated the procedure well with no immediate complications  Complications: No        Labs Reviewed - No data to display       Imaging Results    None          Medical Decision Making:   History:   Old Medical Records: I decided to obtain old medical records.  Differential Diagnosis:   Olecranon bursitis, septic bursitis, septic joint, cellulitis, others  ED Management:  Right elbow pain and swelling that began about 10 days ago several days after striking his elbow secondary to falling.  Patient was initially started on clindamycin for 7 days by urgent care for possible septic bursitis.  Patient followed-up with Sports Medicine 3 days ago who aspirated the bursa sac and since the aspirate for cultures.  At the time the patient was also prescribed a new course of clindamycin.  Tonight the patient's family member became concerned because the swelling head is once again increased.  On exam there is obvious olecranon bursitis to the right elbow.  No significant erythema or warmth is present.  I performed bursa aspiration per usual sterile technique at the request of the patient and his family member.  Aspirated bloody fluid.  No purulence.  Advised patient to continue taking clindamycin and follow up with Ortho as scheduled this coming week. Advised patient to follow up with Orthopedics for re-evaluation and further management.  ED return precautions given. All questions regarding diagnosis and plan were answered to the patient's fullest possible satisfaction. Patient expressed understanding of diagnosis, discharge instructions, and return precautions.    Other:   I have discussed this case with another health  care provider.       <> Summary of the Discussion: Case discussed with my attending physician who also conducted a history and physical exam of the patient and agreed with the above assessment and plan.            Scribe Attestation:   Scribe #1: I performed the above scribed service and the documentation accurately describes the services I performed. I attest to the accuracy of the note.    Attending Attestation:     Physician Attestation Statement for NP/PA:   I have conducted a face to face encounter with this patient in addition to the NP/PA, due to NP/PA Request    Other NP/PA Attestation Additions:    History of Present Illness: 48 y.o. Male with R elbow swelling x 13 days, briefly better s/p drainage by outpatient ortho and abx, but has recurred. No fevers.   Physical Exam: Nontoxic-appearing adult male.  Patient is ranging R elbow well. He can fully flex and extend. He can pronate and supinate at the wrist. There is a [place]-sized area of swelling overlying the olecranon process c/w bursitis. This is minimally TTP. It is soft, not tight. There is minimal overlying erythema only over the swelling itself.    Medical Decision Making: Ddx includes uncomplicated bursitis, infected bursitis, septic arthritis, cellulitis, abscess, other.    Exam c/w uncomplicated bursitis. As this is uncomfortable for patient (he is on crutches s/p an unrelated knee procedure), NP drained as noted. Patient has ortho f/u this week.       Physician Attestation for Scribe:  Physician Attestation Statement for Scribe #1: I, Suraj Jacques NP, reviewed documentation, as scribed by Hira Boles in my presence, and it is both accurate and complete.                    Clinical Impression:   The encounter diagnosis was Olecranon bursitis of right elbow.      Disposition:   Disposition: Discharged  Condition: Stable                        Suraj Jacques NP  01/14/19 [id number]       Mariela Nava MD  01/17/19 [id number]

## 2019-01-14 NOTE — TELEPHONE ENCOUNTER
Spoke to patient who reports continued elbow swelling but no spreading of erythema and no fever. Fluid culture grew MSSA resistant to clindamycin. Will change abx to bactrim DS BID to start ASAP.     All patients questions were answered. Patient was advised to call us with any concerns or questions.

## 2019-01-16 LAB — BACTERIA SPEC ANAEROBE CULT: NORMAL

## 2019-01-17 ENCOUNTER — TELEPHONE (OUTPATIENT)
Dept: SPORTS MEDICINE | Facility: CLINIC | Age: 49
End: 2019-01-17

## 2019-01-17 NOTE — TELEPHONE ENCOUNTER
----- Message from Ju Giron sent at 1/17/2019  2:10 PM CST -----  Contact: Tina,wife  Requesting a return call back from Mello regarding her 's knees and could be reached at [phone]

## 2019-01-18 ENCOUNTER — OFFICE VISIT (OUTPATIENT)
Dept: SPORTS MEDICINE | Facility: CLINIC | Age: 49
End: 2019-01-18
Payer: COMMERCIAL

## 2019-01-18 VITALS
HEIGHT: 71 IN | HEART RATE: 96 BPM | DIASTOLIC BLOOD PRESSURE: 86 MMHG | SYSTOLIC BLOOD PRESSURE: 136 MMHG | WEIGHT: 200 LBS | BODY MASS INDEX: 28 KG/M2

## 2019-01-18 DIAGNOSIS — L08.9 INFECTED ABRASION OF RIGHT ELBOW, INITIAL ENCOUNTER: ICD-10-CM

## 2019-01-18 DIAGNOSIS — M25.521 RIGHT ELBOW PAIN: ICD-10-CM

## 2019-01-18 DIAGNOSIS — M70.21 OLECRANON BURSITIS OF RIGHT ELBOW: Primary | ICD-10-CM

## 2019-01-18 DIAGNOSIS — S50.311A INFECTED ABRASION OF RIGHT ELBOW, INITIAL ENCOUNTER: ICD-10-CM

## 2019-01-18 DIAGNOSIS — M17.11 OSTEOARTHRITIS OF RIGHT KNEE, UNSPECIFIED OSTEOARTHRITIS TYPE: ICD-10-CM

## 2019-01-18 DIAGNOSIS — M25.561 ACUTE PAIN OF RIGHT KNEE: ICD-10-CM

## 2019-01-18 PROCEDURE — 3075F PR MOST RECENT SYSTOLIC BLOOD PRESS GE 130-139MM HG: ICD-10-PCS | Mod: CPTII,S$GLB,, | Performed by: PHYSICIAN ASSISTANT

## 2019-01-18 PROCEDURE — 99499 NO LOS: ICD-10-PCS | Mod: S$GLB,,, | Performed by: PHYSICIAN ASSISTANT

## 2019-01-18 PROCEDURE — 99999 PR PBB SHADOW E&M-EST. PATIENT-LVL IV: CPT | Mod: PBBFAC,,, | Performed by: PHYSICIAN ASSISTANT

## 2019-01-18 PROCEDURE — 3008F BODY MASS INDEX DOCD: CPT | Mod: CPTII,S$GLB,, | Performed by: PHYSICIAN ASSISTANT

## 2019-01-18 PROCEDURE — 20610 DRAIN/INJ JOINT/BURSA W/O US: CPT | Mod: 79,RT,S$GLB, | Performed by: PHYSICIAN ASSISTANT

## 2019-01-18 PROCEDURE — 3079F PR MOST RECENT DIASTOLIC BLOOD PRESSURE 80-89 MM HG: ICD-10-PCS | Mod: CPTII,S$GLB,, | Performed by: PHYSICIAN ASSISTANT

## 2019-01-18 PROCEDURE — 20610 PR DRAIN/INJECT LARGE JOINT/BURSA: ICD-10-PCS | Mod: 79,RT,S$GLB, | Performed by: PHYSICIAN ASSISTANT

## 2019-01-18 PROCEDURE — 3075F SYST BP GE 130 - 139MM HG: CPT | Mod: CPTII,S$GLB,, | Performed by: PHYSICIAN ASSISTANT

## 2019-01-18 PROCEDURE — 3079F DIAST BP 80-89 MM HG: CPT | Mod: CPTII,S$GLB,, | Performed by: PHYSICIAN ASSISTANT

## 2019-01-18 PROCEDURE — 3008F PR BODY MASS INDEX (BMI) DOCUMENTED: ICD-10-PCS | Mod: CPTII,S$GLB,, | Performed by: PHYSICIAN ASSISTANT

## 2019-01-18 PROCEDURE — 99499 UNLISTED E&M SERVICE: CPT | Mod: S$GLB,,, | Performed by: PHYSICIAN ASSISTANT

## 2019-01-18 PROCEDURE — 99999 PR PBB SHADOW E&M-EST. PATIENT-LVL IV: ICD-10-PCS | Mod: PBBFAC,,, | Performed by: PHYSICIAN ASSISTANT

## 2019-01-18 RX ORDER — INDOMETHACIN 75 MG/1
75 CAPSULE, EXTENDED RELEASE ORAL EVERY 12 HOURS
Qty: 14 CAPSULE | Refills: 0 | Status: SHIPPED | OUTPATIENT
Start: 2019-01-18 | End: 2019-01-18 | Stop reason: CLARIF

## 2019-01-18 RX ORDER — INDOMETHACIN 75 MG/1
75 CAPSULE, EXTENDED RELEASE ORAL EVERY 12 HOURS
Qty: 14 CAPSULE | Refills: 0 | Status: SHIPPED | OUTPATIENT
Start: 2019-01-18 | End: 2019-03-14

## 2019-01-18 NOTE — TELEPHONE ENCOUNTER
----- Message from Jennifer Odom MA sent at 1/17/2019  2:32 PM CST -----  Contact: Tina,wife  Pt went to the E.R. On Sunday at that time his elbow was drained and he was given Oxycodone 5mg in which he got no relief also started Bactrim on Monday. Today, elbow is swollen again and in a lot of pain as well as right knee where he received Euflexxa Inj   On 1/11/18, Pt and wife are very concerned infection is in blood stream. Pt would like a return call ASAP.  ----- Message -----  From: Ju Giron  Sent: 1/17/2019   2:10 PM  To: [facility] Staff    Requesting a return call back from Mello regarding her 's knees and could be reached at [phone]

## 2019-01-18 NOTE — PROGRESS NOTES
Patient is here for follow up of knee arthritis. Pt is requesting Euflexxa injection #2.  PMFH reviewed per encounter record. Has failed other conservative modalities including NSAIDS, activity modification, weight loss.    He has not received these injections in the past.     The prior shots were tolerated well.    PHYSICAL EXAMINATION:     General: The patient is alert and oriented x 3. Mood is pleasant.   Observation of ears, eyes and nose reveals no gross abnormalities. No   labored breathing observed.     No signs of infection or adverse reaction to knee.        Euflexxa Injection Procedure #2    A time out was performed, including verification of patient ID, procedure, site and side, availability of information and equipment, review of safety issues, and agreement with consent, the procedure site was marked.    The patient was prepped aseptically with povidone-iodine swabsticks. A diagnostic and therapeutic injection of 2cc Euflexxa was given under sterile technique using a 21.5g x 1.5 needle from the anterolateral aspect of the right knee Joint with the patient in the seated position.     Nirmal Moss had no adverse reactions to the medication. Pain decreased. male was instructed to apply ice to the joint for 20 minutes and avoid strenuous activities for 24-36 hours following the injection. male was warned of possible blood pressure changes during that time. Following that time, male can resume activities as prior to the injection.    male was reminded to call the clinic immediately for any adverse side effects as explained in clinic today.    RTC in 1 week for injection #3  Continue aggressive ice compresses to keep swelling down in knee.   All patients questions were answered. Patient was advised to call us with any concerns or questions.       CC  Right elbow pain     49y/o white male with complaints of right elbow pain that began at the beginning of this year a few days after he lost his balance  and feel in his bathroom and hit is elbow. He reports that had a small abrasion of the elbow from that fall. A couple of days later he began having erythema, pain, and swelling of he elbow that worsened to cover the area from his mid biceps to his mid forearm. He then presented to urgent care on 1/5/19 where he was given a steroid shot and started on clindamycin abx which he has been taking. He reports improvement of the swelling and redness since being on the abx. He has some intermittent slight posterior elbow pain.  He denies fever, chills, nausea, vomiting. He reports that elbow pain or swelling is not worsening. He is using compression sleeve to the elbow.     Aspirate fluid was sent to lab and grew out MSSA on 1/11/19. Resistant to clindamycin so his abx was switched to bactrim DS    He is currently off of work due to left knee surgery.             PHYSICAL EXAM:  General: Patient appears alert and oriented x 3.  Mood is pleasant.  Observation of ears, eyes and nose reveal no gross abnormalities. HEENT: NCAT, sclera nonicteric  Lungs: Respirations are equal and unlabored.  Well nourished, in no acute distress and ambulates with a non-antalgic gait with no assistive devices.    Skin: Skin intact bilaterally. Sensation intact bilaterally. Compartments soft. No evidence of edema, infection, or induration.      Right ELBOW / WRIST EXTREMITY EXAM:     OBSERVATION / INSPECTION    Swelling                                           localized to bursa area that is fluctuant with moderate swelling to the bursa.                                       Deformity                                         none  Discoloration                                  3x3 cm area of improved redness localized to the olecranon bursa that is different than prior and now may be more from pressure than actual infection.                                       Scars                                                         none                                       Atrophy                                            none     TENDERNESS / CREPITUS (T / C):                                                                                             T/C                                                                                                                            Medial epicondyle                                                               - / -    Med. (Ulnar) collateral ligament                                        - / -    Flexor pronator Musculature                                              - / -   Biceps tendon                                                                    - / -   Head of radius                                                                   - / -    Lateral epicondyle                                                             - / -    Extensor Musculature                                                       - / -   Brachioradialis                                                                  - / -   Triceps tendon                                                                  - / -   Triceps muscle                                                                  - / -   Olecranon                                                                          - / -   Olecranon bursa                                                               mild + / -   Cubital fossa                                                                     - / -   Anterior jointline                                                                - / -   Radial tunnel                                                                     -/-                                                                                                                                                                                                       ROM:             ('*' = with pain)                                   Right Elbow                                      AROM (PROM)                                             Extension                                        0 deg  (5 deg)   Flexion                                            145 deg (145 deg)                                                                Pronation                                         90 deg  (90 deg)                                                                 Supination                                       80 deg  (80 deg)                                                                                                                                                                                 Left Elbow                                       AROM (PROM)                                             Extension                                        0 deg  (5 deg)   Flexion                                             145 deg (145 deg)                                                                Pronation                                         90 deg  (90 deg)                                                                 Supination                                       80 deg  (80 deg)                Right Wrist                                      AROM (PROM)                       Extension                                        80 deg (85 deg)   Flexion                                            80 deg (85 deg)                                                                    Ulnar Deviation                     35 deg (40 deg)  Radial Deviation                    35 deg (40 deg)                                                                                                                                      Left Wrist                                         AROM (PROM)                                             Extension                                        80 deg (85 deg)   Flexion                                             80 deg (85 deg)                                                                     Ulnar Deviation                     35 deg (40 deg)  Radial Deviation                    35 deg (40 deg)                                                                     STRENGTH:             ('*' = with pain)                        Elbow Flexion:                                                       5/5  Elbow Extension:                                        5/5  Wrist Flexion:                                                        5/5  Wrist Extension:                                          5/5  :                                                                                5/5  Intrinsics:                                                               5/5  EPL (Ext. pollicis longus):                 5/5  Pinch Mechanism:                                      5/5     ELBOW EXAMINATION:  See above noted areas of tenderness.   Test for Ligamentous Instability - UCL                     neg  Test for Ligamentous Instability - LUCL                   normal  PLRI                                                                                    neg  Tinel's (Percussion) Test - Cubital                                      neg  Tennis Elbow Test                                                              neg  Golfer's Elbow Test                                                             neg  Radial Capitellar Grind Test                                               neg  Valgus/Extension Overload Test                                        neg  Resisted Long Finger Extension Pain                                 neg  Moving Valgus                                                                   neg  Forearm pain with resisted supination                                 neg  Yeargeson' s (elbow pain)                                                    neg  Hook test                                                                                neg           EXTREMITY NEURO-VASCULAR  EXAMINATION: Sensation grossly intact to light touch all dermatomal regions. DTR 2+ Biceps, Triceps, BR and Negative Clint's sign. Grossly intact motor function at Elbow, Wrist and Hand. Distal pulses radial and ulnar 2+, brisk cap refill, symmetric.     Other Findings:    Healed abrasion noted of posterior elbow.    No lymphadenopathy noted of right upper extremity and no streaking.     Xrays:  Xrays of the right elbow 3 views were reviewed by me today from 1/5/18. No fracture, subluxation, or other significant bony or joint abnormality is identified.         ASSESSMENT:  Right elbow pain, acute  Right elbow olecranon bursitis   Right elbow infection with abrasion  Right knee osteoarthritis   Right knee pain    I have considered and do not suspect septic elbow joint, bacteremia, cellulitis at this time. Symptoms localized to elbow bursa.     Plan:    -Continue Bactrim DS 1 tablet BID x 10 days  -Aggressive ice, compression, elevation    -hold off on aspiration today.     -d/c mobic. Start indocin 75mg BID.   The patient was advised that NSAID-type medications have two very important potential side effects: gastrointestinal irritation including hemorrhage and renal injuries. She was asked to take the medication with food and to stop if she experiences any GI upset. I asked her to call for vomiting, abdominal pain or black/bloody stools. The patient expresses understanding of these issues and questions were answered.      -RTC in 1 week.  -Told patient to go to ED, call office, or RTC immediately if symptoms change or worsen especially if he develops fever or worsening erythema.     All patients questions were answered. Patient was advised to call us with any concerns or questions.

## 2019-01-23 ENCOUNTER — OFFICE VISIT (OUTPATIENT)
Dept: SPORTS MEDICINE | Facility: CLINIC | Age: 49
End: 2019-01-23
Payer: COMMERCIAL

## 2019-01-23 ENCOUNTER — PATIENT MESSAGE (OUTPATIENT)
Dept: SPORTS MEDICINE | Facility: CLINIC | Age: 49
End: 2019-01-23

## 2019-01-23 ENCOUNTER — TELEPHONE (OUTPATIENT)
Dept: INFECTIOUS DISEASES | Facility: CLINIC | Age: 49
End: 2019-01-23

## 2019-01-23 ENCOUNTER — TELEPHONE (OUTPATIENT)
Dept: SPORTS MEDICINE | Facility: CLINIC | Age: 49
End: 2019-01-23

## 2019-01-23 ENCOUNTER — LAB VISIT (OUTPATIENT)
Dept: LAB | Facility: HOSPITAL | Age: 49
End: 2019-01-23
Attending: INTERNAL MEDICINE
Payer: COMMERCIAL

## 2019-01-23 VITALS
WEIGHT: 200 LBS | HEIGHT: 71 IN | HEART RATE: 100 BPM | BODY MASS INDEX: 28 KG/M2 | SYSTOLIC BLOOD PRESSURE: 153 MMHG | DIASTOLIC BLOOD PRESSURE: 93 MMHG

## 2019-01-23 DIAGNOSIS — S50.311A INFECTED ABRASION OF RIGHT ELBOW, INITIAL ENCOUNTER: ICD-10-CM

## 2019-01-23 DIAGNOSIS — L08.9 INFECTED ABRASION OF RIGHT ELBOW, INITIAL ENCOUNTER: ICD-10-CM

## 2019-01-23 DIAGNOSIS — M17.11 OSTEOARTHRITIS OF RIGHT KNEE, UNSPECIFIED OSTEOARTHRITIS TYPE: ICD-10-CM

## 2019-01-23 DIAGNOSIS — M71.121 INFECTED OLECRANON BURSA, RIGHT: ICD-10-CM

## 2019-01-23 DIAGNOSIS — M70.21 OLECRANON BURSITIS OF RIGHT ELBOW: Primary | ICD-10-CM

## 2019-01-23 DIAGNOSIS — M70.21 OLECRANON BURSITIS OF RIGHT ELBOW: ICD-10-CM

## 2019-01-23 LAB
BASOPHILS # BLD AUTO: 0.03 K/UL
BASOPHILS NFR BLD: 0.3 %
CRP SERPL-MCNC: 5.2 MG/L
DIFFERENTIAL METHOD: ABNORMAL
EOSINOPHIL # BLD AUTO: 0.1 K/UL
EOSINOPHIL NFR BLD: 0.6 %
ERYTHROCYTE [DISTWIDTH] IN BLOOD BY AUTOMATED COUNT: 13.9 %
ERYTHROCYTE [SEDIMENTATION RATE] IN BLOOD BY WESTERGREN METHOD: 5 MM/HR
HCT VFR BLD AUTO: 40.4 %
HGB BLD-MCNC: 13.3 G/DL
IMM GRANULOCYTES # BLD AUTO: 0.03 K/UL
IMM GRANULOCYTES NFR BLD AUTO: 0.3 %
LYMPHOCYTES # BLD AUTO: 1.6 K/UL
LYMPHOCYTES NFR BLD: 16.6 %
MCH RBC QN AUTO: 32.3 PG
MCHC RBC AUTO-ENTMCNC: 32.9 G/DL
MCV RBC AUTO: 98 FL
MONOCYTES # BLD AUTO: 0.7 K/UL
MONOCYTES NFR BLD: 7.5 %
NEUTROPHILS # BLD AUTO: 7.1 K/UL
NEUTROPHILS NFR BLD: 74.7 %
NRBC BLD-RTO: 0 /100 WBC
PLATELET # BLD AUTO: 286 K/UL
PMV BLD AUTO: 10.7 FL
RBC # BLD AUTO: 4.12 M/UL
WBC # BLD AUTO: 9.47 K/UL

## 2019-01-23 PROCEDURE — 3080F DIAST BP >= 90 MM HG: CPT | Mod: CPTII,S$GLB,, | Performed by: PHYSICIAN ASSISTANT

## 2019-01-23 PROCEDURE — 87070 CULTURE OTHR SPECIMN AEROBIC: CPT

## 2019-01-23 PROCEDURE — 3077F SYST BP >= 140 MM HG: CPT | Mod: CPTII,S$GLB,, | Performed by: PHYSICIAN ASSISTANT

## 2019-01-23 PROCEDURE — 85652 RBC SED RATE AUTOMATED: CPT

## 2019-01-23 PROCEDURE — 89051 BODY FLUID CELL COUNT: CPT

## 2019-01-23 PROCEDURE — 36415 COLL VENOUS BLD VENIPUNCTURE: CPT | Mod: PO

## 2019-01-23 PROCEDURE — 3008F PR BODY MASS INDEX (BMI) DOCUMENTED: ICD-10-PCS | Mod: CPTII,S$GLB,, | Performed by: PHYSICIAN ASSISTANT

## 2019-01-23 PROCEDURE — 20605 PR DRAIN/INJECT INTERMEDIATE JOINT/BURSA: ICD-10-PCS | Mod: 79,51,RT,S$GLB | Performed by: PHYSICIAN ASSISTANT

## 2019-01-23 PROCEDURE — 87102 FUNGUS ISOLATION CULTURE: CPT

## 2019-01-23 PROCEDURE — 3077F PR MOST RECENT SYSTOLIC BLOOD PRESSURE >= 140 MM HG: ICD-10-PCS | Mod: CPTII,S$GLB,, | Performed by: PHYSICIAN ASSISTANT

## 2019-01-23 PROCEDURE — 20610 PR DRAIN/INJECT LARGE JOINT/BURSA: ICD-10-PCS | Mod: 79,RT,S$GLB, | Performed by: PHYSICIAN ASSISTANT

## 2019-01-23 PROCEDURE — 3008F BODY MASS INDEX DOCD: CPT | Mod: CPTII,S$GLB,, | Performed by: PHYSICIAN ASSISTANT

## 2019-01-23 PROCEDURE — 20605 DRAIN/INJ JOINT/BURSA W/O US: CPT | Mod: 79,51,RT,S$GLB | Performed by: PHYSICIAN ASSISTANT

## 2019-01-23 PROCEDURE — 20610 DRAIN/INJ JOINT/BURSA W/O US: CPT | Mod: 79,RT,S$GLB, | Performed by: PHYSICIAN ASSISTANT

## 2019-01-23 PROCEDURE — 87186 SC STD MICRODIL/AGAR DIL: CPT

## 2019-01-23 PROCEDURE — 85025 COMPLETE CBC W/AUTO DIFF WBC: CPT

## 2019-01-23 PROCEDURE — 99999 PR PBB SHADOW E&M-EST. PATIENT-LVL V: CPT | Mod: PBBFAC,,, | Performed by: PHYSICIAN ASSISTANT

## 2019-01-23 PROCEDURE — 89060 EXAM SYNOVIAL FLUID CRYSTALS: CPT

## 2019-01-23 PROCEDURE — 3080F PR MOST RECENT DIASTOLIC BLOOD PRESSURE >= 90 MM HG: ICD-10-PCS | Mod: CPTII,S$GLB,, | Performed by: PHYSICIAN ASSISTANT

## 2019-01-23 PROCEDURE — 99214 OFFICE O/P EST MOD 30 MIN: CPT | Mod: 24,25,S$GLB, | Performed by: PHYSICIAN ASSISTANT

## 2019-01-23 PROCEDURE — 99214 PR OFFICE/OUTPT VISIT, EST, LEVL IV, 30-39 MIN: ICD-10-PCS | Mod: 24,25,S$GLB, | Performed by: PHYSICIAN ASSISTANT

## 2019-01-23 PROCEDURE — 99999 PR PBB SHADOW E&M-EST. PATIENT-LVL V: ICD-10-PCS | Mod: PBBFAC,,, | Performed by: PHYSICIAN ASSISTANT

## 2019-01-23 PROCEDURE — 87075 CULTR BACTERIA EXCEPT BLOOD: CPT

## 2019-01-23 PROCEDURE — 86140 C-REACTIVE PROTEIN: CPT

## 2019-01-23 PROCEDURE — 87077 CULTURE AEROBIC IDENTIFY: CPT

## 2019-01-23 RX ORDER — PROMETHAZINE HYDROCHLORIDE 25 MG/1
25 TABLET ORAL EVERY 6 HOURS PRN
Qty: 16 TABLET | Refills: 0 | Status: SHIPPED | OUTPATIENT
Start: 2019-01-23 | End: 2019-02-20

## 2019-01-23 RX ORDER — SULFAMETHOXAZOLE AND TRIMETHOPRIM 800; 160 MG/1; MG/1
1 TABLET ORAL 2 TIMES DAILY
Qty: 10 TABLET | Refills: 0 | Status: SHIPPED | OUTPATIENT
Start: 2019-01-23 | End: 2019-01-28

## 2019-01-23 NOTE — TELEPHONE ENCOUNTER
----- Message from Nathen Solis MD sent at 1/23/2019  2:31 PM CST -----  Regarding: RE: Patient to be schedule to see Dr. Solis  Schedule him for 9:30 am on Friday.      ----- Message -----  From: Rachel Perez MA  Sent: 1/23/2019   2:20 PM  To: Nathen Solis MD  Subject: FW: Patient to be schedule to see Dr. Solis      I scheduled this pt for your first available, if you want to see them sooner just let me know  ----- Message -----  From: Jayant Flores III, PA-C  Sent: 1/23/2019   2:02 PM  To: Irma Sena Staff  Subject: Patient to be schedule to see Dr. 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 Jeong that he would like to get in with Dr. Irma SHAIKH for an infected right elbow bursa not responding well to antibiotics after drainage and culture and 10 days of bactrim.     We would like antibiotic recommendations for this patient.     Please call him to schedule an appointment. Referral has been placed.     Mello

## 2019-01-23 NOTE — TELEPHONE ENCOUNTER
Called patient and gave results of normal WBC, CRP, and ESR. He will see infectious disease clinic this Friday.

## 2019-01-24 LAB
APPEARANCE FLD: NORMAL
BODY FLD TYPE: NORMAL
BODY FLD TYPE: NORMAL
COLOR FLD: NORMAL
CRYSTALS FLD MICRO: NEGATIVE
LYMPHOCYTES NFR FLD MANUAL: 3 %
NEUTROPHILS NFR FLD MANUAL: 97 %
PATH INTERP FLD-IMP: NORMAL
WBC # FLD: NORMAL /CU MM

## 2019-01-24 NOTE — PROGRESS NOTES
Patient is here for follow up of knee arthritis. Pt is requesting Euflexxa injection #3.  PMFH reviewed per encounter record. Has failed other conservative modalities including NSAIDS, activity modification, weight loss.    He has not received these injections in the past.     The prior shots were tolerated well.    PHYSICAL EXAMINATION:     General: The patient is alert and oriented x 3. Mood is pleasant.   Observation of ears, eyes and nose reveals no gross abnormalities. No   labored breathing observed.     No signs of infection or adverse reaction to knee.        Euflexxa Injection Procedure #3    A time out was performed, including verification of patient ID, procedure, site and side, availability of information and equipment, review of safety issues, and agreement with consent, the procedure site was marked.    The patient was prepped aseptically with povidone-iodine swabsticks. A diagnostic and therapeutic injection of 2cc Euflexxa was given under sterile technique using a 21.5g x 1.5 needle from the anterolateral aspect of the right knee Joint with the patient in the seated position.     Nirmal Moss had no adverse reactions to the medication. Pain decreased. male was instructed to apply ice to the joint for 20 minutes and avoid strenuous activities for 24-36 hours following the injection. male was warned of possible blood pressure changes during that time. Following that time, male can resume activities as prior to the injection.    male was reminded to call the clinic immediately for any adverse side effects as explained in clinic today.    RTC prn knee pain  Knee pain has improved.   All patients questions were answered. Patient was advised to call us with any concerns or questions.       CC  Right elbow pain     49y/o white male with complaints of right elbow pain that began at the beginning of this year a few days after he lost his balance and feel in his bathroom and hit is elbow. He reports  that had a small abrasion of the elbow from that fall. A couple of days later he began having erythema, pain, and swelling of he elbow that worsened to cover the area from his mid biceps to his mid forearm. He then presented to urgent care on 1/5/19 where he was given a steroid shot and started on clindamycin abx which he has been taking. He reports improvement of the swelling and redness since being on the abx. He has some intermittent slight posterior elbow pain.  He denies fever, chills, nausea, vomiting. He reports that elbow pain or swelling is not worsening. He is using compression sleeve to the elbow.     Aspirate fluid was sent to lab and grew out MSSA on 1/11/19. Resistant to clindamycin so his abx was switched to bactrim DS. Has completed a 10 day course of bactrim today with improvement of arm swelling but continued swelling of his olecranon bursa and some erythema only directly overlying the bursa sack. He has very little pain of the area. No real improvement of swelling with indocin.    He is currently off of work due to left knee surgery.             PHYSICAL EXAM:  General: Patient appears alert and oriented x 3.  Mood is pleasant.  Observation of ears, eyes and nose reveal no gross abnormalities. HEENT: NCAT, sclera nonicteric  Lungs: Respirations are equal and unlabored.  Well nourished, in no acute distress and ambulates with a non-antalgic gait with no assistive devices.    Skin: Skin intact bilaterally. Sensation intact bilaterally. Compartments soft. No evidence of edema, infection, or induration.      Right ELBOW / WRIST EXTREMITY EXAM:     OBSERVATION / INSPECTION    Swelling                                           localized to bursa area that is fluctuant with moderate swelling to the bursa.                                       Deformity                                         none  Discoloration                                  3x3 cm area of improved redness localized to the olecranon  bursa.                                      Scars                                                         none                                      Atrophy                                            none     TENDERNESS / CREPITUS (T / C):                                                                                             T/C                                                                                                                            Medial epicondyle                                                               - / -    Med. (Ulnar) collateral ligament                                        - / -    Flexor pronator Musculature                                              - / -   Biceps tendon                                                                    - / -   Head of radius                                                                   - / -    Lateral epicondyle                                                             - / -    Extensor Musculature                                                       - / -   Brachioradialis                                                                  - / -   Triceps tendon                                                                  - / -   Triceps muscle                                                                  - / -   Olecranon                                                                          - / -   Olecranon bursa                                                               mild + / -   Cubital fossa                                                                     - / -   Anterior jointline                                                                - / -   Radial tunnel                                                                     -/-                                                                                                                                                                                                        ROM:             ('*' = with pain)                                   Right Elbow                                     AROM (PROM)                                             Extension                                        0 deg  (5 deg)   Flexion                                            145 deg (145 deg)                                                                Pronation                                         90 deg  (90 deg)                                                                 Supination                                       80 deg  (80 deg)                                                                                                                                                                                 Left Elbow                                       AROM (PROM)                                             Extension                                        0 deg  (5 deg)   Flexion                                             145 deg (145 deg)                                                                Pronation                                         90 deg  (90 deg)                                                                 Supination                                       80 deg  (80 deg)                Right Wrist                                      AROM (PROM)                       Extension                                        80 deg (85 deg)   Flexion                                            80 deg (85 deg)                                                                    Ulnar Deviation                     35 deg (40 deg)  Radial Deviation                    35 deg (40 deg)                                                                                                                                      Left Wrist                                         AROM (PROM)                                             Extension                                         80 deg (85 deg)   Flexion                                             80 deg (85 deg)                                                                    Ulnar Deviation                     35 deg (40 deg)  Radial Deviation                    35 deg (40 deg)                                                                     STRENGTH:             ('*' = with pain)                        Elbow Flexion:                                                       5/5  Elbow Extension:                                        5/5  Wrist Flexion:                                                        5/5  Wrist Extension:                                          5/5  :                                                                                5/5  Intrinsics:                                                               5/5  EPL (Ext. pollicis longus):                 5/5  Pinch Mechanism:                                      5/5     ELBOW EXAMINATION:  See above noted areas of tenderness.   Test for Ligamentous Instability - UCL                     neg  Test for Ligamentous Instability - LUCL                   normal  PLRI                                                                                    neg  Tinel's (Percussion) Test - Cubital                                      neg  Tennis Elbow Test                                                              neg  Golfer's Elbow Test                                                             neg  Radial Capitellar Grind Test                                               neg  Valgus/Extension Overload Test                                        neg  Resisted Long Finger Extension Pain                                 neg  Moving Valgus                                                                   neg  Forearm pain with resisted supination                                 neg  Yeargeson' s (elbow pain)                                                     neg  Hook test                                                                                neg           EXTREMITY NEURO-VASCULAR EXAMINATION: Sensation grossly intact to light touch all dermatomal regions. DTR 2+ Biceps, Triceps, BR and Negative Clint's sign. Grossly intact motor function at Elbow, Wrist and Hand. Distal pulses radial and ulnar 2+, brisk cap refill, symmetric.     Other Findings:    Healed abrasion noted of posterior elbow.    No lymphadenopathy noted of right upper extremity and no streaking.     Xrays:  Xrays of the right elbow 3 views were reviewed by me today from 1/5/18. No fracture, subluxation, or other significant bony or joint abnormality is identified.         ASSESSMENT:  Right elbow pain, acute  Right elbow olecranon bursitis   Right elbow infection with abrasion  Right knee osteoarthritis   Right knee pain    I have considered and do not suspect septic elbow joint, bacteremia, cellulitis at this time. Symptoms localized to elbow bursa.     Plan:    -Continue Bactrim DS 1 tablet BID x 5 more days  -Aggressive ice, compression, elevation    --PROCEDURE NOTE: Right elbow bursa aspiration      After time out was performed, including verification of patient ID, procedure, site and side, availability of information and equipment, review of safety issues, and agreement with consent, the procedure site was marked and the patient was prepped aseptically. The right elbow bursa was entered from the medial aspect with an 18 gauge needle with 20 cc of bloodly serous fluid aspirated from the bursa. Area was cleaned and bandaged and wrapped with compression. The patient had no adverse reactions to the medication. Pain decreased.     -Continue indocin 75mg BID.   The patient was advised that NSAID-type medications have two very important potential side effects: gastrointestinal irritation including hemorrhage and renal injuries. She was asked to take the medication with food and to stop if she  experiences any GI upset. I asked her to call for vomiting, abdominal pain or black/bloody stools. The patient expresses understanding of these issues and questions were answered.    -Aspirated fluid sent to lab for cultures again including fungus culture.   -CBC, CRP, ESR ordered today  - Patient will follow up with infectious disease this Friday for recommendation on abx treatment for suspect infected olecranon bursa that grew out MSSA.    -RTC on Monday for surgical knee check.   -Told patient to go to ED, call office, or RTC immediately if symptoms change or worsen especially if he develops fever or worsening erythema.     All patients questions were answered. Patient was advised to call us with any concerns or questions.

## 2019-01-25 ENCOUNTER — OFFICE VISIT (OUTPATIENT)
Dept: INFECTIOUS DISEASES | Facility: CLINIC | Age: 49
End: 2019-01-25
Payer: COMMERCIAL

## 2019-01-25 VITALS
TEMPERATURE: 98 F | DIASTOLIC BLOOD PRESSURE: 81 MMHG | HEART RATE: 73 BPM | SYSTOLIC BLOOD PRESSURE: 146 MMHG | HEIGHT: 71 IN | BODY MASS INDEX: 27.89 KG/M2

## 2019-01-25 DIAGNOSIS — M70.21 OLECRANON BURSITIS OF RIGHT ELBOW: Primary | ICD-10-CM

## 2019-01-25 PROCEDURE — 99999 PR PBB SHADOW E&M-EST. PATIENT-LVL III: CPT | Mod: PBBFAC,,, | Performed by: INTERNAL MEDICINE

## 2019-01-25 PROCEDURE — 3079F PR MOST RECENT DIASTOLIC BLOOD PRESSURE 80-89 MM HG: ICD-10-PCS | Mod: CPTII,S$GLB,, | Performed by: INTERNAL MEDICINE

## 2019-01-25 PROCEDURE — 99999 PR PBB SHADOW E&M-EST. PATIENT-LVL III: ICD-10-PCS | Mod: PBBFAC,,, | Performed by: INTERNAL MEDICINE

## 2019-01-25 PROCEDURE — 3077F PR MOST RECENT SYSTOLIC BLOOD PRESSURE >= 140 MM HG: ICD-10-PCS | Mod: CPTII,S$GLB,, | Performed by: INTERNAL MEDICINE

## 2019-01-25 PROCEDURE — 3008F BODY MASS INDEX DOCD: CPT | Mod: CPTII,S$GLB,, | Performed by: INTERNAL MEDICINE

## 2019-01-25 PROCEDURE — 99204 OFFICE O/P NEW MOD 45 MIN: CPT | Mod: S$GLB,,, | Performed by: INTERNAL MEDICINE

## 2019-01-25 PROCEDURE — 3077F SYST BP >= 140 MM HG: CPT | Mod: CPTII,S$GLB,, | Performed by: INTERNAL MEDICINE

## 2019-01-25 PROCEDURE — 99204 PR OFFICE/OUTPT VISIT, NEW, LEVL IV, 45-59 MIN: ICD-10-PCS | Mod: S$GLB,,, | Performed by: INTERNAL MEDICINE

## 2019-01-25 PROCEDURE — 3079F DIAST BP 80-89 MM HG: CPT | Mod: CPTII,S$GLB,, | Performed by: INTERNAL MEDICINE

## 2019-01-25 PROCEDURE — 3008F PR BODY MASS INDEX (BMI) DOCUMENTED: ICD-10-PCS | Mod: CPTII,S$GLB,, | Performed by: INTERNAL MEDICINE

## 2019-01-25 NOTE — PROGRESS NOTES
Subjective:      Patient ID: Nirmal Moss is a 48 y.o. male.    Chief Complaint:referral      History of Present Illness    Mr. Moss is a 49 y/o male who is referred to me for right elbow bursitis.  He is here for evaluation.  He had left Knee surgery on December 11.  Was on crutches afterwards.  Around December 27, he fell hitting his right elbow and right hip.  Developed some swelling and pain in his right elbow for a few days and then it resolved.  About 2-3 weeks ago, right elbow, forearm and bicep began swelling with erythema and irritation.  Eventually went to [facility] urgent [facility].  Diagnosed with bursitis.  He was given clindamycin and a steroid shot.  After 1 week, there was no improvement.  He was seen by Mello Flores who obtained a sample.  The sample was tested and it came back as staph. He was placed on bactrim for about 10 days and an anti-inflammatory.  It was drained again in the ER.  It was drained again 2 days ago.  He is here with his wife.  His right elbow is not getting any better.    Review of Systems   Constitution: Positive for malaise/fatigue. Negative for chills, decreased appetite, fever, weakness, night sweats, weight gain and weight loss.   HENT: Negative for congestion, ear pain, hearing loss, hoarse voice, sore throat and tinnitus.    Eyes: Negative for blurred vision, redness and visual disturbance.   Cardiovascular: Negative for chest pain, leg swelling and palpitations.   Respiratory: Negative for cough, hemoptysis, shortness of breath and sputum production.    Hematologic/Lymphatic: Negative for adenopathy. Does not bruise/bleed easily.   Skin: Negative for dry skin, itching, rash and suspicious lesions.   Musculoskeletal: Positive for back pain, joint pain and myalgias. Negative for neck pain.   Gastrointestinal: Positive for abdominal pain, diarrhea, heartburn and nausea. Negative for constipation and vomiting.   Genitourinary: Negative for dysuria, flank pain,  frequency, hematuria, hesitancy and urgency.   Neurological: Negative for dizziness, headaches, numbness and paresthesias.   Psychiatric/Behavioral: Negative for depression and memory loss. The patient does not have insomnia and is not nervous/anxious.      Objective:   Physical Exam   Constitutional: He is oriented to person, place, and time. He appears well-developed and well-nourished. No distress.   HENT:   Head: Normocephalic and atraumatic.   Right Ear: External ear normal.   Left Ear: External ear normal.   Nose: Nose normal.   Mouth/Throat: Oropharynx is clear and moist. No oropharyngeal exudate.   Eyes: Conjunctivae and EOM are normal. Pupils are equal, round, and reactive to light. Right eye exhibits no discharge. Left eye exhibits no discharge. No scleral icterus.   Neck: Normal range of motion. Neck supple. No JVD present. No tracheal deviation present. No thyromegaly present.   Cardiovascular: Normal rate, regular rhythm, normal heart sounds and intact distal pulses. Exam reveals no gallop and no friction rub.   No murmur heard.  Pulmonary/Chest: Effort normal and breath sounds normal. No stridor. No respiratory distress. He has no wheezes. He has no rales. He exhibits no tenderness.   Abdominal: Soft. Bowel sounds are normal. He exhibits no distension and no mass. There is no tenderness. There is no rebound and no guarding.   Musculoskeletal: Normal range of motion. He exhibits edema (right elbow) and tenderness (right elbow).   Lymphadenopathy:     He has no cervical adenopathy.   Neurological: He is alert and oriented to person, place, and time. He has normal reflexes. He displays normal reflexes. No cranial nerve deficit. He exhibits normal muscle tone. Coordination normal.   Skin: Skin is warm. No rash noted. He is not diaphoretic. No erythema. No pallor.   Psychiatric: He has a normal mood and affect. His behavior is normal. Judgment and thought content normal.   Nursing note and vitals  reviewed.    Assessment:       1. Olecranon bursitis of right elbow        47 y/o with MSSA olecranon bursitis.  He has not improved on oral antibiotics.  I will order a midline and start him on IV cefazolin 2 grams q 8 hours.  Anticipate 2 weeks of therapy.  He may need additional drainage of the bursa.  Plan:       Olecranon bursitis of right elbow  -     Midline catheter placement; Future

## 2019-01-26 LAB — BACTERIA SPEC AEROBE CULT: NORMAL

## 2019-01-28 ENCOUNTER — PATIENT MESSAGE (OUTPATIENT)
Dept: INFECTIOUS DISEASES | Facility: CLINIC | Age: 49
End: 2019-01-28

## 2019-01-28 ENCOUNTER — OFFICE VISIT (OUTPATIENT)
Dept: SPORTS MEDICINE | Facility: CLINIC | Age: 49
End: 2019-01-28
Payer: COMMERCIAL

## 2019-01-28 VITALS
HEIGHT: 71 IN | DIASTOLIC BLOOD PRESSURE: 102 MMHG | SYSTOLIC BLOOD PRESSURE: 148 MMHG | BODY MASS INDEX: 28 KG/M2 | HEART RATE: 91 BPM | WEIGHT: 200 LBS

## 2019-01-28 DIAGNOSIS — Z98.890 STATUS POST ARTHROSCOPY OF LEFT KNEE: ICD-10-CM

## 2019-01-28 DIAGNOSIS — M71.121 INFECTED OLECRANON BURSA, RIGHT: Primary | ICD-10-CM

## 2019-01-28 PROCEDURE — 3080F DIAST BP >= 90 MM HG: CPT | Mod: CPTII,S$GLB,, | Performed by: ORTHOPAEDIC SURGERY

## 2019-01-28 PROCEDURE — 20605 DRAIN/INJ JOINT/BURSA W/O US: CPT | Mod: 79,RT,S$GLB, | Performed by: ORTHOPAEDIC SURGERY

## 2019-01-28 PROCEDURE — 99999 PR PBB SHADOW E&M-EST. PATIENT-LVL IV: ICD-10-PCS | Mod: PBBFAC,,, | Performed by: ORTHOPAEDIC SURGERY

## 2019-01-28 PROCEDURE — 3008F PR BODY MASS INDEX (BMI) DOCUMENTED: ICD-10-PCS | Mod: CPTII,S$GLB,, | Performed by: ORTHOPAEDIC SURGERY

## 2019-01-28 PROCEDURE — 99214 PR OFFICE/OUTPT VISIT, EST, LEVL IV, 30-39 MIN: ICD-10-PCS | Mod: 24,25,S$GLB, | Performed by: ORTHOPAEDIC SURGERY

## 2019-01-28 PROCEDURE — 99214 OFFICE O/P EST MOD 30 MIN: CPT | Mod: 24,25,S$GLB, | Performed by: ORTHOPAEDIC SURGERY

## 2019-01-28 PROCEDURE — 3008F BODY MASS INDEX DOCD: CPT | Mod: CPTII,S$GLB,, | Performed by: ORTHOPAEDIC SURGERY

## 2019-01-28 PROCEDURE — 3077F SYST BP >= 140 MM HG: CPT | Mod: CPTII,S$GLB,, | Performed by: ORTHOPAEDIC SURGERY

## 2019-01-28 PROCEDURE — 20605 INTERMEDIATE JOINT ASPIRATION/INJECTION: R OLECRANON BURSA: ICD-10-PCS | Mod: 79,RT,S$GLB, | Performed by: ORTHOPAEDIC SURGERY

## 2019-01-28 PROCEDURE — 99999 PR PBB SHADOW E&M-EST. PATIENT-LVL IV: CPT | Mod: PBBFAC,,, | Performed by: ORTHOPAEDIC SURGERY

## 2019-01-28 PROCEDURE — 3077F PR MOST RECENT SYSTOLIC BLOOD PRESSURE >= 140 MM HG: ICD-10-PCS | Mod: CPTII,S$GLB,, | Performed by: ORTHOPAEDIC SURGERY

## 2019-01-28 PROCEDURE — 3080F PR MOST RECENT DIASTOLIC BLOOD PRESSURE >= 90 MM HG: ICD-10-PCS | Mod: CPTII,S$GLB,, | Performed by: ORTHOPAEDIC SURGERY

## 2019-01-28 NOTE — PROGRESS NOTES
HISTORY OF PRESENT ILLNESS:   Pt is here today for post-operative followup of knee arthroscopy.  he is doing well.  We have reviewed his findings and discussed plan of care and future treatment options.    He was attending PT at [facility], he notes that after the new year he was discharged and told to do his HEP until he is weight bearing and then follow back up with them              DATE OF PROCEDURE: 12/11/2018  PROCEDURE PERFORMED:   left  1. knee arthroscopic chondroplasty (CPT 18430)  2. knee arthroscopic medial and lateral (CPT 19947) meniscectomy   3. knee arthroscopic partial synovectomy/debridement (CPT 46805).   4. knee arthroscopic plica excision(CPT 17788).    5. Knee arthroscopic-assisted arthrocentesis of viable structural human allograft tissue      matrix ([employer]) (CPT 56249)              6. Knee arthroscopic lysis of adhesions (CPT 58876)  7. Knee arthroscopic cartilage biopsy (CPT 64302)  8. Knee arthroscopic microfracture (CPT 59959)         In the patellofemoral compartment, there was chondral damage to:  Patella 10 x 10 mm grade 2  Chondroplasty was performed using arthroscopic shaver.     There was chondral damage to:  Medial femoral condyle 10 x 30 mm grade 2, 7 x 6 mm grade 4     Chondroplasty was performed using arthroscopic shaver.     There was chondral damage to:  Lateral tibial plateau 15 x 20 mm grade 2  Chondroplasty was performed using arthroscopic shaver.       Microfracture was performed in the standard fashion to chondral lesion (MFC 7x6mm) (with awl) after edges were prepared and calcified cartilage layer was removed with curette, with holes 3-4mm apart.  Confirmation of marrow elements from each hole was confirmed upon decrease of pump pressure.                                                PHYSICAL EXAMINATION:     Incision sites healed well  No evidence of any erythema, infection or induration  Range of motion 0-135 degrees  No effusion  2+ DP pulse  No swelling, no  calf tenderness  - Jagdeep's sign  Negative medial and lateral joint line tenderness  Mild quad atrophy                                                                                 ASSESSMENT:                                                                                                                                               1. Status post above, doing well.                                                                                                                               PLAN:                                                                                                                                                     1. Continue with PT in 1 week. PWB on left knee for 1 more week, total of limited weight bearing for a total of 8 weeks status post.  2. Emphasized quad function.  3. I have discussed return to activity in detail.  4.Patient will see us back in 6 weeks.                                    5. All questions were answered and patient should contact us if he  has any questions or concerns in the interim.           CC  Right elbow pain     47y/o white male with complaints of right elbow pain that began at the beginning of this year a few days after he lost his balance and feel in his bathroom and hit is elbow. He reports that had a small abrasion of the elbow from that fall. A couple of days later he began having erythema, pain, and swelling of his elbow that worsened to cover the area from his mid biceps to his mid forearm. He then presented to urgent care on 1/5/19 where he was given a steroid shot and started on clindamycin abx which he has been taking. He reports improvement of the swelling and redness since being on the abx. He has some intermittent slight posterior elbow pain.  He denies fever, chills, nausea, vomiting. He reports that elbow pain or swelling is not worsening. He is using compression sleeve to the elbow.     Aspirate fluid was sent to lab and grew out MSSA on  1/11/19. Resistant to clindamycin so his abx was switched to bactrim DS. Has completed a 10 day course of bactrim today with improvement of arm swelling but continued swelling of his olecranon bursa and some erythema only directly overlying the bursa sack. He has very little pain of the area. No real improvement of swelling with indocin.    He is currently off of work due to left knee surgery.        He is now being treated with IV antibiotics and under the care of Dr. Solis for his infected olecranon bursitis       PHYSICAL EXAM:  General: Patient appears alert and oriented x 3.  Mood is pleasant.  Observation of ears, eyes and nose reveal no gross abnormalities. HEENT: NCAT, sclera nonicteric  Lungs: Respirations are equal and unlabored.  Well nourished, in no acute distress and ambulates with a non-antalgic gait with no assistive devices.    Skin: Skin intact bilaterally. Sensation intact bilaterally. Compartments soft. No evidence of edema, infection, or induration.      Right ELBOW / WRIST EXTREMITY EXAM:     OBSERVATION / INSPECTION    Swelling                                           localized to bursa area that is fluctuant with moderate swelling to the bursa.                                       Deformity                                         none  Discoloration                                  3x3 cm area of improved redness localized to the olecranon bursa.                                      Scars                                                         none                                      Atrophy                                            none     TENDERNESS / CREPITUS (T / C):                                                                                             T/C                                                                                                                            Medial epicondyle                                                               - / -    Med. (Ulnar)  collateral ligament                                        - / -    Flexor pronator Musculature                                              - / -   Biceps tendon                                                                    - / -   Head of radius                                                                   - / -    Lateral epicondyle                                                             - / -    Extensor Musculature                                                       - / -   Brachioradialis                                                                  - / -   Triceps tendon                                                                  - / -   Triceps muscle                                                                  - / -   Olecranon                                                                          - / -   Olecranon bursa                                                               mild + / -   Cubital fossa                                                                     - / -   Anterior jointline                                                                - / -   Radial tunnel                                                                     -/-                                                                                                                                                                                                       ROM:             ('*' = with pain)                                   Right Elbow                                     AROM (PROM)                                             Extension                                        0 deg  (5 deg)   Flexion                                            145 deg (145 deg)                                                                Pronation                                         90 deg  (90 deg)                                                                 Supination                                        80 deg  (80 deg)                                                                                                                                                                                 Left Elbow                                       AROM (PROM)                                             Extension                                        0 deg  (5 deg)   Flexion                                             145 deg (145 deg)                                                                Pronation                                         90 deg  (90 deg)                                                                 Supination                                       80 deg  (80 deg)                Right Wrist                                      AROM (PROM)                       Extension                                        80 deg (85 deg)   Flexion                                            80 deg (85 deg)                                                                    Ulnar Deviation                     35 deg (40 deg)  Radial Deviation                    35 deg (40 deg)                                                                                                                                      Left Wrist                                         AROM (PROM)                                             Extension                                        80 deg (85 deg)   Flexion                                             80 deg (85 deg)                                                                    Ulnar Deviation                     35 deg (40 deg)  Radial Deviation                    35 deg (40 deg)                                                                     STRENGTH:             ('*' = with pain)                        Elbow Flexion:                                                       5/5  Elbow Extension:                                        5/5  Wrist Flexion:                                                         5/5  Wrist Extension:                                          5/5  :                                                                                5/5  Intrinsics:                                                               5/5  EPL (Ext. pollicis longus):                 5/5  Pinch Mechanism:                                      5/5     ELBOW EXAMINATION:  See above noted areas of tenderness.   Test for Ligamentous Instability - UCL                     neg  Test for Ligamentous Instability - LUCL                   normal  PLRI                                                                                    neg  Tinel's (Percussion) Test - Cubital                                      neg  Tennis Elbow Test                                                              neg  Golfer's Elbow Test                                                             neg  Radial Capitellar Grind Test                                               neg  Valgus/Extension Overload Test                                        neg  Resisted Long Finger Extension Pain                                 neg  Moving Valgus                                                                   neg  Forearm pain with resisted supination                                 neg  Yeargeson' s (elbow pain)                                                    neg  Hook test                                                                                neg           EXTREMITY NEURO-VASCULAR EXAMINATION: Sensation grossly intact to light touch all dermatomal regions. DTR 2+ Biceps, Triceps, BR and Negative Clint's sign. Grossly intact motor function at Elbow, Wrist and Hand. Distal pulses radial and ulnar 2+, brisk cap refill, symmetric.     Other Findings:    Healed abrasion noted of posterior elbow.    No lymphadenopathy noted of right upper extremity and no streaking.     Xrays:  Xrays of the right elbow 3  views from 1/5/18. No fracture, subluxation, or other significant bony or joint abnormality is identified.         ASSESSMENT:  Right elbow pain, acute  Right elbow olecranon bursitis   Right elbow infection with abrasion  Right knee osteoarthritis   Right knee pain      Plan:    1. Continue treatment with Dr. Solis for IV antibiotic   2. Aggressive ice, compression, elevation  3. Follow up with Mello Flores on Monday of next week   4. All patients questions were answered. Patient was advised to call us with any concerns or questions.     --PROCEDURE NOTE: Right elbow bursa aspiration      After time out was performed, including verification of patient ID, procedure, site and side, availability of information and equipment, review of safety issues, and agreement with consent, the procedure site was marked and the patient was prepped aseptically. The right elbow bursa was entered from the medial aspect with an 18 gauge needle with 15 cc of bloodly serous fluid aspirated from the bursa. Area was cleaned and bandaged. The patient had no adverse reactions to the medication. Pain decreased.

## 2019-01-28 NOTE — PROCEDURES
Intermediate Joint Aspiration/Injection: R olecranon bursa  Date/Time: 1/28/2019 1:23 PM  Performed by: Gwendolyn Jeong MD  Authorized by: Gwendolyn Jeong MD     Indications: Pain and joint swelling  Site marked: The procedure site was marked   Timeout: Prior to procedure the correct patient, procedure, and site was verified      Location:  Elbow  Site:  R olecranon bursa  Needle size:  20 G  Aspirate amount (ml):  15  Aspirate:  Bloody

## 2019-01-29 ENCOUNTER — TELEPHONE (OUTPATIENT)
Dept: INFECTIOUS DISEASES | Facility: CLINIC | Age: 49
End: 2019-01-29

## 2019-01-29 DIAGNOSIS — M70.21 OLECRANON BURSITIS OF RIGHT ELBOW: Primary | ICD-10-CM

## 2019-01-29 LAB — BACTERIA SPEC ANAEROBE CULT: NORMAL

## 2019-01-30 ENCOUNTER — TELEPHONE (OUTPATIENT)
Dept: INFECTIOUS DISEASES | Facility: CLINIC | Age: 49
End: 2019-01-30

## 2019-01-30 DIAGNOSIS — M70.21 OLECRANON BURSITIS OF RIGHT ELBOW: Primary | ICD-10-CM

## 2019-01-30 RX ORDER — CEFADROXIL 1000 MG/1
1 TABLET ORAL 2 TIMES DAILY
Qty: 6 TABLET | Refills: 0 | Status: SHIPPED | OUTPATIENT
Start: 2019-01-30 | End: 2019-02-02

## 2019-01-30 RX ORDER — OXYCODONE AND ACETAMINOPHEN 7.5; 325 MG/1; MG/1
1 TABLET ORAL EVERY 4 HOURS PRN
Qty: 20 TABLET | Refills: 0 | Status: SHIPPED | OUTPATIENT
Start: 2019-01-30 | End: 2019-02-20

## 2019-01-30 NOTE — TELEPHONE ENCOUNTER
Spoke to patient's wife by phone.  Answered her questions.  She expressed the things that were bothering her.  Will prescribe pain medications to his pharmacy and start some oral antibiotics prior to initiation of the IV antibiotics.

## 2019-01-30 NOTE — TELEPHONE ENCOUNTER
----- Message from Benita Aileen sent at 1/30/2019  9:54 AM CST -----  Contact: Mrs. Tina Epperson   tel:      [phone]  Needs Advice    Reason for call:    Wife says she is very upset that pt. Is having to wait until 2/4 to get the picc line inserted.    Says her  has been sick for a while now and she wants this done asap, today.            Communication Preference:  Phone     Additional Information:  pls call asap.

## 2019-01-31 ENCOUNTER — TELEPHONE (OUTPATIENT)
Dept: INTERVENTIONAL RADIOLOGY/VASCULAR | Facility: HOSPITAL | Age: 49
End: 2019-01-31

## 2019-01-31 DIAGNOSIS — I10 ESSENTIAL HYPERTENSION: ICD-10-CM

## 2019-01-31 NOTE — TELEPHONE ENCOUNTER
Spoke with pt and confirmed arival time of 1330 for procedure on 2/1/2019. Pt verbalized understanding.

## 2019-02-01 ENCOUNTER — HOSPITAL ENCOUNTER (OUTPATIENT)
Dept: INTERVENTIONAL RADIOLOGY/VASCULAR | Facility: HOSPITAL | Age: 49
Discharge: HOME | End: 2019-02-01
Attending: INTERNAL MEDICINE
Payer: COMMERCIAL

## 2019-02-01 ENCOUNTER — PATIENT MESSAGE (OUTPATIENT)
Dept: DERMATOLOGY | Facility: CLINIC | Age: 49
End: 2019-02-01

## 2019-02-01 DIAGNOSIS — M70.21 OLECRANON BURSITIS OF RIGHT ELBOW: ICD-10-CM

## 2019-02-01 RX ORDER — HYDROCHLOROTHIAZIDE 25 MG/1
TABLET ORAL
Qty: 90 TABLET | Refills: 12 | Status: SHIPPED | OUTPATIENT
Start: 2019-02-01 | End: 2019-03-14

## 2019-02-01 RX ORDER — SODIUM CHLORIDE 0.9 % (FLUSH) 0.9 %
10 SYRINGE (ML) INJECTION
Status: DISCONTINUED | OUTPATIENT
Start: 2019-02-01 | End: 2019-02-02 | Stop reason: HOSPADM

## 2019-02-01 RX ORDER — SODIUM CHLORIDE 0.9 % (FLUSH) 0.9 %
10 SYRINGE (ML) INJECTION EVERY 6 HOURS
Status: DISCONTINUED | OUTPATIENT
Start: 2019-02-01 | End: 2019-02-02 | Stop reason: HOSPADM

## 2019-02-01 RX ORDER — DESONIDE 0.5 MG/G
CREAM TOPICAL 2 TIMES DAILY
Qty: 60 G | Refills: 3 | Status: SHIPPED | OUTPATIENT
Start: 2019-02-01 | End: 2023-08-11 | Stop reason: SDUPTHER

## 2019-02-01 NOTE — PROGRESS NOTES
Discharge instructions reviewed with patient. Questions answered. Verbalized understanding, no further questions at this time. Procedure site is [facility].

## 2019-02-01 NOTE — PROCEDURES
Nirmal Moss is a 48 y.o. male patient.         PICC  Date/Time: 2/1/2019 2:29 PM  Performed by: Laith Rosario RN  Consent Done: Yes  Time out: Immediately prior to procedure a time out was called to verify the correct patient, procedure, equipment, support staff and site/side marked as required  Indications: med administration and vascular access  Anesthesia: local infiltration  Local anesthetic: lidocaine 1% without epinephrine  Anesthetic Total (mL): 2  Preparation: skin prepped with chlorhexidine (without alcohol)  Skin prep agent dried: skin prep agent completely dried prior to procedure  Sterile barriers: all five maximum sterile barriers used - cap, mask, sterile gown, sterile gloves, and large sterile sheet  Hand hygiene: hand hygiene performed prior to central venous catheter insertion  Location details: left basilic  Catheter type: double lumen  Catheter size: 5 Fr  Catheter Length: 44cm    Ultrasound guidance: yes  Vessel Caliber: medium, compressibility normal  Vascular Doppler: not done  Needle advanced into vessel with real time Ultrasound guidance.  Guidewire confirmed in vessel.  Sterile sheath used.  no esophageal manometryNumber of attempts: 1  Post-procedure: blood return through all ports and sterile dressing applied            Laith Rosario  2/1/2019

## 2019-02-02 ENCOUNTER — PATIENT MESSAGE (OUTPATIENT)
Dept: FAMILY MEDICINE | Facility: CLINIC | Age: 49
End: 2019-02-02

## 2019-02-02 DIAGNOSIS — F98.8 ATTENTION DEFICIT DISORDER, UNSPECIFIED HYPERACTIVITY PRESENCE: ICD-10-CM

## 2019-02-04 ENCOUNTER — OFFICE VISIT (OUTPATIENT)
Dept: SPORTS MEDICINE | Facility: CLINIC | Age: 49
End: 2019-02-04
Payer: COMMERCIAL

## 2019-02-04 VITALS
HEIGHT: 71 IN | HEART RATE: 125 BPM | BODY MASS INDEX: 28 KG/M2 | SYSTOLIC BLOOD PRESSURE: 167 MMHG | DIASTOLIC BLOOD PRESSURE: 99 MMHG | WEIGHT: 200 LBS

## 2019-02-04 DIAGNOSIS — S50.311A INFECTED ABRASION OF RIGHT ELBOW, INITIAL ENCOUNTER: ICD-10-CM

## 2019-02-04 DIAGNOSIS — M71.121 INFECTED OLECRANON BURSA, RIGHT: Primary | ICD-10-CM

## 2019-02-04 DIAGNOSIS — L08.9 INFECTED ABRASION OF RIGHT ELBOW, INITIAL ENCOUNTER: ICD-10-CM

## 2019-02-04 DIAGNOSIS — Z98.890 STATUS POST ARTHROSCOPY OF LEFT KNEE: ICD-10-CM

## 2019-02-04 DIAGNOSIS — M70.21 OLECRANON BURSITIS OF RIGHT ELBOW: ICD-10-CM

## 2019-02-04 DIAGNOSIS — M25.521 RIGHT ELBOW PAIN: ICD-10-CM

## 2019-02-04 PROCEDURE — 3077F PR MOST RECENT SYSTOLIC BLOOD PRESSURE >= 140 MM HG: ICD-10-PCS | Mod: CPTII,S$GLB,, | Performed by: PHYSICIAN ASSISTANT

## 2019-02-04 PROCEDURE — 99999 PR PBB SHADOW E&M-EST. PATIENT-LVL IV: CPT | Mod: PBBFAC,,, | Performed by: PHYSICIAN ASSISTANT

## 2019-02-04 PROCEDURE — 99999 PR PBB SHADOW E&M-EST. PATIENT-LVL IV: ICD-10-PCS | Mod: PBBFAC,,, | Performed by: PHYSICIAN ASSISTANT

## 2019-02-04 PROCEDURE — 20605 PR DRAIN/INJECT INTERMEDIATE JOINT/BURSA: ICD-10-PCS | Mod: 79,RT,S$GLB, | Performed by: PHYSICIAN ASSISTANT

## 2019-02-04 PROCEDURE — 3008F BODY MASS INDEX DOCD: CPT | Mod: CPTII,S$GLB,, | Performed by: PHYSICIAN ASSISTANT

## 2019-02-04 PROCEDURE — 3080F DIAST BP >= 90 MM HG: CPT | Mod: CPTII,S$GLB,, | Performed by: PHYSICIAN ASSISTANT

## 2019-02-04 PROCEDURE — 3080F PR MOST RECENT DIASTOLIC BLOOD PRESSURE >= 90 MM HG: ICD-10-PCS | Mod: CPTII,S$GLB,, | Performed by: PHYSICIAN ASSISTANT

## 2019-02-04 PROCEDURE — 99214 PR OFFICE/OUTPT VISIT, EST, LEVL IV, 30-39 MIN: ICD-10-PCS | Mod: 24,25,S$GLB, | Performed by: PHYSICIAN ASSISTANT

## 2019-02-04 PROCEDURE — 3077F SYST BP >= 140 MM HG: CPT | Mod: CPTII,S$GLB,, | Performed by: PHYSICIAN ASSISTANT

## 2019-02-04 PROCEDURE — 20605 DRAIN/INJ JOINT/BURSA W/O US: CPT | Mod: 79,RT,S$GLB, | Performed by: PHYSICIAN ASSISTANT

## 2019-02-04 PROCEDURE — 99214 OFFICE O/P EST MOD 30 MIN: CPT | Mod: 24,25,S$GLB, | Performed by: PHYSICIAN ASSISTANT

## 2019-02-04 PROCEDURE — 3008F PR BODY MASS INDEX (BMI) DOCUMENTED: ICD-10-PCS | Mod: CPTII,S$GLB,, | Performed by: PHYSICIAN ASSISTANT

## 2019-02-04 RX ORDER — DEXTROAMPHETAMINE SACCHARATE, AMPHETAMINE ASPARTATE MONOHYDRATE, DEXTROAMPHETAMINE SULFATE AND AMPHETAMINE SULFATE 7.5; 7.5; 7.5; 7.5 MG/1; MG/1; MG/1; MG/1
CAPSULE, EXTENDED RELEASE ORAL
Qty: 60 CAPSULE | Refills: 0 | Status: SHIPPED | OUTPATIENT
Start: 2019-02-05 | End: 2019-03-14 | Stop reason: SDUPTHER

## 2019-02-04 NOTE — PROGRESS NOTES
HISTORY OF PRESENT ILLNESS:   Pt is here today for post-operative followup of knee arthroscopy.  he is doing well.  We have reviewed his findings and discussed plan of care and future treatment options.    He was attending PT at [facility], he notes that after the new year he was discharged and told to do his HEP until he is weight bearing and then follow back up with them              DATE OF PROCEDURE: 12/11/2018  PROCEDURE PERFORMED:   left  1. knee arthroscopic chondroplasty (CPT 62336)  2. knee arthroscopic medial and lateral (CPT 31699) meniscectomy   3. knee arthroscopic partial synovectomy/debridement (CPT 53474).   4. knee arthroscopic plica excision(CPT 39069).    5. Knee arthroscopic-assisted arthrocentesis of viable structural human allograft tissue      matrix ([employer]) (CPT 93815)              6. Knee arthroscopic lysis of adhesions (CPT 36466)  7. Knee arthroscopic cartilage biopsy (CPT 03937)  8. Knee arthroscopic microfracture (CPT 07868)         In the patellofemoral compartment, there was chondral damage to:  Patella 10 x 10 mm grade 2  Chondroplasty was performed using arthroscopic shaver.     There was chondral damage to:  Medial femoral condyle 10 x 30 mm grade 2, 7 x 6 mm grade 4     Chondroplasty was performed using arthroscopic shaver.     There was chondral damage to:  Lateral tibial plateau 15 x 20 mm grade 2  Chondroplasty was performed using arthroscopic shaver.       Microfracture was performed in the standard fashion to chondral lesion (MFC 7x6mm) (with awl) after edges were prepared and calcified cartilage layer was removed with curette, with holes 3-4mm apart.  Confirmation of marrow elements from each hole was confirmed upon decrease of pump pressure.                                                PHYSICAL EXAMINATION:     Incision sites healed well  No evidence of any erythema, infection or induration  Range of motion 0-135 degrees  No effusion  2+ DP pulse  No swelling, no  calf tenderness  - Jagdeep's sign  Negative medial and lateral joint line tenderness  Mild quad atrophy                                                                                 ASSESSMENT:                                                                                                                                               1. Status post above, doing well.                                                                                                                               PLAN:                                                                                                                                                     1. Continue with PT in 1 week. Can wean off crutches and be WBAT.  total of limited weight bearing for a total of 8 weeks status post.  2. Emphasized quad function.  3. I have discussed return to activity in detail.  4.Patient will see us back in 5 weeks.                                    5. All questions were answered and patient should contact us if he  has any questions or concerns in the interim.           CC  Right elbow pain     47y/o white male with complaints of right elbow pain that began at the beginning of this year a few days after he lost his balance and feel in his bathroom and hit is elbow. He reports that had a small abrasion of the elbow from that fall. A couple of days later he began having erythema, pain, and swelling of his elbow that worsened to cover the area from his mid biceps to his mid forearm. He then presented to urgent care on 1/5/19 where he was given a steroid shot and started on clindamycin abx which he has been taking. He reports improvement of the swelling and redness since being on the abx. He has some intermittent slight posterior elbow pain.  He denies fever, chills, nausea, vomiting. He reports that elbow pain or swelling is not worsening. He is using compression sleeve to the elbow.     Aspirate fluid was sent to lab and grew out MSSA on  1/11/19. Resistant to clindamycin so his abx was switched to bactrim DS. Has completed a 10 day course of bactrim today with improvement of arm swelling but continued swelling of his olecranon bursa and some erythema only directly overlying the bursa sack. He has very little pain of the area. No real improvement of swelling with indocin.    He is currently off of work due to left knee surgery.    Since last visit 1 week ago, the bursa has been draining spontaneously from the medial previous aspiration site that was once closed. Fluid is reported as clear and not foul smelling.        He is now being treated with IV antibiotics (cefazolin) through left arm PICC and under the care of Dr. Solis for his infected olecranon bursitis       PHYSICAL EXAM:  General: Patient appears alert and oriented x 3.  Mood is pleasant.  Observation of ears, eyes and nose reveal no gross abnormalities. HEENT: NCAT, sclera nonicteric  Lungs: Respirations are equal and unlabored.  Well nourished, in no acute distress and ambulates with a non-antalgic gait with no assistive devices.    Skin: Skin intact bilaterally. Sensation intact bilaterally. Compartments soft. No evidence of edema, infection, or induration.      Right ELBOW / WRIST EXTREMITY EXAM:     OBSERVATION / INSPECTION    Swelling                                           localized to bursa area that is fluctuant with improved mild swelling to the bursa.                                       Deformity                                         none  Discoloration                                  2x2 cm area of improved light redness localized to the olecranon bursa.                                      Scars                                                         none                                      Atrophy                                            none     TENDERNESS / CREPITUS (T / C):                                                                                              T/C                                                                                                                            Medial epicondyle                                                               - / -    Med. (Ulnar) collateral ligament                                        - / -    Flexor pronator Musculature                                              - / -   Biceps tendon                                                                    - / -   Head of radius                                                                   - / -    Lateral epicondyle                                                             - / -    Extensor Musculature                                                       - / -   Brachioradialis                                                                  - / -   Triceps tendon                                                                  - / -   Triceps muscle                                                                  - / -   Olecranon                                                                          - / -   Olecranon bursa                                                               mild + / -   Cubital fossa                                                                     - / -   Anterior jointline                                                                - / -   Radial tunnel                                                                     -/-                                                                                                                                                                                                       ROM:             ('*' = with pain)                                   Right Elbow                                     AROM (PROM)                                             Extension                                        0 deg  (5 deg)   Flexion                                            145 deg (145 deg)                                                                 Pronation                                         90 deg  (90 deg)                                                                 Supination                                       80 deg  (80 deg)                                                                                                                                                                                 Left Elbow                                       AROM (PROM)                                             Extension                                        0 deg  (5 deg)   Flexion                                             145 deg (145 deg)                                                                Pronation                                         90 deg  (90 deg)                                                                 Supination                                       80 deg  (80 deg)                Right Wrist                                      AROM (PROM)                       Extension                                        80 deg (85 deg)   Flexion                                            80 deg (85 deg)                                                                    Ulnar Deviation                     35 deg (40 deg)  Radial Deviation                    35 deg (40 deg)                                                                                                                                      Left Wrist                                         AROM (PROM)                                             Extension                                        80 deg (85 deg)   Flexion                                             80 deg (85 deg)                                                                    Ulnar Deviation                     35 deg (40 deg)  Radial Deviation                    35 deg (40 deg)                                                                      STRENGTH:             ('*' = with pain)                        Elbow Flexion:                                                       5/5  Elbow Extension:                                        5/5  Wrist Flexion:                                                        5/5  Wrist Extension:                                          5/5  :                                                                                5/5  Intrinsics:                                                               5/5  EPL (Ext. pollicis longus):                 5/5  Pinch Mechanism:                                      5/5     ELBOW EXAMINATION:  See above noted areas of tenderness.   Test for Ligamentous Instability - UCL                     neg  Test for Ligamentous Instability - LUCL                   normal  PLRI                                                                                    neg  Tinel's (Percussion) Test - Cubital                                      neg  Tennis Elbow Test                                                              neg  Golfer's Elbow Test                                                             neg  Radial Capitellar Grind Test                                               neg  Valgus/Extension Overload Test                                        neg  Resisted Long Finger Extension Pain                                 neg  Moving Valgus                                                                   neg  Forearm pain with resisted supination                                 neg  Yeargeson' s (elbow pain)                                                    neg  Hook test                                                                                neg           EXTREMITY NEURO-VASCULAR EXAMINATION: Sensation grossly intact to light touch all dermatomal regions. DTR 2+ Biceps, Triceps, BR and Negative Clint's sign. Grossly intact motor function at Elbow, Wrist and Hand. Distal  pulses radial and ulnar 2+, brisk cap refill, symmetric.     Other Findings:    Medial area of thin soft tissue noted that is closed from where bursa has been draining fluid    No lymphadenopathy noted of right upper extremity and no streaking.     Xrays:  Xrays of the right elbow 3 views from 1/5/18. No fracture, subluxation, or other significant bony or joint abnormality is identified.         ASSESSMENT:  Right elbow pain, acute  Right elbow olecranon bursitis   Right elbow infection with abrasion  Right knee osteoarthritis   Right knee s/p knee scope      Plan:    1. Continue treatment with Dr. Solis for IV antibiotics (patient states another 3 weeks)  2. Aggressive ice, compression, elevation  3. RTC in 1 week with me for recheck of elbow.   4. Keep elbow covered if draining.   All patients questions were answered. Patient was advised to call us with any concerns or questions.     --PROCEDURE NOTE: Right elbow bursa aspiration      After time out was performed, including verification of patient ID, procedure, site and side, availability of information and equipment, review of safety issues, and agreement with consent, the procedure site was marked and the patient was prepped aseptically. The right elbow bursa was entered from the medial aspect with an 18 gauge needle with 5 cc of bloodly serous fluid aspirated from the bursa with another 3-4 cc's expressed after needle was removed. Area was cleaned and bandaged with pressure dressing. The patient had no adverse reactions to the medication. Pain decreased.

## 2019-02-05 ENCOUNTER — OFFICE VISIT (OUTPATIENT)
Dept: SPORTS MEDICINE | Facility: CLINIC | Age: 49
End: 2019-02-05
Payer: COMMERCIAL

## 2019-02-05 ENCOUNTER — TELEPHONE (OUTPATIENT)
Dept: SPORTS MEDICINE | Facility: CLINIC | Age: 49
End: 2019-02-05

## 2019-02-05 VITALS
WEIGHT: 200 LBS | HEIGHT: 71 IN | DIASTOLIC BLOOD PRESSURE: 109 MMHG | HEART RATE: 104 BPM | BODY MASS INDEX: 28 KG/M2 | SYSTOLIC BLOOD PRESSURE: 190 MMHG

## 2019-02-05 DIAGNOSIS — S50.311A INFECTED ABRASION OF RIGHT ELBOW, INITIAL ENCOUNTER: ICD-10-CM

## 2019-02-05 DIAGNOSIS — L08.9 INFECTED ABRASION OF RIGHT ELBOW, INITIAL ENCOUNTER: ICD-10-CM

## 2019-02-05 DIAGNOSIS — M25.521 RIGHT ELBOW PAIN: ICD-10-CM

## 2019-02-05 DIAGNOSIS — M70.21 OLECRANON BURSITIS OF RIGHT ELBOW: ICD-10-CM

## 2019-02-05 DIAGNOSIS — M71.121 INFECTED OLECRANON BURSA, RIGHT: Primary | ICD-10-CM

## 2019-02-05 PROCEDURE — 3077F SYST BP >= 140 MM HG: CPT | Mod: CPTII,S$GLB,, | Performed by: PHYSICIAN ASSISTANT

## 2019-02-05 PROCEDURE — 3008F BODY MASS INDEX DOCD: CPT | Mod: CPTII,S$GLB,, | Performed by: PHYSICIAN ASSISTANT

## 2019-02-05 PROCEDURE — 97602 WOUND(S) CARE NON-SELECTIVE: CPT | Mod: S$GLB,,, | Performed by: PHYSICIAN ASSISTANT

## 2019-02-05 PROCEDURE — 97602 PR WOUND DEBRIDEMNT, NON-SELECTIVE, EA: ICD-10-PCS | Mod: S$GLB,,, | Performed by: PHYSICIAN ASSISTANT

## 2019-02-05 PROCEDURE — 99999 PR PBB SHADOW E&M-EST. PATIENT-LVL III: ICD-10-PCS | Mod: PBBFAC,,, | Performed by: PHYSICIAN ASSISTANT

## 2019-02-05 PROCEDURE — 3077F PR MOST RECENT SYSTOLIC BLOOD PRESSURE >= 140 MM HG: ICD-10-PCS | Mod: CPTII,S$GLB,, | Performed by: PHYSICIAN ASSISTANT

## 2019-02-05 PROCEDURE — 99999 PR PBB SHADOW E&M-EST. PATIENT-LVL III: CPT | Mod: PBBFAC,,, | Performed by: PHYSICIAN ASSISTANT

## 2019-02-05 PROCEDURE — 3008F PR BODY MASS INDEX (BMI) DOCUMENTED: ICD-10-PCS | Mod: CPTII,S$GLB,, | Performed by: PHYSICIAN ASSISTANT

## 2019-02-05 PROCEDURE — 99214 OFFICE O/P EST MOD 30 MIN: CPT | Mod: 25,S$GLB,, | Performed by: PHYSICIAN ASSISTANT

## 2019-02-05 PROCEDURE — 99214 PR OFFICE/OUTPT VISIT, EST, LEVL IV, 30-39 MIN: ICD-10-PCS | Mod: 25,S$GLB,, | Performed by: PHYSICIAN ASSISTANT

## 2019-02-05 PROCEDURE — 3080F PR MOST RECENT DIASTOLIC BLOOD PRESSURE >= 90 MM HG: ICD-10-PCS | Mod: CPTII,S$GLB,, | Performed by: PHYSICIAN ASSISTANT

## 2019-02-05 PROCEDURE — 3080F DIAST BP >= 90 MM HG: CPT | Mod: CPTII,S$GLB,, | Performed by: PHYSICIAN ASSISTANT

## 2019-02-05 NOTE — TELEPHONE ENCOUNTER
----- Message from Kota Jameson sent at 2/5/2019 10:01 AM CST -----  Contact: Leyla/[facility]  Please call Leyla at [phone]    Questions about the right elbow surgical site    Thank you

## 2019-02-05 NOTE — PROGRESS NOTES
CC  Right elbow pain     47y/o white male with complaints of right elbow pain.     He presents today for unplanned visit after his home health nurse was changing his right elbow dressing and noticed tissue protruding from his medial posterior elbow when his bursa had previously been spontaneously draining and from where he was aspirated yesterday. The protruding tissue pokes out more with elbow flexion and reduces with elbow extension. He denies any significant changes in wound drainage since yesterday with no increasing redness. He feel the elbow has improved some since yesterday. Denies fevers or chills.      that began at the beginning of this year a few days after he lost his balance and feel in his bathroom and hit is elbow. He reports that had a small abrasion of the elbow from that fall. A couple of days later he began having erythema, pain, and swelling of his elbow that worsened to cover the area from his mid biceps to his mid forearm. He then presented to urgent care on 1/5/19 where he was given a steroid shot and started on clindamycin abx which he has been taking. He reports improvement of the swelling and redness since being on the abx. He has some intermittent slight posterior elbow pain.  He denies fever, chills, nausea, vomiting. He reports that elbow pain or swelling is not worsening. He is using compression sleeve to the elbow.     Aspirate fluid was sent to lab and grew out MSSA on 1/11/19. Resistant to clindamycin so his abx was switched to bactrim DS. Has completed a 10 day course of bactrim today with improvement of arm swelling but continued swelling of his olecranon bursa and some erythema only directly overlying the bursa sack. He has very little pain of the area. No real improvement of swelling with indocin.    He is currently off of work due to left knee surgery.    Since last visit 1 week ago, the bursa has been draining spontaneously from the medial previous aspiration site that was  "once closed. Fluid is reported as clear and not foul smelling.        He is now being treated with IV antibiotics (cefazolin) through left arm PICC and under the care of Dr. Solis for his infected olecranon bursitis       Review of Systems   Constitution: Negative. Negative for chills, fever and night sweats.   HENT: Negative for congestion and headaches.    Eyes: Negative for blurred vision, left vision loss and right vision loss.   Cardiovascular: Negative for chest pain and syncope.   Respiratory: Negative for cough and shortness of breath.    Endocrine: Negative for polydipsia, polyphagia and polyuria.   Hematologic/Lymphatic: Negative for bleeding problem. Does not bruise/bleed easily.   Skin: Negative for dry skin, itching and rash.   Musculoskeletal: Negative for falls and muscle weakness.   Gastrointestinal: Negative for abdominal pain and bowel incontinence.   Genitourinary: Negative for bladder incontinence and nocturia.   Neurological: Negative for disturbances in coordination, loss of balance and seizures.   Psychiatric/Behavioral: Negative for depression. The patient does not have insomnia.    Allergic/Immunologic: Negative for hives and persistent infections.     Vitals:    02/05/19 1304   BP: (!) 190/109   Pulse: 104   Weight: 90.7 kg (200 lb)   Height: 5' 11" (1.803 m)   PainSc:   2       PHYSICAL EXAM:  General: Patient appears alert and oriented x 3.  Mood is pleasant.  Observation of ears, eyes and nose reveal no gross abnormalities. HEENT: NCAT, sclera nonicteric  Lungs: Respirations are equal and unlabored.  Well nourished, in no acute distress and ambulates with a non-antalgic gait with no assistive devices.    Skin: Skin intact bilaterally. Sensation intact bilaterally. Compartments soft. No evidence of edema, infection, or induration.      Right ELBOW / WRIST EXTREMITY EXAM:     OBSERVATION / INSPECTION    Swelling                                           mild swelling localized to bursa " area                                    Deformity                                         none  Discoloration                                  erythema near completely resolved.                                      Scars                                                         none                                      Atrophy                                            none    0.5 diameter skin opening of the posterior medial elbow noted with fatty tissue protruding from it. No purulent drainage or foul smell.      TENDERNESS / CREPITUS (T / C):                                                                                             T/C                                                                                                                            Medial epicondyle                                                               - / -    Med. (Ulnar) collateral ligament                                        - / -    Flexor pronator Musculature                                              - / -   Biceps tendon                                                                    - / -   Head of radius                                                                   - / -    Lateral epicondyle                                                             - / -    Extensor Musculature                                                       - / -   Brachioradialis                                                                  - / -   Triceps tendon                                                                  - / -   Triceps muscle                                                                  - / -   Olecranon                                                                          - / -   Olecranon bursa                                                               mild + / -   Cubital fossa                                                                     - / -   Anterior jointline                                                                 - / -   Radial tunnel                                                                     -/-                                                                                                                                                                                                       ROM:             ('*' = with pain)                                   Right Elbow                                     AROM (PROM)                                             Extension                                        0 deg  (5 deg)   Flexion                                            145 deg (145 deg)                                                                Pronation                                         90 deg  (90 deg)                                                                 Supination                                       80 deg  (80 deg)                                                                                                                                                                                 Left Elbow                                       AROM (PROM)                                             Extension                                        0 deg  (5 deg)   Flexion                                             145 deg (145 deg)                                                                Pronation                                         90 deg  (90 deg)                                                                 Supination                                       80 deg  (80 deg)                Right Wrist                                      AROM (PROM)                       Extension                                        80 deg (85 deg)   Flexion                                            80 deg (85 deg)                                                                    Ulnar Deviation                     35 deg (40 deg)  Radial Deviation                     35 deg (40 deg)                                                                                                                                      Left Wrist                                         AROM (PROM)                                             Extension                                        80 deg (85 deg)   Flexion                                             80 deg (85 deg)                                                                    Ulnar Deviation                     35 deg (40 deg)  Radial Deviation                    35 deg (40 deg)                                                                     STRENGTH:             ('*' = with pain)                        Elbow Flexion:                                                       5/5  Elbow Extension:                                        5/5  Wrist Flexion:                                                        5/5  Wrist Extension:                                          5/5  :                                                                                5/5  Intrinsics:                                                               5/5  EPL (Ext. pollicis longus):                 5/5  Pinch Mechanism:                                      5/5     ELBOW EXAMINATION:  See above noted areas of tenderness.   Test for Ligamentous Instability - UCL                     neg  Test for Ligamentous Instability - LUCL                   normal  PLRI                                                                                    neg  Tinel's (Percussion) Test - Cubital                                      neg  Tennis Elbow Test                                                              neg  Golfer's Elbow Test                                                             neg  Radial Capitellar Grind Test                                               neg  Valgus/Extension Overload Test                                         neg  Resisted Long Finger Extension Pain                                 neg  Moving Valgus                                                                   neg  Forearm pain with resisted supination                                 neg  Yeargeson' s (elbow pain)                                                    neg  Hook test                                                                                neg           EXTREMITY NEURO-VASCULAR EXAMINATION: Sensation grossly intact to light touch all dermatomal regions. DTR 2+ Biceps, Triceps, BR and Negative Clint's sign. Grossly intact motor function at Elbow, Wrist and Hand. Distal pulses radial and ulnar 2+, brisk cap refill, symmetric.     Other Findings:    Medial area of thin soft tissue is now opened 0.5cm diameter opening in skin noted to have some weeping bursal fluid draining from it.    No lymphadenopathy noted of right upper extremity and no streaking.     Bursa and elbow actually look improved otherwise from yesterday.     Xrays:  Xrays of the right elbow 3 views from 1/5/18. No fracture, subluxation, or other significant bony or joint abnormality is identified.         ASSESSMENT:  Right elbow pain, acute  Right elbow olecranon bursitis   Right elbow infection with abrasion      Plan:    1. Continue treatment with Dr. Solis for IV antibiotics (patient states another 3 weeks)  2. Aggressive ice, compression, elevation  3. RTC in 6 day with me for recheck of elbow. Return sooner if things worsen or if new symptoms occur.   4. Keep elbow covered if draining. Try to avoid elbow flexion over 90 degrees when possible but okay to bend.  5. Wound debrided today. Looks greatly improved afterwards.  All patients questions were answered. Patient was advised to call us with any concerns or questions.     --PROCEDURE NOTE: Right elbow wound debridement      After time out was performed, including verification of patient ID, procedure, site and side, availability  of information and equipment, review of safety issues, and agreement with consent, the procedure site was marked and the patient was cleaned and prepped as aseptically possible. The opening of the skin of the right elbow had adipose tissue protruding from it. This was debrided with scissors after local injection with 2cc of 1% lidocaine. After this, the bursa and elbow soft tissue was expressed with removal of 3 small collections of adipose tissue. Bleeding was controlled. Area was cleaned and bandaged with xeroform and pressure dressing. The patient had no adverse reactions to the medication. Pain decreased. told to change bandage daily and watch for adverse effects.       RTC on Monday.

## 2019-02-05 NOTE — TELEPHONE ENCOUNTER
I called and spoke with Leyla, she notes that she is at the patient's house changing the dressing on his elbow. She said that when the patient bent his elbow a fatty substance came out of a fingertip sized poke hole in the patient's elbow. She notes that she pushed the substance back into the patient's elbow but that it continues to protrude with elbow flexion. After discussing this with Dr. Jeong we will have the patient come in to see Mello Flores in clinic today for reevaluation following draining yesterday by Mello.

## 2019-02-08 RX ORDER — PAROXETINE HYDROCHLORIDE 40 MG/1
TABLET, FILM COATED ORAL
Qty: 90 TABLET | Refills: 0 | Status: SHIPPED | OUTPATIENT
Start: 2019-02-08 | End: 2019-03-14 | Stop reason: SDUPTHER

## 2019-02-11 ENCOUNTER — OFFICE VISIT (OUTPATIENT)
Dept: SPORTS MEDICINE | Facility: CLINIC | Age: 49
End: 2019-02-11
Payer: COMMERCIAL

## 2019-02-11 VITALS
SYSTOLIC BLOOD PRESSURE: 131 MMHG | DIASTOLIC BLOOD PRESSURE: 90 MMHG | HEIGHT: 71 IN | HEART RATE: 87 BPM | BODY MASS INDEX: 28 KG/M2 | WEIGHT: 200 LBS

## 2019-02-11 DIAGNOSIS — M70.21 OLECRANON BURSITIS OF RIGHT ELBOW: ICD-10-CM

## 2019-02-11 DIAGNOSIS — L08.9 INFECTED ABRASION OF RIGHT ELBOW, INITIAL ENCOUNTER: ICD-10-CM

## 2019-02-11 DIAGNOSIS — M71.121 INFECTED OLECRANON BURSA, RIGHT: Primary | ICD-10-CM

## 2019-02-11 DIAGNOSIS — S50.311A INFECTED ABRASION OF RIGHT ELBOW, INITIAL ENCOUNTER: ICD-10-CM

## 2019-02-11 DIAGNOSIS — M25.521 RIGHT ELBOW PAIN: ICD-10-CM

## 2019-02-11 PROCEDURE — 3075F PR MOST RECENT SYSTOLIC BLOOD PRESS GE 130-139MM HG: ICD-10-PCS | Mod: CPTII,S$GLB,, | Performed by: PHYSICIAN ASSISTANT

## 2019-02-11 PROCEDURE — 99214 OFFICE O/P EST MOD 30 MIN: CPT | Mod: 24,S$GLB,, | Performed by: PHYSICIAN ASSISTANT

## 2019-02-11 PROCEDURE — 3075F SYST BP GE 130 - 139MM HG: CPT | Mod: CPTII,S$GLB,, | Performed by: PHYSICIAN ASSISTANT

## 2019-02-11 PROCEDURE — 99214 PR OFFICE/OUTPT VISIT, EST, LEVL IV, 30-39 MIN: ICD-10-PCS | Mod: 24,S$GLB,, | Performed by: PHYSICIAN ASSISTANT

## 2019-02-11 PROCEDURE — 3080F PR MOST RECENT DIASTOLIC BLOOD PRESSURE >= 90 MM HG: ICD-10-PCS | Mod: CPTII,S$GLB,, | Performed by: PHYSICIAN ASSISTANT

## 2019-02-11 PROCEDURE — 3008F PR BODY MASS INDEX (BMI) DOCUMENTED: ICD-10-PCS | Mod: CPTII,S$GLB,, | Performed by: PHYSICIAN ASSISTANT

## 2019-02-11 PROCEDURE — 3008F BODY MASS INDEX DOCD: CPT | Mod: CPTII,S$GLB,, | Performed by: PHYSICIAN ASSISTANT

## 2019-02-11 PROCEDURE — 3080F DIAST BP >= 90 MM HG: CPT | Mod: CPTII,S$GLB,, | Performed by: PHYSICIAN ASSISTANT

## 2019-02-11 PROCEDURE — 99999 PR PBB SHADOW E&M-EST. PATIENT-LVL III: CPT | Mod: PBBFAC,,, | Performed by: PHYSICIAN ASSISTANT

## 2019-02-11 PROCEDURE — 99999 PR PBB SHADOW E&M-EST. PATIENT-LVL III: ICD-10-PCS | Mod: PBBFAC,,, | Performed by: PHYSICIAN ASSISTANT

## 2019-02-11 NOTE — PROGRESS NOTES
CC  Right elbow pain     47y/o white male with complaints of right elbow pain.     He presents for right elbow which is significantly improved since 5 days ago when I performed debridement and expression of elbow bursa with removal of some tissue pieces from the wound. Since, the wound has closed up and near completely stopped draining. Only a little dot of drainage noted on his bandage today. Redness has resolved. Denies fevers or chills.     This began after he fell in his bathroom and hit is elbow. He reports that had a small abrasion of the elbow from that fall. A couple of days later he began having erythema, pain, and swelling of his elbow that worsened to cover the area from his mid biceps to his mid forearm. He then presented to urgent care on 1/5/19 where he was given a steroid shot and started on clindamycin abx which he has been taking. He reports improvement of the swelling and redness since being on the abx. He has some intermittent slight posterior elbow pain.  He denies fever, chills, nausea, vomiting. He reports that elbow pain or swelling is not worsening. He is using compression sleeve to the elbow.     Aspirate fluid was sent to lab and grew out MSSA on 1/11/19. Resistant to clindamycin so his abx was switched to bactrim DS. Has completed a 10 day course of bactrim today with improvement of arm swelling but continued swelling of his olecranon bursa and some erythema only directly overlying the bursa sack. He has very little pain of the area. No real improvement of swelling with indocin.    He is currently off of work due to left knee surgery.       He is now being treated with IV antibiotics (cefazolin) through left arm PICC and under the care of Dr. Solis for his infected olecranon bursitis       Review of Systems   Constitution: Negative. Negative for chills, fever and night sweats.   HENT: Negative for congestion and headaches.    Eyes: Negative for blurred vision, left vision loss and right  "vision loss.   Cardiovascular: Negative for chest pain and syncope.   Respiratory: Negative for cough and shortness of breath.    Endocrine: Negative for polydipsia, polyphagia and polyuria.   Hematologic/Lymphatic: Negative for bleeding problem. Does not bruise/bleed easily.   Skin: Negative for dry skin, itching and rash.   Musculoskeletal: Negative for falls and muscle weakness.   Gastrointestinal: Negative for abdominal pain and bowel incontinence.   Genitourinary: Negative for bladder incontinence and nocturia.   Neurological: Negative for disturbances in coordination, loss of balance and seizures.   Psychiatric/Behavioral: Negative for depression. The patient does not have insomnia.    Allergic/Immunologic: Negative for hives and persistent infections.     Vitals:    02/11/19 1242   BP: (!) 131/90   Pulse: 87   Weight: 90.7 kg (200 lb)   Height: 5' 11" (1.803 m)   PainSc:   2       PHYSICAL EXAM:  General: Patient appears alert and oriented x 3.  Mood is pleasant.  Observation of ears, eyes and nose reveal no gross abnormalities. HEENT: NCAT, sclera nonicteric  Lungs: Respirations are equal and unlabored.  Well nourished, in no acute distress and ambulates with a non-antalgic gait with no assistive devices.    Skin: Skin intact bilaterally. Sensation intact bilaterally. Compartments soft. No evidence of edema, infection, or induration.      Right ELBOW / WRIST EXTREMITY EXAM:     OBSERVATION / INSPECTION    Swelling                                          none                                   Deformity                                         none  Discoloration                                  none                                       Scars                                                         none                                      Atrophy                                            none    Skin is closed with no open incision.      TENDERNESS / CREPITUS (T / C):                                          "                                                    T/C                                                                                                                            Medial epicondyle                                                               - / -    Med. (Ulnar) collateral ligament                                        - / -    Flexor pronator Musculature                                              - / -   Biceps tendon                                                                    - / -   Head of radius                                                                   - / -    Lateral epicondyle                                                             - / -    Extensor Musculature                                                       - / -   Brachioradialis                                                                  - / -   Triceps tendon                                                                  - / -   Triceps muscle                                                                  - / -   Olecranon                                                                          - / -   Olecranon bursa                                                               - / -   Cubital fossa                                                                     - / -   Anterior jointline                                                                - / -   Radial tunnel                                                                     -/-                                                                                                                                                                                                       ROM:             ('*' = with pain)                                   Right Elbow                                     AROM (PROM)                                             Extension                                        0 deg  (5 deg)   Flexion                                             145 deg (145 deg)                                                                Pronation                                         90 deg  (90 deg)                                                                 Supination                                       80 deg  (80 deg)                                                                                                                                                                                 Left Elbow                                       AROM (PROM)                                             Extension                                        0 deg  (5 deg)   Flexion                                             145 deg (145 deg)                                                                Pronation                                         90 deg  (90 deg)                                                                 Supination                                       80 deg  (80 deg)                Right Wrist                                      AROM (PROM)                       Extension                                        80 deg (85 deg)   Flexion                                            80 deg (85 deg)                                                                    Ulnar Deviation                     35 deg (40 deg)  Radial Deviation                    35 deg (40 deg)                                                                                                                                      Left Wrist                                         AROM (PROM)                                             Extension                                        80 deg (85 deg)   Flexion                                             80 deg (85 deg)                                                                    Ulnar Deviation                     35 deg (40 deg)  Radial Deviation                    35 deg (40 deg)                                                                      STRENGTH:             ('*' = with pain)                        Elbow Flexion:                                                       5/5  Elbow Extension:                                        5/5  Wrist Flexion:                                                        5/5  Wrist Extension:                                          5/5  :                                                                                5/5  Intrinsics:                                                               5/5  EPL (Ext. pollicis longus):                 5/5  Pinch Mechanism:                                      5/5     ELBOW EXAMINATION:  See above noted areas of tenderness.   Test for Ligamentous Instability - UCL                     neg  Test for Ligamentous Instability - LUCL                   normal  PLRI                                                                                    neg  Tinel's (Percussion) Test - Cubital                                      neg  Tennis Elbow Test                                                              neg  Golfer's Elbow Test                                                             neg  Radial Capitellar Grind Test                                               neg  Valgus/Extension Overload Test                                        neg  Resisted Long Finger Extension Pain                                 neg  Moving Valgus                                                                   neg  Forearm pain with resisted supination                                 neg  Yeargeson' s (elbow pain)                                                    neg  Hook test                                                                                neg           EXTREMITY NEURO-VASCULAR EXAMINATION: Sensation grossly intact to light touch all dermatomal regions. DTR 2+ Biceps, Triceps, BR and Negative Clint's sign. Grossly intact motor  function at Elbow, Wrist and Hand. Distal pulses radial and ulnar 2+, brisk cap refill, symmetric.     Other Findings:      No lymphadenopathy noted of right upper extremity and no streaking.     Bursa and elbow 95% improved.    Xrays:  Xrays of the right elbow 3 views from 1/5/18. No fracture, subluxation, or other significant bony or joint abnormality is identified.         ASSESSMENT:  Right elbow pain, acute  Right elbow olecranon bursitis   Right elbow infection with abrasion      Plan:    1. Continue treatment with Dr. Solis for IV antibiotics (patient states another 2 weeks)  2.  ice, compression, elevation  3. Patient will message me if elbow worsens or if drainage returns. Return sooner if things worsen or if new symptoms occur.   4. Keep elbow covered for another 1 week.   5. Compression wrap for another 1-2 weeks.     Patient will f/u with Dr. Solis on the 25th of this month.     All patients questions were answered. Patient was advised to call us with any concerns or questions.

## 2019-02-14 ENCOUNTER — TELEPHONE (OUTPATIENT)
Dept: INFECTIOUS DISEASES | Facility: CLINIC | Age: 49
End: 2019-02-14

## 2019-02-14 NOTE — TELEPHONE ENCOUNTER
Called to check in with the patient.  Swelling has gone down in his elbow.  Still feeling lethargic.  Having some residual pain in that elbow.

## 2019-02-18 ENCOUNTER — PATIENT MESSAGE (OUTPATIENT)
Dept: INFECTIOUS DISEASES | Facility: CLINIC | Age: 49
End: 2019-02-18

## 2019-02-19 ENCOUNTER — TELEPHONE (OUTPATIENT)
Dept: INFECTIOUS DISEASES | Facility: CLINIC | Age: 49
End: 2019-02-19

## 2019-02-19 DIAGNOSIS — M70.21 OLECRANON BURSITIS OF RIGHT ELBOW: Primary | ICD-10-CM

## 2019-02-19 RX ORDER — IBUPROFEN 800 MG/1
800 TABLET ORAL 3 TIMES DAILY PRN
Qty: 42 TABLET | Refills: 0 | Status: SHIPPED | OUTPATIENT
Start: 2019-02-19 | End: 2019-02-20

## 2019-02-19 NOTE — TELEPHONE ENCOUNTER
----- Message from Benita Gallagher sent at 2/19/2019  9:13 AM CST -----  Contact: Leyla gayathri/ [facility]  tel:  [phone]   Needs Advice    Reason for call:    I.V. Infusion is due to end on Friday. Pt. Is having signs of infection in his right elbow.     Pls call to discuss this.          Communication Preference:   Phone     Additional Information:  pls call.

## 2019-02-19 NOTE — TELEPHONE ENCOUNTER
----- Message from Rachel Perez MA sent at 2/19/2019 10:01 AM CST -----  Contact: Leyla/ [facility]  Leyla the home health nurse called to report that the pt had some redness in the right elbow and a little warm to the touch as well as some fluid. She says the his labs from the 30th were fine and that the area looks way better than before. His abx ends Friday and she just wanted to let us know what's going on

## 2019-02-19 NOTE — TELEPHONE ENCOUNTER
Mo Ramos.  Please ask the infusion company to extend his antibiotics for another 2 weeks.  Also lets change the dosing to cefazolin 6 grams as continuous infusion over 24 hours.  I sent the patient a [employer] message notifying him of the changes.

## 2019-02-19 NOTE — TELEPHONE ENCOUNTER
----- Message from Rachel Perez MA sent at 2/19/2019  1:31 PM CST -----  Contact: [facility]  [facility] wants to know if you want the pt to start the new

## 2019-02-19 NOTE — TELEPHONE ENCOUNTER
----- Message from Rachel Perez MA sent at 2/19/2019  3:42 PM CST -----  Contact: Leighton/ [facility]  Leighton wants to know if you want the pt to finish his dose he's on now through and start the new dose Friday or start the new dose right away

## 2019-02-19 NOTE — TELEPHONE ENCOUNTER
----- Message from Rachel Perez MA sent at 2/19/2019  1:51 PM CST -----  Contact: Self [phone]      ----- Message -----  From: Sherri Guerin  Sent: 2/19/2019   1:33 PM  To: Irma Sen[facility] Staff    PT called to speak with Dr. Solis to discuss a message that was received via the portal.

## 2019-02-19 NOTE — TELEPHONE ENCOUNTER
Called Leyla from [facility] at the number provided in the message that was left. There wa no answer, left a message to give me a call back

## 2019-02-19 NOTE — TELEPHONE ENCOUNTER
Spoke with Leighton from [facility] and informed him the change in the dosing for the pt

## 2019-02-20 ENCOUNTER — HOSPITAL ENCOUNTER (OUTPATIENT)
Dept: RADIOLOGY | Facility: HOSPITAL | Age: 49
Discharge: HOME | End: 2019-02-20
Attending: PHYSICIAN ASSISTANT
Payer: COMMERCIAL

## 2019-02-20 ENCOUNTER — OFFICE VISIT (OUTPATIENT)
Dept: SPORTS MEDICINE | Facility: CLINIC | Age: 49
End: 2019-02-20
Payer: COMMERCIAL

## 2019-02-20 VITALS
WEIGHT: 200 LBS | HEIGHT: 71 IN | HEART RATE: 92 BPM | DIASTOLIC BLOOD PRESSURE: 92 MMHG | SYSTOLIC BLOOD PRESSURE: 147 MMHG | BODY MASS INDEX: 28 KG/M2

## 2019-02-20 DIAGNOSIS — M70.21 OLECRANON BURSITIS OF RIGHT ELBOW: ICD-10-CM

## 2019-02-20 DIAGNOSIS — M25.521 RIGHT ELBOW PAIN: ICD-10-CM

## 2019-02-20 DIAGNOSIS — M17.11 OSTEOARTHRITIS OF RIGHT KNEE, UNSPECIFIED OSTEOARTHRITIS TYPE: ICD-10-CM

## 2019-02-20 DIAGNOSIS — M25.521 RIGHT ELBOW PAIN: Primary | ICD-10-CM

## 2019-02-20 PROCEDURE — 3080F PR MOST RECENT DIASTOLIC BLOOD PRESSURE >= 90 MM HG: ICD-10-PCS | Mod: CPTII,S$GLB,, | Performed by: PHYSICIAN ASSISTANT

## 2019-02-20 PROCEDURE — 73080 XR ELBOW COMPLETE 3 VIEW RIGHT: ICD-10-PCS | Mod: 26,RT,, | Performed by: RADIOLOGY

## 2019-02-20 PROCEDURE — 73080 X-RAY EXAM OF ELBOW: CPT | Mod: 26,RT,, | Performed by: RADIOLOGY

## 2019-02-20 PROCEDURE — 99999 PR PBB SHADOW E&M-EST. PATIENT-LVL IV: ICD-10-PCS | Mod: PBBFAC,,, | Performed by: PHYSICIAN ASSISTANT

## 2019-02-20 PROCEDURE — 3077F PR MOST RECENT SYSTOLIC BLOOD PRESSURE >= 140 MM HG: ICD-10-PCS | Mod: CPTII,S$GLB,, | Performed by: PHYSICIAN ASSISTANT

## 2019-02-20 PROCEDURE — 73080 X-RAY EXAM OF ELBOW: CPT | Mod: TC,FY,PO,RT

## 2019-02-20 PROCEDURE — 3077F SYST BP >= 140 MM HG: CPT | Mod: CPTII,S$GLB,, | Performed by: PHYSICIAN ASSISTANT

## 2019-02-20 PROCEDURE — 99214 PR OFFICE/OUTPT VISIT, EST, LEVL IV, 30-39 MIN: ICD-10-PCS | Mod: 24,S$GLB,, | Performed by: PHYSICIAN ASSISTANT

## 2019-02-20 PROCEDURE — 3008F BODY MASS INDEX DOCD: CPT | Mod: CPTII,S$GLB,, | Performed by: PHYSICIAN ASSISTANT

## 2019-02-20 PROCEDURE — 3080F DIAST BP >= 90 MM HG: CPT | Mod: CPTII,S$GLB,, | Performed by: PHYSICIAN ASSISTANT

## 2019-02-20 PROCEDURE — 99214 OFFICE O/P EST MOD 30 MIN: CPT | Mod: 24,S$GLB,, | Performed by: PHYSICIAN ASSISTANT

## 2019-02-20 PROCEDURE — 99999 PR PBB SHADOW E&M-EST. PATIENT-LVL IV: CPT | Mod: PBBFAC,,, | Performed by: PHYSICIAN ASSISTANT

## 2019-02-20 PROCEDURE — 3008F PR BODY MASS INDEX (BMI) DOCUMENTED: ICD-10-PCS | Mod: CPTII,S$GLB,, | Performed by: PHYSICIAN ASSISTANT

## 2019-02-20 RX ORDER — CELECOXIB 200 MG/1
200 CAPSULE ORAL EVERY 12 HOURS PRN
Qty: 30 CAPSULE | Refills: 1 | Status: SHIPPED | OUTPATIENT
Start: 2019-02-20 | End: 2019-03-14

## 2019-02-20 RX ORDER — PROMETHAZINE HYDROCHLORIDE 25 MG/1
25 TABLET ORAL EVERY 6 HOURS PRN
Qty: 16 TABLET | Refills: 0 | Status: SHIPPED | OUTPATIENT
Start: 2019-02-20 | End: 2022-01-13

## 2019-02-20 RX ORDER — TRAMADOL HYDROCHLORIDE 50 MG/1
50-100 TABLET ORAL EVERY 6 HOURS PRN
Qty: 28 TABLET | Refills: 0 | Status: SHIPPED | OUTPATIENT
Start: 2019-02-20 | End: 2019-07-08 | Stop reason: SDUPTHER

## 2019-02-20 NOTE — PROGRESS NOTES
CC  Right elbow pain     47y/o white male with complaints of right elbow pain.     He presents for right elbow which has been hurting him some over the last few days and having some mild intermittent erythema and swelling. The elbow was last addressed on 2/5/19 when I performed debridement and expression of the elbow bursa with removal of some tissue pieces from the wound. Since, the wound has closed up and near completely stopped draining.  Elbow has been greatly improved since this time. He presents today for concerns of his mild intermittent pain and swelling of the elbow. Denies fevers or chills.     Interval history:  This began after he fell in his bathroom and hit is elbow. He reports that had a small abrasion of the elbow from that fall. A couple of days later he began having erythema, pain, and swelling of his elbow that worsened to cover the area from his mid biceps to his mid forearm. He then presented to urgent care on 1/5/19 where he was given a steroid shot and started on clindamycin abx which he has been taking. He reports improvement of the swelling and redness since being on the abx. He has some intermittent slight posterior elbow pain.  He denies fever, chills, nausea, vomiting. He reports that elbow pain or swelling is not worsening. He is using compression sleeve to the elbow.     Aspirate fluid was sent to lab and grew out MSSA on 1/11/19. Resistant to clindamycin so his abx was switched to bactrim DS. Has completed a 10 day course of bactrim today with improvement of arm swelling but continued swelling of his olecranon bursa and some erythema only directly overlying the bursa sack. He has very little pain of the area. No real improvement of swelling with indocin.    He is currently off of work due to left knee surgery.       He is now being treated with IV antibiotics (cefazolin) through left arm PICC and under the care of Dr. Solis for his infected olecranon bursitis       Review of Systems  "  Constitution: Negative. Negative for chills, fever and night sweats.   HENT: Negative for congestion and headaches.    Eyes: Negative for blurred vision, left vision loss and right vision loss.   Cardiovascular: Negative for chest pain and syncope.   Respiratory: Negative for cough and shortness of breath.    Endocrine: Negative for polydipsia, polyphagia and polyuria.   Hematologic/Lymphatic: Negative for bleeding problem. Does not bruise/bleed easily.   Skin: Negative for dry skin, itching and rash.   Musculoskeletal: Negative for falls and muscle weakness.   Gastrointestinal: Negative for abdominal pain and bowel incontinence.   Genitourinary: Negative for bladder incontinence and nocturia.   Neurological: Negative for disturbances in coordination, loss of balance and seizures.   Psychiatric/Behavioral: Negative for depression. The patient does not have insomnia.    Allergic/Immunologic: Negative for hives and persistent infections.     Vitals:    02/20/19 0842   BP: (!) 147/92   Pulse: 92   Weight: 90.7 kg (200 lb)   Height: 5' 11" (1.803 m)   PainSc:   4       PHYSICAL EXAM:  General: Patient appears alert and oriented x 3.  Mood is pleasant.  Observation of ears, eyes and nose reveal no gross abnormalities. HEENT: NCAT, sclera nonicteric  Lungs: Respirations are equal and unlabored.  Well nourished, in no acute distress and ambulates with a non-antalgic gait with no assistive devices.    Skin: Skin intact bilaterally. Sensation intact bilaterally. Compartments soft. No evidence of edema, infection, or induration.      Right ELBOW / WRIST EXTREMITY EXAM:     OBSERVATION / INSPECTION    Swelling                                          none                                   Deformity                                         none  Discoloration                                  none                                       Scars                                                         none                             "          Atrophy                                            none    Skin is closed with no open incisions, wounds or drainage. No erythema present today.      TENDERNESS / CREPITUS (T / C):                                                                                             T/C                                                                                                                            Medial epicondyle                                                               - / -    Med. (Ulnar) collateral ligament                                        - / -    Flexor pronator Musculature                                              - / -   Biceps tendon                                                                    - / -   Head of radius                                                                   - / -    Lateral epicondyle                                                             - / -    Extensor Musculature                                                       - / -   Brachioradialis                                                                  - / -   Triceps tendon                                                                  - / -   Triceps muscle                                                                  - / -   Olecranon                                                                          - / -   Olecranon bursa                                                               Very mild + TTP / -   Cubital fossa                                                                     - / -   Anterior jointline                                                                - / -   Radial tunnel                                                                     -/-                                                                                                                                                                                                       ROM:              ('*' = with pain)                                   Right Elbow                                     AROM (PROM)                                             Extension                                        0 deg  (5 deg)   Flexion                                            145 deg (145 deg)                                                                Pronation                                         90 deg  (90 deg)                                                                 Supination                                       80 deg  (80 deg)                                                                                                                                                                                 Left Elbow                                       AROM (PROM)                                             Extension                                        0 deg  (5 deg)   Flexion                                             145 deg (145 deg)                                                                Pronation                                         90 deg  (90 deg)                                                                 Supination                                       80 deg  (80 deg)                Right Wrist                                      AROM (PROM)                       Extension                                        80 deg (85 deg)   Flexion                                            80 deg (85 deg)                                                                    Ulnar Deviation                     35 deg (40 deg)  Radial Deviation                    35 deg (40 deg)                                                                                                                                      Left Wrist                                         AROM (PROM)                                             Extension                                        80 deg (85 deg)   Flexion                                              80 deg (85 deg)                                                                    Ulnar Deviation                     35 deg (40 deg)  Radial Deviation                    35 deg (40 deg)                                                                     STRENGTH:             ('*' = with pain)                        Elbow Flexion:                                                       5/5  Elbow Extension:                                        5/5  Wrist Flexion:                                                        5/5  Wrist Extension:                                          5/5  :                                                                                5/5  Intrinsics:                                                               5/5  EPL (Ext. pollicis longus):                 5/5  Pinch Mechanism:                                      5/5     ELBOW EXAMINATION:  See above noted areas of tenderness.   Test for Ligamentous Instability - UCL                     neg  Test for Ligamentous Instability - LUCL                   normal  PLRI                                                                                    neg  Tinel's (Percussion) Test - Cubital                                      neg  Tennis Elbow Test                                                              neg  Golfer's Elbow Test                                                             neg  Radial Capitellar Grind Test                                               neg  Valgus/Extension Overload Test                                        neg  Resisted Long Finger Extension Pain                                 neg  Moving Valgus                                                                   neg  Forearm pain with resisted supination                                 neg  Yeargeson' s (elbow pain)                                                    neg  Hook test                                                                                 neg           EXTREMITY NEURO-VASCULAR EXAMINATION: Sensation grossly intact to light touch all dermatomal regions. DTR 2+ Biceps, Triceps, BR and Negative Clint's sign. Grossly intact motor function at Elbow, Wrist and Hand. Distal pulses radial and ulnar 2+, brisk cap refill, symmetric.     Other Findings:      No lymphadenopathy noted of right upper extremity and no streaking.     Bursa and elbow greatly improved with very little swelling and no erythema today.  Greatly improved.    Xrays:  Xrays of the right elbow 3 views were ordered and reviewed from today. No fracture, subluxation, or other significant bony or joint abnormality is identified. Some mild arthritis changes noted.        ASSESSMENT:  Right elbow pain, acute  Right elbow olecranon bursitis   Right elbow infection - resolving or nearly resolved      Plan:    Patient reassured today. Elbow and bursa still look greatly improved from previous. No signs of septic joint or infection at this time.   -He could be having some non-infectious olecranon bursitis coming and going now.     1. Continue treatment with Dr. Solis for IV antibiotics as needed.  2.  ice, compression, elevation  -Will start celebrex for knee osteoarthritis pain and elbow.  -phenergan for nausea likely from abx.  -Tramadol as needed for nightly pain    3. Patient will message me if elbow worsens or if drainage returns. Return sooner if things worsen or if new symptoms occur.   4. RTC in 2 weeks for recheck.         All patients questions were answered. Patient was advised to call us with any concerns or questions.

## 2019-02-25 ENCOUNTER — OFFICE VISIT (OUTPATIENT)
Dept: INFECTIOUS DISEASES | Facility: CLINIC | Age: 49
End: 2019-02-25
Payer: COMMERCIAL

## 2019-02-25 VITALS
SYSTOLIC BLOOD PRESSURE: 144 MMHG | DIASTOLIC BLOOD PRESSURE: 96 MMHG | WEIGHT: 224.19 LBS | HEART RATE: 88 BPM | HEIGHT: 71 IN | BODY MASS INDEX: 31.39 KG/M2 | TEMPERATURE: 98 F

## 2019-02-25 DIAGNOSIS — M70.21 OLECRANON BURSITIS OF RIGHT ELBOW: Primary | ICD-10-CM

## 2019-02-25 PROCEDURE — 3008F PR BODY MASS INDEX (BMI) DOCUMENTED: ICD-10-PCS | Mod: CPTII,S$GLB,, | Performed by: INTERNAL MEDICINE

## 2019-02-25 PROCEDURE — 3080F PR MOST RECENT DIASTOLIC BLOOD PRESSURE >= 90 MM HG: ICD-10-PCS | Mod: CPTII,S$GLB,, | Performed by: INTERNAL MEDICINE

## 2019-02-25 PROCEDURE — 3077F PR MOST RECENT SYSTOLIC BLOOD PRESSURE >= 140 MM HG: ICD-10-PCS | Mod: CPTII,S$GLB,, | Performed by: INTERNAL MEDICINE

## 2019-02-25 PROCEDURE — 3077F SYST BP >= 140 MM HG: CPT | Mod: CPTII,S$GLB,, | Performed by: INTERNAL MEDICINE

## 2019-02-25 PROCEDURE — 3080F DIAST BP >= 90 MM HG: CPT | Mod: CPTII,S$GLB,, | Performed by: INTERNAL MEDICINE

## 2019-02-25 PROCEDURE — 99214 OFFICE O/P EST MOD 30 MIN: CPT | Mod: S$GLB,,, | Performed by: INTERNAL MEDICINE

## 2019-02-25 PROCEDURE — 3008F BODY MASS INDEX DOCD: CPT | Mod: CPTII,S$GLB,, | Performed by: INTERNAL MEDICINE

## 2019-02-25 PROCEDURE — 99999 PR PBB SHADOW E&M-EST. PATIENT-LVL III: ICD-10-PCS | Mod: PBBFAC,,, | Performed by: INTERNAL MEDICINE

## 2019-02-25 PROCEDURE — 99214 PR OFFICE/OUTPT VISIT, EST, LEVL IV, 30-39 MIN: ICD-10-PCS | Mod: S$GLB,,, | Performed by: INTERNAL MEDICINE

## 2019-02-25 PROCEDURE — 99999 PR PBB SHADOW E&M-EST. PATIENT-LVL III: CPT | Mod: PBBFAC,,, | Performed by: INTERNAL MEDICINE

## 2019-02-25 NOTE — PROGRESS NOTES
Subjective:      Patient ID: Nirmal Moss is a 48 y.o. male.    Chief Complaint:Follow-up    History of Present Illness    Mr. Moss is a 49 y/o male with a history of MSSA olecranon bursitis.  He has completed about 2 weeks of IV cefazolin.  He is here today for follow up.  He still has some pain in the right elbow.  But overall, he feels better.    Review of Systems   Constitution: Positive for weakness and malaise/fatigue. Negative for chills, decreased appetite, fever, night sweats, weight gain and weight loss.   HENT: Negative for congestion, ear pain, hearing loss, hoarse voice, sore throat and tinnitus.    Eyes: Negative for blurred vision, redness and visual disturbance.   Cardiovascular: Negative for chest pain, leg swelling and palpitations.   Respiratory: Negative for cough, hemoptysis, shortness of breath and sputum production.    Hematologic/Lymphatic: Negative for adenopathy. Does not bruise/bleed easily.   Skin: Positive for dry skin and itching. Negative for rash and suspicious lesions.   Musculoskeletal: Positive for joint pain. Negative for back pain, myalgias and neck pain.   Gastrointestinal: Positive for heartburn and nausea. Negative for abdominal pain, constipation, diarrhea and vomiting.   Genitourinary: Negative for dysuria, flank pain, frequency, hematuria, hesitancy and urgency.   Neurological: Negative for dizziness, headaches, numbness and paresthesias.   Psychiatric/Behavioral: Negative for depression and memory loss. The patient does not have insomnia and is not nervous/anxious.      Objective:   Physical Exam   Constitutional: He is oriented to person, place, and time. He appears well-developed and well-nourished. No distress.   HENT:   Head: Normocephalic and atraumatic.   Right Ear: External ear normal.   Left Ear: External ear normal.   Nose: Nose normal.   Mouth/Throat: Oropharynx is clear and moist. No oropharyngeal exudate.   Eyes: Conjunctivae and EOM are normal.  Pupils are equal, round, and reactive to light. Right eye exhibits no discharge. Left eye exhibits no discharge. No scleral icterus.   Neck: Normal range of motion. Neck supple. No JVD present. No tracheal deviation present. No thyromegaly present.   Cardiovascular: Normal rate, regular rhythm, normal heart sounds and intact distal pulses. Exam reveals no gallop and no friction rub.   No murmur heard.  Pulmonary/Chest: Effort normal and breath sounds normal. No stridor. No respiratory distress. He has no wheezes. He has no rales. He exhibits no tenderness.   Abdominal: Soft. Bowel sounds are normal. He exhibits no distension and no mass. There is no tenderness. There is no rebound and no guarding.   Musculoskeletal: Normal range of motion. He exhibits no edema or tenderness.   Right elbow swelling and erythema is significantly better.   Lymphadenopathy:     He has no cervical adenopathy.   Neurological: He is alert and oriented to person, place, and time. He has normal reflexes. He displays normal reflexes. No cranial nerve deficit. He exhibits normal muscle tone. Coordination normal.   Skin: Skin is warm. No rash noted. He is not diaphoretic. No erythema. No pallor.   Psychiatric: He has a normal mood and affect. His behavior is normal. Judgment and thought content normal.   Nursing note and vitals reviewed.           Assessment:       1. Olecranon bursitis of right elbow        S/p 2 weeks of IV cefazolin for MSSA olecranon bursitis.  Will extend IV cefazolin for 2 additional weeks.  He is to follow up with me in about 4 weeks.  Plan:       Olecranon bursitis of right elbow   -IV cefazolin for additional 2 weeks.

## 2019-02-26 LAB — FUNGUS SPEC CULT: NORMAL

## 2019-02-28 ENCOUNTER — TELEPHONE (OUTPATIENT)
Dept: INFECTIOUS DISEASES | Facility: CLINIC | Age: 49
End: 2019-02-28

## 2019-02-28 ENCOUNTER — PATIENT MESSAGE (OUTPATIENT)
Dept: INFECTIOUS DISEASES | Facility: CLINIC | Age: 49
End: 2019-02-28

## 2019-02-28 DIAGNOSIS — M70.21 OLECRANON BURSITIS OF RIGHT ELBOW: Primary | ICD-10-CM

## 2019-03-06 ENCOUNTER — OFFICE VISIT (OUTPATIENT)
Dept: SPORTS MEDICINE | Facility: CLINIC | Age: 49
End: 2019-03-06
Payer: COMMERCIAL

## 2019-03-06 VITALS
SYSTOLIC BLOOD PRESSURE: 158 MMHG | BODY MASS INDEX: 31.36 KG/M2 | HEART RATE: 96 BPM | HEIGHT: 71 IN | WEIGHT: 224 LBS | DIASTOLIC BLOOD PRESSURE: 102 MMHG

## 2019-03-06 DIAGNOSIS — M71.121 INFECTED OLECRANON BURSA, RIGHT: ICD-10-CM

## 2019-03-06 DIAGNOSIS — M70.21 OLECRANON BURSITIS OF RIGHT ELBOW: ICD-10-CM

## 2019-03-06 DIAGNOSIS — M25.521 RIGHT ELBOW PAIN: Primary | ICD-10-CM

## 2019-03-06 PROCEDURE — 99214 PR OFFICE/OUTPT VISIT, EST, LEVL IV, 30-39 MIN: ICD-10-PCS | Mod: 24,S$GLB,, | Performed by: PHYSICIAN ASSISTANT

## 2019-03-06 PROCEDURE — 99999 PR PBB SHADOW E&M-EST. PATIENT-LVL V: CPT | Mod: PBBFAC,,, | Performed by: PHYSICIAN ASSISTANT

## 2019-03-06 PROCEDURE — 3080F DIAST BP >= 90 MM HG: CPT | Mod: CPTII,S$GLB,, | Performed by: PHYSICIAN ASSISTANT

## 2019-03-06 PROCEDURE — 99999 PR PBB SHADOW E&M-EST. PATIENT-LVL V: ICD-10-PCS | Mod: PBBFAC,,, | Performed by: PHYSICIAN ASSISTANT

## 2019-03-06 PROCEDURE — 3077F SYST BP >= 140 MM HG: CPT | Mod: CPTII,S$GLB,, | Performed by: PHYSICIAN ASSISTANT

## 2019-03-06 PROCEDURE — 3080F PR MOST RECENT DIASTOLIC BLOOD PRESSURE >= 90 MM HG: ICD-10-PCS | Mod: CPTII,S$GLB,, | Performed by: PHYSICIAN ASSISTANT

## 2019-03-06 PROCEDURE — 3008F PR BODY MASS INDEX (BMI) DOCUMENTED: ICD-10-PCS | Mod: CPTII,S$GLB,, | Performed by: PHYSICIAN ASSISTANT

## 2019-03-06 PROCEDURE — 99214 OFFICE O/P EST MOD 30 MIN: CPT | Mod: 24,S$GLB,, | Performed by: PHYSICIAN ASSISTANT

## 2019-03-06 PROCEDURE — 3008F BODY MASS INDEX DOCD: CPT | Mod: CPTII,S$GLB,, | Performed by: PHYSICIAN ASSISTANT

## 2019-03-06 PROCEDURE — 3077F PR MOST RECENT SYSTOLIC BLOOD PRESSURE >= 140 MM HG: ICD-10-PCS | Mod: CPTII,S$GLB,, | Performed by: PHYSICIAN ASSISTANT

## 2019-03-07 ENCOUNTER — DOCUMENTATION ONLY (OUTPATIENT)
Dept: INFECTIOUS DISEASES | Facility: CLINIC | Age: 49
End: 2019-03-07

## 2019-03-07 NOTE — PROGRESS NOTES
CC  Right elbow pain     47y/o white male with complaints of right elbow pain.     He presents for right elbow which is now greatly improved from previous. The elbow has not been giving him any issues. Occasionally he has some slight pain of the elbow. He denies erythema or swelling. The elbow was last addressed on 2/5/19 when I performed debridement and expression of the elbow bursa with removal of some tissue pieces from the wound. Since, the wound has closed up and near completely stopped draining.  Elbow has been greatly improved since that time.    He will finish his antibiotics in 2 days and get the PICC line out Monday.       Interval history:  This began after he fell in his bathroom and hit is elbow. He reports that had a small abrasion of the elbow from that fall. A couple of days later he began having erythema, pain, and swelling of his elbow that worsened to cover the area from his mid biceps to his mid forearm. He then presented to urgent care on 1/5/19 where he was given a steroid shot and started on clindamycin abx which he has been taking. He reports improvement of the swelling and redness since being on the abx. He has some intermittent slight posterior elbow pain.  He denies fever, chills, nausea, vomiting. He reports that elbow pain or swelling is not worsening. He is using compression sleeve to the elbow.     Aspirate fluid was sent to lab and grew out MSSA on 1/11/19. Resistant to clindamycin so his abx was switched to bactrim DS. Has completed a 10 day course of bactrim today with improvement of arm swelling but continued swelling of his olecranon bursa and some erythema only directly overlying the bursa sack. He has very little pain of the area. No real improvement of swelling with indocin.    He is currently off of work due to left knee surgery.       He is now being treated with IV antibiotics (cefazolin) through left arm PICC and under the care of Dr. Solis for his infected olecranon  "bursitis       Review of Systems   Constitution: Negative. Negative for chills, fever and night sweats.   HENT: Negative for congestion and headaches.    Eyes: Negative for blurred vision, left vision loss and right vision loss.   Cardiovascular: Negative for chest pain and syncope.   Respiratory: Negative for cough and shortness of breath.    Endocrine: Negative for polydipsia, polyphagia and polyuria.   Hematologic/Lymphatic: Negative for bleeding problem. Does not bruise/bleed easily.   Skin: Negative for dry skin, itching and rash.   Musculoskeletal: Negative for falls and muscle weakness.   Gastrointestinal: Negative for abdominal pain and bowel incontinence.   Genitourinary: Negative for bladder incontinence and nocturia.   Neurological: Negative for disturbances in coordination, loss of balance and seizures.   Psychiatric/Behavioral: Negative for depression. The patient does not have insomnia.    Allergic/Immunologic: Negative for hives and persistent infections.     Vitals:    03/06/19 1026 03/06/19 1101   BP: (!) 154/100 (!) 158/102   Pulse: 99 96   Weight: 101.6 kg (224 lb)    Height: 5' 11" (1.803 m)    PainSc:   2        PHYSICAL EXAM:  General: Patient appears alert and oriented x 3.  Mood is pleasant.  Observation of ears, eyes and nose reveal no gross abnormalities. HEENT: NCAT, sclera nonicteric  Lungs: Respirations are equal and unlabored.  Well nourished, in no acute distress and ambulates with a non-antalgic gait with no assistive devices.    Skin: Skin intact bilaterally. Sensation intact bilaterally. Compartments soft. No evidence of edema, infection, or induration.      Right ELBOW / WRIST EXTREMITY EXAM:     OBSERVATION / INSPECTION    Swelling                                          none                                   Deformity                                         none  Discoloration                                  none                                       Scars                       "                                   none                                      Atrophy                                            none    Skin is closed with no open incisions, wounds or drainage. No erythema present today.      TENDERNESS / CREPITUS (T / C):                                                                                             T/C                                                                                                                            Medial epicondyle                                                               - / -    Med. (Ulnar) collateral ligament                                        - / -    Flexor pronator Musculature                                              - / -   Biceps tendon                                                                    - / -   Head of radius                                                                   - / -    Lateral epicondyle                                                             - / -    Extensor Musculature                                                       - / -   Brachioradialis                                                                  - / -   Triceps tendon                                                                  - / -   Triceps muscle                                                                  - / -   Olecranon                                                                          - / -   Olecranon bursa                                                               - / -   Cubital fossa                                                                     - / -   Anterior jointline                                                                - / -   Radial tunnel                                                                     -/-                                                                                                                                                                                                        ROM:             ('*' = with pain)                                   Right Elbow                                     AROM (PROM)                                             Extension                                        0 deg  (5 deg)   Flexion                                            145 deg (145 deg)                                                                Pronation                                         90 deg  (90 deg)                                                                 Supination                                       80 deg  (80 deg)                                                                                                                                                                                 Left Elbow                                       AROM (PROM)                                             Extension                                        0 deg  (5 deg)   Flexion                                             145 deg (145 deg)                                                                Pronation                                         90 deg  (90 deg)                                                                 Supination                                       80 deg  (80 deg)                Right Wrist                                      AROM (PROM)                       Extension                                        80 deg (85 deg)   Flexion                                            80 deg (85 deg)                                                                    Ulnar Deviation                     35 deg (40 deg)  Radial Deviation                    35 deg (40 deg)                                                                                                                                      Left Wrist                                         AROM (PROM)                                             Extension                                         80 deg (85 deg)   Flexion                                             80 deg (85 deg)                                                                    Ulnar Deviation                     35 deg (40 deg)  Radial Deviation                    35 deg (40 deg)                                                                     STRENGTH:             ('*' = with pain)                        Elbow Flexion:                                                       5/5  Elbow Extension:                                        5/5  Wrist Flexion:                                                        5/5  Wrist Extension:                                          5/5  :                                                                                5/5  Intrinsics:                                                               5/5  EPL (Ext. pollicis longus):                 5/5  Pinch Mechanism:                                      5/5     ELBOW EXAMINATION:  See above noted areas of tenderness.   Test for Ligamentous Instability - UCL                     neg  Test for Ligamentous Instability - LUCL                   normal  PLRI                                                                                    neg  Tinel's (Percussion) Test - Cubital                                      neg  Tennis Elbow Test                                                              neg  Golfer's Elbow Test                                                             neg  Radial Capitellar Grind Test                                               neg  Valgus/Extension Overload Test                                        neg  Resisted Long Finger Extension Pain                                 neg  Moving Valgus                                                                   neg  Forearm pain with resisted supination                                 neg  Yeargeson' s (elbow pain)                                                     neg  Hook test                                                                                neg           EXTREMITY NEURO-VASCULAR EXAMINATION: Sensation grossly intact to light touch all dermatomal regions. DTR 2+ Biceps, Triceps, BR and Negative Clint's sign. Grossly intact motor function at Elbow, Wrist and Hand. Distal pulses radial and ulnar 2+, brisk cap refill, symmetric.     Other Findings:      No lymphadenopathy noted of right upper extremity and no streaking.     Bursa and elbow greatly improved with no swelling and no erythema today.  Greatly improved.    Xrays:  Xrays of the right elbow 3 views were reviewed from today. No fracture, subluxation, or other significant bony or joint abnormality is identified. Some mild arthritis changes noted.        ASSESSMENT:  Right elbow pain, acute  Right elbow olecranon bursitis   Right elbow infection - resolved      Plan:    Elbow and bursa still look greatly improved from previous. No signs of septic joint or infection at this time.     1. Continue treatment with Dr. Solis for IV antibiotics which will end in 2 days  2.  Ice and compression as needed.  Celebrex as needed    3. d.c tramadol    4. RTC prn     All patients questions were answered. Patient was advised to call us with any concerns or questions.

## 2019-03-07 NOTE — PROGRESS NOTES
Labs from march 4 reviewed    Potassium=4.4  Creatinine=0.78  Ast=23  Alt=12  Wbc=3.93  Hemoglobulin=12.6  Platelets=242  CRP=0.2

## 2019-03-07 NOTE — PROGRESS NOTES
Labs from 2/26/19 reviewed    Potassium=3.9  Creatinine=0.75  Ast=16  Alt=10  Wbc=5.8  Hemoglobulin=12.5  Platelets=210  CRP=0.8

## 2019-03-08 ENCOUNTER — PATIENT MESSAGE (OUTPATIENT)
Dept: FAMILY MEDICINE | Facility: CLINIC | Age: 49
End: 2019-03-08

## 2019-03-08 DIAGNOSIS — F98.8 ATTENTION DEFICIT DISORDER, UNSPECIFIED HYPERACTIVITY PRESENCE: ICD-10-CM

## 2019-03-10 ENCOUNTER — PATIENT MESSAGE (OUTPATIENT)
Dept: FAMILY MEDICINE | Facility: CLINIC | Age: 49
End: 2019-03-10

## 2019-03-10 RX ORDER — DEXTROAMPHETAMINE SACCHARATE, AMPHETAMINE ASPARTATE MONOHYDRATE, DEXTROAMPHETAMINE SULFATE AND AMPHETAMINE SULFATE 7.5; 7.5; 7.5; 7.5 MG/1; MG/1; MG/1; MG/1
CAPSULE, EXTENDED RELEASE ORAL
Qty: 60 CAPSULE | Refills: 0 | Status: SHIPPED | OUTPATIENT
Start: 2019-03-10 | End: 2019-03-14 | Stop reason: SDUPTHER

## 2019-03-11 ENCOUNTER — INFUSION (OUTPATIENT)
Dept: INFECTIOUS DISEASES | Facility: HOSPITAL | Age: 49
End: 2019-03-11
Attending: INTERNAL MEDICINE
Payer: COMMERCIAL

## 2019-03-11 ENCOUNTER — OFFICE VISIT (OUTPATIENT)
Dept: SPORTS MEDICINE | Facility: CLINIC | Age: 49
End: 2019-03-11
Payer: COMMERCIAL

## 2019-03-11 VITALS
HEIGHT: 71 IN | DIASTOLIC BLOOD PRESSURE: 98 MMHG | SYSTOLIC BLOOD PRESSURE: 150 MMHG | BODY MASS INDEX: 31.36 KG/M2 | WEIGHT: 224 LBS | HEART RATE: 78 BPM

## 2019-03-11 DIAGNOSIS — S83.289S TEAR OF LATERAL CARTILAGE OR MENISCUS OF KNEE, CURRENT, UNSPECIFIED LATERALITY, SEQUELA: Primary | ICD-10-CM

## 2019-03-11 PROCEDURE — 3077F SYST BP >= 140 MM HG: CPT | Mod: CPTII,S$GLB,, | Performed by: ORTHOPAEDIC SURGERY

## 2019-03-11 PROCEDURE — 99024 POSTOP FOLLOW-UP VISIT: CPT | Mod: S$GLB,,, | Performed by: ORTHOPAEDIC SURGERY

## 2019-03-11 PROCEDURE — 99999 PR PBB SHADOW E&M-EST. PATIENT-LVL IV: CPT | Mod: PBBFAC,,, | Performed by: ORTHOPAEDIC SURGERY

## 2019-03-11 PROCEDURE — 99024 PR POST-OP FOLLOW-UP VISIT: ICD-10-PCS | Mod: S$GLB,,, | Performed by: ORTHOPAEDIC SURGERY

## 2019-03-11 PROCEDURE — 3077F PR MOST RECENT SYSTOLIC BLOOD PRESSURE >= 140 MM HG: ICD-10-PCS | Mod: CPTII,S$GLB,, | Performed by: ORTHOPAEDIC SURGERY

## 2019-03-11 PROCEDURE — 3080F DIAST BP >= 90 MM HG: CPT | Mod: CPTII,S$GLB,, | Performed by: ORTHOPAEDIC SURGERY

## 2019-03-11 PROCEDURE — 99999 PR PBB SHADOW E&M-EST. PATIENT-LVL IV: ICD-10-PCS | Mod: PBBFAC,,, | Performed by: ORTHOPAEDIC SURGERY

## 2019-03-11 PROCEDURE — 3008F BODY MASS INDEX DOCD: CPT | Mod: CPTII,S$GLB,, | Performed by: ORTHOPAEDIC SURGERY

## 2019-03-11 PROCEDURE — 3080F PR MOST RECENT DIASTOLIC BLOOD PRESSURE >= 90 MM HG: ICD-10-PCS | Mod: CPTII,S$GLB,, | Performed by: ORTHOPAEDIC SURGERY

## 2019-03-11 PROCEDURE — 3008F PR BODY MASS INDEX (BMI) DOCUMENTED: ICD-10-PCS | Mod: CPTII,S$GLB,, | Performed by: ORTHOPAEDIC SURGERY

## 2019-03-11 NOTE — PROGRESS NOTES
CC left knee pain    HISTORY OF PRESENT ILLNESS:   Pt is here today for post-operative followup of knee arthroscopy.  he is doing well.  We have reviewed his findings and discussed plan of care and future treatment options.     He was attending PT at [facility], he notes that after the new year he was discharged and told to do his HEP until he is weight bearing and then follow back up with them              DATE OF PROCEDURE: 12/11/2018  PROCEDURE PERFORMED:   left  1. knee arthroscopic chondroplasty (CPT 01427)  2. knee arthroscopic medial and lateral (CPT 92937) meniscectomy   3. knee arthroscopic partial synovectomy/debridement (CPT 82830).   4. knee arthroscopic plica excision(CPT 20073).    5. Knee arthroscopic-assisted arthrocentesis of viable structural human allograft tissue      matrix ([employer]DR[employer]) (CPT 28321)              6. Knee arthroscopic lysis of adhesions (CPT 19640)  7. Knee arthroscopic cartilage biopsy (CPT 69393)  8. Knee arthroscopic microfracture (CPT 51234)        In the patellofemoral compartment, there was chondral damage to:  Patella 10 x 10 mm grade 2  Chondroplasty was performed using arthroscopic shaver.     There was chondral damage to:  Medial femoral condyle 10 x 30 mm grade 2, 7 x 6 mm grade 4     Chondroplasty was performed using arthroscopic shaver.     There was chondral damage to:  Lateral tibial plateau 15 x 20 mm grade 2  Chondroplasty was performed using arthroscopic shaver.        Microfracture was performed in the standard fashion to chondral lesion (MFC 7x6mm) (with awl) after edges were prepared and calcified cartilage layer was removed with curette, with holes 3-4mm apart.  Confirmation of marrow elements from each hole was confirmed upon decrease of pump pressure.                                                Review of Systems   Constitution: Negative. Negative for chills, fever and night sweats.   HENT: Negative for congestion and headaches.    Eyes: Negative for  blurred vision, left vision loss and right vision loss.   Cardiovascular: Negative for chest pain and syncope.   Respiratory: Negative for cough and shortness of breath.    Endocrine: Negative for polydipsia, polyphagia and polyuria.   Hematologic/Lymphatic: Negative for bleeding problem. Does not bruise/bleed easily.   Skin: Negative for dry skin, itching and rash.   Musculoskeletal: Negative for falls and muscle weakness.   Gastrointestinal: Negative for abdominal pain and bowel incontinence.   Genitourinary: Negative for bladder incontinence and nocturia.   Neurological: Negative for disturbances in coordination, loss of balance and seizures.   Psychiatric/Behavioral: Negative for depression. The patient does not have insomnia.    Allergic/Immunologic: Negative for hives and persistent infections.     PAST MEDICAL HISTORY:   Past Medical History:   Diagnosis Date    ADD (attention deficit disorder)     Anxiety     Depression     Gastritis     EGD 2014    HDL deficiency     Hypertension     Kidney stone     Meniscus tear 09/2013    right    Meniscus tear 01/2017    right    Seizure disorder 1/18/2018    Seizures 06/2014    last seizure in late 2014    Vitamin D deficiency disease 1/18/2018     PAST SURGICAL HISTORY:   Past Surgical History:   Procedure Laterality Date    ARTHROSCOPY, KNEE, WITH DEBRIDEMENT Right 12/5/2013    [facility]    ARTHROSCOPY, KNEE, WITH MENISCECTOMY Left 12/11/2018    [facility]    [id number] & LATERAL Right 3/28/2017    [facility]    CHONDRECTOMY, KNEE, SEMILUNAR CARTILAGE Right 12/5/2013    [facility]    CHONDROPLASTY, KNEE Left 12/11/2018    [facility]    EGD (ESOPHAGOGASTRODUODENOSCOPY) N/A 4/4/2014    Performed by [facility]raim Crawford [facility] at [facility] ENDO (4TH FLR)    EXCISION, PLICA, KNEE, ARTHROSCOPIC Right  12/5/2013    Per[facility]    [id number] Right 3/28/2017    [facility]    FRACTURE SURGERY Left 05/2016    wrist fracture    Injection, Joint, Bio-D Left Knee Left 12/11/2018    [facility]    KNEE SURGERY      SYNOVECTOMY, KNEE Left 12/11/2018    [facility]    SYNOVECTOMY, KNEE Right 12/5/2013    Per[facility] by [facility] at [facility]     FAMILY HISTORY:   Family History   Problem Relation Age of Onset    Diabetes Father     Cancer Father     Skin cancer Father     Cancer Maternal Grandfather      SOCIAL HISTORY:   Social History     Socioeconomic History    Marital status:      Spouse name: Not on file    Number of children: 1    Years of education: Not on file    Highest education level: Not on file   Social Needs    Financial resource strain: Not on file    Food insecurity - worry: Not on file    Food insecurity - inability: Not on file    Transportation needs - medical: Not on file    Transportation needs - non-medical: Not on file   Occupational History     Employer: [employer]   Tobacco Use    Smoking status: Never Smoker    Smokeless tobacco: Never Used   Substance and Sexual Activity    Alcohol use: Yes     Alcohol/week: 0.0 oz     Comment: social    Drug use: No    Sexual activity: Yes     Partners: Male   Other Topics Concern    Not on file   Social History Narrative    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       MEDICATIONS:   Current Outpatient Medications:     amLODIPine (NORVASC) 10 MG tablet, TAKE ONE Tablet BY MOUTH DAILY, Disp: 90 tablet, Rfl: 12    desonide (DESOWEN) 0.05 % cream, Apply topically 2 (two) times daily., Disp: 60 g, Rfl: 3    dextroamphetamine-amphetamine (ADDERALL XR) 30 MG 24 hr capsule, TAKE TWO CAPSULES BY MOUTH EVERY DAY, Disp: 60 capsule, Rfl: 0     "dextroamphetamine-amphetamine (ADDERALL XR) 30 MG 24 hr capsule, TAKE TWO CAPSULES BY MOUTH EVERY DAY, Disp: 60 capsule, Rfl: 0    dextroamphetamine-amphetamine (ADDERALL XR) 30 MG 24 hr capsule, TAKE TWO CAPSULES BY MOUTH EVERY DAY, Disp: 60 capsule, Rfl: 0    hydroCHLOROthiazide (HYDRODIURIL) 25 MG tablet, TAKE ONE TABLET BY MOUTH EVERY DAY, Disp: 90 tablet, Rfl: 12    indomethacin (INDOCIN SR) 75 mg CpSR CR capsule, Take 1 capsule (75 mg total) by mouth every 12 (twelve) hours. Take with food., Disp: 14 capsule, Rfl: 0    irbesartan (AVAPRO) 300 MG tablet, TAKE ONE Tablet BY MOUTH DAILY, Disp: 90 tablet, Rfl: 12    methylPREDNISolone (MEDROL DOSEPACK) 4 mg tablet, use as directed, Disp: 1 Package, Rfl: 0    metoprolol succinate (TOPROL-XL) 25 MG 24 hr tablet, Take 1 tablet (25 mg total) by mouth once daily., Disp: 30 tablet, Rfl: 11    montelukast (SINGULAIR) 10 mg tablet, TAKE ONE TABLET BY MOUTH EVERY EVENING, Disp: 30 tablet, Rfl: 5    mupirocin (BACTROBAN) 2 % ointment, , Disp: , Rfl:     paroxetine (PAXIL) 40 MG tablet, TAKE ONE TABLET BY MOUTH EVERY MORNING, Disp: 90 tablet, Rfl: 0    celecoxib (CELEBREX) 200 MG capsule, Take 1 capsule (200 mg total) by mouth every 12 (twelve) hours as needed for Pain., Disp: 30 capsule, Rfl: 1    promethazine (PHENERGAN) 25 MG tablet, Take 1 tablet (25 mg total) by mouth every 6 (six) hours as needed for Nausea., Disp: 16 tablet, Rfl: 0    traMADol (ULTRAM) 50 mg tablet, Take 1-2 tablets ( mg total) by mouth every 6 (six) hours as needed for Pain., Disp: 28 tablet, Rfl: 0  ALLERGIES:   Review of patient's allergies indicates:   Allergen Reactions    Hydrocodone Rash       VITAL SIGNS: BP (!) 150/98   Pulse 78   Ht 5' 11" (1.803 m)   Wt 101.6 kg (224 lb)   BMI 31.24 kg/m²        PHYSICAL EXAMINATION:     Incision sites healed well  No evidence of any erythema, infection or induration  Range of motion 0-135 degrees  No effusion  2+ DP pulse  No " swelling, no calf tenderness  - Jagdeep's sign  Negative medial and lateral joint line tenderness  Mild quad atrophy                                                                                  ASSESSMENT:                                                                                                                                               1. Status post above, doing well.                                                                                                                               PLAN:                                                                                                                                                     1. Emphasized quad function.  2. I have discussed return to activity in detail.  3. Patient will see us back in 12 weeks.                                    4. All questions were answered and patient should contact us if he  has any questions or concerns in the interim.

## 2019-03-11 NOTE — PROGRESS NOTES
FADIA PICC line removed, pt tolerated well. Pt instructed to leave dressing on for 24hrs, verbalized understanding. Pt observed for 30 mins after pulling line. Pt left in NAD.

## 2019-03-14 ENCOUNTER — OFFICE VISIT (OUTPATIENT)
Dept: FAMILY MEDICINE | Facility: CLINIC | Age: 49
End: 2019-03-14
Payer: COMMERCIAL

## 2019-03-14 VITALS
OXYGEN SATURATION: 98 % | HEIGHT: 71 IN | DIASTOLIC BLOOD PRESSURE: 86 MMHG | HEART RATE: 88 BPM | BODY MASS INDEX: 31.39 KG/M2 | TEMPERATURE: 99 F | SYSTOLIC BLOOD PRESSURE: 130 MMHG | WEIGHT: 224.19 LBS

## 2019-03-14 DIAGNOSIS — F98.8 ATTENTION DEFICIT DISORDER, UNSPECIFIED HYPERACTIVITY PRESENCE: ICD-10-CM

## 2019-03-14 DIAGNOSIS — I10 ESSENTIAL HYPERTENSION: Primary | ICD-10-CM

## 2019-03-14 DIAGNOSIS — F39 MOOD DISORDER: ICD-10-CM

## 2019-03-14 DIAGNOSIS — G40.909 SEIZURE DISORDER: ICD-10-CM

## 2019-03-14 PROCEDURE — 99999 PR PBB SHADOW E&M-EST. PATIENT-LVL III: ICD-10-PCS | Mod: PBBFAC,,, | Performed by: INTERNAL MEDICINE

## 2019-03-14 PROCEDURE — 3075F SYST BP GE 130 - 139MM HG: CPT | Mod: CPTII,S$GLB,, | Performed by: INTERNAL MEDICINE

## 2019-03-14 PROCEDURE — 99215 PR OFFICE/OUTPT VISIT, EST, LEVL V, 40-54 MIN: ICD-10-PCS | Mod: S$GLB,,, | Performed by: INTERNAL MEDICINE

## 2019-03-14 PROCEDURE — 3008F BODY MASS INDEX DOCD: CPT | Mod: CPTII,S$GLB,, | Performed by: INTERNAL MEDICINE

## 2019-03-14 PROCEDURE — 3075F PR MOST RECENT SYSTOLIC BLOOD PRESS GE 130-139MM HG: ICD-10-PCS | Mod: CPTII,S$GLB,, | Performed by: INTERNAL MEDICINE

## 2019-03-14 PROCEDURE — 3079F DIAST BP 80-89 MM HG: CPT | Mod: CPTII,S$GLB,, | Performed by: INTERNAL MEDICINE

## 2019-03-14 PROCEDURE — 99215 OFFICE O/P EST HI 40 MIN: CPT | Mod: S$GLB,,, | Performed by: INTERNAL MEDICINE

## 2019-03-14 PROCEDURE — 99999 PR PBB SHADOW E&M-EST. PATIENT-LVL III: CPT | Mod: PBBFAC,,, | Performed by: INTERNAL MEDICINE

## 2019-03-14 PROCEDURE — 3008F PR BODY MASS INDEX (BMI) DOCUMENTED: ICD-10-PCS | Mod: CPTII,S$GLB,, | Performed by: INTERNAL MEDICINE

## 2019-03-14 PROCEDURE — 3079F PR MOST RECENT DIASTOLIC BLOOD PRESSURE 80-89 MM HG: ICD-10-PCS | Mod: CPTII,S$GLB,, | Performed by: INTERNAL MEDICINE

## 2019-03-14 RX ORDER — DEXTROAMPHETAMINE SACCHARATE, AMPHETAMINE ASPARTATE MONOHYDRATE, DEXTROAMPHETAMINE SULFATE AND AMPHETAMINE SULFATE 7.5; 7.5; 7.5; 7.5 MG/1; MG/1; MG/1; MG/1
CAPSULE, EXTENDED RELEASE ORAL
Qty: 60 CAPSULE | Refills: 0 | Status: SHIPPED | OUTPATIENT
Start: 2019-04-14 | End: 2019-06-13 | Stop reason: SDUPTHER

## 2019-03-14 RX ORDER — DEXTROAMPHETAMINE SACCHARATE, AMPHETAMINE ASPARTATE MONOHYDRATE, DEXTROAMPHETAMINE SULFATE AND AMPHETAMINE SULFATE 7.5; 7.5; 7.5; 7.5 MG/1; MG/1; MG/1; MG/1
CAPSULE, EXTENDED RELEASE ORAL
Qty: 60 CAPSULE | Refills: 0 | Status: SHIPPED | OUTPATIENT
Start: 2019-05-14 | End: 2019-06-13 | Stop reason: SDUPTHER

## 2019-03-14 RX ORDER — METOPROLOL SUCCINATE 50 MG/1
50 TABLET, EXTENDED RELEASE ORAL DAILY
Qty: 30 TABLET | Refills: 11 | Status: SHIPPED | OUTPATIENT
Start: 2019-03-14 | End: 2019-09-17 | Stop reason: SDUPTHER

## 2019-03-14 RX ORDER — DEXTROAMPHETAMINE SACCHARATE, AMPHETAMINE ASPARTATE MONOHYDRATE, DEXTROAMPHETAMINE SULFATE AND AMPHETAMINE SULFATE 7.5; 7.5; 7.5; 7.5 MG/1; MG/1; MG/1; MG/1
CAPSULE, EXTENDED RELEASE ORAL
Qty: 60 CAPSULE | Refills: 0 | Status: SHIPPED | OUTPATIENT
Start: 2019-03-14 | End: 2019-06-13 | Stop reason: SDUPTHER

## 2019-03-14 RX ORDER — HYDROCHLOROTHIAZIDE 25 MG/1
25 TABLET ORAL DAILY
Qty: 90 TABLET | Refills: 12 | Status: SHIPPED | OUTPATIENT
Start: 2019-03-14 | End: 2020-06-07

## 2019-03-14 RX ORDER — PAROXETINE HYDROCHLORIDE 20 MG/1
60 TABLET, FILM COATED ORAL EVERY MORNING
Qty: 90 TABLET | Refills: 12 | Status: SHIPPED | OUTPATIENT
Start: 2019-03-14 | End: 2020-03-05

## 2019-03-14 NOTE — PROGRESS NOTES
Chief complaint:  Follow-up on ADD and blood pressure medications, discussed wife's issues, anxiety    48-year-old white male with long-standing ADD.  He requires a high-dose of the Adderall and apparently his son does as well as and is a high metabolizer of the medication so patient himself can assumedly is the same.  He finds benefit from medication.  We printed in the 3 prescriptions and I encouraged him to message us with the exact date see needs for the next 3 months and we will see him every 6 months.      Reviewed recent events about injuring his right elbow and he had an infection and a PICC line.  During this period of time he had some blood pressure elevations.  136-190 systolics over  diastolics.  He message and we increased his metoprolol to 50 mg.  Blood pressures appears to be better.  His PICC lines been out for 4 days.  We discussed continuing to monitor.  He was wanting to be needed to go back to [facility] but we discussed that it is still not yet proven if indeed the blood pressure elevation was is ADD medication since he has been on this and has not had blood pressure problems in the past.  He continues on all his antihypertensives.  Plan at right now would be to monitor continuing the same ADD medicine and continue the Toprol 50    Were stress lately.  He wants his Paxil increased from 40-60 and I am happy to do so.  The need to refill is ADD medications.    Also spent a good deal of time discussing his wife's issue.  She is present.  She did not have an appointment today.  She is a patient of mine.  She has had some headaches on the left side which have been shown by ENT not to be sinusitis.  She has associated vertigo with the.  Happens maybe once every 4 months.  Her sisters have migraines.  She has not had a diagnosis but this sounds very much like migraine or cluster headaches.  I had recommended her to suppress the vertigo with meclizine, try some Excedrin, she has no contraindication  to aspirin or caffeine.  She may need to see Neurology but this appears to be a migraine type issue and I encouraged her to message me with these symptoms so I can documented in her chart.  Counseled both the at length regarding all these issues.Total time over 45 minutes with over 50% counseling.  ROS:   CONST: weight stable. EYES: no vision change. ENT: no sore throat. CV: no chest pain w/ exertion. RESP: no shortness of breath. GI: no nausea, vomiting, diarrhea. No dysphagia. : no urinary issues. MUSCULOSKELETAL: no new myalgias or arthralgias. SKIN: no new changes. NEURO: no focal deficits. PSYCH: no new issues. ENDOCRINE: no polyuria. HEME: no lymph nodes. ALLERGY: no general pruritis.    PAST MEDICAL HISTORY:                                                        1.  Hypertension.                                                            2.  Depression, sees Psychiatry.                                             3.  ADD, sees Psychiatry.  Dr Emery                                                  4.  Right knee scope.   5.  Low HDL 1999   6.  Insomnia  7.  Sleep eating with Ambien   8.  probable seizure disorder 2015  9. Orthostatic hypotension, he worked up by cardiology 2015, may relate to seizures   10.  Left Colles' fracture with surgical repair 2016  11.  Right medial meniscus injury 2017                                                                                                                                 SOCIAL HISTORY:  Does not smoke, social alcohol, never smoked.                                                                                            FAMILY HISTORY:  Mom with seizures.  Father with hypertension, diabetes, .     He has no siblings.           Vital signs as above,  Gen: no distress  Exam otherwise deferred    Nirmal was seen today for hypertension.    Diagnoses and all orders for this visit:    Essential hypertension, may well have been influenced by stress but  "reassured I doubt it was the antibiotics.  Continue to monitor, continue the increased Toprol  -     hydroCHLOROthiazide (HYDRODIURIL) 25 MG tablet; Take 1 tablet (25 mg total) by mouth once daily.    Attention deficit disorder, unspecified hyperactivity presence, controlled substance management and monitoring done  -     dextroamphetamine-amphetamine (ADDERALL XR) 30 MG 24 hr capsule; TAKE TWO CAPSULES BY MOUTH EVERY DAY  -     dextroamphetamine-amphetamine (ADDERALL XR) 30 MG 24 hr capsule; TAKE TWO CAPSULES BY MOUTH EVERY DAY  -     dextroamphetamine-amphetamine (ADDERALL XR) 30 MG 24 hr capsule; TAKE TWO CAPSULES BY MOUTH EVERY DAY    Mood disorder, not controlled subjectively, he desires that increase in Paxil    Seizure disorder, chronic and stable    Other orders  -     metoprolol succinate (TOPROL-XL) 50 MG 24 hr tablet; Take 1 tablet (50 mg total) by mouth once daily.  -     paroxetine (PAXIL) 20 MG tablet; Take 3 tablets (60 mg total) by mouth every morning.                       Clinical note will be sensitive as doing controlled substance management/monitoring"This note will not be shared with the patient."  "

## 2019-04-04 ENCOUNTER — OFFICE VISIT (OUTPATIENT)
Dept: INFECTIOUS DISEASES | Facility: CLINIC | Age: 49
End: 2019-04-04
Payer: COMMERCIAL

## 2019-04-04 VITALS
DIASTOLIC BLOOD PRESSURE: 77 MMHG | TEMPERATURE: 98 F | HEART RATE: 84 BPM | BODY MASS INDEX: 30.86 KG/M2 | WEIGHT: 220.44 LBS | HEIGHT: 71 IN | SYSTOLIC BLOOD PRESSURE: 127 MMHG

## 2019-04-04 DIAGNOSIS — M70.21 OLECRANON BURSITIS OF RIGHT ELBOW: Primary | ICD-10-CM

## 2019-04-04 PROCEDURE — 99999 PR PBB SHADOW E&M-EST. PATIENT-LVL III: ICD-10-PCS | Mod: PBBFAC,,, | Performed by: INTERNAL MEDICINE

## 2019-04-04 PROCEDURE — 3078F PR MOST RECENT DIASTOLIC BLOOD PRESSURE < 80 MM HG: ICD-10-PCS | Mod: CPTII,S$GLB,, | Performed by: INTERNAL MEDICINE

## 2019-04-04 PROCEDURE — 3074F PR MOST RECENT SYSTOLIC BLOOD PRESSURE < 130 MM HG: ICD-10-PCS | Mod: CPTII,S$GLB,, | Performed by: INTERNAL MEDICINE

## 2019-04-04 PROCEDURE — 99213 OFFICE O/P EST LOW 20 MIN: CPT | Mod: S$GLB,,, | Performed by: INTERNAL MEDICINE

## 2019-04-04 PROCEDURE — 3078F DIAST BP <80 MM HG: CPT | Mod: CPTII,S$GLB,, | Performed by: INTERNAL MEDICINE

## 2019-04-04 PROCEDURE — 3074F SYST BP LT 130 MM HG: CPT | Mod: CPTII,S$GLB,, | Performed by: INTERNAL MEDICINE

## 2019-04-04 PROCEDURE — 3008F BODY MASS INDEX DOCD: CPT | Mod: CPTII,S$GLB,, | Performed by: INTERNAL MEDICINE

## 2019-04-04 PROCEDURE — 99213 PR OFFICE/OUTPT VISIT, EST, LEVL III, 20-29 MIN: ICD-10-PCS | Mod: S$GLB,,, | Performed by: INTERNAL MEDICINE

## 2019-04-04 PROCEDURE — 99999 PR PBB SHADOW E&M-EST. PATIENT-LVL III: CPT | Mod: PBBFAC,,, | Performed by: INTERNAL MEDICINE

## 2019-04-04 PROCEDURE — 3008F PR BODY MASS INDEX (BMI) DOCUMENTED: ICD-10-PCS | Mod: CPTII,S$GLB,, | Performed by: INTERNAL MEDICINE

## 2019-04-04 NOTE — PROGRESS NOTES
Subjective:      Patient ID: Nirmal Moss is a 48 y.o. male.    Chief Complaint:Follow-up    History of Present Illness    Mr. Moss is a 49 y/o male with a history of MSSA olecranon bursitis.  He completed about 4 weeks of IV cefazolin.  He is here today for follow up.  The pain and swelling in his elbow has significantly improved.      Review of Systems   Constitution: Positive for malaise/fatigue and weight gain. Negative for chills, decreased appetite, fever, night sweats and weight loss.   HENT: Negative for congestion, ear pain, hearing loss, hoarse voice, sore throat and tinnitus.    Eyes: Negative for blurred vision, redness and visual disturbance.   Cardiovascular: Negative for chest pain, leg swelling and palpitations.   Respiratory: Negative for cough, hemoptysis, shortness of breath and sputum production.    Hematologic/Lymphatic: Negative for adenopathy. Does not bruise/bleed easily.   Skin: Positive for dry skin and itching. Negative for rash and suspicious lesions.   Musculoskeletal: Positive for joint pain. Negative for back pain, myalgias and neck pain.   Gastrointestinal: Negative for abdominal pain, constipation, diarrhea, heartburn, nausea and vomiting.   Genitourinary: Negative for dysuria, flank pain, frequency, hematuria, hesitancy and urgency.   Neurological: Positive for headaches. Negative for dizziness, numbness, paresthesias and weakness.   Psychiatric/Behavioral: Negative for depression and memory loss. The patient has insomnia. The patient is not nervous/anxious.      Objective:   Physical Exam   Constitutional: He is oriented to person, place, and time. He appears well-developed and well-nourished. No distress.   HENT:   Head: Normocephalic and atraumatic.   Right Ear: External ear normal.   Left Ear: External ear normal.   Nose: Nose normal.   Mouth/Throat: Oropharynx is clear and moist. No oropharyngeal exudate.   Eyes: Pupils are equal, round, and reactive to light.  Conjunctivae and EOM are normal. Right eye exhibits no discharge. Left eye exhibits no discharge. No scleral icterus.   Neck: Normal range of motion. Neck supple. No JVD present. No tracheal deviation present. No thyromegaly present.   Cardiovascular: Normal rate, regular rhythm, normal heart sounds and intact distal pulses. Exam reveals no gallop and no friction rub.   No murmur heard.  Pulmonary/Chest: Effort normal and breath sounds normal. No stridor. No respiratory distress. He has no wheezes. He has no rales. He exhibits no tenderness.   Abdominal: Soft. Bowel sounds are normal. He exhibits no distension and no mass. There is no tenderness. There is no rebound and no guarding.   Musculoskeletal: Normal range of motion. He exhibits no edema or tenderness.   Lymphadenopathy:     He has no cervical adenopathy.   Neurological: He is alert and oriented to person, place, and time. He has normal reflexes. He displays normal reflexes. No cranial nerve deficit. He exhibits normal muscle tone. Coordination normal.   Skin: Skin is warm. No rash noted. He is not diaphoretic. No erythema. No pallor.   Psychiatric: He has a normal mood and affect. His behavior is normal. Judgment and thought content normal.   Nursing note and vitals reviewed.    Assessment:       1. Olecranon bursitis of right elbow      s/p completion of IV cefazolin. Swelling and erythema of his right elbow has completely resolved.  No further antibiotics planned.  Follow up as needed.    Plan:       Olecranon bursitis of right elbow        - Follow up as needed.

## 2019-06-10 ENCOUNTER — OFFICE VISIT (OUTPATIENT)
Dept: SPORTS MEDICINE | Facility: CLINIC | Age: 49
End: 2019-06-10
Payer: COMMERCIAL

## 2019-06-10 VITALS
DIASTOLIC BLOOD PRESSURE: 87 MMHG | WEIGHT: 220 LBS | HEIGHT: 71 IN | HEART RATE: 81 BPM | BODY MASS INDEX: 30.8 KG/M2 | SYSTOLIC BLOOD PRESSURE: 134 MMHG

## 2019-06-10 DIAGNOSIS — M17.11 OSTEOARTHRITIS OF RIGHT KNEE, UNSPECIFIED OSTEOARTHRITIS TYPE: Primary | ICD-10-CM

## 2019-06-10 PROCEDURE — 3075F PR MOST RECENT SYSTOLIC BLOOD PRESS GE 130-139MM HG: ICD-10-PCS | Mod: CPTII,S$GLB,, | Performed by: ORTHOPAEDIC SURGERY

## 2019-06-10 PROCEDURE — 3079F DIAST BP 80-89 MM HG: CPT | Mod: CPTII,S$GLB,, | Performed by: ORTHOPAEDIC SURGERY

## 2019-06-10 PROCEDURE — 99999 PR PBB SHADOW E&M-EST. PATIENT-LVL IV: CPT | Mod: PBBFAC,,, | Performed by: ORTHOPAEDIC SURGERY

## 2019-06-10 PROCEDURE — 3008F BODY MASS INDEX DOCD: CPT | Mod: CPTII,S$GLB,, | Performed by: ORTHOPAEDIC SURGERY

## 2019-06-10 PROCEDURE — 3075F SYST BP GE 130 - 139MM HG: CPT | Mod: CPTII,S$GLB,, | Performed by: ORTHOPAEDIC SURGERY

## 2019-06-10 PROCEDURE — 99999 PR PBB SHADOW E&M-EST. PATIENT-LVL IV: ICD-10-PCS | Mod: PBBFAC,,, | Performed by: ORTHOPAEDIC SURGERY

## 2019-06-10 PROCEDURE — 99214 OFFICE O/P EST MOD 30 MIN: CPT | Mod: S$GLB,,, | Performed by: ORTHOPAEDIC SURGERY

## 2019-06-10 PROCEDURE — 3008F PR BODY MASS INDEX (BMI) DOCUMENTED: ICD-10-PCS | Mod: CPTII,S$GLB,, | Performed by: ORTHOPAEDIC SURGERY

## 2019-06-10 PROCEDURE — 3079F PR MOST RECENT DIASTOLIC BLOOD PRESSURE 80-89 MM HG: ICD-10-PCS | Mod: CPTII,S$GLB,, | Performed by: ORTHOPAEDIC SURGERY

## 2019-06-10 PROCEDURE — 99214 PR OFFICE/OUTPT VISIT, EST, LEVL IV, 30-39 MIN: ICD-10-PCS | Mod: S$GLB,,, | Performed by: ORTHOPAEDIC SURGERY

## 2019-06-10 NOTE — PROGRESS NOTES
CC left knee pain    HISTORY OF PRESENT ILLNESS:   Pt is here today for post-operative followup of knee arthroscopy.  he is doing well.  We have reviewed his findings and discussed plan of care and future treatment options.     He was attending PT at [facility], he notes that after the new year he was discharged and told to do his HEP until he is weight bearing and then follow back up with them              DATE OF PROCEDURE: 12/11/2018  PROCEDURE PERFORMED:   left  1. knee arthroscopic chondroplasty (CPT 33224)  2. knee arthroscopic medial and lateral (CPT 48937) meniscectomy   3. knee arthroscopic partial synovectomy/debridement (CPT 31125).   4. knee arthroscopic plica excision(CPT 18153).    5. Knee arthroscopic-assisted arthrocentesis of viable structural human allograft tissue      matrix (BioDRestore) (CPT 14347)              6. Knee arthroscopic lysis of adhesions (CPT 46162)  7. Knee arthroscopic cartilage biopsy (CPT 01687)  8. Knee arthroscopic microfracture (CPT 55694)        In the patellofemoral compartment, there was chondral damage to:  Patella 10 x 10 mm grade 2  Chondroplasty was performed using arthroscopic shaver.     There was chondral damage to:  Medial femoral condyle 10 x 30 mm grade 2, 7 x 6 mm grade 4     Chondroplasty was performed using arthroscopic shaver.     There was chondral damage to:  Lateral tibial plateau 15 x 20 mm grade 2  Chondroplasty was performed using arthroscopic shaver.        Microfracture was performed in the standard fashion to chondral lesion (MFC 7x6mm) (with awl) after edges were prepared and calcified cartilage layer was removed with curette, with holes 3-4mm apart.  Confirmation of marrow elements from each hole was confirmed upon decrease of pump pressure.        DATE OF PROCEDURE: 03/29/2017  PROCEDURE PERFORMED:   right  1. knee arthroscopic medial and lateral meniscectomy   2. knee arthroscopic partial synovectomy/debridement.   3. knee arthroscopic plica  excision.   4. knee arthroscopic chondroplasty      In the patellofemoral compartment, there was chondral damage to:  Patella 10 x 10 mm grade 2  Trochlea 10 x 20 mm grade 3  Chondroplasty was performed using arthroscopic shaver.     There was chondral damage to:  Medial femoral condyle 10 x 20 mm grade 2  Chondroplasty was performed using arthroscopic shaver.     There was chondral damage to:  Lateral tibial plateau 10 x 20 mm grade 3  Chondroplasty was performed using arthroscopic shaver.                                                    Review of Systems   Constitution: Negative. Negative for chills, fever and night sweats.   HENT: Negative for congestion and headaches.    Eyes: Negative for blurred vision, left vision loss and right vision loss.   Cardiovascular: Negative for chest pain and syncope.   Respiratory: Negative for cough and shortness of breath.    Endocrine: Negative for polydipsia, polyphagia and polyuria.   Hematologic/Lymphatic: Negative for bleeding problem. Does not bruise/bleed easily.   Skin: Negative for dry skin, itching and rash.   Musculoskeletal: Negative for falls and muscle weakness.   Gastrointestinal: Negative for abdominal pain and bowel incontinence.   Genitourinary: Negative for bladder incontinence and nocturia.   Neurological: Negative for disturbances in coordination, loss of balance and seizures.   Psychiatric/Behavioral: Negative for depression. The patient does not have insomnia.    Allergic/Immunologic: Negative for hives and persistent infections.     PAST MEDICAL HISTORY:   Past Medical History:   Diagnosis Date    ADD (attention deficit disorder)     Anxiety     Depression     Gastritis     EGD 2014    HDL deficiency     Hypertension     Kidney stone     Meniscus tear 09/2013    right    Meniscus tear 01/2017    right    Seizure disorder 1/18/2018    Seizures 06/2014    last seizure in late 2014    Vitamin D deficiency disease 1/18/2018     PAST SURGICAL  HISTORY:   Past Surgical History:   Procedure Laterality Date    ARTHROSCOPY, KNEE, WITH DEBRIDEMENT Right 12/5/2013    [facility]    ARTHROSCOPY, KNEE, WITH MENISCECTOMY Left 12/11/2018    [facility]    [id number] & LATERAL Right 3/28/2017    [facility]    CHONDRECTOMY, KNEE, SEMILUNAR CARTILAGE Right 12/5/2013    [facility]    CHONDROPLASTY, KNEE Left 12/11/2018    [facility]    EGD (ESOPHAGOGASTRODUODENOSCOPY) N/A 4/4/2014    [facility]    EXCISION, PLICA, KNEE, ARTHROSCOPIC Right 12/5/2013    [facility]    [id number] Right 3/28/2017    [facility]    FRACTURE SURGERY Left 05/2016    wrist fracture    Injection, Joint, Bio-D Left Knee Left 12/11/2018    [facility]    KNEE SURGERY      SYNOVECTOMY, KNEE Left 12/11/2018    [facility]    SYNOVECTOMY, KNEE Right 12/5/2013    [facility]     FAMILY HISTORY:   Family History   Problem Relation Age of Onset    Diabetes Father     Cancer Father     Skin cancer Father     Cancer Maternal Grandfather      SOCIAL HISTORY:   Social History     Socioeconomic History    Marital status:      Spouse name: Not on file    Number of children: 1    Years of education: Not on file    Highest education level: Not on file   Occupational History     Employer: [employer]   Social Needs    Financial resource strain: Not on file    Food insecurity:     Worry: Not on file     Inability: Not on file    Transportation needs:     Medical: Not on file     Non-medical: Not on file   Tobacco Use    Smoking status: Never Smoker    Smokeless tobacco: Never Used   Substance and Sexual Activity     Alcohol use: Yes     Alcohol/week: 0.0 oz     Comment: social    Drug use: No    Sexual activity: Yes     Partners: Male   Lifestyle    Physical activity:     Days per week: Not on file     Minutes per session: Not on file    Stress: Not on file   Relationships    Social connections:     Talks on phone: Not on file     Gets together: Not on file     Attends Sikhism service: Not on file     Active member of club or organization: Not on file     Attends meetings of clubs or organizations: Not on file     Relationship status: Not on file   Other Topics Concern    Not on file   Social History Narrative    [employer] company out of [place],  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       MEDICATIONS:   Current Outpatient Medications:     amLODIPine (NORVASC) 10 MG tablet, TAKE ONE Tablet BY MOUTH DAILY, Disp: 90 tablet, Rfl: 12    desonide (DESOWEN) 0.05 % cream, Apply topically 2 (two) times daily., Disp: 60 g, Rfl: 3    dextroamphetamine-amphetamine (ADDERALL XR) 30 MG 24 hr capsule, TAKE TWO CAPSULES BY MOUTH EVERY DAY, Disp: 60 capsule, Rfl: 0    dextroamphetamine-amphetamine (ADDERALL XR) 30 MG 24 hr capsule, TAKE TWO CAPSULES BY MOUTH EVERY DAY, Disp: 60 capsule, Rfl: 0    dextroamphetamine-amphetamine (ADDERALL XR) 30 MG 24 hr capsule, TAKE TWO CAPSULES BY MOUTH EVERY DAY, Disp: 60 capsule, Rfl: 0    hydroCHLOROthiazide (HYDRODIURIL) 25 MG tablet, Take 1 tablet (25 mg total) by mouth once daily., Disp: 90 tablet, Rfl: 12    irbesartan (AVAPRO) 300 MG tablet, TAKE ONE Tablet BY MOUTH DAILY, Disp: 90 tablet, Rfl: 12    metoprolol succinate (TOPROL-XL) 50 MG 24 hr tablet, Take 1 tablet (50 mg total) by mouth once daily., Disp: 30 tablet, Rfl: 11    montelukast (SINGULAIR) 10 mg tablet, TAKE ONE TABLET BY MOUTH EVERY EVENING, Disp: 30 tablet, Rfl: 5    mupirocin (BACTROBAN) 2 % ointment, , Disp: , Rfl:     paroxetine (PAXIL) 20 MG tablet, Take 3 tablets (60 mg total) by mouth every morning., Disp:  "90 tablet, Rfl: 12    promethazine (PHENERGAN) 25 MG tablet, Take 1 tablet (25 mg total) by mouth every 6 (six) hours as needed for Nausea., Disp: 16 tablet, Rfl: 0    traMADol (ULTRAM) 50 mg tablet, Take 1-2 tablets ( mg total) by mouth every 6 (six) hours as needed for Pain., Disp: 28 tablet, Rfl: 0  ALLERGIES:   Review of patient's allergies indicates:   Allergen Reactions    Hydrocodone Rash       VITAL SIGNS: /87   Pulse 81   Ht 5' 11" (1.803 m)   Wt 99.8 kg (220 lb)   BMI 30.68 kg/m²        PHYSICAL EXAMINATION:     Incision sites healed well  No evidence of any erythema, infection or induration  Range of motion 0-135 degrees  No effusion  2+ DP pulse  No swelling, no calf tenderness  - Jagdeep's sign  Negative medial and lateral joint line tenderness  Mild quad atrophy                                                                                  ASSESSMENT:                                                                                                                                               1. Status post above, doing well.                   2. Right knee OA                                                                                                             PLAN:                                                                                                                                                     1. Emphasized quad function.  2. I have discussed return to activity in detail.  3. Patient will see us back in 12 weeks.                                    4. All questions were answered and patient should contact us if he  has any questions or concerns in the interim.    5. euflexxa right knee with Mello    Will cont wt loss    "

## 2019-06-13 ENCOUNTER — PATIENT MESSAGE (OUTPATIENT)
Dept: FAMILY MEDICINE | Facility: CLINIC | Age: 49
End: 2019-06-13

## 2019-06-13 DIAGNOSIS — F98.8 ATTENTION DEFICIT DISORDER, UNSPECIFIED HYPERACTIVITY PRESENCE: ICD-10-CM

## 2019-06-17 ENCOUNTER — PATIENT MESSAGE (OUTPATIENT)
Dept: FAMILY MEDICINE | Facility: CLINIC | Age: 49
End: 2019-06-17

## 2019-06-17 RX ORDER — MONTELUKAST SODIUM 10 MG/1
TABLET ORAL
Qty: 30 TABLET | Refills: 5 | Status: SHIPPED | OUTPATIENT
Start: 2019-06-17 | End: 2020-01-02

## 2019-06-18 ENCOUNTER — PATIENT MESSAGE (OUTPATIENT)
Dept: FAMILY MEDICINE | Facility: CLINIC | Age: 49
End: 2019-06-18

## 2019-06-18 RX ORDER — DEXTROAMPHETAMINE SACCHARATE, AMPHETAMINE ASPARTATE MONOHYDRATE, DEXTROAMPHETAMINE SULFATE AND AMPHETAMINE SULFATE 7.5; 7.5; 7.5; 7.5 MG/1; MG/1; MG/1; MG/1
CAPSULE, EXTENDED RELEASE ORAL
Qty: 60 CAPSULE | Refills: 0 | Status: SHIPPED | OUTPATIENT
Start: 2019-06-18 | End: 2019-09-17 | Stop reason: SDUPTHER

## 2019-06-18 RX ORDER — DEXTROAMPHETAMINE SACCHARATE, AMPHETAMINE ASPARTATE MONOHYDRATE, DEXTROAMPHETAMINE SULFATE AND AMPHETAMINE SULFATE 7.5; 7.5; 7.5; 7.5 MG/1; MG/1; MG/1; MG/1
CAPSULE, EXTENDED RELEASE ORAL
Qty: 60 CAPSULE | Refills: 0 | Status: SHIPPED | OUTPATIENT
Start: 2019-07-18 | End: 2019-09-17 | Stop reason: SDUPTHER

## 2019-06-18 RX ORDER — DEXTROAMPHETAMINE SACCHARATE, AMPHETAMINE ASPARTATE MONOHYDRATE, DEXTROAMPHETAMINE SULFATE AND AMPHETAMINE SULFATE 7.5; 7.5; 7.5; 7.5 MG/1; MG/1; MG/1; MG/1
CAPSULE, EXTENDED RELEASE ORAL
Qty: 60 CAPSULE | Refills: 0 | Status: SHIPPED | OUTPATIENT
Start: 2019-08-18 | End: 2019-09-17 | Stop reason: SDUPTHER

## 2019-07-08 ENCOUNTER — TELEPHONE (OUTPATIENT)
Dept: SPORTS MEDICINE | Facility: CLINIC | Age: 49
End: 2019-07-08

## 2019-07-08 ENCOUNTER — OFFICE VISIT (OUTPATIENT)
Dept: SPORTS MEDICINE | Facility: CLINIC | Age: 49
End: 2019-07-08
Payer: COMMERCIAL

## 2019-07-08 ENCOUNTER — HOSPITAL ENCOUNTER (EMERGENCY)
Facility: HOSPITAL | Age: 49
Discharge: HOME | End: 2019-07-09
Attending: EMERGENCY MEDICINE
Payer: COMMERCIAL

## 2019-07-08 VITALS
WEIGHT: 220 LBS | HEIGHT: 71 IN | DIASTOLIC BLOOD PRESSURE: 102 MMHG | HEART RATE: 78 BPM | SYSTOLIC BLOOD PRESSURE: 154 MMHG | BODY MASS INDEX: 30.8 KG/M2

## 2019-07-08 DIAGNOSIS — M25.461 KNEE EFFUSION, RIGHT: ICD-10-CM

## 2019-07-08 DIAGNOSIS — M25.561 CHRONIC PAIN OF RIGHT KNEE: ICD-10-CM

## 2019-07-08 DIAGNOSIS — M17.11 OSTEOARTHRITIS OF RIGHT KNEE, UNSPECIFIED OSTEOARTHRITIS TYPE: Primary | ICD-10-CM

## 2019-07-08 DIAGNOSIS — G89.29 CHRONIC PAIN OF RIGHT KNEE: ICD-10-CM

## 2019-07-08 DIAGNOSIS — M25.561 RIGHT KNEE PAIN, UNSPECIFIED CHRONICITY: Primary | ICD-10-CM

## 2019-07-08 PROCEDURE — 84145 PROCALCITONIN (PCT): CPT

## 2019-07-08 PROCEDURE — 99285 PR EMERGENCY DEPT VISIT,LEVEL V: ICD-10-PCS | Mod: ,,, | Performed by: PHYSICIAN ASSISTANT

## 2019-07-08 PROCEDURE — 87116 MYCOBACTERIA CULTURE: CPT

## 2019-07-08 PROCEDURE — 89051 BODY FLUID CELL COUNT: CPT

## 2019-07-08 PROCEDURE — 20610 DRAIN/INJ JOINT/BURSA W/O US: CPT | Mod: RT

## 2019-07-08 PROCEDURE — 99999 PR PBB SHADOW E&M-EST. PATIENT-LVL IV: CPT | Mod: PBBFAC,,, | Performed by: PHYSICIAN ASSISTANT

## 2019-07-08 PROCEDURE — 89060 EXAM SYNOVIAL FLUID CRYSTALS: CPT

## 2019-07-08 PROCEDURE — 99284 EMERGENCY DEPT VISIT MOD MDM: CPT | Mod: 25

## 2019-07-08 PROCEDURE — 25000003 PHARM REV CODE 250: Performed by: PHYSICIAN ASSISTANT

## 2019-07-08 PROCEDURE — 20610 PR DRAIN/INJECT LARGE JOINT/BURSA: ICD-10-PCS | Mod: RT,S$GLB,, | Performed by: PHYSICIAN ASSISTANT

## 2019-07-08 PROCEDURE — 99999 PR PBB SHADOW E&M-EST. PATIENT-LVL IV: ICD-10-PCS | Mod: PBBFAC,,, | Performed by: PHYSICIAN ASSISTANT

## 2019-07-08 PROCEDURE — 20610 DRAIN/INJ JOINT/BURSA W/O US: CPT | Mod: RT,S$GLB,, | Performed by: PHYSICIAN ASSISTANT

## 2019-07-08 PROCEDURE — 99285 EMERGENCY DEPT VISIT HI MDM: CPT | Mod: ,,, | Performed by: PHYSICIAN ASSISTANT

## 2019-07-08 PROCEDURE — 20610 DRAIN/INJ JOINT/BURSA W/O US: CPT | Mod: PBBFAC,PO | Performed by: PHYSICIAN ASSISTANT

## 2019-07-08 PROCEDURE — 99499 NO LOS: ICD-10-PCS | Mod: S$GLB,,, | Performed by: PHYSICIAN ASSISTANT

## 2019-07-08 PROCEDURE — 99499 UNLISTED E&M SERVICE: CPT | Mod: S$GLB,,, | Performed by: PHYSICIAN ASSISTANT

## 2019-07-08 RX ORDER — TRAMADOL HYDROCHLORIDE 50 MG/1
50-100 TABLET ORAL EVERY 8 HOURS PRN
Qty: 12 TABLET | Refills: 0 | Status: SHIPPED | OUTPATIENT
Start: 2019-07-08 | End: 2023-06-13

## 2019-07-08 RX ORDER — OXYCODONE AND ACETAMINOPHEN 5; 325 MG/1; MG/1
1 TABLET ORAL
Status: COMPLETED | OUTPATIENT
Start: 2019-07-08 | End: 2019-07-08

## 2019-07-08 RX ADMIN — Medication 20 MG: at 02:07

## 2019-07-08 RX ADMIN — OXYCODONE HYDROCHLORIDE AND ACETAMINOPHEN 1 TABLET: 5; 325 TABLET ORAL at 11:07

## 2019-07-08 NOTE — LETTER
July 8, 2019      Giovanny Tay MD  [address]  [facility] [address]           [facility]  [address]  [facility] [address]  Phone: [phone]          Patient: Nirmal Moss   MR Number: [id number]   YOB: 1970   Date of Visit: 7/8/2019       Dear Dr. Giovanny Tay:    Thank you for referring Nirmal Moss to me for evaluation. Attached you will find relevant portions of my assessment and plan of care.    If you have questions, please do not hesitate to call me. I look forward to following Nirmal Moss along with you.    Sincerely,    Jayant Flores III, PA-C    Enclosure  CC:  No Recipients    If you would like to receive this communication electronically, please contact [email or web address] or [phone] to request more information on [employer] access.    For providers and/or their staff who would like to refer a patient to [facility], please contact us through our one-stop-shop provider referral line, [facility], at [phone].    If you feel you have received this communication in error or would no longer like to receive these types of communications, please e-mail [email or web address]

## 2019-07-09 VITALS
HEART RATE: 72 BPM | BODY MASS INDEX: 28 KG/M2 | DIASTOLIC BLOOD PRESSURE: 83 MMHG | OXYGEN SATURATION: 100 % | TEMPERATURE: 98 F | RESPIRATION RATE: 16 BRPM | SYSTOLIC BLOOD PRESSURE: 145 MMHG | WEIGHT: 200 LBS | HEIGHT: 71 IN

## 2019-07-09 LAB
APPEARANCE FLD: NORMAL
BODY FLD TYPE: NORMAL
BODY FLD TYPE: NORMAL
COLOR FLD: NORMAL
CRYSTALS FLD MICRO: NEGATIVE
LYMPHOCYTES NFR FLD MANUAL: 20 %
MONOS+MACROS NFR FLD MANUAL: 20 %
NEUTROPHILS NFR FLD MANUAL: 60 %
PATH INTERP FLD-IMP: NORMAL
PROCALCITONIN SERPL IA-MCNC: NORMAL NG/ML
WBC # FLD: 7303 /CU MM

## 2019-07-09 RX ORDER — OXYCODONE AND ACETAMINOPHEN 10; 325 MG/1; MG/1
1 TABLET ORAL EVERY 6 HOURS PRN
Qty: 11 TABLET | Refills: 0 | Status: SHIPPED | OUTPATIENT
Start: 2019-07-09 | End: 2019-07-15 | Stop reason: ALTCHOICE

## 2019-07-09 NOTE — CONSULTS
Consult Note  Orthopaedics    SUBJECTIVE:     History of Present Illness:  Patient is a 48 y.o. male with OA bilateral knees. Has been seeing Mello villalta at sports medicine who has been treating right knee. He has right knee aspiration today with 38 cc straw colored fluid collected. He then had euflexxa injection to follow. He has since had increasing pain and swelling of the knee.No fevers or chills.     Scheduled Meds:  Continuous Infusions:  PRN Meds:    Review of patient's allergies indicates:   Allergen Reactions    Hydrocodone Rash       Past Medical History:   Diagnosis Date    ADD (attention deficit disorder)     Anxiety     Depression     Gastritis     EGD 2014    HDL deficiency     Hypertension     Kidney stone     Meniscus tear 09/2013    right    Meniscus tear 01/2017    right    Seizure disorder 1/18/2018    Seizures 06/2014    last seizure in late 2014    Vitamin D deficiency disease 1/18/2018     Past Surgical History:   Procedure Laterality Date    ARTHROSCOPY, KNEE, WITH DEBRIDEMENT Right 12/5/2013    [facility]    ARTHROSCOPY, KNEE, WITH MENISCECTOMY Left 12/11/2018    [facility]    ZGUUANJCLZR-BBWWROKESPTY-WBVOGC & LATERAL Right 3/28/2017    [facility]    CHONDRECTOMY, KNEE, SEMILUNAR CARTILAGE Right 12/5/2013    [facility]    CHONDROPLASTY, KNEE Left 12/11/2018    [facility]    EGD (ESOPHAGOGASTRODUODENOSCOPY) N/A 4/4/2014    [facility] by [facility] at [facility]O (4TH FLR)    EXCISION, PLICA, KNEE, ARTHROSCOPIC Right 12/5/2013    [facility]    LYJHYVEO-AMDCL-JBEZLRSVZFPH Right 3/28/2017    [facility]    FRACTURE SURGERY Left 05/2016    wrist fracture    Injection, Joint, Bio-D Left Knee Left 12/11/2018    [facility]    KNEE SURGERY       SYNOVECTOMY, KNEE Left 12/11/2018    [facility]    SYNOVECTOMY, KNEE Right 12/5/2013    [facility]     Family History   Problem Relation Age of Onset    Diabetes Father     Cancer Father     Skin cancer Father     Cancer Maternal Grandfather      Social History     Tobacco Use    Smoking status: Never Smoker    Smokeless tobacco: Never Used   Substance Use Topics    Alcohol use: Yes     Alcohol/week: 0.0 oz     Comment: social    Drug use: No        Review of Systems:  Constitutional: negative for fevers  Eyes: no visual changes  ENT: negative for hearing loss  Respiratory: negative for dyspnea  Cardiovascular: negative for chest pain  Gastrointestinal: negative for abdominal pain  Genitourinary: negative for dysuria  Neurological: negative for headaches  Behavioral/Psych: negative for hallucinations  Endocrine: negative for temperature intolerance      OBJECTIVE:     Vital Signs (Most Recent)  Temp: 98.2 °F (36.8 °C) (07/08/19 2235)  Pulse: 74 (07/08/19 2235)  Resp: 18 (07/08/19 2235)  BP: (!) 152/88 (07/08/19 2235)  SpO2: 100 % (07/08/19 2235)    Physical Exam:  Gen:  No acute distress  CV:  Peripherally well-perfused.  Pulses 2+ bilaterally.  Lungs:  Normal respiratory effort.  Abdomen:  Soft, non-tender, non-distended  Head/Neck:  Normocephalic.  Atraumatic. No TTP, AROM and PROM intact without pain  Neuro:  CN intact without deficit, SILT throughout B/L Upper & Lower Extremities  Pelvis: No TTP, Stable to direct anterior pressure over ASIS.  RIGHT LOWER EXTREMITY    INSPECTION  - Skin intact, visible knee effuson. No erythema.   PALPATION  - Minimally TTP, palpable effusion  RANGE OF MOTION  - AROM and PROM intact at knee limited some due to pain  NEUROVASCULAR  - TA/EHL/Gastroc/FHL assessed in isolation without deficit  - SILT throughout  - DP and PT palpated  2+  - Capillary Refill <3s    Laboratory:  No results found for this or any previous  visit (from the past 72 hour(s)).    Diagnostic Results:  X-Ray: Reviewed  No acute bony anormality  ASSESSMENT/PLAN:     A/P: Nirmal Moss is a 48 y.o. with right knee pain and swelling s/p euflexxa injection. No concern for infection at this time. 110 cc bloody effusion aspirated at bedside.       Plan:  - No acute orthopedic intervention. Likely hemarthrosis from injection today. Will place in compressive dressing. Baseline inflammatory markers ordered as precaution, continue ice, elevation. IV benadryl given for possible allergic reaction. Will dc to home with close f/u this week in ortho clinic.     Procedure Note:  After consent was obtained, using sterile technique the right knee joint was entered and 110 cc of bloody colored fluid was withdrawn and sent for cell count, cbc, culture and crystals. Sterile dressing was applied. The procedure was well tolerated.  The patient is asked to continue to rest the knee for a few more days before resuming regular activities.  It may be more painful for the first 1-2 days.  Watch for fever, or increased swelling or persistent pain in knee. Activity as tolerated.       Leighton Crews M.D. PGY3  Orthopedic Surgery

## 2019-07-09 NOTE — ED TRIAGE NOTES
Pt reports R knee pain today. Pt had fluid drained and was given flexeril injection today around 2 pm. Pt advised to come to ED d/t pain. Pt states he can barely move his R knee. Pt also reports swelling.. Per wife, Md recommend to drain the knee again and do some blood test.        LOC: The patient is awake, alert, aware of environment with an appropriate affect. Oriented x4, speaking appropriately  APPEARANCE: Pt resting comfortably, in no acute distress, pt is clean and well groomed, clothing properly fastened  SKIN:The skin is warm and dry, color consistent with ethnicity, patient has normal skin turgor and moist mucus membranes  RESPIRATORY:Airway is open and patent, respirations are spontaneous, patient has a normal effort and rate, no accessory muscle use noted.  CARDIAC: Normal rate and rhythm, no peripheral edema noted, capillary refill < 3 seconds, bilateral radial pulses 2+.  ABDOMEN: Soft, non tender, non distended.  NEUROLOGIC: PERRLA, facial expression is symmetrical, patient moving all extremities spontaneously, normal sensation in all extremities when touched with a finger.  Follows all commands appropriately  MUSCULOSKELETAL:  Decreased ROM R knee,   + swelling

## 2019-07-09 NOTE — PROGRESS NOTES
Patient is here for follow up of knee arthritis. Pt is requesting Euflexxa injection #1.  [facility] reviewed per encounter record. Has failed other conservative modalities including NSAIDS, activity modification, weight loss.    He has received good relief with injections in the past.     The prior shots were tolerated well.    PHYSICAL EXAMINATION:     General: The patient is alert and oriented x 3. Mood is pleasant.   Observation of ears, eyes and nose reveals no gross abnormalities. No   labored breathing observed.     No signs of infection or adverse reaction to knee.    Mild effusion noted to the knee.         Euflexxa Injection Procedure #1    A time out was performed, including verification of patient ID, procedure, site and side, availability of information and equipment, review of safety issues, and agreement with consent, the procedure site was marked.    The patient was prepped aseptically with povidone-iodine swabsticks.     Using a sterile technique the knee joint was entered with a 18 gauge needle from the superior lateral approach and 38 ml's of serous colored fluid was withdrawn. Using the same 18 gauge needle, a therapeutic injection of 2cc Euflexxa was given under sterile technique using a 21.5g x 1.5 needle from the same superior lateral aspect of the right knee Joint with the patient in the supine position.     Nirmal Moss had no adverse reactions to the medication. Pain decreased. male was instructed to apply ice to the joint for 20 minutes and avoid strenuous activities for 24-36 hours following the injection. male was warned of possible blood pressure changes during that time. Following that time, male can resume activities as prior to the injection.    male was reminded to call the clinic immediately for any adverse side effects as explained in clinic today.    RTC in 1 week for injection #2  Knee sleeve given today.   Patient will be prescribed tramadol today to help with any knee pain  following procedure today.     All patients questions were answered. Patient was advised to call us with any concerns or questions.

## 2019-07-09 NOTE — DISCHARGE INSTRUCTIONS
Please take new medication as directed. Please make sure to follow up with Orthopedics to discuss today's Emergency Department visit and for further evaluation and management. Please return to the Emergency Department if your symptoms worsen or you develop any additional concerning symptoms.

## 2019-07-09 NOTE — ED NOTES
Ortho at bedside for removal of fluid in knee; labs handed to RN to be sent off to lab at this time.

## 2019-07-09 NOTE — TELEPHONE ENCOUNTER
Spoke to patient's wife, who called me on my personal line tonight regarding patient's knee.     Patient's knee was aspirated and injected with 2 cc of euflexxa earlier today in clinic. Procedure went well and he has had this procedure and injection prior to today.     Wife reports patient is in excruciating pain and that knee is swollen more then it was earlier today in clinic. She also reports swelling of the leg from the thigh to the calf.     This is terribly usual following this procedure. Recommend they go to [facility] ED for evaluation.     I recommend consult to orthopedics for aspiration of knee joint and fluid sent to lab for fluid analysis of WBC, fluid culture, gout crystal analysis.    I highly doubt infection due to acuteness of swelling in relation to procedure. Could be pseudo-septic reaction which can happen sometimes following euflexxa injection.     If his whole leg is swollen as they say, then may need ultrasound to rule out blood clot which would also be highly unlikely.

## 2019-07-10 NOTE — ED PROVIDER NOTES
Encounter Date: 7/8/2019       History     Chief Complaint   Patient presents with    Knee Pain     R knee pain and swelling since injection and drainage today.      Mr Tijerina is a 47 yo male patient that presents to the ED with right knee pain. Pt was seen in Sports Medicine Clinic and had knee aspiration and Euflexxa injection. Pt developed worsening pain and swelling post procedure. Pain is worsened by weight bearing and movement. Decreased ROM.  Denies any fevers, chills, headaches, dizziness, numbness, weakness, chest pain, shortness of breath, cough, abdominal pain, N/V/D.         Review of patient's allergies indicates:   Allergen Reactions    Hydrocodone Rash     Past Medical History:   Diagnosis Date    ADD (attention deficit disorder)     Anxiety     Depression     Gastritis     EGD 2014    HDL deficiency     Hypertension     Kidney stone     Meniscus tear 09/2013    right    Meniscus tear 01/2017    right    Seizure disorder 1/18/2018    Seizures 06/2014    last seizure in late 2014    Vitamin D deficiency disease 1/18/2018     Past Surgical History:   Procedure Laterality Date    ARTHROSCOPY, KNEE, WITH DEBRIDEMENT Right 12/5/2013    [facility]    ARTHROSCOPY, KNEE, WITH MENISCECTOMY Left 12/11/2018    [facility]    RIGEPYGGABO-XNCYVWXTSQSJ-DGFFZF & LATERAL Right 3/28/2017    [facility]    CHONDRECTOMY, KNEE, SEMILUNAR CARTILAGE Right 12/5/2013    [facility]    CHONDROPLASTY, KNEE Left 12/11/2018    [facility] at [facility]    EGD (ESOPHAGOGASTRODUODENOSCOPY) N/A 4/4/2014    Performed by [facility] at [facility] ENDO (4TH FLR)    EXCISION, PLICA, KNEE, ARTHROSCOPIC Right 12/5/2013    [facility] [place]    NZXPYXHP-DQVFW-CKVHCPTVSOFF Right 3/28/2017    [facility] by [facility] at [facility]    FRACTURE SURGERY Left 05/2016    wrist  fracture    Injection, Joint, Bio-D Left Knee Left 12/11/2018    [facility]    KNEE SURGERY      SYNOVECTOMY, KNEE Left 12/11/2018    [facility]    SYNOVECTOMY, KNEE Right 12/5/2013    [facility]formed by [facility] at [facility]     Family History   Problem Relation Age of Onset    Diabetes Father     Cancer Father     Skin cancer Father     Cancer Maternal Grandfather      Social History     Tobacco Use    Smoking status: Never Smoker    Smokeless tobacco: Never Used   Substance Use Topics    Alcohol use: Yes     Alcohol/week: 0.0 oz     Comment: social    Drug use: No     Review of Systems   Constitutional: Negative for chills and fever.   HENT: Negative for congestion, rhinorrhea and sore throat.    Eyes: Negative for pain and visual disturbance.   Respiratory: Negative for cough and shortness of breath.    Cardiovascular: Negative for chest pain.   Gastrointestinal: Negative for abdominal pain, diarrhea, nausea and vomiting.   Genitourinary: Negative for dysuria, flank pain and frequency.   Musculoskeletal: Positive for arthralgias and joint swelling. Negative for neck pain and neck stiffness.   Skin: Negative for rash.   Neurological: Negative for dizziness, syncope, light-headedness and headaches.   Psychiatric/Behavioral: Negative for confusion.       Physical Exam     Initial Vitals [07/08/19 2235]   BP Pulse Resp Temp SpO2   (!) 152/88 74 18 98.2 °F (36.8 °C) 100 %      MAP       --         Physical Exam    Constitutional: He appears well-developed and well-nourished. He is cooperative.  Non-toxic appearance. No distress.   HENT:   Head: Normocephalic and atraumatic.   Eyes: Conjunctivae and EOM are normal. Pupils are equal, round, and reactive to light.   Neck: Normal range of motion. Neck supple.   Cardiovascular: Normal rate and intact distal pulses. Exam reveals no gallop and no friction rub.    No murmur heard.  Pulses:       Dorsalis pedis  pulses are 2+ on the right side, and 2+ on the left side.   Pulmonary/Chest: Effort normal. No respiratory distress. He has no decreased breath sounds. He has no wheezes. He has no rhonchi. He has no rales.   Abdominal: Soft. There is no tenderness. There is no rigidity, no rebound, no guarding and no CVA tenderness.   Musculoskeletal:        Right knee: He exhibits decreased range of motion, swelling and effusion. Tenderness found. Lateral joint line tenderness noted.   Neurological: He is alert and oriented to person, place, and time.   Skin: Skin is warm and dry.         ED Course   Procedures  Labs Reviewed   AFB CULTURE & SMEAR   CULTURE, ANAEROBIC   CULTURE, AEROBIC  (SPECIFY SOURCE)   CULTURE, FUNGUS   GRAM STAIN   PROCALCITONIN   WBC & DIFF,BODY FLUID   BODY FLUID CRYSTAL   PATHOLOGIST REVIEW, BODY FLUID          Imaging Results          X-Ray Knee 3 View Right (Final result)  Result time 07/09/19 00:26:15    Final result by Steffen Rivera MD (07/09/19 00:26:15)                 Impression:      No acute fracture.    Suprapatellar effusion.    Tricompartment degenerative change, similar to prior.      Electronically signed by: Steffen Rivera MD  Date:    07/09/2019  Time:    00:26             Narrative:    EXAMINATION:  XR KNEE 3 VIEW RIGHT    CLINICAL HISTORY:  knee pain;    TECHNIQUE:  AP, lateral, and Merchant views of the right knee were performed.    COMPARISON:  October 17, 2018.    FINDINGS:  No fracture or dislocation.  Suprapatellar effusion.  Tricompartment degenerative change, similar to prior.                                 Medical Decision Making:   History:   Old Medical Records: I decided to obtain old medical records.  Initial Assessment:   Mr Tijerina is a 47 yo male patient that presents to the ED with right knee pain. Pt was seen in Sports Medicine Clinic and had knee aspiration and Euflexxa injection. Pt developed worsening pain and swelling post procedure. Pain is worsened by weight  bearing and movement. Decreased ROM.  Denies any fevers, chills, headaches, dizziness, numbness, weakness, chest pain, shortness of breath, cough, abdominal pain, N/V/D.   ED Management:  Pt hemodynamically stable. Non-toxic and in no acute distress. Orthopedic Surgery consulted and will evaluate patient. Orthopedic Surgery aspirated knee, sent off labs, and recommend discharge with follow up in clinic. He was discharged with short course of oxycodone.  He will follow up with Orthopedic clinic.  All of the patient's questions were answered.  I reviewed the patient's chart, labs, and imaging and discussed the case with my supervising physician.               Attending Attestation:     Physician Attestation Statement for NP/PA:   I discussed this assessment and plan of this patient with the NP/PA, but I did not personally examine the patient. The face to face encounter was performed by the NP/PA.                     Clinical Impression:       ICD-10-CM ICD-9-CM   1. Right knee pain, unspecified chronicity M25.561 719.46         Disposition:   Disposition: Discharged  Condition: Stable                        Michael Gtz PA-C  07/10/19 2145       Jason Perea III, MD  07/22/19 1112

## 2019-07-15 ENCOUNTER — OFFICE VISIT (OUTPATIENT)
Dept: SPORTS MEDICINE | Facility: CLINIC | Age: 49
End: 2019-07-15
Payer: COMMERCIAL

## 2019-07-15 VITALS
WEIGHT: 200 LBS | HEIGHT: 71 IN | HEART RATE: 77 BPM | BODY MASS INDEX: 28 KG/M2 | SYSTOLIC BLOOD PRESSURE: 142 MMHG | DIASTOLIC BLOOD PRESSURE: 89 MMHG

## 2019-07-15 DIAGNOSIS — M17.11 OSTEOARTHRITIS OF RIGHT KNEE, UNSPECIFIED OSTEOARTHRITIS TYPE: Primary | ICD-10-CM

## 2019-07-15 DIAGNOSIS — G89.29 CHRONIC PAIN OF RIGHT KNEE: ICD-10-CM

## 2019-07-15 DIAGNOSIS — M25.561 CHRONIC PAIN OF RIGHT KNEE: ICD-10-CM

## 2019-07-15 DIAGNOSIS — M25.461 KNEE EFFUSION, RIGHT: ICD-10-CM

## 2019-07-15 PROCEDURE — 20610 DRAIN/INJ JOINT/BURSA W/O US: CPT | Mod: RT,S$GLB,, | Performed by: PHYSICIAN ASSISTANT

## 2019-07-15 PROCEDURE — 99999 PR PBB SHADOW E&M-EST. PATIENT-LVL III: CPT | Mod: PBBFAC,,, | Performed by: PHYSICIAN ASSISTANT

## 2019-07-15 PROCEDURE — 99499 NO LOS: ICD-10-PCS | Mod: S$GLB,,, | Performed by: PHYSICIAN ASSISTANT

## 2019-07-15 PROCEDURE — 99499 UNLISTED E&M SERVICE: CPT | Mod: S$GLB,,, | Performed by: PHYSICIAN ASSISTANT

## 2019-07-15 PROCEDURE — 20610 PR DRAIN/INJECT LARGE JOINT/BURSA: ICD-10-PCS | Mod: RT,S$GLB,, | Performed by: PHYSICIAN ASSISTANT

## 2019-07-15 PROCEDURE — 99999 PR PBB SHADOW E&M-EST. PATIENT-LVL III: ICD-10-PCS | Mod: PBBFAC,,, | Performed by: PHYSICIAN ASSISTANT

## 2019-07-15 RX ORDER — BUPIVACAINE HYDROCHLORIDE 5 MG/ML
2 INJECTION, SOLUTION PERINEURAL
Status: COMPLETED | OUTPATIENT
Start: 2019-07-15 | End: 2019-07-15

## 2019-07-15 RX ORDER — LIDOCAINE HYDROCHLORIDE 10 MG/ML
2 INJECTION INFILTRATION; PERINEURAL
Status: COMPLETED | OUTPATIENT
Start: 2019-07-15 | End: 2019-07-15

## 2019-07-15 RX ORDER — TRIAMCINOLONE ACETONIDE 40 MG/ML
40 INJECTION, SUSPENSION INTRA-ARTICULAR; INTRAMUSCULAR
Status: COMPLETED | OUTPATIENT
Start: 2019-07-15 | End: 2019-07-15

## 2019-07-15 RX ORDER — OXYCODONE AND ACETAMINOPHEN 5; 325 MG/1; MG/1
1 TABLET ORAL EVERY 8 HOURS PRN
Qty: 8 TABLET | Refills: 0 | Status: SHIPPED | OUTPATIENT
Start: 2019-07-15 | End: 2023-05-29

## 2019-07-15 RX ADMIN — BUPIVACAINE HYDROCHLORIDE 10 MG: 5 INJECTION, SOLUTION PERINEURAL at 01:07

## 2019-07-15 RX ADMIN — TRIAMCINOLONE ACETONIDE 40 MG: 40 INJECTION, SUSPENSION INTRA-ARTICULAR; INTRAMUSCULAR at 01:07

## 2019-07-15 RX ADMIN — LIDOCAINE HYDROCHLORIDE 2 ML: 10 INJECTION INFILTRATION; PERINEURAL at 01:07

## 2019-07-15 NOTE — PROGRESS NOTES
Patient is here for follow up of knee arthritis. Pt is requesting knee drainage and injection  [facility] reviewed per encounter record. Has failed other conservative modalities including NSAIDS, activity modification, weight loss.    He has received good relief with injections in the past.     The prior shots were tolerated well. Except for this last aspiration and euflexxa injection which caused him to have a hemarthrosis of the right knee later that evening. He presented to the ED with knee aspiration and negative gout and WBC studies. His fluid cultures were not processed by the lab. No concern for infection.     PHYSICAL EXAMINATION:     General: The patient is alert and oriented x 3. Mood is pleasant.   Observation of ears, eyes and nose reveals no gross abnormalities. No   labored breathing observed.     No signs of infection or adverse reaction to knee.    Mild effusion noted to the knee.         Right knee aspiration and injection    A time out was performed, including verification of patient ID, procedure, site and side, availability of information and equipment, review of safety issues, and agreement with consent, the procedure site was marked.    The patient was prepped aseptically with povidone-iodine swabsticks.     Using a sterile technique the knee joint was entered with a 18 gauge needle from the superior lateral approach and 35 ml's of blood tinged colored fluid was withdrawn. Using the same 18 gauge needle, a therapeutic injection of 2cc lidocaine, 2cc of marcaine and 1cc of kenalog (40mg) was given under sterile technique using a 21.5g x 1.5 needle from the same superior lateral aspect of the right knee Joint with the patient in the supine position.     Nirmal Moss had no adverse reactions to the medication. Pain decreased. male was instructed to apply ice to the joint for 20 minutes and avoid strenuous activities for 24-36 hours following the injection. male was warned of possible blood  pressure changes during that time. Following that time, male can resume activities as prior to the injection.    male was reminded to call the clinic immediately for any adverse side effects as explained in clinic today.    RTC in 10 days for euflexxa injection #2  Knee sleeve given today.   Patient will be prescribed a few percocet today to help with any knee pain following procedure today. He is allergic to norco and tramadol does not help his pain.     All patients questions were answered. Patient was advised to call us with any concerns or questions.

## 2019-07-15 NOTE — LETTER
July 15, 2019      Giovanny Tay MD  [address]  [facility] [address]           [facility]  [address]  [facility] [address]  Phone: [phone]          Patient: Nirmal Moss   MR Number: [id number]   YOB: 1970   Date of Visit: 7/15/2019       Dear Dr. Giovanny Tay:    Thank you for referring Nirmal Moss to me for evaluation. Attached you will find relevant portions of my assessment and plan of care.    If you have questions, please do not hesitate to call me. I look forward to following Nirmal Moss along with you.    Sincerely,    Jayant Flores III, PA-C    Enclosure  CC:  No Recipients    If you would like to receive this communication electronically, please contact [email or web address] or [phone] to request more information on [employer] Link access.    For providers and/or their staff who would like to refer a patient to [facility], please contact us through our one-stop-shop provider referral line, [facility], at [phone].    If you feel you have received this communication in error or would no longer like to receive these types of communications, please e-mail [email or web address]

## 2019-07-29 ENCOUNTER — OFFICE VISIT (OUTPATIENT)
Dept: SPORTS MEDICINE | Facility: CLINIC | Age: 49
End: 2019-07-29
Payer: COMMERCIAL

## 2019-07-29 VITALS
DIASTOLIC BLOOD PRESSURE: 94 MMHG | HEART RATE: 69 BPM | WEIGHT: 200 LBS | BODY MASS INDEX: 28 KG/M2 | HEIGHT: 71 IN | SYSTOLIC BLOOD PRESSURE: 140 MMHG

## 2019-07-29 DIAGNOSIS — M17.11 OSTEOARTHRITIS OF RIGHT KNEE, UNSPECIFIED OSTEOARTHRITIS TYPE: Primary | ICD-10-CM

## 2019-07-29 PROCEDURE — 99999 PR PBB SHADOW E&M-EST. PATIENT-LVL III: ICD-10-PCS | Mod: PBBFAC,,, | Performed by: PHYSICIAN ASSISTANT

## 2019-07-29 PROCEDURE — 20610 PR DRAIN/INJECT LARGE JOINT/BURSA: ICD-10-PCS | Mod: RT,S$GLB,, | Performed by: PHYSICIAN ASSISTANT

## 2019-07-29 PROCEDURE — 20610 DRAIN/INJ JOINT/BURSA W/O US: CPT | Mod: PBBFAC,PO | Performed by: PHYSICIAN ASSISTANT

## 2019-07-29 PROCEDURE — 99499 UNLISTED E&M SERVICE: CPT | Mod: S$GLB,,, | Performed by: PHYSICIAN ASSISTANT

## 2019-07-29 PROCEDURE — 99499 NO LOS: ICD-10-PCS | Mod: S$GLB,,, | Performed by: PHYSICIAN ASSISTANT

## 2019-07-29 PROCEDURE — 99999 PR PBB SHADOW E&M-EST. PATIENT-LVL III: CPT | Mod: PBBFAC,,, | Performed by: PHYSICIAN ASSISTANT

## 2019-07-29 PROCEDURE — 20610 DRAIN/INJ JOINT/BURSA W/O US: CPT | Mod: RT,S$GLB,, | Performed by: PHYSICIAN ASSISTANT

## 2019-07-29 RX ADMIN — Medication 20 MG: at 01:07

## 2019-07-29 NOTE — LETTER
July 29, 2019      Giovanny Tay MD  [address]  [facility] [address]           [facility]  [address]  [facility] [address]  Phone: [phone]          Patient: Nirmal Moss   MR Number: [id number]   YOB: 1970   Date of Visit: 7/29/2019       Dear Dr. Giovanny Tay:    Thank you for referring Nirmal Moss to me for evaluation. Attached you will find relevant portions of my assessment and plan of care.    If you have questions, please do not hesitate to call me. I look forward to following Nirmal Moss along with you.    Sincerely,    Jayant Flores III, PA-C    Enclosure  CC:  No Recipients    If you would like to receive this communication electronically, please contact [email or web address] or [phone] to request more information on [facility] Link access.    For providers and/or their staff who would like to refer a patient to [facility], please contact us through our one-stop-shop provider referral line, [facility], at [phone].    If you feel you have received this communication in error or would no longer like to receive these types of communications, please e-mail [email or web address]

## 2019-07-29 NOTE — PROGRESS NOTES
Patient is here for follow up of knee arthritis. Pt is requesting Euflexxa injection #2.  PMFH reviewed per encounter record. Has failed other conservative modalities including NSAIDS, activity modification, weight loss.    He has received good relief with injections in the past.     The prior shots were tolerated well.    PHYSICAL EXAMINATION:     General: The patient is alert and oriented x 3. Mood is pleasant.   Observation of ears, eyes and nose reveals no gross abnormalities. No   labored breathing observed.     No signs of infection or adverse reaction to knee.        Euflexxa Injection Procedure #2    A time out was performed, including verification of patient ID, procedure, site and side, availability of information and equipment, review of safety issues, and agreement with consent, the procedure site was marked.    The patient was prepped aseptically with povidone-iodine swabsticks. A diagnostic and therapeutic injection of 2cc Euflexxa was given under sterile technique using a 21.5g x 1.5 needle from the anterolateral aspect of the right knee Joint with the patient in the seated position.     Nirmal Moss had no adverse reactions to the medication. Pain decreased. male was instructed to apply ice to the joint for 20 minutes and avoid strenuous activities for 24-36 hours following the injection. male was warned of possible blood pressure changes during that time. Following that time, male can resume activities as prior to the injection.    male was reminded to call the clinic immediately for any adverse side effects as explained in clinic today.    RTC in 1 week for injection #3  All patients questions were answered. Patient was advised to call us with any concerns or questions.

## 2019-08-05 ENCOUNTER — HOSPITAL ENCOUNTER (OUTPATIENT)
Dept: RADIOLOGY | Facility: HOSPITAL | Age: 49
Discharge: HOME | End: 2019-08-05
Attending: PHYSICIAN ASSISTANT
Payer: COMMERCIAL

## 2019-08-05 ENCOUNTER — OFFICE VISIT (OUTPATIENT)
Dept: SPORTS MEDICINE | Facility: CLINIC | Age: 49
End: 2019-08-05
Payer: COMMERCIAL

## 2019-08-05 VITALS
WEIGHT: 200 LBS | DIASTOLIC BLOOD PRESSURE: 85 MMHG | HEIGHT: 71 IN | SYSTOLIC BLOOD PRESSURE: 131 MMHG | BODY MASS INDEX: 28 KG/M2 | HEART RATE: 69 BPM

## 2019-08-05 DIAGNOSIS — S90.32XA CONTUSION OF LEFT FOOT, INITIAL ENCOUNTER: ICD-10-CM

## 2019-08-05 DIAGNOSIS — M17.11 OSTEOARTHRITIS OF RIGHT KNEE, UNSPECIFIED OSTEOARTHRITIS TYPE: ICD-10-CM

## 2019-08-05 DIAGNOSIS — G89.29 CHRONIC PAIN OF RIGHT KNEE: ICD-10-CM

## 2019-08-05 DIAGNOSIS — M79.672 LEFT FOOT PAIN: ICD-10-CM

## 2019-08-05 DIAGNOSIS — M25.561 CHRONIC PAIN OF RIGHT KNEE: ICD-10-CM

## 2019-08-05 DIAGNOSIS — M79.672 LEFT FOOT PAIN: Primary | ICD-10-CM

## 2019-08-05 PROCEDURE — 73630 X-RAY EXAM OF FOOT: CPT | Mod: TC,FY,PO,LT

## 2019-08-05 PROCEDURE — 99999 PR PBB SHADOW E&M-EST. PATIENT-LVL IV: ICD-10-PCS | Mod: PBBFAC,,, | Performed by: PHYSICIAN ASSISTANT

## 2019-08-05 PROCEDURE — 20610 PR DRAIN/INJECT LARGE JOINT/BURSA: ICD-10-PCS | Mod: S$GLB,,, | Performed by: PHYSICIAN ASSISTANT

## 2019-08-05 PROCEDURE — 99999 PR PBB SHADOW E&M-EST. PATIENT-LVL IV: CPT | Mod: PBBFAC,,, | Performed by: PHYSICIAN ASSISTANT

## 2019-08-05 PROCEDURE — 73630 X-RAY EXAM OF FOOT: CPT | Mod: 26,LT,, | Performed by: RADIOLOGY

## 2019-08-05 PROCEDURE — 99214 PR OFFICE/OUTPT VISIT, EST, LEVL IV, 30-39 MIN: ICD-10-PCS | Mod: S$GLB,,, | Performed by: PHYSICIAN ASSISTANT

## 2019-08-05 PROCEDURE — 99214 OFFICE O/P EST MOD 30 MIN: CPT | Mod: S$GLB,,, | Performed by: PHYSICIAN ASSISTANT

## 2019-08-05 PROCEDURE — 20610 DRAIN/INJ JOINT/BURSA W/O US: CPT | Mod: S$GLB,,, | Performed by: PHYSICIAN ASSISTANT

## 2019-08-05 PROCEDURE — 73630 XR FOOT COMPLETE 3 VIEW LEFT: ICD-10-PCS | Mod: 26,LT,, | Performed by: RADIOLOGY

## 2019-08-05 PROCEDURE — 20610 DRAIN/INJ JOINT/BURSA W/O US: CPT | Mod: PBBFAC,PO | Performed by: PHYSICIAN ASSISTANT

## 2019-08-05 RX ADMIN — Medication 20 MG: at 05:08

## 2019-08-06 NOTE — PROGRESS NOTES
Patient is here for follow up of knee arthritis. Pt is requesting Euflexxa injection #3.  PMFH reviewed per encounter record. Has failed other conservative modalities including NSAIDS, activity modification, weight loss.    He has received good relief with injections in the past.     The prior shots were tolerated well.    PHYSICAL EXAMINATION:     General: The patient is alert and oriented x 3. Mood is pleasant.   Observation of ears, eyes and nose reveals no gross abnormalities. No   labored breathing observed.     No signs of infection or adverse reaction to knee.        Euflexxa Injection Procedure #3    A time out was performed, including verification of patient ID, procedure, site and side, availability of information and equipment, review of safety issues, and agreement with consent, the procedure site was marked.    The patient was prepped aseptically with povidone-iodine swabsticks. A diagnostic and therapeutic injection of 2cc Euflexxa was given under sterile technique using a 21.5g x 1.5 needle from the anterolateral aspect of the right knee Joint with the patient in the seated position.     Nirmal Moss had no adverse reactions to the medication. Pain decreased. male was instructed to apply ice to the joint for 20 minutes and avoid strenuous activities for 24-36 hours following the injection. male was warned of possible blood pressure changes during that time. Following that time, male can resume activities as prior to the injection.    male was reminded to call the clinic immediately for any adverse side effects as explained in clinic today.    RTC in 6 months prn knee pain  All patients questions were answered. Patient was advised to call us with any concerns or questions.         He also presents today to be evaluated for foot pain.       Chief Complaint: Left foot pain    48 y.o. Male who reports an injury to foot 3 weeks ago after dropping a piece of furniture that he was moving on his  lateral foot. He reports that pain is mild to moderate but has persisted without improvement. Pain is worse with walking on the foot for a long period. He denies swelling of the foot.    Is affecting ADLs.     PAST MEDICAL HISTORY:   Past Medical History:   Diagnosis Date    ADD (attention deficit disorder)     Anxiety     Depression     Gastritis     EGD 2014    HDL deficiency     Hypertension     Kidney stone     Meniscus tear 09/2013    right    Meniscus tear 01/2017    right    Seizure disorder 1/18/2018    Seizures 06/2014    last seizure in late 2014    Vitamin D deficiency disease 1/18/2018     PAST SURGICAL HISTORY:   Past Surgical History:   Procedure Laterality Date    ARTHROSCOPY, KNEE, WITH DEBRIDEMENT Right 12/5/2013    Per[facility] at [facility]    ARTHROSCOPY, KNEE, WITH MENISCECTOMY Left 12/11/2018    [facility] at [facility]    [id number] & LATERAL Right 3/28/2017    [facility] at [facility] OR    CHONDRECTOMY, KNEE, SEMILUNAR CARTILAGE Right 12/5/2013    [facility] at [facility]    CHONDROPLASTY, KNEE Left 12/11/2018    [facility] at [facility] OR    EGD (ESOPHAGOGASTRODUODENOSCOPY) N/A 4/4/2014    [facility] ([facility]    EXCISION, PLICA, KNEE, ARTHROSCOPIC Right 12/5/2013    Per[facility] at [facility] OR    [id number] Right 3/28/2017    Per[facility] at [facility] OR    FRACTURE SURGERY Left 05/2016    wrist fracture    Injection, Joint, Bio-D Left Knee Left 12/11/2018    Per[facility] at [facility]    KNEE SURGERY      SYNOVECTOMY, KNEE Left 12/11/2018    Per[facility] at [facility]    SYNOVECTOMY, KNEE Right 12/5/2013    Performed by [facility] at [facility] OR     FAMILY HISTORY:   Family History   Problem Relation Age of Onset    Diabetes Father     Cancer Father     Skin cancer Father      Cancer Maternal Grandfather      SOCIAL HISTORY:   Social History     Socioeconomic History    Marital status:      Spouse name: Not on file    Number of children: 1    Years of education: Not on file    Highest education level: Not on file   Occupational History     Employer: [employer]   Social Needs    Financial resource strain: Not on file    Food insecurity:     Worry: Not on file     Inability: Not on file    Transportation needs:     Medical: Not on file     Non-medical: Not on file   Tobacco Use    Smoking status: Never Smoker    Smokeless tobacco: Never Used   Substance and Sexual Activity    Alcohol use: Yes     Alcohol/week: 0.0 oz     Comment: social    Drug use: No    Sexual activity: Yes     Partners: Male   Lifestyle    Physical activity:     Days per week: Not on file     Minutes per session: Not on file    Stress: Not on file   Relationships    Social connections:     Talks on phone: Not on file     Gets together: Not on file     Attends [employer] service: Not on file     Active member of club or organization: Not on file     Attends meetings of clubs or organizations: Not on file     Relationship status: Not on file   Other Topics Concern    Not on file   Social History Narrative    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       MEDICATIONS:   Current Outpatient Medications:     amLODIPine (NORVASC) 10 MG tablet, TAKE ONE Tablet BY MOUTH DAILY, Disp: 90 tablet, Rfl: 12    desonide (DESOWEN) 0.05 % cream, Apply topically 2 (two) times daily., Disp: 60 g, Rfl: 3    [START ON 8/18/2019] dextroamphetamine-amphetamine (ADDERALL XR) 30 MG 24 hr capsule, TAKE TWO CAPSULES BY MOUTH EVERY DAY, Disp: 60 capsule, Rfl: 0    dextroamphetamine-amphetamine (ADDERALL XR) 30 MG 24 hr capsule, TAKE TWO CAPSULES BY MOUTH EVERY DAY, Disp: 60 capsule, Rfl: 0    dextroamphetamine-amphetamine (ADDERALL XR) 30 MG 24 hr capsule, TAKE TWO  "CAPSULES BY MOUTH EVERY DAY, Disp: 60 capsule, Rfl: 0    hydroCHLOROthiazide (HYDRODIURIL) 25 MG tablet, Take 1 tablet (25 mg total) by mouth once daily., Disp: 90 tablet, Rfl: 12    irbesartan (AVAPRO) 300 MG tablet, TAKE ONE Tablet BY MOUTH DAILY, Disp: 90 tablet, Rfl: 12    metoprolol succinate (TOPROL-XL) 50 MG 24 hr tablet, Take 1 tablet (50 mg total) by mouth once daily., Disp: 30 tablet, Rfl: 11    montelukast (SINGULAIR) 10 mg tablet, TAKE ONE TABLET BY MOUTH EVERY EVENING, Disp: 30 tablet, Rfl: 5    mupirocin (BACTROBAN) 2 % ointment, , Disp: , Rfl:     paroxetine (PAXIL) 20 MG tablet, Take 3 tablets (60 mg total) by mouth every morning., Disp: 90 tablet, Rfl: 12    oxyCODONE-acetaminophen (PERCOCET) 5-325 mg per tablet, Take 1 tablet by mouth every 8 (eight) hours as needed for Pain., Disp: 8 tablet, Rfl: 0    promethazine (PHENERGAN) 25 MG tablet, Take 1 tablet (25 mg total) by mouth every 6 (six) hours as needed for Nausea., Disp: 16 tablet, Rfl: 0    traMADol (ULTRAM) 50 mg tablet, Take 1-2 tablets ( mg total) by mouth every 8 (eight) hours as needed for Pain., Disp: 12 tablet, Rfl: 0  No current facility-administered medications for this visit.   ALLERGIES:   Review of patient's allergies indicates:   Allergen Reactions    Hydrocodone Rash       VITAL SIGNS: /85   Pulse 69   Ht 5' 11" (1.803 m)   Wt 90.7 kg (200 lb)   BMI 27.89 kg/m²      Review of Systems   Constitution: Negative. Negative for chills, fever and night sweats.   HENT: Negative for congestion and headaches.    Eyes: Negative for blurred vision, left vision loss and right vision loss.   Cardiovascular: Negative for chest pain and syncope.   Respiratory: Negative for cough and shortness of breath.    Endocrine: Negative for polydipsia, polyphagia and polyuria.   Hematologic/Lymphatic: Negative for bleeding problem. Does not bruise/bleed easily.   Skin: Negative for dry skin, itching and rash.   Musculoskeletal: " Negative for falls and muscle weakness.   Gastrointestinal: Negative for abdominal pain and bowel incontinence.   Genitourinary: Negative for bladder incontinence and nocturia.   Neurological: Negative for disturbances in coordination, loss of balance and seizures.   Psychiatric/Behavioral: Negative for depression. The patient does not have insomnia.    Allergic/Immunologic: Negative for hives and persistent infections.   All other systems negative.    PHYSICAL EXAMINATION    General:  The patient is alert and oriented x 3.  Mood is pleasant.  Observation of ears, eyes and nose reveal no gross abnormalities.  No labored breathing observed.    left Foot and Ankle Exam    INSPECTION:      ALIGNMENT:  Gait:    Normal   Hindfoot  Normal    Scars:   None    Midfoot: Normal  Swelling:   none    Forefoot: Normal  Color:   Normal      Atrophy:  None    Collective Ankle-Hindfoot Alignment    Heel / Toe Walking: No difficulty   Good -plantigrade (PG), well aligned           [Fair-PG, malaligned, asymptomatic]         [Poor-Non-PG,malaligned, has sxs]     TENDERNESS:  lATERAL:    anterior:  Sinus tarsi:  None  Anteromedial joint line:  none  Syndesmosis:  none  Anterolateral joint line:   none  ATFL:   none  Talonavicular:    none   CFL:   none  Anterior tibialis:   none  Anterolateral gutter: none  Extensor tendons:   none  Fibula:   none  Peroneal tendons: none  POSTERIOR:  Peroneal tubercle.  None  Medial/lateral achilles:   none       Medial/lateral achilles insertion: none  MEDIAL:      Deltoid:  none  CALCANEUS:  Malleolus:  none  Retrocalcaneal:   none  PTT:   none  Medial achilles:   none  Navicular:  none  Lateral achilles:   none       Calcaneal tuberosity:   none  FOOT:    Calcaneal cuboid  none MT / MT heads:  + of 5th metatarsal   Navicular   none  Medial cord origin PF:  none  Cuneiforms:   none  Web space:   none  Lisfranc    none  Tarsal tunnel:   none  Base of the fifth metatarsal  none Tinels  sign   neg        RANGE OF MOTION:  RIGHT/ LEFT   STRENGTH: (affected)  Ankle DF/PF:  15/45  15/45    Anterior tibialis: 5/5     Eversion/Inversion: 15/25 15/25  Posterior tibialis: 5/5   Midfoot ABD/ADD: 10/10 10/10  Gastroc-soleus: 5/5   First MTP DF/PF: 60/25 60/25  Peroneals:  5/5         EHL:   5/5   (* = pain)     FHL:   5/5         (* = pain)      SPECIAL TESTS:   ANKLE INSTABILITY: (*pain)    Anterior drawer:   Normal     (C-W contralateral side)     Inversion:   30°     Eversion  10°            Collective Instability: (Ant-post and varus-valgus)     Stable        PROVOCATIVE TESTING:    Forced DF/ER: No pain at syndesmosis.    Mid-leg squeeze  No pain at syndesmosis    Forced DF:  No pain anterior joint line.      Forced PF:  No pain posterior ankle.     Forced INV:  No pain lateral    Forced EV:  No pain medial     Laus sign: Normal ankle plantar flexion.     Resisted peroneal No subluxation or pain    1st-2nd MT toggle No pain at Lisfranc    MT-T torque  No pain at Lisfranc     NEUROLOGIC TESTING:  All dermatomes foot, ankle and leg have normal sensation light touch  Ankle Reflexes 2+, symmetric   Negative Babinski and No Clonus    VASCULAR:  2+ pulses PT/DT with brisk capillary refill toes.    Other Findings:  Left  no swelling is present      XRAYS:  Left foot 3 views (AP, lateral,mortise)  were ordered and reviewed.   No evidence of any fracture or dislocation.  The osseous structures appear well mineralized and well aligned. No mortise displacement.    ASSESSMENT:   Left foot contusion   Left foot pain    PLAN:  I have discussed the nature of this problem with the patient today.     I feel he may have a bone bruise of the foot. No suspicion of stress fracture at this time.   Encouraged rest and ice compresses and giving it another 3-4 weeks to see if it resolves.   RTC if no improvement after that time.     All patients questions were answered. Patient was advised to call us with any  concerns or questions.

## 2019-09-10 LAB — MYCOBACTERIUM SPEC QL CULT: NORMAL

## 2019-09-17 ENCOUNTER — OFFICE VISIT (OUTPATIENT)
Dept: FAMILY MEDICINE | Facility: CLINIC | Age: 49
End: 2019-09-17

## 2019-09-17 VITALS
WEIGHT: 219.13 LBS | BODY MASS INDEX: 30.68 KG/M2 | HEIGHT: 71 IN | SYSTOLIC BLOOD PRESSURE: 138 MMHG | TEMPERATURE: 99 F | HEART RATE: 87 BPM | DIASTOLIC BLOOD PRESSURE: 88 MMHG

## 2019-09-17 DIAGNOSIS — S29.011A PECTORALIS MUSCLE STRAIN, INITIAL ENCOUNTER: ICD-10-CM

## 2019-09-17 DIAGNOSIS — G40.909 SEIZURE DISORDER: ICD-10-CM

## 2019-09-17 DIAGNOSIS — I10 ESSENTIAL HYPERTENSION: ICD-10-CM

## 2019-09-17 DIAGNOSIS — F39 MOOD DISORDER: ICD-10-CM

## 2019-09-17 DIAGNOSIS — F41.9 ANXIETY DISORDER, UNSPECIFIED TYPE: ICD-10-CM

## 2019-09-17 DIAGNOSIS — F98.8 ATTENTION DEFICIT DISORDER, UNSPECIFIED HYPERACTIVITY PRESENCE: Primary | ICD-10-CM

## 2019-09-17 PROCEDURE — 99999 PR PBB SHADOW E&M-EST. PATIENT-LVL III: ICD-10-PCS | Mod: PBBFAC,,, | Performed by: INTERNAL MEDICINE

## 2019-09-17 PROCEDURE — 99999 PR PBB SHADOW E&M-EST. PATIENT-LVL III: CPT | Mod: PBBFAC,,, | Performed by: INTERNAL MEDICINE

## 2019-09-17 PROCEDURE — 99213 OFFICE O/P EST LOW 20 MIN: CPT | Mod: PBBFAC,PO | Performed by: INTERNAL MEDICINE

## 2019-09-17 PROCEDURE — 99214 PR OFFICE/OUTPT VISIT, EST, LEVL IV, 30-39 MIN: ICD-10-PCS | Mod: S$PBB,,, | Performed by: INTERNAL MEDICINE

## 2019-09-17 PROCEDURE — 99214 OFFICE O/P EST MOD 30 MIN: CPT | Mod: S$PBB,,, | Performed by: INTERNAL MEDICINE

## 2019-09-17 RX ORDER — DEXTROAMPHETAMINE SACCHARATE, AMPHETAMINE ASPARTATE MONOHYDRATE, DEXTROAMPHETAMINE SULFATE AND AMPHETAMINE SULFATE 7.5; 7.5; 7.5; 7.5 MG/1; MG/1; MG/1; MG/1
CAPSULE, EXTENDED RELEASE ORAL
Qty: 60 CAPSULE | Refills: 0 | Status: SHIPPED | OUTPATIENT
Start: 2019-10-17 | End: 2019-12-06 | Stop reason: SDUPTHER

## 2019-09-17 RX ORDER — DEXTROAMPHETAMINE SACCHARATE, AMPHETAMINE ASPARTATE MONOHYDRATE, DEXTROAMPHETAMINE SULFATE AND AMPHETAMINE SULFATE 7.5; 7.5; 7.5; 7.5 MG/1; MG/1; MG/1; MG/1
CAPSULE, EXTENDED RELEASE ORAL
Qty: 60 CAPSULE | Refills: 0 | Status: SHIPPED | OUTPATIENT
Start: 2019-11-17 | End: 2019-12-06 | Stop reason: SDUPTHER

## 2019-09-17 RX ORDER — DEXTROAMPHETAMINE SACCHARATE, AMPHETAMINE ASPARTATE MONOHYDRATE, DEXTROAMPHETAMINE SULFATE AND AMPHETAMINE SULFATE 7.5; 7.5; 7.5; 7.5 MG/1; MG/1; MG/1; MG/1
CAPSULE, EXTENDED RELEASE ORAL
Qty: 60 CAPSULE | Refills: 0 | Status: SHIPPED | OUTPATIENT
Start: 2019-09-17 | End: 2019-12-06 | Stop reason: SDUPTHER

## 2019-09-17 RX ORDER — METOPROLOL SUCCINATE 100 MG/1
100 TABLET, EXTENDED RELEASE ORAL DAILY
Qty: 90 TABLET | Refills: 11 | Status: SHIPPED | OUTPATIENT
Start: 2019-09-17 | End: 2020-12-22

## 2019-09-17 NOTE — PROGRESS NOTES
Chief complaint:  Follow-up on ADD and blood pressure medications, discussed wife's issues, anxiety, checking in 20 min late for his appointment although he did call saying he was going to be late    48-year-old white male with long-standing ADD.  He requires a high-dose of the Adderall and apparently his son does as well as and is a high metabolizer of the medication so patient himself can assumedly is the same.  He finds benefit from medication.  We refilled the 3 prescriptions and I encouraged him to message us with the exact date see needs for the next 3 months and we will see him every 6 months.      Reviewed events about injuring his right elbow and he had an infection and a PICC line.  During this period of time he had some blood pressure elevations.  136-190 systolics over  diastolics.  He message and we increased his metoprolol to 50 mg.  Pulse at home still running in the 90s so we will try to increase his metoprolol to 100 while monitoring.  Blood pressures appears to be better running in the 130s to 140s at home but still not at goal..      He does find significant benefit with his ADD medication and that is confirmed by his wife.  He does not need any dose change.  I will give him refills for 3 months and be happy to send another 3 months when these run out no will see him in the spring for his checkup    Lately working in the heat and putting and cabinets in his carotids.  He has a lot of repetitions bruises over his thighs and forearms and upper chest where he has been clearly lifting the cabinets and his wife was reassured.  Yesterday also pulled his left lateral pectoralis muscle which is reproducibly tender and causes pain upon resisted strength testing.  We discussed application of heat and allowing it to heal on its own but does not appear to be a major injury.    more stress lately.  He wanted his Paxil increased from 40-60 last appt.  Doing better         Counseled both the at length  regarding all these issues.Total time over 25 minutes with over 50% counseling.      ROS:   CONST: weight stable. EYES: no vision change. ENT: no sore throat. CV: no chest pain w/ exertion. RESP: no shortness of breath. GI: no nausea, vomiting, diarrhea. No dysphagia. : no urinary issues. MUSCULOSKELETAL: no new myalgias or arthralgias. SKIN: no new changes. NEURO: no focal deficits. PSYCH: no new issues. ENDOCRINE: no polyuria. HEME: no lymph nodes. ALLERGY: no general pruritis.    PAST MEDICAL HISTORY:                                                        1.  Hypertension.                                                            2.  Depression, sees Psychiatry.                                             3.  ADD, sees Psychiatry.  Dr Emery                                                  4.  Right knee scope.   5.  Low HDL 1999   6.  Insomnia  7.  Sleep eating with Ambien   8.  probable seizure disorder 2015  9. Orthostatic hypotension, he worked up by cardiology 2015, may relate to seizures   10.  Left Colles' fracture with surgical repair 2016  11.  Right medial meniscus injury 2017                                                                                                                                 SOCIAL HISTORY:  Does not smoke, social alcohol, never smoked.                                                                                            FAMILY HISTORY:  Mom with seizures.  Father with hypertension, diabetes, .     He has no siblings.           Vital signs as above,  Gen: no distress  Left shoulder full range of motion with no pain but does have pain over the left lateral pectoralis muscle on resisted pectoralis flexion.  Bruises over the thighs forearms and upper chest that appeared to be benign and repetitious due to trauma      Diagnoses and all orders for this visit:    Attention deficit disorder, unspecified hyperactivity presence, continue to monitor blood pressure and pulse  "fall taking stimulant, does find benefit from the Adderall and will continue  -     dextroamphetamine-amphetamine (ADDERALL XR) 30 MG 24 hr capsule; TAKE TWO CAPSULES BY MOUTH EVERY DAY  -     dextroamphetamine-amphetamine (ADDERALL XR) 30 MG 24 hr capsule; TAKE TWO CAPSULES BY MOUTH EVERY DAY  -     dextroamphetamine-amphetamine (ADDERALL XR) 30 MG 24 hr capsule; TAKE TWO CAPSULES BY MOUTH EVERY DAY    Essential hypertension slowly improving, pulse still running in the 90s, monitor at home as we increase metoprolol to 100 mg, continue the other medications    Mood disorder, improving    Seizure disorder, chronic and stable    Anxiety disorder, unspecified type, improving    Pectoralis muscle strain, initial encounter, new problem, discussed    Other orders  -     metoprolol succinate (TOPROL-XL) 100 MG 24 hr tablet; Take 1 tablet (100 mg total) by mouth once daily.                           Clinical note will be sensitive as doing controlled substance management/monitoring">>>>>"This note will not be shared with the patient."  "

## 2019-09-18 RX ORDER — IRBESARTAN 300 MG/1
TABLET ORAL
Qty: 90 TABLET | Refills: 12 | Status: SHIPPED | OUTPATIENT
Start: 2019-09-18 | End: 2020-12-22

## 2019-10-29 NOTE — TELEPHONE ENCOUNTER
CARDIOLOGY CONSULTATION NOTE    REFERRING PHYSICIAN  Todd Padilla CNP    CHIEF COMPLAINT  Consultation and Cardiac Clearance      HISTORY OF PRESENT ILLNESS  Ms. Marti is a 82 year old female, who presents for pre-operative risk stratification for cataract surgery. She has a history of hypertension. She had an EKG done with her PCP which revealed PVCs and therefore was referred to cardiology for further evaluation. She is non-compliant with her antihypertensive medications. She is prescribed fosinopril 40mg daily and HCTZ 12.5mg daily. She only occasionally takes half of her fosinopril and does not take HCTZ at all. She checks her BP at home and it is typically in the 150-160s which she thinks is normal. She is very active around the house and has no chest pain symptoms.      MEDICATIONS:  Current Outpatient Medications   Medication Sig Dispense Refill   • ALPRAZolam (XANAX) 0.25 MG tablet Take 1 tablet by mouth 2 times daily as needed for Anxiety. 60 tablet 1   • fosinopril (MONOPRIL) 40 MG tablet Take 1 tablet by mouth daily. 90 tablet 1   • albuterol 108 (90 Base) MCG/ACT inhaler Inhale 2 puffs into the lungs every 4 hours as needed.     • hydrochlorothiazide (HYDRODIURIL) 25 MG tablet Take 1 tablet by mouth daily. 30 tablet 3     No current facility-administered medications for this visit.        ALLERGIES:  ALLERGIES:   Allergen Reactions   • Amoclan Other (See Comments)   • Amoxicillin Other (See Comments)        Past Medical History:  Past Medical History:   Diagnosis Date   • Anxiety    • Basal cell carcinoma (BCC) of [place]     treated   • COPD with respiratory distress, acute (CMS/HCC)    • HTN (hypertension)    • Humerus fracture    • Osteopenia    • Pneumonia    • Pre-op evaluation 10/29/2019       Past Surgical history:  History reviewed. No pertinent surgical history.    Family History:  Family History   Problem Relation Age of Onset   • Cancer Mother    • Cancer Father        Social  Sending prescription for high dose ibuprofen for his pain symptoms.   History:  Social History     Tobacco Use   • Smoking status: Never Smoker   • Smokeless tobacco: Never Used   Substance Use Topics   • Alcohol use: No     Frequency: Never   • Drug use: No         REVIEW OF SYSTEMS    Constitutional: Negative for fever.   HENT: Negative for ear discharge and mouth sores.    Eyes: Negative for discharge.   Respiratory: Negative for apnea.    Gastrointestinal: Negative for nausea.   Endocrine: Negative for polyphagia.   Genitourinary: Negative for urgency.   Musculoskeletal: Negative for neck stiffness.   Skin: Negative for pallor.   Allergic/Immunologic: Negative for immunocompromised state.   Neurological: Negative for seizures.   Hematological: Negative for adenopathy.   Psychiatric/Behavioral: Negative for behavioral problems.     BP (!) 217/105 (BP Location: LUE - Left upper extremity)   Pulse (!) 49   Ht 5' 5\" (1.651 m)   Wt 58 kg (127 lb 15.6 oz)   BMI 21.30 kg/m²   BSA 1.64 m²     PHYSICAL EXAM  Constitutional: Appears well-developed.   HENT: MMM, OP clear  Head: Normocephalic.   Mouth/Throat: Oropharynx is clear and moist.   Eyes: EOM are normal. No scleral icterus.   Neck: No JVD present. No thyromegaly present.   Cardiovascular: Sinus bradycardia.  Pulmonary/Chest: Effort normal.  has no wheezes.   Abdominal: Soft. Bowel sounds are normal. There is no tenderness.   Musculoskeletal: exhibits no effusion.   Lymphadenopathy:  No occipital adenopathy present.       Neurological: alert.   Skin: Skin is warm.   Psychiatric:  speech is normal.     CARDIAC STUDIES  EKG (10/23/2019): normal sinus rhythm, PVCs    ASSESSMENT/PLAN  Pre-op evaluation  She is planned for cataract surgery which is considered low risk. She has no cardiovascular complaints and EKG only significant for occasional PVCs. She cannot proceed with surgery until her BP is better controlled (see below).    Hypertension  She is non-compliant with her antihypertensive medications. She is prescribed fosinopril  40mg daily and HCTZ 12.5mg daily. She only occasionally takes half of her fosinopril and does not take HCTZ at all. She checks her BP at home and it is typically in the 150-160s which she thinks is normal. At this point she will be instructed to take fosinopril 40mg daily as prescribed and will take HCTZ 25mg daily (rather than 12.5mg daily which she isn't even taking). If still hypertensive will start CCB. Will need to avoid BB with bradycardia. Return in 2 weeks with NP for BP check and if Bp is well controlled at that time then she can proceed with cataract surgery without further testing.      Ricky Zelaya MD  Interventional Cardiology  [facility]    A letter has been sent to the referring physician.

## 2019-12-06 ENCOUNTER — OFFICE VISIT (OUTPATIENT)
Dept: FAMILY MEDICINE | Facility: CLINIC | Age: 49
End: 2019-12-06

## 2019-12-06 VITALS
BODY MASS INDEX: 31.45 KG/M2 | DIASTOLIC BLOOD PRESSURE: 88 MMHG | HEIGHT: 71 IN | TEMPERATURE: 99 F | WEIGHT: 224.63 LBS | OXYGEN SATURATION: 97 % | SYSTOLIC BLOOD PRESSURE: 138 MMHG | HEART RATE: 110 BPM

## 2019-12-06 DIAGNOSIS — I10 ESSENTIAL HYPERTENSION: ICD-10-CM

## 2019-12-06 DIAGNOSIS — Z20.9 EXPOSURE TO COMMUNICABLE DISEASE: ICD-10-CM

## 2019-12-06 DIAGNOSIS — F98.8 ATTENTION DEFICIT DISORDER, UNSPECIFIED HYPERACTIVITY PRESENCE: Primary | ICD-10-CM

## 2019-12-06 PROCEDURE — 99214 PR OFFICE/OUTPT VISIT, EST, LEVL IV, 30-39 MIN: ICD-10-PCS | Mod: S$PBB,,, | Performed by: INTERNAL MEDICINE

## 2019-12-06 PROCEDURE — 99999 PR PBB SHADOW E&M-EST. PATIENT-LVL IV: ICD-10-PCS | Mod: PBBFAC,,, | Performed by: INTERNAL MEDICINE

## 2019-12-06 PROCEDURE — 99214 OFFICE O/P EST MOD 30 MIN: CPT | Mod: S$PBB,,, | Performed by: INTERNAL MEDICINE

## 2019-12-06 PROCEDURE — 99999 PR PBB SHADOW E&M-EST. PATIENT-LVL IV: CPT | Mod: PBBFAC,,, | Performed by: INTERNAL MEDICINE

## 2019-12-06 PROCEDURE — 99214 OFFICE O/P EST MOD 30 MIN: CPT | Mod: PBBFAC,PO | Performed by: INTERNAL MEDICINE

## 2019-12-06 RX ORDER — DEXTROAMPHETAMINE SACCHARATE, AMPHETAMINE ASPARTATE MONOHYDRATE, DEXTROAMPHETAMINE SULFATE AND AMPHETAMINE SULFATE 7.5; 7.5; 7.5; 7.5 MG/1; MG/1; MG/1; MG/1
CAPSULE, EXTENDED RELEASE ORAL
Qty: 60 CAPSULE | Refills: 0 | Status: SHIPPED | OUTPATIENT
Start: 2019-12-06 | End: 2020-03-16 | Stop reason: SDUPTHER

## 2019-12-06 RX ORDER — DEXTROAMPHETAMINE SACCHARATE, AMPHETAMINE ASPARTATE MONOHYDRATE, DEXTROAMPHETAMINE SULFATE AND AMPHETAMINE SULFATE 7.5; 7.5; 7.5; 7.5 MG/1; MG/1; MG/1; MG/1
CAPSULE, EXTENDED RELEASE ORAL
Qty: 60 CAPSULE | Refills: 0 | Status: SHIPPED | OUTPATIENT
Start: 2020-02-06 | End: 2020-03-16 | Stop reason: SDUPTHER

## 2019-12-06 RX ORDER — DEXTROAMPHETAMINE SACCHARATE, AMPHETAMINE ASPARTATE MONOHYDRATE, DEXTROAMPHETAMINE SULFATE AND AMPHETAMINE SULFATE 7.5; 7.5; 7.5; 7.5 MG/1; MG/1; MG/1; MG/1
CAPSULE, EXTENDED RELEASE ORAL
Qty: 60 CAPSULE | Refills: 0 | Status: SHIPPED | OUTPATIENT
Start: 2020-01-06 | End: 2020-03-16 | Stop reason: SDUPTHER

## 2019-12-06 NOTE — PROGRESS NOTES
Chief complaint:  Discussed possible exposure Follow-up on ADD and blood pressure medications, discussed wife's issues, anxiety, checking in 20 min late for his appointment although he did call saying he was going to be late    48-year-old white male with long-standing ADD.  He requires a high-dose of the Adderall and apparently his son does as well as and is a high metabolizer of the medication so patient himself can assumedly is the same.  He finds benefit from medication.  We refilled the 3 prescriptions and I encouraged him to message us with the exact date see needs for the next 3 months and we will see him every 6 months.      Patient is here with his wife.  There with the caregivers of her mother who recently passed and we spent an extensive amount of time discussing the interval events all of which were positive.  There doing better    Wife apparently have a history of genital warts which were resected.  She acquired this in her 20s.  She went 10 years or so with negative Pap smears.  She has had a hysterectomy and cervix removed.  Most recently her past may was positive again for HPV.  She is here concerned about her .  Her  has no genital warts or any other problems. I discussed with her to discuss in greater detail with her gyn the type of HPV she might have an whether not it was just more sensitive testing that  to HPV lately that may have been there previously as well as to discuss whether not the type of HPV she has would necessitate any particular testing and so forth for her .  I am not aware of any such situation but she can discuss further.    He does find significant benefit with his ADD medication and that is confirmed by his wife.  He does not need any dose change.  I will give him refills for 3 months and be happy to send another 3 months when these run out no will see him in the spring for his checkup               Counseled both the at length regarding all these  issues.Total time over 25 minutes with over 50% counseling.      ROS:   CONST: weight stable. EYES: no vision change. ENT: no sore throat. CV: no chest pain w/ exertion. RESP: no shortness of breath. GI: no nausea, vomiting, diarrhea. No dysphagia. : no urinary issues. MUSCULOSKELETAL: no new myalgias or arthralgias. SKIN: no new changes. NEURO: no focal deficits. PSYCH: no new issues. ENDOCRINE: no polyuria. HEME: no lymph nodes. ALLERGY: no general pruritis.    PAST MEDICAL HISTORY:                                                        1.  Hypertension.                                                            2.  Depression, sees Psychiatry.                                             3.  ADD, sees Psychiatry.  Dr Emery                                                  4.  Right knee scope.   5.  Low HDL 1999   6.  Insomnia  7.  Sleep eating with Ambien   8.  probable seizure disorder 2015  9. Orthostatic hypotension, he worked up by cardiology 2015, may relate to seizures   10.  Left Colles' fracture with surgical repair 2016  11.  Right medial meniscus injury 2017                                                                                                                                 SOCIAL HISTORY:  Does not smoke, social alcohol, never smoked.                                                                                            FAMILY HISTORY:  Mom with seizures.  Father with hypertension, diabetes, .     He has no siblings.           Vital signs as above,  Gen: no distress  Left shoulder full range of motion with no pain but does have pain over the left lateral pectoralis muscle on resisted pectoralis flexion.  Bruises over the thighs forearms and upper chest that appeared to be benign and repetitious due to trauma      Nirmal was seen today for follow-up.    Diagnoses and all orders for this visit:    Attention deficit disorder, unspecified hyperactivity presence  -      "dextroamphetamine-amphetamine (ADDERALL XR) 30 MG 24 hr capsule; TAKE TWO CAPSULES BY MOUTH EVERY DAY  -     dextroamphetamine-amphetamine (ADDERALL XR) 30 MG 24 hr capsule; TAKE TWO CAPSULES BY MOUTH EVERY DAY  -     dextroamphetamine-amphetamine (ADDERALL XR) 30 MG 24 hr capsule; TAKE TWO CAPSULES BY MOUTH EVERY DAY    Exposure to communicable disease, discussed and reassured    Essential hypertension, appears to be improving                             Clinical note will be sensitive as doing controlled substance management/monitoring">>"This note will not be shared with the patient."  "

## 2019-12-11 RX ORDER — AMLODIPINE BESYLATE 10 MG/1
TABLET ORAL
Qty: 90 TABLET | Refills: 1 | Status: SHIPPED | OUTPATIENT
Start: 2019-12-11 | End: 2020-01-13

## 2020-01-02 RX ORDER — MONTELUKAST SODIUM 10 MG/1
TABLET ORAL
Qty: 30 TABLET | Refills: 5 | Status: SHIPPED | OUTPATIENT
Start: 2020-01-02 | End: 2023-08-15

## 2020-01-06 DIAGNOSIS — Z12.11 COLON CANCER SCREENING: ICD-10-CM

## 2020-01-13 RX ORDER — AMLODIPINE BESYLATE 10 MG/1
TABLET ORAL
Qty: 90 TABLET | Refills: 1 | Status: SHIPPED | OUTPATIENT
Start: 2020-01-13 | End: 2020-06-07

## 2020-02-14 DIAGNOSIS — I10 HTN (HYPERTENSION): ICD-10-CM

## 2020-03-05 RX ORDER — PAROXETINE HYDROCHLORIDE 20 MG/1
TABLET, FILM COATED ORAL
Qty: 90 TABLET | Refills: 12 | Status: SHIPPED | OUTPATIENT
Start: 2020-03-05 | End: 2021-03-23

## 2020-03-16 DIAGNOSIS — F98.8 ATTENTION DEFICIT DISORDER, UNSPECIFIED HYPERACTIVITY PRESENCE: ICD-10-CM

## 2020-03-19 ENCOUNTER — PATIENT MESSAGE (OUTPATIENT)
Dept: FAMILY MEDICINE | Facility: CLINIC | Age: 50
End: 2020-03-19

## 2020-03-19 RX ORDER — DEXTROAMPHETAMINE SACCHARATE, AMPHETAMINE ASPARTATE MONOHYDRATE, DEXTROAMPHETAMINE SULFATE AND AMPHETAMINE SULFATE 7.5; 7.5; 7.5; 7.5 MG/1; MG/1; MG/1; MG/1
CAPSULE, EXTENDED RELEASE ORAL
Qty: 60 CAPSULE | Refills: 0 | Status: SHIPPED | OUTPATIENT
Start: 2020-03-19 | End: 2020-06-04 | Stop reason: SDUPTHER

## 2020-03-19 RX ORDER — DEXTROAMPHETAMINE SACCHARATE, AMPHETAMINE ASPARTATE MONOHYDRATE, DEXTROAMPHETAMINE SULFATE AND AMPHETAMINE SULFATE 7.5; 7.5; 7.5; 7.5 MG/1; MG/1; MG/1; MG/1
CAPSULE, EXTENDED RELEASE ORAL
Qty: 60 CAPSULE | Refills: 0 | Status: SHIPPED | OUTPATIENT
Start: 2020-03-19 | End: 2020-05-20 | Stop reason: SDUPTHER

## 2020-03-26 NOTE — TELEPHONE ENCOUNTER
Called Patient.  No answer.  Left voice message to inform  Patient that requested Rx. refills were authorized and requesting a call back if further questions.

## 2020-05-18 ENCOUNTER — PATIENT MESSAGE (OUTPATIENT)
Dept: FAMILY MEDICINE | Facility: CLINIC | Age: 50
End: 2020-05-18

## 2020-05-19 NOTE — TELEPHONE ENCOUNTER
Process refill to wheere needed BUT ALSO explain virtual appt now--set him up- explain this will now be needed every 3 mo SO REFILLS WILL STAY STRAIGHT

## 2020-05-20 DIAGNOSIS — F98.8 ATTENTION DEFICIT DISORDER, UNSPECIFIED HYPERACTIVITY PRESENCE: ICD-10-CM

## 2020-05-20 RX ORDER — DEXTROAMPHETAMINE SACCHARATE, AMPHETAMINE ASPARTATE MONOHYDRATE, DEXTROAMPHETAMINE SULFATE AND AMPHETAMINE SULFATE 7.5; 7.5; 7.5; 7.5 MG/1; MG/1; MG/1; MG/1
CAPSULE, EXTENDED RELEASE ORAL
Qty: 60 CAPSULE | Refills: 0 | Status: SHIPPED | OUTPATIENT
Start: 2020-05-20 | End: 2020-06-04 | Stop reason: SDUPTHER

## 2020-05-20 NOTE — TELEPHONE ENCOUNTER
Patient informed that requested Rx. refill was authorized.  He verbalized understanding and appointment scheduled.

## 2020-05-20 NOTE — TELEPHONE ENCOUNTER
Spoke with [facility]/ Pharmacist, who said that there is no remaining Adderall Rx. for Patient so his DrFaviola will need to send over another Rx. for the month of May.

## 2020-05-20 NOTE — TELEPHONE ENCOUNTER
----- Message from Mariola Graham sent at 5/20/2020  8:45 AM CDT -----  Contact: Patient   Type:  Patient Returning Call    Who Called: Patient     Who Left Message for Patient: Loulou Taylor LPN      Does the patient know what this is regarding?: not sure     Would the patient rather a call back or a response via [facility]? Call back     Best Call Back Number: [phone]

## 2020-05-22 NOTE — TELEPHONE ENCOUNTER
Patient informed that requested Rx. refill was authorized.  He verbalized understanding and gratitude.

## 2020-06-04 ENCOUNTER — OFFICE VISIT (OUTPATIENT)
Dept: FAMILY MEDICINE | Facility: CLINIC | Age: 50
End: 2020-06-04
Payer: COMMERCIAL

## 2020-06-04 VITALS — BODY MASS INDEX: 30.4 KG/M2 | DIASTOLIC BLOOD PRESSURE: 78 MMHG | WEIGHT: 218 LBS | SYSTOLIC BLOOD PRESSURE: 127 MMHG

## 2020-06-04 DIAGNOSIS — I10 ESSENTIAL HYPERTENSION: ICD-10-CM

## 2020-06-04 DIAGNOSIS — F39 MOOD DISORDER: ICD-10-CM

## 2020-06-04 DIAGNOSIS — F98.8 ATTENTION DEFICIT DISORDER, UNSPECIFIED HYPERACTIVITY PRESENCE: Primary | ICD-10-CM

## 2020-06-04 PROCEDURE — 3078F PR MOST RECENT DIASTOLIC BLOOD PRESSURE < 80 MM HG: ICD-10-PCS | Mod: CPTII,,, | Performed by: INTERNAL MEDICINE

## 2020-06-04 PROCEDURE — 3008F PR BODY MASS INDEX (BMI) DOCUMENTED: ICD-10-PCS | Mod: CPTII,,, | Performed by: INTERNAL MEDICINE

## 2020-06-04 PROCEDURE — 99214 OFFICE O/P EST MOD 30 MIN: CPT | Mod: 95,,, | Performed by: INTERNAL MEDICINE

## 2020-06-04 PROCEDURE — 3008F BODY MASS INDEX DOCD: CPT | Mod: CPTII,,, | Performed by: INTERNAL MEDICINE

## 2020-06-04 PROCEDURE — 99214 PR OFFICE/OUTPT VISIT, EST, LEVL IV, 30-39 MIN: ICD-10-PCS | Mod: 95,,, | Performed by: INTERNAL MEDICINE

## 2020-06-04 PROCEDURE — 3078F DIAST BP <80 MM HG: CPT | Mod: CPTII,,, | Performed by: INTERNAL MEDICINE

## 2020-06-04 PROCEDURE — 3074F PR MOST RECENT SYSTOLIC BLOOD PRESSURE < 130 MM HG: ICD-10-PCS | Mod: CPTII,,, | Performed by: INTERNAL MEDICINE

## 2020-06-04 PROCEDURE — 3074F SYST BP LT 130 MM HG: CPT | Mod: CPTII,,, | Performed by: INTERNAL MEDICINE

## 2020-06-04 RX ORDER — DEXTROAMPHETAMINE SACCHARATE, AMPHETAMINE ASPARTATE MONOHYDRATE, DEXTROAMPHETAMINE SULFATE AND AMPHETAMINE SULFATE 7.5; 7.5; 7.5; 7.5 MG/1; MG/1; MG/1; MG/1
CAPSULE, EXTENDED RELEASE ORAL
Qty: 60 CAPSULE | Refills: 0 | Status: SHIPPED | OUTPATIENT
Start: 2020-08-03 | End: 2020-09-15 | Stop reason: SDUPTHER

## 2020-06-04 RX ORDER — DEXTROAMPHETAMINE SACCHARATE, AMPHETAMINE ASPARTATE MONOHYDRATE, DEXTROAMPHETAMINE SULFATE AND AMPHETAMINE SULFATE 7.5; 7.5; 7.5; 7.5 MG/1; MG/1; MG/1; MG/1
CAPSULE, EXTENDED RELEASE ORAL
Qty: 60 CAPSULE | Refills: 0 | Status: SHIPPED | OUTPATIENT
Start: 2020-07-03 | End: 2020-09-15 | Stop reason: SDUPTHER

## 2020-06-04 RX ORDER — DEXTROAMPHETAMINE SACCHARATE, AMPHETAMINE ASPARTATE MONOHYDRATE, DEXTROAMPHETAMINE SULFATE AND AMPHETAMINE SULFATE 7.5; 7.5; 7.5; 7.5 MG/1; MG/1; MG/1; MG/1
CAPSULE, EXTENDED RELEASE ORAL
Qty: 60 CAPSULE | Refills: 0 | Status: SHIPPED | OUTPATIENT
Start: 2020-06-04 | End: 2020-09-15 | Stop reason: SDUPTHER

## 2020-06-04 NOTE — PROGRESS NOTES
Chief complaint:  Follow-up on ADD and blood pressure medications,  Last seen 12/19    Virtual appointment.  Patient is at his home with his wife.  He is well known to me since I am the PCP but he also knows the limitations of this virtual visit.  He was seen using audio and video.    49-year-old white male with long-standing ADD.  He requires a high-dose of the Adderall and apparently his son does as well as and is a high metabolizer of the medication so patient himself can assumedly is the same.  He finds benefit from medication.  We refilled the 3 prescriptions and I encouraged him to message us with the exact date see needs for the next 3 months and we will see him every 6 months.      Patient  with his wife.  There with the caregivers of her mother who recently passed and we spent an extensive amount of time discussing the interval events all of which were positive.  Patient also wishes to discuss her edema.  We discussed all the pathophysiology of edema.  It is worse at the end of the day and she has has plenty of stockings.  She inquires if a physical appointment would be better and I said that would probably be best to figure out all the multifactorial issues that can relate to edema.      He does find significant benefit with his ADD medication and that is confirmed by his wife.  He does not need any dose change.  I will give him refills for 3 months and be happy to send another 3 months when these run out no will see him in the spring for his checkup        Blood pressure at home running great 125/70 in that range.       Counseled both the at length regarding all these issues.Total time over 25 minutes with over 50% counseling.      ROS:   CONST: weight stable.  No other problems    PAST MEDICAL HISTORY:                                                        1.  Hypertension.                                                            2.  Depression, sees Psychiatry.                                           "   3.  ADD, sees Psychiatry.  Dr Emery                                                  4.  Right knee scope.   5.  Low HDL 1999   6.  Insomnia  7.  Sleep eating with Ambien   8.  probable seizure disorder 2015  9. Orthostatic hypotension, he worked up by cardiology 2015, may relate to seizures   10.  Left Colles' fracture with surgical repair 2016  11.  Right medial meniscus injury 2017                                                                                                                                 SOCIAL HISTORY:  Does not smoke, social alcohol, never smoked.                                                                                            FAMILY HISTORY:  Mom with seizures.  Father with hypertension, diabetes, .     He has no siblings.                 Diagnoses and all orders for this visit:    Attention deficit disorder, unspecified hyperactivity presence, controlled substance management for the next three months done  -     dextroamphetamine-amphetamine (ADDERALL XR) 30 MG 24 hr capsule; TAKE TWO CAPSULES BY MOUTH EVERY DAY  -     dextroamphetamine-amphetamine (ADDERALL XR) 30 MG 24 hr capsule; TAKE TWO CAPSULES BY MOUTH EVERY DAY  -     dextroamphetamine-amphetamine (ADDERALL XR) 30 MG 24 hr capsule; TAKE TWO CAPSULES BY MOUTH EVERY DAY    Essential hypertension, chronic and stable, on four medications and he is okay with continuing that realizing that perhaps his ADD medications are influencing his blood pressure a little bit but it is well controlled    Mood disorder, chronic and stable                              Clinical note will be sensitive as doing controlled substance management/monitoring">"This note will not be shared with the patient."  "

## 2020-07-13 ENCOUNTER — EXTERNAL HOME HEALTH (OUTPATIENT)
Dept: HOME HEALTH SERVICES | Facility: HOSPITAL | Age: 50
End: 2020-07-13
Payer: COMMERCIAL

## 2020-07-13 PROCEDURE — G0180 PR HOME HEALTH MD CERTIFICATION: ICD-10-PCS | Mod: ,,, | Performed by: INTERNAL MEDICINE

## 2020-07-13 PROCEDURE — G0180 MD CERTIFICATION HHA PATIENT: HCPCS | Mod: ,,, | Performed by: INTERNAL MEDICINE

## 2020-08-14 DIAGNOSIS — Z11.59 NEED FOR HEPATITIS C SCREENING TEST: ICD-10-CM

## 2020-09-15 ENCOUNTER — PATIENT MESSAGE (OUTPATIENT)
Dept: FAMILY MEDICINE | Facility: CLINIC | Age: 50
End: 2020-09-15

## 2020-09-15 DIAGNOSIS — F98.8 ATTENTION DEFICIT DISORDER, UNSPECIFIED HYPERACTIVITY PRESENCE: ICD-10-CM

## 2020-09-15 NOTE — TELEPHONE ENCOUNTER
Thanks, three refills set up for refill but all with the same start date.  Please changed him to September, October and November    Review allergies if needed

## 2020-09-15 NOTE — TELEPHONE ENCOUNTER
Spoke with Ms Tina informed her that Dr LEA stated he could see them every 6 months for ov but meds have to be refill every 3 months and she verbally understood

## 2020-09-17 RX ORDER — DEXTROAMPHETAMINE SACCHARATE, AMPHETAMINE ASPARTATE MONOHYDRATE, DEXTROAMPHETAMINE SULFATE AND AMPHETAMINE SULFATE 7.5; 7.5; 7.5; 7.5 MG/1; MG/1; MG/1; MG/1
CAPSULE, EXTENDED RELEASE ORAL
Qty: 60 CAPSULE | Refills: 0 | Status: SHIPPED | OUTPATIENT
Start: 2020-11-16 | End: 2020-10-17 | Stop reason: CLARIF

## 2020-09-17 RX ORDER — DEXTROAMPHETAMINE SACCHARATE, AMPHETAMINE ASPARTATE MONOHYDRATE, DEXTROAMPHETAMINE SULFATE AND AMPHETAMINE SULFATE 7.5; 7.5; 7.5; 7.5 MG/1; MG/1; MG/1; MG/1
CAPSULE, EXTENDED RELEASE ORAL
Qty: 60 CAPSULE | Refills: 0 | Status: SHIPPED | OUTPATIENT
Start: 2020-10-16 | End: 2020-10-17 | Stop reason: CLARIF

## 2020-09-17 RX ORDER — DEXTROAMPHETAMINE SACCHARATE, AMPHETAMINE ASPARTATE MONOHYDRATE, DEXTROAMPHETAMINE SULFATE AND AMPHETAMINE SULFATE 7.5; 7.5; 7.5; 7.5 MG/1; MG/1; MG/1; MG/1
CAPSULE, EXTENDED RELEASE ORAL
Qty: 60 CAPSULE | Refills: 0 | Status: SHIPPED | OUTPATIENT
Start: 2020-09-17 | End: 2020-10-17 | Stop reason: CLARIF

## 2020-10-05 ENCOUNTER — PATIENT MESSAGE (OUTPATIENT)
Dept: ADMINISTRATIVE | Facility: HOSPITAL | Age: 50
End: 2020-10-05

## 2020-10-05 ENCOUNTER — PATIENT OUTREACH (OUTPATIENT)
Dept: ADMINISTRATIVE | Facility: HOSPITAL | Age: 50
End: 2020-10-05

## 2020-10-05 DIAGNOSIS — Z12.11 COLON CANCER SCREENING: Primary | ICD-10-CM

## 2020-10-17 ENCOUNTER — HOSPITAL ENCOUNTER (EMERGENCY)
Facility: HOSPITAL | Age: 50
Discharge: HOME | End: 2020-10-17
Attending: EMERGENCY MEDICINE
Payer: COMMERCIAL

## 2020-10-17 VITALS
DIASTOLIC BLOOD PRESSURE: 78 MMHG | HEART RATE: 65 BPM | TEMPERATURE: 98 F | OXYGEN SATURATION: 100 % | RESPIRATION RATE: 16 BRPM | HEIGHT: 71 IN | SYSTOLIC BLOOD PRESSURE: 122 MMHG | BODY MASS INDEX: 30.1 KG/M2 | WEIGHT: 215 LBS

## 2020-10-17 DIAGNOSIS — M79.89 LEG SWELLING: ICD-10-CM

## 2020-10-17 DIAGNOSIS — S80.12XA CONTUSION OF LEFT LOWER EXTREMITY, INITIAL ENCOUNTER: Primary | ICD-10-CM

## 2020-10-17 DIAGNOSIS — M79.605 LEFT LEG PAIN: ICD-10-CM

## 2020-10-17 PROCEDURE — 63600175 PHARM REV CODE 636 W HCPCS: Performed by: NURSE PRACTITIONER

## 2020-10-17 PROCEDURE — 99284 PR EMERGENCY DEPT VISIT,LEVEL IV: ICD-10-PCS | Mod: ,,, | Performed by: NURSE PRACTITIONER

## 2020-10-17 PROCEDURE — 99284 EMERGENCY DEPT VISIT MOD MDM: CPT | Mod: ,,, | Performed by: NURSE PRACTITIONER

## 2020-10-17 PROCEDURE — 99284 EMERGENCY DEPT VISIT MOD MDM: CPT | Mod: 25

## 2020-10-17 PROCEDURE — 96372 THER/PROPH/DIAG INJ SC/IM: CPT

## 2020-10-17 RX ORDER — KETOROLAC TROMETHAMINE 10 MG/1
10 TABLET, FILM COATED ORAL EVERY 6 HOURS PRN
Qty: 12 TABLET | Refills: 0 | Status: SHIPPED | OUTPATIENT
Start: 2020-10-17 | End: 2020-10-22

## 2020-10-17 RX ORDER — KETOROLAC TROMETHAMINE 30 MG/ML
15 INJECTION, SOLUTION INTRAMUSCULAR; INTRAVENOUS
Status: COMPLETED | OUTPATIENT
Start: 2020-10-17 | End: 2020-10-17

## 2020-10-17 RX ADMIN — KETOROLAC TROMETHAMINE 15 MG: 30 INJECTION, SOLUTION INTRAMUSCULAR; INTRAVENOUS at 06:10

## 2020-10-17 NOTE — ED NOTES
LOC: The patient is awake, alert, and oriented to place, time, situation. Affect is appropriate.  Speech is appropriate and clear.     APPEARANCE: Patient resting uncomfortably, left lower with swelling and pain.   Patient is clean and well groomed.    SKIN: The skin is warm and dry; color consistent with ethnicity.  Patient has normal skin turgor and moist mucus membranes.  Skin intact; no breakdown or bruising noted.  LLE with swelling, redness, and pain.     MUSCULOSKELETAL: Patient moving upper and lower extremities without difficulty.  Denies weakness.     RESPIRATORY: Airway is open and patent. Respirations spontaneous, even, easy, and non-labored.  Patient has a normal effort and rate.  No accessory muscle use noted. Denies cough.     CARDIAC:   No peripheral edema noted. No complaints of chest pain.      ABDOMEN: Soft and non tender to palpation.  No distention noted.     NEUROLOGIC: Eyes open spontaneously.  Behavior appropriate to situation.  Follows commands; facial expression symmetrical.  Purposeful motor response noted; normal sensation in all extremities.

## 2020-10-17 NOTE — ED PROVIDER NOTES
"    Encounter Date: 10/17/2020       History     Chief Complaint   Patient presents with    Leg Pain     Pt states "I bumped my shin on a metal bed frame in my garage a week ago" Pt reports pain, swelling.      The patient is a 49-year-old male with a past medical history of ADD, anxiety, depression gastritis, hypertension, seizures, and kidney stones who presents to the ED complaining of left lower extremity pain and swelling after hitting his shin on a metal bed frame in his garage 1 week ago.  Swelling has been increasing over the past week.  He denies fever and chills.  He does endorse red marks on his shin, but reports this was caused by the offending object injuring the skin as he lost his balance.  He denies history of blood clots, malignancy, and recent surgeries.  Seen at urgent care for these symptoms earlier today and directed to the ED for further evaluation as his left calf measurements was 3 cm larger than his right calf measurement.  He denies chest pain and shortness of breath.  Patient is ambulatory.  Tdap up-to-date.  No treatment attempted prior to arrival.    The history is provided by the patient.     Review of patient's allergies indicates:   Allergen Reactions    Hydrocodone Rash     Past Medical History:   Diagnosis Date    ADD (attention deficit disorder)     Anxiety     Depression     Gastritis     EGD 2014    HDL deficiency     Hypertension     Kidney stone     Meniscus tear 09/2013    right    Meniscus tear 01/2017    right    Seizure disorder 1/18/2018    Seizures 06/2014    last seizure in late 2014    Vitamin D deficiency disease 1/18/2018     Past Surgical History:   Procedure Laterality Date    FRACTURE SURGERY Left 05/2016    wrist fracture    INJECTION OF JOINT Left 12/11/2018    Procedure: Injection, Joint, Bio-D Left Knee;  Surgeon: Gwendolyn Jeong MD;  Location[facility];  Service: Orthopedics;  Laterality: Left;  Bio-D Left Knee    KNEE ARTHROSCOPY W/ MENISCECTOMY " Left 12/11/2018    Procedure: ARTHROSCOPY, KNEE, WITH MENISCECTOMY;  Surgeon: Gwendolyn Jeong MD;  Location: [facility] OR;  Service: Orthopedics;  Laterality: Left;  Cartilage biopsy  and repair or micro-fracture    KNEE SURGERY      SYNOVECTOMY OF KNEE Left 12/11/2018    Procedure: SYNOVECTOMY, KNEE;  Surgeon: Gwendolyn Jeong MD;  Location: [facility] OR;  Service: Orthopedics;  Laterality: Left;     Family History   Problem Relation Age of Onset    Diabetes Father     Cancer Father     Skin cancer Father     Cancer Maternal Grandfather      Social History     Tobacco Use    Smoking status: Never Smoker    Smokeless tobacco: Never Used   Substance Use Topics    Alcohol use: Yes     Alcohol/week: 0.0 standard drinks     Frequency: Never     Drinks per session: 1 or 2     Binge frequency: Never     Comment: social    Drug use: No     Review of Systems   Constitutional: Negative for fever.   HENT: Negative for sore throat.    Respiratory: Negative for shortness of breath.    Cardiovascular: Positive for leg swelling. Negative for chest pain.   Gastrointestinal: Negative for nausea.   Genitourinary: Negative for dysuria.   Musculoskeletal: Positive for myalgias (Left leg pain). Negative for back pain.   Skin: Negative for rash.   Neurological: Negative for weakness.   Hematological: Does not bruise/bleed easily.       Physical Exam     Initial Vitals [10/17/20 1648]   BP Pulse Resp Temp SpO2   122/78 65 16 97.9 °F (36.6 °C) 100 %      MAP       --         Physical Exam    Nursing note and vitals reviewed.  Constitutional: He appears well-developed and well-nourished.  Non-toxic appearance. No distress.   HENT:   Head: Normocephalic and atraumatic.   Right Ear: Hearing and abnromal external ear normal.   Left Ear: Hearing and abnormal external ear normal.   Nose: Nose abnormal.   Mouth/Throat: Mucous membranes are normal.   Eyes: Conjunctivae and EOM are normal.   Neck: Full passive range of motion without pain. Neck supple.    Cardiovascular: Normal rate, normal heart sounds and normal pulses. Exam reveals no gallop and no friction rub.    No murmur heard.  Pulses:       Dorsalis pedis pulses are 2+ on the right side and 2+ on the left side.   1-2+ nonpitting edema noted to left lower extremity   Pulmonary/Chest: Effort normal and breath sounds normal. No respiratory distress. He has no wheezes. He has no rhonchi. He has no rales.   Musculoskeletal: Normal range of motion.      Left lower leg: He exhibits tenderness.      Comments: Left lower extremity distal to knee and proximal to ankle is grossly tender to palpation.  Skin appears taut.  No erythema, induration, fluctuance, or drainage.  No swelling, erythema, ecchymosis, crepitus, obvious deformity, or gross joint laxity noted to knee or ankle joints.  Patient is ambulatory with a steady gait.   Neurological: He is alert and oriented to person, place, and time. He has normal strength. Gait normal.   Skin: Skin is warm, dry and intact. Capillary refill takes less than 2 seconds. No rash noted.   Psychiatric: He has a normal mood and affect. His speech is normal and behavior is normal. Judgment and thought content normal. Cognition and memory are normal.             ED Course   Procedures  Labs Reviewed - No data to display       Imaging Results          US Lower Extremity Veins Left (Final result)  Result time 10/17/20 19:02:35    Final result by Joshua Jorgensen MD (10/17/20 19:02:35)                 Impression:      No evidence of deep venous thrombosis in the left lower extremity.    Electronically signed by resident: Hung Dunbar  Date:    10/17/2020  Time:    18:57    Electronically signed by: Joshua Jorgensen MD  Date:    10/17/2020  Time:    19:02             Narrative:    EXAMINATION:  US LOWER EXTREMITY VEINS LEFT    CLINICAL HISTORY:  leg swelling;    TECHNIQUE:  Duplex and color flow Doppler evaluation and graded compression of the left lower extremity veins was  performed.    COMPARISON:  None    FINDINGS:  Left thigh veins: The common femoral, femoral, popliteal, upper greater saphenous, and deep femoral veins are patent and free of thrombus. The veins are normally compressible and have normal phasic flow and augmentation response.    Left calf veins: The visualized calf veins are patent.    Contralateral CFV: The contralateral (right) common femoral vein is patent and free of thrombus.    Miscellaneous: None                               X-Ray Tibia Fibula 2 View Left (Final result)  Result time 10/17/20 18:07:31    Final result by Judd Hay MD (10/17/20 18:07:31)                 Impression:      1. No convincing acute displaced fracture or dislocation of the tibia or fibula noting there is edema overlying the anterior aspect of the leg.      Electronically signed by: Judd Hay MD  Date:    10/17/2020  Time:    18:07             Narrative:    EXAMINATION:  XR TIBIA FIBULA 2 VIEW LEFT    CLINICAL HISTORY:  Other specified soft tissue disorders    TECHNIQUE:  AP and lateral views of the left tibia and fibula were performed.    COMPARISON:  None.    FINDINGS:  Four views left tibia fibula.    No acute displaced fracture or dislocation of the tibia or fibula.  No radiopaque foreign body.  Degenerative changes are noted of the knee.  There may be edema along the anterior aspect of the lower leg.                                 Medical Decision Making:   History:   Old Medical Records: I decided to obtain old medical records.  Clinical Tests:   Radiological Study: Ordered and Reviewed  ED Management:  This is an emergent evaluation of a 49-year-old male who presents to the ED for further evaluation of left lower extremity pain and swelling that has been worsening over the past week.    Physical exam is notable for 1 to 2+ nonpitting edema to left lower extremity.  Pedal pulse is palpable.  Cap refill within normal limits.  Compartments are mostly soft, though  skin is taut.  No erythema.  Patient is afebrile with stable vital signs.    Left tib-fib is negative for acute abnormalities.    Ultrasound of left lower extremity is negative.    Based on history and physical exam, as well as diagnostic results, I considered but do not suspect acute fracture, DVT, cellulitis, abscess formation, or other emergent cause of the patient's symptoms.  Clinical presentation suggests left lower extremity contusion.  I have recommended anti-inflammatories and elevation of extremities.  Follow-up with PCP in 2-3 days.  All questions answered.  Return precautions have been discussed.  Case discussed with supervising physician who agrees with the plan.                             Clinical Impression:       ICD-10-CM ICD-9-CM   1. Contusion of left lower extremity, initial encounter  S80.12XA 924.5   2. Leg swelling  M79.89 729.81   3. Left leg pain  M79.605 729.5                          ED Disposition Condition    Discharge Stable        ED Prescriptions     Medication Sig Dispense Start Date End Date Auth. Provider    ketorolac (TORADOL) 10 mg tablet Take 1 tablet (10 mg total) by mouth every 6 (six) hours as needed for Pain. 12 tablet 10/17/2020 10/22/2020 Latosha Guerin NP        Follow-up Information     Follow up With Specialties Details Why Contact Info    Ravi Baird MD Internal Medicine Schedule an appointment as soon as possible for a visit in 3 days  [address]  [place] [address]  [phone]      [facility] Emergency Medicine  If symptoms worsen [facility] [facility] [address] [phone]                                       Latosha Guerin NP  10/18/20 0122

## 2020-10-18 NOTE — DISCHARGE INSTRUCTIONS
Thank you for allowing me to care for you today.  I hope our treatment plan will make you feel better in the next few days.  In order for me to take better care of my future patients and improve our Emergency Department, I would appreciate if you can provide us with feedback.  In the next few days, you may receive a survey in the mail.  If you do, it would mean a great deal to me if you would please take the time to complete it.    Thank you and I hope you feel better.  Latosha Guerin NP

## 2020-11-18 ENCOUNTER — OFFICE VISIT (OUTPATIENT)
Dept: FAMILY MEDICINE | Facility: CLINIC | Age: 50
End: 2020-11-18
Payer: COMMERCIAL

## 2020-11-18 ENCOUNTER — LAB VISIT (OUTPATIENT)
Dept: LAB | Facility: HOSPITAL | Age: 50
End: 2020-11-18
Attending: INTERNAL MEDICINE
Payer: COMMERCIAL

## 2020-11-18 VITALS
DIASTOLIC BLOOD PRESSURE: 82 MMHG | HEART RATE: 63 BPM | SYSTOLIC BLOOD PRESSURE: 128 MMHG | BODY MASS INDEX: 28.92 KG/M2 | HEIGHT: 71 IN | OXYGEN SATURATION: 98 % | TEMPERATURE: 98 F | WEIGHT: 206.56 LBS

## 2020-11-18 DIAGNOSIS — Z12.5 SCREENING FOR PROSTATE CANCER: ICD-10-CM

## 2020-11-18 DIAGNOSIS — Z00.00 ROUTINE MEDICAL EXAM: ICD-10-CM

## 2020-11-18 DIAGNOSIS — E55.9 VITAMIN D DEFICIENCY DISEASE: ICD-10-CM

## 2020-11-18 DIAGNOSIS — Z00.00 ROUTINE MEDICAL EXAM: Primary | ICD-10-CM

## 2020-11-18 DIAGNOSIS — F41.9 ANXIETY DISORDER, UNSPECIFIED TYPE: ICD-10-CM

## 2020-11-18 DIAGNOSIS — Z12.11 SCREENING FOR COLORECTAL CANCER: ICD-10-CM

## 2020-11-18 DIAGNOSIS — G40.909 SEIZURE DISORDER: ICD-10-CM

## 2020-11-18 DIAGNOSIS — F98.8 ATTENTION DEFICIT DISORDER, UNSPECIFIED HYPERACTIVITY PRESENCE: ICD-10-CM

## 2020-11-18 DIAGNOSIS — I10 ESSENTIAL HYPERTENSION: ICD-10-CM

## 2020-11-18 DIAGNOSIS — Z12.12 SCREENING FOR COLORECTAL CANCER: ICD-10-CM

## 2020-11-18 DIAGNOSIS — F39 MOOD DISORDER: ICD-10-CM

## 2020-11-18 DIAGNOSIS — D64.9 ANEMIA, UNSPECIFIED TYPE: ICD-10-CM

## 2020-11-18 LAB
25(OH)D3+25(OH)D2 SERPL-MCNC: 79 NG/ML (ref 30–96)
ALBUMIN SERPL BCP-MCNC: 4 G/DL (ref 3.5–5.2)
ALP SERPL-CCNC: 85 U/L (ref 55–135)
ALT SERPL W/O P-5'-P-CCNC: 34 U/L (ref 10–44)
ANION GAP SERPL CALC-SCNC: 6 MMOL/L (ref 8–16)
AST SERPL-CCNC: 25 U/L (ref 10–40)
BASOPHILS # BLD AUTO: 0.04 K/UL (ref 0–0.2)
BASOPHILS NFR BLD: 0.7 % (ref 0–1.9)
BILIRUB SERPL-MCNC: 0.3 MG/DL (ref 0.1–1)
BUN SERPL-MCNC: 18 MG/DL (ref 6–20)
CALCIUM SERPL-MCNC: 9.3 MG/DL (ref 8.7–10.5)
CHLORIDE SERPL-SCNC: 101 MMOL/L (ref 95–110)
CHOLEST SERPL-MCNC: 114 MG/DL (ref 120–199)
CHOLEST/HDLC SERPL: 2.5 {RATIO} (ref 2–5)
CO2 SERPL-SCNC: 30 MMOL/L (ref 23–29)
COMPLEXED PSA SERPL-MCNC: 1.5 NG/ML (ref 0–4)
CREAT SERPL-MCNC: 1 MG/DL (ref 0.5–1.4)
DIFFERENTIAL METHOD: ABNORMAL
EOSINOPHIL # BLD AUTO: 0 K/UL (ref 0–0.5)
EOSINOPHIL NFR BLD: 0.2 % (ref 0–8)
ERYTHROCYTE [DISTWIDTH] IN BLOOD BY AUTOMATED COUNT: 13.5 % (ref 11.5–14.5)
EST. GFR  (AFRICAN AMERICAN): >60 ML/MIN/1.73 M^2
EST. GFR  (NON AFRICAN AMERICAN): >60 ML/MIN/1.73 M^2
ESTIMATED AVG GLUCOSE: 97 MG/DL (ref 68–131)
GLUCOSE SERPL-MCNC: 104 MG/DL (ref 70–110)
HBA1C MFR BLD HPLC: 5 % (ref 4–5.6)
HCT VFR BLD AUTO: 40.9 % (ref 40–54)
HDLC SERPL-MCNC: 46 MG/DL (ref 40–75)
HDLC SERPL: 40.4 % (ref 20–50)
HGB BLD-MCNC: 13.3 G/DL (ref 14–18)
IMM GRANULOCYTES # BLD AUTO: 0.01 K/UL (ref 0–0.04)
IMM GRANULOCYTES NFR BLD AUTO: 0.2 % (ref 0–0.5)
LDLC SERPL CALC-MCNC: 45 MG/DL (ref 63–159)
LYMPHOCYTES # BLD AUTO: 1.3 K/UL (ref 1–4.8)
LYMPHOCYTES NFR BLD: 22.8 % (ref 18–48)
MCH RBC QN AUTO: 33.9 PG (ref 27–31)
MCHC RBC AUTO-ENTMCNC: 32.5 G/DL (ref 32–36)
MCV RBC AUTO: 104 FL (ref 82–98)
MONOCYTES # BLD AUTO: 0.7 K/UL (ref 0.3–1)
MONOCYTES NFR BLD: 11.4 % (ref 4–15)
NEUTROPHILS # BLD AUTO: 3.7 K/UL (ref 1.8–7.7)
NEUTROPHILS NFR BLD: 64.7 % (ref 38–73)
NONHDLC SERPL-MCNC: 68 MG/DL
NRBC BLD-RTO: 0 /100 WBC
PLATELET # BLD AUTO: 284 K/UL (ref 150–350)
PMV BLD AUTO: 11.1 FL (ref 9.2–12.9)
POTASSIUM SERPL-SCNC: 4.8 MMOL/L (ref 3.5–5.1)
PROT SERPL-MCNC: 6.9 G/DL (ref 6–8.4)
RBC # BLD AUTO: 3.92 M/UL (ref 4.6–6.2)
SODIUM SERPL-SCNC: 137 MMOL/L (ref 136–145)
T4 FREE SERPL-MCNC: 0.82 NG/DL (ref 0.71–1.51)
TRIGL SERPL-MCNC: 115 MG/DL (ref 30–150)
TSH SERPL DL<=0.005 MIU/L-ACNC: 1.41 UIU/ML (ref 0.4–4)
VIT B12 SERPL-MCNC: 1805 PG/ML (ref 210–950)
WBC # BLD AUTO: 5.71 K/UL (ref 3.9–12.7)

## 2020-11-18 PROCEDURE — 83036 HEMOGLOBIN GLYCOSYLATED A1C: CPT

## 2020-11-18 PROCEDURE — 3008F PR BODY MASS INDEX (BMI) DOCUMENTED: ICD-10-PCS | Mod: CPTII,S$GLB,, | Performed by: INTERNAL MEDICINE

## 2020-11-18 PROCEDURE — 99999 PR PBB SHADOW E&M-EST. PATIENT-LVL III: CPT | Mod: PBBFAC,,, | Performed by: INTERNAL MEDICINE

## 2020-11-18 PROCEDURE — 82306 VITAMIN D 25 HYDROXY: CPT

## 2020-11-18 PROCEDURE — 99396 PR PREVENTIVE VISIT,EST,40-64: ICD-10-PCS | Mod: S$GLB,,, | Performed by: INTERNAL MEDICINE

## 2020-11-18 PROCEDURE — 3079F DIAST BP 80-89 MM HG: CPT | Mod: CPTII,S$GLB,, | Performed by: INTERNAL MEDICINE

## 2020-11-18 PROCEDURE — 3008F BODY MASS INDEX DOCD: CPT | Mod: CPTII,S$GLB,, | Performed by: INTERNAL MEDICINE

## 2020-11-18 PROCEDURE — 3074F PR MOST RECENT SYSTOLIC BLOOD PRESSURE < 130 MM HG: ICD-10-PCS | Mod: CPTII,S$GLB,, | Performed by: INTERNAL MEDICINE

## 2020-11-18 PROCEDURE — 36415 COLL VENOUS BLD VENIPUNCTURE: CPT | Mod: PO

## 2020-11-18 PROCEDURE — 80053 COMPREHEN METABOLIC PANEL: CPT

## 2020-11-18 PROCEDURE — 82607 VITAMIN B-12: CPT

## 2020-11-18 PROCEDURE — 3079F PR MOST RECENT DIASTOLIC BLOOD PRESSURE 80-89 MM HG: ICD-10-PCS | Mod: CPTII,S$GLB,, | Performed by: INTERNAL MEDICINE

## 2020-11-18 PROCEDURE — 99999 PR PBB SHADOW E&M-EST. PATIENT-LVL III: ICD-10-PCS | Mod: PBBFAC,,, | Performed by: INTERNAL MEDICINE

## 2020-11-18 PROCEDURE — 84443 ASSAY THYROID STIM HORMONE: CPT

## 2020-11-18 PROCEDURE — 80061 LIPID PANEL: CPT

## 2020-11-18 PROCEDURE — 85025 COMPLETE CBC W/AUTO DIFF WBC: CPT

## 2020-11-18 PROCEDURE — 3074F SYST BP LT 130 MM HG: CPT | Mod: CPTII,S$GLB,, | Performed by: INTERNAL MEDICINE

## 2020-11-18 PROCEDURE — 84439 ASSAY OF FREE THYROXINE: CPT

## 2020-11-18 PROCEDURE — 84153 ASSAY OF PSA TOTAL: CPT

## 2020-11-18 PROCEDURE — 99396 PREV VISIT EST AGE 40-64: CPT | Mod: S$GLB,,, | Performed by: INTERNAL MEDICINE

## 2020-11-18 RX ORDER — DEXTROAMPHETAMINE SACCHARATE, AMPHETAMINE ASPARTATE MONOHYDRATE, DEXTROAMPHETAMINE SULFATE AND AMPHETAMINE SULFATE 7.5; 7.5; 7.5; 7.5 MG/1; MG/1; MG/1; MG/1
CAPSULE, EXTENDED RELEASE ORAL
Qty: 60 CAPSULE | Refills: 0 | Status: SHIPPED | OUTPATIENT
Start: 2020-11-18 | End: 2021-02-18 | Stop reason: SDUPTHER

## 2020-11-18 RX ORDER — DEXTROAMPHETAMINE SACCHARATE, AMPHETAMINE ASPARTATE MONOHYDRATE, DEXTROAMPHETAMINE SULFATE AND AMPHETAMINE SULFATE 7.5; 7.5; 7.5; 7.5 MG/1; MG/1; MG/1; MG/1
CAPSULE, EXTENDED RELEASE ORAL
Qty: 60 CAPSULE | Refills: 0 | Status: SHIPPED | OUTPATIENT
Start: 2020-12-18 | End: 2020-12-21 | Stop reason: SDUPTHER

## 2020-11-18 RX ORDER — DEXTROAMPHETAMINE SACCHARATE, AMPHETAMINE ASPARTATE MONOHYDRATE, DEXTROAMPHETAMINE SULFATE AND AMPHETAMINE SULFATE 7.5; 7.5; 7.5; 7.5 MG/1; MG/1; MG/1; MG/1
CAPSULE, EXTENDED RELEASE ORAL
Qty: 60 CAPSULE | Refills: 0 | Status: SHIPPED | OUTPATIENT
Start: 2021-01-18 | End: 2020-12-18 | Stop reason: SDUPTHER

## 2020-11-18 NOTE — PROGRESS NOTES
Chief complaint: physical Follow-up on ADD and blood pressure medications,          49-year-old white male with long-standing ADD.  He requires a high-dose of the Adderall and apparently his son does as well as and is a high metabolizer of the medication so patient himself can assumedly is the same.  He finds benefit from medication.  We refilled the 3 prescriptions and I encouraged him to message us with the exact date see needs for the next 3 months and we will see him every 6 months.      nikki sent in last wk    He does find significant benefit with his ADD medication and that is confirmed by his wife.  He does not need any dose change.  I will give him refills for 3 months and be happy to send another 3 months when these run out no will see him in the spring for his checkup        Blood pressure at home running great 125/70 in that range.            ROS:   CONST: weight stable. EYES: no vision change. ENT: no sore throat. CV: no chest pain w/ exertion. RESP: no shortness of breath. GI: no nausea, vomiting, diarrhea. No dysphagia. : no urinary issues. MUSCULOSKELETAL: no new myalgias or arthralgias. SKIN: no new changes. NEURO: no focal deficits. PSYCH: no new issues. ENDOCRINE: no polyuria. HEME: no lymph nodes. ALLERGY: no general pruritis.    PAST MEDICAL HISTORY:                                                        1.  Hypertension.                                                            2.  Depression, sees Psychiatry.                                             3.  ADD, sees Psychiatry.  Dr Emery                                                  4.  Right knee scope.   5.  Low HDL 1999   6.  Insomnia  7.  Sleep eating with Ambien   8.  probable seizure disorder 2015  9. Orthostatic hypotension, he worked up by cardiology 2015, may relate to seizures   10.  Left Colles' fracture with surgical repair 2016  11.  Right medial meniscus injury 2017    12.  Mild erosive gastropathy on EGD 2014  13.  Hemoglobin reduction, rising MCV                                                                                                                               SOCIAL HISTORY:  Does not smoke, social alcohol, never smoked.                                                                                            FAMILY HISTORY:  Mom with seizures.  Father with hypertension, diabetes, .     He has no siblings.             Gen: no distress  EYES: conjunctiva clear, non-icteric, PERRL  ENT: nose clear, nasal mucosa normal, oropharynx clear and moist, teeth good  NECK:supple, thyroid non-palpable  RESP: effort is good, lungs clear  CV: heart RRR w/o murmur, gallops or rubs; no carotid bruits, no edema  GI: abdomen soft, non-distended, non-tender, no hepatosplenomegaly  MS: gait normal, no clubbing or cyanosis of the digits  SKIN: no rashes, warm to touch          Nirmal was seen today for annual exam and medication refill.    Diagnoses and all orders for this visit:    Routine medical exam Cologuard pending, update all blood work  -     PSA, Screening; Future  -     Vitamin D; Future  -     Comprehensive Metabolic Panel; Future  -     Hemoglobin A1C; Future  -     CBC Auto Differential; Future  -     TSH; Future  -     T4, Free; Future  -     Lipid Panel; Future  -     Vitamin B12; Future    Screening for prostate cancer  -     PSA, Screening; Future    Screening for colorectal cancer, discussed the colonoscopy is best but he was sent to Cologuard    Attention deficit disorder, unspecified hyperactivity presence  -     dextroamphetamine-amphetamine (ADDERALL XR) 30 MG 24 hr capsule; 2 a day  -     dextroamphetamine-amphetamine (ADDERALL XR) 30 MG 24 hr capsule; 2 a day    Essential hypertension, chronic and stable    Mood disorder    Seizure disorder, chronic and stable    Anxiety disorder, unspecified type, chronic and stable    Vitamin D deficiency disease, continues on supplement    Anemia, unspecified type, slight  "elevation of MCV, check appropriate labs, B12    Other orders  -     dextroamphetamine-amphetamine (ADDERALL XR) 30 MG 24 hr capsule; 2 a day                Clinical note will be sensitive as doing controlled substance management/monitoring"This note will not be shared with the patient."    "

## 2020-12-18 DIAGNOSIS — F98.8 ATTENTION DEFICIT DISORDER, UNSPECIFIED HYPERACTIVITY PRESENCE: ICD-10-CM

## 2020-12-18 NOTE — TELEPHONE ENCOUNTER
Dr Dumont can you send a script for this patient. It has to go to this [facility] in [place]. The pharmacy he normally gets it from is out. Thanks

## 2020-12-21 ENCOUNTER — PATIENT MESSAGE (OUTPATIENT)
Dept: FAMILY MEDICINE | Facility: CLINIC | Age: 50
End: 2020-12-21

## 2020-12-21 DIAGNOSIS — F98.8 ATTENTION DEFICIT DISORDER, UNSPECIFIED HYPERACTIVITY PRESENCE: ICD-10-CM

## 2020-12-21 RX ORDER — DEXTROAMPHETAMINE SACCHARATE, AMPHETAMINE ASPARTATE MONOHYDRATE, DEXTROAMPHETAMINE SULFATE AND AMPHETAMINE SULFATE 7.5; 7.5; 7.5; 7.5 MG/1; MG/1; MG/1; MG/1
CAPSULE, EXTENDED RELEASE ORAL
Qty: 60 CAPSULE | Refills: 0 | Status: SHIPPED | OUTPATIENT
Start: 2021-01-18 | End: 2021-02-18 | Stop reason: SDUPTHER

## 2020-12-21 RX ORDER — DEXTROAMPHETAMINE SACCHARATE, AMPHETAMINE ASPARTATE MONOHYDRATE, DEXTROAMPHETAMINE SULFATE AND AMPHETAMINE SULFATE 7.5; 7.5; 7.5; 7.5 MG/1; MG/1; MG/1; MG/1
CAPSULE, EXTENDED RELEASE ORAL
Qty: 60 CAPSULE | Refills: 0 | Status: SHIPPED | OUTPATIENT
Start: 2020-12-21 | End: 2021-03-24 | Stop reason: SDUPTHER

## 2021-02-25 DIAGNOSIS — F98.8 ATTENTION DEFICIT DISORDER, UNSPECIFIED HYPERACTIVITY PRESENCE: ICD-10-CM

## 2021-02-25 RX ORDER — DEXTROAMPHETAMINE SACCHARATE, AMPHETAMINE ASPARTATE MONOHYDRATE, DEXTROAMPHETAMINE SULFATE AND AMPHETAMINE SULFATE 7.5; 7.5; 7.5; 7.5 MG/1; MG/1; MG/1; MG/1
CAPSULE, EXTENDED RELEASE ORAL
Qty: 60 CAPSULE | Refills: 0 | Status: SHIPPED | OUTPATIENT
Start: 2021-02-25 | End: 2021-12-06 | Stop reason: SDUPTHER

## 2021-03-24 ENCOUNTER — PATIENT MESSAGE (OUTPATIENT)
Dept: FAMILY MEDICINE | Facility: CLINIC | Age: 51
End: 2021-03-24

## 2021-03-24 DIAGNOSIS — F98.8 ATTENTION DEFICIT DISORDER, UNSPECIFIED HYPERACTIVITY PRESENCE: ICD-10-CM

## 2021-03-24 RX ORDER — DEXTROAMPHETAMINE SACCHARATE, AMPHETAMINE ASPARTATE MONOHYDRATE, DEXTROAMPHETAMINE SULFATE AND AMPHETAMINE SULFATE 7.5; 7.5; 7.5; 7.5 MG/1; MG/1; MG/1; MG/1
CAPSULE, EXTENDED RELEASE ORAL
Qty: 60 CAPSULE | Refills: 0 | Status: SHIPPED | OUTPATIENT
Start: 2021-03-24 | End: 2021-05-24 | Stop reason: SDUPTHER

## 2021-03-24 RX ORDER — DEXTROAMPHETAMINE SACCHARATE, AMPHETAMINE ASPARTATE MONOHYDRATE, DEXTROAMPHETAMINE SULFATE AND AMPHETAMINE SULFATE 7.5; 7.5; 7.5; 7.5 MG/1; MG/1; MG/1; MG/1
CAPSULE, EXTENDED RELEASE ORAL
Qty: 60 CAPSULE | Refills: 0 | Status: SHIPPED | OUTPATIENT
Start: 2021-04-24 | End: 2021-05-24 | Stop reason: SDUPTHER

## 2021-05-12 ENCOUNTER — PATIENT OUTREACH (OUTPATIENT)
Dept: ADMINISTRATIVE | Facility: HOSPITAL | Age: 51
End: 2021-05-12

## 2021-05-24 ENCOUNTER — OFFICE VISIT (OUTPATIENT)
Dept: FAMILY MEDICINE | Facility: CLINIC | Age: 51
End: 2021-05-24
Payer: COMMERCIAL

## 2021-05-24 VITALS
WEIGHT: 221.31 LBS | TEMPERATURE: 98 F | HEIGHT: 71 IN | OXYGEN SATURATION: 99 % | HEART RATE: 71 BPM | BODY MASS INDEX: 30.98 KG/M2 | DIASTOLIC BLOOD PRESSURE: 80 MMHG | SYSTOLIC BLOOD PRESSURE: 128 MMHG

## 2021-05-24 DIAGNOSIS — F39 MOOD DISORDER: ICD-10-CM

## 2021-05-24 DIAGNOSIS — I10 ESSENTIAL HYPERTENSION: ICD-10-CM

## 2021-05-24 DIAGNOSIS — F98.8 ATTENTION DEFICIT DISORDER, UNSPECIFIED HYPERACTIVITY PRESENCE: Primary | ICD-10-CM

## 2021-05-24 PROCEDURE — 3008F BODY MASS INDEX DOCD: CPT | Mod: CPTII,S$GLB,, | Performed by: INTERNAL MEDICINE

## 2021-05-24 PROCEDURE — 3008F PR BODY MASS INDEX (BMI) DOCUMENTED: ICD-10-PCS | Mod: CPTII,S$GLB,, | Performed by: INTERNAL MEDICINE

## 2021-05-24 PROCEDURE — 99999 PR PBB SHADOW E&M-EST. PATIENT-LVL III: CPT | Mod: PBBFAC,,, | Performed by: INTERNAL MEDICINE

## 2021-05-24 PROCEDURE — 99999 PR PBB SHADOW E&M-EST. PATIENT-LVL III: ICD-10-PCS | Mod: PBBFAC,,, | Performed by: INTERNAL MEDICINE

## 2021-05-24 PROCEDURE — 99214 OFFICE O/P EST MOD 30 MIN: CPT | Mod: S$GLB,,, | Performed by: INTERNAL MEDICINE

## 2021-05-24 PROCEDURE — 99214 PR OFFICE/OUTPT VISIT, EST, LEVL IV, 30-39 MIN: ICD-10-PCS | Mod: S$GLB,,, | Performed by: INTERNAL MEDICINE

## 2021-05-24 RX ORDER — DEXTROAMPHETAMINE SACCHARATE, AMPHETAMINE ASPARTATE MONOHYDRATE, DEXTROAMPHETAMINE SULFATE AND AMPHETAMINE SULFATE 7.5; 7.5; 7.5; 7.5 MG/1; MG/1; MG/1; MG/1
CAPSULE, EXTENDED RELEASE ORAL
Qty: 60 CAPSULE | Refills: 0 | Status: SHIPPED | OUTPATIENT
Start: 2021-05-24 | End: 2021-08-16 | Stop reason: SDUPTHER

## 2021-05-24 RX ORDER — DEXTROAMPHETAMINE SACCHARATE, AMPHETAMINE ASPARTATE MONOHYDRATE, DEXTROAMPHETAMINE SULFATE AND AMPHETAMINE SULFATE 7.5; 7.5; 7.5; 7.5 MG/1; MG/1; MG/1; MG/1
CAPSULE, EXTENDED RELEASE ORAL
Qty: 60 CAPSULE | Refills: 0 | Status: CANCELLED | OUTPATIENT
Start: 2021-07-24

## 2021-05-24 RX ORDER — DEXTROAMPHETAMINE SACCHARATE, AMPHETAMINE ASPARTATE MONOHYDRATE, DEXTROAMPHETAMINE SULFATE AND AMPHETAMINE SULFATE 7.5; 7.5; 7.5; 7.5 MG/1; MG/1; MG/1; MG/1
CAPSULE, EXTENDED RELEASE ORAL
Qty: 60 CAPSULE | Refills: 0 | Status: SHIPPED | OUTPATIENT
Start: 2021-06-24 | End: 2021-08-16 | Stop reason: SDUPTHER

## 2021-08-16 ENCOUNTER — PATIENT MESSAGE (OUTPATIENT)
Dept: FAMILY MEDICINE | Facility: CLINIC | Age: 51
End: 2021-08-16

## 2021-08-16 DIAGNOSIS — F98.8 ATTENTION DEFICIT DISORDER, UNSPECIFIED HYPERACTIVITY PRESENCE: ICD-10-CM

## 2021-08-17 RX ORDER — DEXTROAMPHETAMINE SACCHARATE, AMPHETAMINE ASPARTATE MONOHYDRATE, DEXTROAMPHETAMINE SULFATE AND AMPHETAMINE SULFATE 7.5; 7.5; 7.5; 7.5 MG/1; MG/1; MG/1; MG/1
CAPSULE, EXTENDED RELEASE ORAL
Qty: 60 CAPSULE | Refills: 0 | Status: SHIPPED | OUTPATIENT
Start: 2021-08-17 | End: 2022-01-13 | Stop reason: SDUPTHER

## 2021-08-17 RX ORDER — DEXTROAMPHETAMINE SACCHARATE, AMPHETAMINE ASPARTATE MONOHYDRATE, DEXTROAMPHETAMINE SULFATE AND AMPHETAMINE SULFATE 7.5; 7.5; 7.5; 7.5 MG/1; MG/1; MG/1; MG/1
CAPSULE, EXTENDED RELEASE ORAL
Qty: 60 CAPSULE | Refills: 0 | Status: SHIPPED | OUTPATIENT
Start: 2021-08-17 | End: 2023-09-19 | Stop reason: SDUPTHER

## 2021-12-01 ENCOUNTER — PATIENT MESSAGE (OUTPATIENT)
Dept: FAMILY MEDICINE | Facility: CLINIC | Age: 51
End: 2021-12-01
Payer: COMMERCIAL

## 2021-12-06 DIAGNOSIS — F98.8 ATTENTION DEFICIT DISORDER, UNSPECIFIED HYPERACTIVITY PRESENCE: ICD-10-CM

## 2021-12-06 RX ORDER — DEXTROAMPHETAMINE SACCHARATE, AMPHETAMINE ASPARTATE MONOHYDRATE, DEXTROAMPHETAMINE SULFATE AND AMPHETAMINE SULFATE 7.5; 7.5; 7.5; 7.5 MG/1; MG/1; MG/1; MG/1
CAPSULE, EXTENDED RELEASE ORAL
Qty: 60 CAPSULE | Refills: 0 | Status: SHIPPED | OUTPATIENT
Start: 2021-12-06 | End: 2023-05-28 | Stop reason: CLARIF

## 2021-12-06 RX ORDER — DEXTROAMPHETAMINE SACCHARATE, AMPHETAMINE ASPARTATE MONOHYDRATE, DEXTROAMPHETAMINE SULFATE AND AMPHETAMINE SULFATE 7.5; 7.5; 7.5; 7.5 MG/1; MG/1; MG/1; MG/1
CAPSULE, EXTENDED RELEASE ORAL
Qty: 60 CAPSULE | Refills: 0 | Status: SHIPPED | OUTPATIENT
Start: 2022-01-06 | End: 2022-11-11 | Stop reason: SDUPTHER

## 2021-12-21 RX ORDER — IRBESARTAN 300 MG/1
TABLET ORAL
Qty: 90 TABLET | Refills: 0 | Status: SHIPPED | OUTPATIENT
Start: 2021-12-21 | End: 2022-03-16 | Stop reason: SDUPTHER

## 2021-12-21 RX ORDER — AMLODIPINE BESYLATE 10 MG/1
TABLET ORAL
Qty: 90 TABLET | Refills: 1 | Status: SHIPPED | OUTPATIENT
Start: 2021-12-21 | End: 2022-06-12

## 2022-01-13 ENCOUNTER — OFFICE VISIT (OUTPATIENT)
Dept: FAMILY MEDICINE | Facility: CLINIC | Age: 52
End: 2022-01-13
Payer: COMMERCIAL

## 2022-01-13 VITALS
SYSTOLIC BLOOD PRESSURE: 128 MMHG | BODY MASS INDEX: 30.86 KG/M2 | WEIGHT: 220.44 LBS | DIASTOLIC BLOOD PRESSURE: 79 MMHG | TEMPERATURE: 98 F | HEIGHT: 71 IN | OXYGEN SATURATION: 99 % | HEART RATE: 64 BPM

## 2022-01-13 DIAGNOSIS — G40.909 SEIZURE DISORDER: ICD-10-CM

## 2022-01-13 DIAGNOSIS — F39 MOOD DISORDER: ICD-10-CM

## 2022-01-13 DIAGNOSIS — Z12.12 SCREENING FOR COLORECTAL CANCER: ICD-10-CM

## 2022-01-13 DIAGNOSIS — F98.8 ATTENTION DEFICIT DISORDER, UNSPECIFIED HYPERACTIVITY PRESENCE: Primary | ICD-10-CM

## 2022-01-13 DIAGNOSIS — I10 ESSENTIAL HYPERTENSION: ICD-10-CM

## 2022-01-13 DIAGNOSIS — Z12.11 SCREENING FOR COLORECTAL CANCER: ICD-10-CM

## 2022-01-13 PROCEDURE — 99214 PR OFFICE/OUTPT VISIT, EST, LEVL IV, 30-39 MIN: ICD-10-PCS | Mod: S$GLB,,, | Performed by: INTERNAL MEDICINE

## 2022-01-13 PROCEDURE — 1159F MED LIST DOCD IN RCRD: CPT | Mod: CPTII,S$GLB,, | Performed by: INTERNAL MEDICINE

## 2022-01-13 PROCEDURE — 3078F DIAST BP <80 MM HG: CPT | Mod: CPTII,S$GLB,, | Performed by: INTERNAL MEDICINE

## 2022-01-13 PROCEDURE — 3008F BODY MASS INDEX DOCD: CPT | Mod: CPTII,S$GLB,, | Performed by: INTERNAL MEDICINE

## 2022-01-13 PROCEDURE — 3078F PR MOST RECENT DIASTOLIC BLOOD PRESSURE < 80 MM HG: ICD-10-PCS | Mod: CPTII,S$GLB,, | Performed by: INTERNAL MEDICINE

## 2022-01-13 PROCEDURE — 1159F PR MEDICATION LIST DOCUMENTED IN MEDICAL RECORD: ICD-10-PCS | Mod: CPTII,S$GLB,, | Performed by: INTERNAL MEDICINE

## 2022-01-13 PROCEDURE — 99214 OFFICE O/P EST MOD 30 MIN: CPT | Mod: S$GLB,,, | Performed by: INTERNAL MEDICINE

## 2022-01-13 PROCEDURE — 99999 PR PBB SHADOW E&M-EST. PATIENT-LVL IV: ICD-10-PCS | Mod: PBBFAC,,, | Performed by: INTERNAL MEDICINE

## 2022-01-13 PROCEDURE — 3074F SYST BP LT 130 MM HG: CPT | Mod: CPTII,S$GLB,, | Performed by: INTERNAL MEDICINE

## 2022-01-13 PROCEDURE — 3074F PR MOST RECENT SYSTOLIC BLOOD PRESSURE < 130 MM HG: ICD-10-PCS | Mod: CPTII,S$GLB,, | Performed by: INTERNAL MEDICINE

## 2022-01-13 PROCEDURE — 99999 PR PBB SHADOW E&M-EST. PATIENT-LVL IV: CPT | Mod: PBBFAC,,, | Performed by: INTERNAL MEDICINE

## 2022-01-13 PROCEDURE — 3008F PR BODY MASS INDEX (BMI) DOCUMENTED: ICD-10-PCS | Mod: CPTII,S$GLB,, | Performed by: INTERNAL MEDICINE

## 2022-01-13 RX ORDER — DEXTROAMPHETAMINE SACCHARATE, AMPHETAMINE ASPARTATE MONOHYDRATE, DEXTROAMPHETAMINE SULFATE AND AMPHETAMINE SULFATE 7.5; 7.5; 7.5; 7.5 MG/1; MG/1; MG/1; MG/1
CAPSULE, EXTENDED RELEASE ORAL
Qty: 60 CAPSULE | Refills: 0 | Status: SHIPPED | OUTPATIENT
Start: 2022-01-13 | End: 2022-05-02 | Stop reason: SDUPTHER

## 2022-01-13 RX ORDER — DEXTROAMPHETAMINE SACCHARATE, AMPHETAMINE ASPARTATE MONOHYDRATE, DEXTROAMPHETAMINE SULFATE AND AMPHETAMINE SULFATE 7.5; 7.5; 7.5; 7.5 MG/1; MG/1; MG/1; MG/1
CAPSULE, EXTENDED RELEASE ORAL
Qty: 60 CAPSULE | Refills: 0 | Status: SHIPPED | OUTPATIENT
Start: 2022-02-13 | End: 2022-05-02 | Stop reason: SDUPTHER

## 2022-01-13 RX ORDER — AZITHROMYCIN 250 MG/1
TABLET, FILM COATED ORAL
COMMUNITY
Start: 2021-09-15 | End: 2023-05-28 | Stop reason: CLARIF

## 2022-01-13 RX ORDER — DEXTROAMPHETAMINE SACCHARATE, AMPHETAMINE ASPARTATE MONOHYDRATE, DEXTROAMPHETAMINE SULFATE AND AMPHETAMINE SULFATE 7.5; 7.5; 7.5; 7.5 MG/1; MG/1; MG/1; MG/1
CAPSULE, EXTENDED RELEASE ORAL
Qty: 60 CAPSULE | Refills: 0 | Status: SHIPPED | OUTPATIENT
Start: 2022-03-13 | End: 2022-05-02 | Stop reason: SDUPTHER

## 2022-01-13 RX ORDER — BENZONATATE 100 MG/1
100-200 CAPSULE ORAL EVERY 8 HOURS PRN
COMMUNITY
Start: 2021-09-15 | End: 2023-05-28 | Stop reason: CLARIF

## 2022-01-13 RX ORDER — ALBUTEROL SULFATE 90 UG/1
AEROSOL, METERED RESPIRATORY (INHALATION)
COMMUNITY
Start: 2021-09-15 | End: 2023-05-28 | Stop reason: CLARIF

## 2022-01-13 RX ORDER — PROMETHAZINE HYDROCHLORIDE AND DEXTROMETHORPHAN HYDROBROMIDE 6.25; 15 MG/5ML; MG/5ML
5 SYRUP ORAL
COMMUNITY
Start: 2021-09-15 | End: 2023-05-28 | Stop reason: CLARIF

## 2022-01-13 NOTE — PROGRESS NOTES
Chief complaint:  Follow-up on ADD and blood pressure medications,          51-year-old white male with long-standing ADD.  He requires a high-dose of the Adderall and apparently his son does as well as and is a high metabolizer of the medication so patient himself can assumedly is the same.  He finds benefit from medication.  We refilled the 3 prescriptions and I encouraged him to message us with the exact date see needs for the next 3 months and we will see him every 6 months.          He does find significant benefit with his ADD medication and that is confirmed by his wife.  He does not need any dose change.  I will give him refills for 3 months        Blood pressure at home running great 125/70 in that range.            ROS:   CONST: weight stable. EYES: no vision change. ENT: no sore throat. CV: no chest pain w/ exertion. RESP: no shortness of breath. GI: no nausea, vomiting, diarrhea. No dysphagia. : no urinary issues. MUSCULOSKELETAL: no new myalgias or arthralgias. SKIN: no new changes. NEURO: no focal deficits. PSYCH: no new issues. ENDOCRINE: no polyuria. HEME: no lymph nodes. ALLERGY: no general pruritis.    PAST MEDICAL HISTORY:                                                        1.  Hypertension.                                                            2.  Depression, sees Psychiatry.                                             3.  ADD, sees Psychiatry.  Dr Emery                                                  4.  Right knee scope.   5.  Low HDL 1999   6.  Insomnia  7.  Sleep eating with Ambien   8.  probable seizure disorder 2015  9. Orthostatic hypotension, he worked up by cardiology 2015, may relate to seizures   10.  Left Colles' fracture with surgical repair 2016  11.  Right medial meniscus injury 2017    12.  Mild erosive gastropathy on EGD 2014  13. Hemoglobin reduction, rising MCV                                                                                                                                SOCIAL HISTORY:  Does not smoke, social alcohol, never smoked.                                                                                            FAMILY HISTORY:  Mom with seizures.  Father with hypertension, diabetes, .     He has no siblings.             Gen: no distress       Nirmal was seen today for adhd.    Diagnoses and all orders for this visit:    Attention deficit disorder, unspecified hyperactivity presence, high risk medications with high risk of complications and side effects address today.  He finds benefit, appropriate use and medications refilled  -     dextroamphetamine-amphetamine (ADDERALL XR) 30 MG 24 hr capsule; 2 a day  -     dextroamphetamine-amphetamine (ADDERALL XR) 30 MG 24 hr capsule; 2 a day  -     dextroamphetamine-amphetamine (ADDERALL XR) 30 MG 24 hr capsule; 2 a day    Essential hypertension, chronic and stable, continue to monitor at home    Mood disorder, chronic and stable, some interval stressors discussed, several deaths but none in the immediate family which is good    Seizure disorder, chronic and stable    Screening for colorectal cancer  -     Cologuard Screening (Multitarget Stool DNA); Future  -     Cologuard Screening (Multitarget Stool DNA)

## 2022-01-27 LAB — NONINV COLON CA DNA+OCC BLD SCRN STL QL: NEGATIVE

## 2022-03-15 NOTE — TELEPHONE ENCOUNTER

## 2022-03-16 RX ORDER — IRBESARTAN 300 MG/1
TABLET ORAL
Qty: 90 TABLET | Refills: 0 | Status: SHIPPED | OUTPATIENT
Start: 2022-03-16 | End: 2022-05-02 | Stop reason: SDUPTHER

## 2022-03-22 DIAGNOSIS — I10 ESSENTIAL HYPERTENSION: ICD-10-CM

## 2022-03-22 NOTE — TELEPHONE ENCOUNTER
No new care gaps identified.  Powered by [employer]. Reference number: [id number].   3/22/2022 3:43:44 PM CDT

## 2022-03-23 RX ORDER — HYDROCHLOROTHIAZIDE 25 MG/1
25 TABLET ORAL DAILY
Qty: 30 TABLET | Refills: 0 | Status: SHIPPED | OUTPATIENT
Start: 2022-03-23 | End: 2022-05-02 | Stop reason: SDUPTHER

## 2022-04-02 NOTE — TELEPHONE ENCOUNTER
No new care gaps identified.  Powered by [employer]. Reference number: [id number].   4/02/2022 9:47:05 AM CDT

## 2022-04-04 RX ORDER — PAROXETINE HYDROCHLORIDE 20 MG/1
60 TABLET, FILM COATED ORAL EVERY MORNING
Qty: 270 TABLET | Refills: 3 | Status: SHIPPED | OUTPATIENT
Start: 2022-04-04 | End: 2023-03-05

## 2022-04-04 NOTE — TELEPHONE ENCOUNTER
Refill Authorization Note   Nirmal Moss  is requesting a refill authorization.  Brief Assessment and Rationale for Refill:  Approve     Medication Therapy Plan:       Medication Reconciliation Completed: No   Comments:     No Care Gaps recommended.     Note composed:1:52 PM 04/04/2022

## 2022-05-04 ENCOUNTER — PATIENT MESSAGE (OUTPATIENT)
Dept: FAMILY MEDICINE | Facility: CLINIC | Age: 52
End: 2022-05-04
Payer: COMMERCIAL

## 2022-05-04 DIAGNOSIS — F98.8 ATTENTION DEFICIT DISORDER, UNSPECIFIED HYPERACTIVITY PRESENCE: ICD-10-CM

## 2022-05-04 RX ORDER — DEXTROAMPHETAMINE SACCHARATE, AMPHETAMINE ASPARTATE MONOHYDRATE, DEXTROAMPHETAMINE SULFATE AND AMPHETAMINE SULFATE 7.5; 7.5; 7.5; 7.5 MG/1; MG/1; MG/1; MG/1
CAPSULE, EXTENDED RELEASE ORAL
Qty: 60 CAPSULE | Refills: 0 | OUTPATIENT
Start: 2022-05-04

## 2022-05-04 NOTE — TELEPHONE ENCOUNTER
No new care gaps identified.  [facility] Embedded Care Gaps. Reference number: [id number]. 5/04/2022   10:36:18 AM MCT

## 2022-06-13 DIAGNOSIS — I10 ESSENTIAL HYPERTENSION: ICD-10-CM

## 2022-06-13 NOTE — TELEPHONE ENCOUNTER
No new care gaps identified.  [facility] Embedded Care Gaps. Reference number: [id number]. 6/13/2022   8:47:50 AM CDT

## 2022-06-14 RX ORDER — HYDROCHLOROTHIAZIDE 25 MG/1
TABLET ORAL
Qty: 30 TABLET | Refills: 0 | Status: SHIPPED | OUTPATIENT
Start: 2022-06-14 | End: 2022-07-20

## 2022-06-14 NOTE — TELEPHONE ENCOUNTER
Refill Routing Note   Medication(s) are not appropriate for processing by [facility] for the following reason(s):      - Required laboratory values are outdated    [facility] action(s):  Defer          Medication reconciliation completed: No     Appointments  past 12m or future 3m with PCP    Date Provider   Last Visit   1/13/2022 Ravi Baird MD   Next Visit   Visit date not found Ravi Baird MD   ED visits in past 90 days: 0        Note composed:9:26 PM 06/13/2022

## 2022-08-12 ENCOUNTER — PATIENT MESSAGE (OUTPATIENT)
Dept: FAMILY MEDICINE | Facility: CLINIC | Age: 52
End: 2022-08-12
Payer: COMMERCIAL

## 2022-08-12 DIAGNOSIS — F98.8 ATTENTION DEFICIT DISORDER, UNSPECIFIED HYPERACTIVITY PRESENCE: ICD-10-CM

## 2022-08-12 NOTE — TELEPHONE ENCOUNTER
Care Due:                  Date            Visit Type   Department     Provider  --------------------------------------------------------------------------------                                [facility]                              PRIMARY      MED/ INTERNAL  Ravi Cosme  Last Visit: 01-      CARE ([facility])   MED/ PEDS      Ehrensing  Next Visit: None Scheduled  None         None Found                                                            Last  Test          Frequency    Reason                     Performed    Due Date  --------------------------------------------------------------------------------    CMP.........  12 months..  hydroCHLOROthiazide,       11- 11-                             irbesartan...............    [employer] Embedded Care Gaps. Reference number: [id number]. 8/12/2022   3:14:07 PM CDT

## 2022-08-15 RX ORDER — DEXTROAMPHETAMINE SACCHARATE, AMPHETAMINE ASPARTATE MONOHYDRATE, DEXTROAMPHETAMINE SULFATE AND AMPHETAMINE SULFATE 7.5; 7.5; 7.5; 7.5 MG/1; MG/1; MG/1; MG/1
CAPSULE, EXTENDED RELEASE ORAL
Qty: 60 CAPSULE | Refills: 0 | OUTPATIENT
Start: 2022-08-15

## 2022-09-07 DIAGNOSIS — I10 HTN (HYPERTENSION): ICD-10-CM

## 2022-10-05 DIAGNOSIS — I10 HTN (HYPERTENSION): ICD-10-CM

## 2022-11-11 ENCOUNTER — OFFICE VISIT (OUTPATIENT)
Dept: FAMILY MEDICINE | Facility: CLINIC | Age: 52
End: 2022-11-11
Payer: COMMERCIAL

## 2022-11-11 ENCOUNTER — LAB VISIT (OUTPATIENT)
Dept: LAB | Facility: HOSPITAL | Age: 52
End: 2022-11-11
Attending: INTERNAL MEDICINE
Payer: COMMERCIAL

## 2022-11-11 VITALS
DIASTOLIC BLOOD PRESSURE: 84 MMHG | HEIGHT: 71 IN | HEART RATE: 97 BPM | TEMPERATURE: 99 F | BODY MASS INDEX: 30.98 KG/M2 | OXYGEN SATURATION: 99 % | SYSTOLIC BLOOD PRESSURE: 130 MMHG | WEIGHT: 221.31 LBS

## 2022-11-11 DIAGNOSIS — Z12.11 SCREENING FOR COLORECTAL CANCER: ICD-10-CM

## 2022-11-11 DIAGNOSIS — Z12.12 SCREENING FOR COLORECTAL CANCER: ICD-10-CM

## 2022-11-11 DIAGNOSIS — Z00.00 ROUTINE MEDICAL EXAM: ICD-10-CM

## 2022-11-11 DIAGNOSIS — Z00.00 ROUTINE MEDICAL EXAM: Primary | ICD-10-CM

## 2022-11-11 DIAGNOSIS — E55.9 VITAMIN D DEFICIENCY DISEASE: ICD-10-CM

## 2022-11-11 DIAGNOSIS — S60.122A CONTUSION OF LEFT INDEX FINGER WITH DAMAGE TO NAIL, INITIAL ENCOUNTER: ICD-10-CM

## 2022-11-11 DIAGNOSIS — Z12.5 SCREENING FOR PROSTATE CANCER: ICD-10-CM

## 2022-11-11 DIAGNOSIS — I10 ESSENTIAL HYPERTENSION: ICD-10-CM

## 2022-11-11 DIAGNOSIS — F39 MOOD DISORDER: ICD-10-CM

## 2022-11-11 DIAGNOSIS — F98.8 ATTENTION DEFICIT DISORDER, UNSPECIFIED HYPERACTIVITY PRESENCE: ICD-10-CM

## 2022-11-11 DIAGNOSIS — G40.909 SEIZURE DISORDER: ICD-10-CM

## 2022-11-11 LAB
25(OH)D3+25(OH)D2 SERPL-MCNC: 60 NG/ML (ref 30–96)
ALBUMIN SERPL BCP-MCNC: 4.4 G/DL (ref 3.5–5.2)
ALP SERPL-CCNC: 86 U/L (ref 55–135)
ALT SERPL W/O P-5'-P-CCNC: 27 U/L (ref 10–44)
ANION GAP SERPL CALC-SCNC: 11 MMOL/L (ref 8–16)
AST SERPL-CCNC: 25 U/L (ref 10–40)
BASOPHILS # BLD AUTO: 0.03 K/UL (ref 0–0.2)
BASOPHILS NFR BLD: 0.7 % (ref 0–1.9)
BILIRUB SERPL-MCNC: 0.7 MG/DL (ref 0.1–1)
BUN SERPL-MCNC: 12 MG/DL (ref 6–20)
CALCIUM SERPL-MCNC: 10 MG/DL (ref 8.7–10.5)
CHLORIDE SERPL-SCNC: 103 MMOL/L (ref 95–110)
CHOLEST SERPL-MCNC: 141 MG/DL (ref 120–199)
CHOLEST/HDLC SERPL: 2.9 {RATIO} (ref 2–5)
CO2 SERPL-SCNC: 24 MMOL/L (ref 23–29)
COMPLEXED PSA SERPL-MCNC: 1.6 NG/ML (ref 0–4)
CREAT SERPL-MCNC: 0.9 MG/DL (ref 0.5–1.4)
DIFFERENTIAL METHOD: ABNORMAL
EOSINOPHIL # BLD AUTO: 0 K/UL (ref 0–0.5)
EOSINOPHIL NFR BLD: 0.2 % (ref 0–8)
ERYTHROCYTE [DISTWIDTH] IN BLOOD BY AUTOMATED COUNT: 14 % (ref 11.5–14.5)
EST. GFR  (NO RACE VARIABLE): >60 ML/MIN/1.73 M^2
GLUCOSE SERPL-MCNC: 97 MG/DL (ref 70–110)
HCT VFR BLD AUTO: 41.8 % (ref 40–54)
HDLC SERPL-MCNC: 49 MG/DL (ref 40–75)
HDLC SERPL: 34.8 % (ref 20–50)
HGB BLD-MCNC: 13.4 G/DL (ref 14–18)
IMM GRANULOCYTES # BLD AUTO: 0.01 K/UL (ref 0–0.04)
IMM GRANULOCYTES NFR BLD AUTO: 0.2 % (ref 0–0.5)
LDLC SERPL CALC-MCNC: 78.8 MG/DL (ref 63–159)
LYMPHOCYTES # BLD AUTO: 1.1 K/UL (ref 1–4.8)
LYMPHOCYTES NFR BLD: 26.6 % (ref 18–48)
MCH RBC QN AUTO: 32.4 PG (ref 27–31)
MCHC RBC AUTO-ENTMCNC: 32.1 G/DL (ref 32–36)
MCV RBC AUTO: 101 FL (ref 82–98)
MONOCYTES # BLD AUTO: 0.5 K/UL (ref 0.3–1)
MONOCYTES NFR BLD: 12.4 % (ref 4–15)
NEUTROPHILS # BLD AUTO: 2.6 K/UL (ref 1.8–7.7)
NEUTROPHILS NFR BLD: 59.9 % (ref 38–73)
NONHDLC SERPL-MCNC: 92 MG/DL
NRBC BLD-RTO: 0 /100 WBC
PLATELET # BLD AUTO: 257 K/UL (ref 150–450)
PMV BLD AUTO: 10.5 FL (ref 9.2–12.9)
POTASSIUM SERPL-SCNC: 4.4 MMOL/L (ref 3.5–5.1)
PROT SERPL-MCNC: 7.3 G/DL (ref 6–8.4)
RBC # BLD AUTO: 4.13 M/UL (ref 4.6–6.2)
SODIUM SERPL-SCNC: 138 MMOL/L (ref 136–145)
TRIGL SERPL-MCNC: 66 MG/DL (ref 30–150)
TSH SERPL DL<=0.005 MIU/L-ACNC: 1.71 UIU/ML (ref 0.4–4)
WBC # BLD AUTO: 4.29 K/UL (ref 3.9–12.7)

## 2022-11-11 PROCEDURE — 99999 PR PBB SHADOW E&M-EST. PATIENT-LVL III: CPT | Mod: PBBFAC,,, | Performed by: INTERNAL MEDICINE

## 2022-11-11 PROCEDURE — 3075F SYST BP GE 130 - 139MM HG: CPT | Mod: CPTII,S$GLB,, | Performed by: INTERNAL MEDICINE

## 2022-11-11 PROCEDURE — 85025 COMPLETE CBC W/AUTO DIFF WBC: CPT | Performed by: INTERNAL MEDICINE

## 2022-11-11 PROCEDURE — 84443 ASSAY THYROID STIM HORMONE: CPT | Performed by: INTERNAL MEDICINE

## 2022-11-11 PROCEDURE — 3075F PR MOST RECENT SYSTOLIC BLOOD PRESS GE 130-139MM HG: ICD-10-PCS | Mod: CPTII,S$GLB,, | Performed by: INTERNAL MEDICINE

## 2022-11-11 PROCEDURE — 4010F PR ACE/ARB THEARPY RXD/TAKEN: ICD-10-PCS | Mod: CPTII,S$GLB,, | Performed by: INTERNAL MEDICINE

## 2022-11-11 PROCEDURE — 4010F ACE/ARB THERAPY RXD/TAKEN: CPT | Mod: CPTII,S$GLB,, | Performed by: INTERNAL MEDICINE

## 2022-11-11 PROCEDURE — 82306 VITAMIN D 25 HYDROXY: CPT | Performed by: INTERNAL MEDICINE

## 2022-11-11 PROCEDURE — 99396 PR PREVENTIVE VISIT,EST,40-64: ICD-10-PCS | Mod: S$GLB,,, | Performed by: INTERNAL MEDICINE

## 2022-11-11 PROCEDURE — 80053 COMPREHEN METABOLIC PANEL: CPT | Performed by: INTERNAL MEDICINE

## 2022-11-11 PROCEDURE — 3008F PR BODY MASS INDEX (BMI) DOCUMENTED: ICD-10-PCS | Mod: CPTII,S$GLB,, | Performed by: INTERNAL MEDICINE

## 2022-11-11 PROCEDURE — 36415 COLL VENOUS BLD VENIPUNCTURE: CPT | Mod: PO | Performed by: INTERNAL MEDICINE

## 2022-11-11 PROCEDURE — 3079F DIAST BP 80-89 MM HG: CPT | Mod: CPTII,S$GLB,, | Performed by: INTERNAL MEDICINE

## 2022-11-11 PROCEDURE — 99999 PR PBB SHADOW E&M-EST. PATIENT-LVL III: ICD-10-PCS | Mod: PBBFAC,,, | Performed by: INTERNAL MEDICINE

## 2022-11-11 PROCEDURE — 3008F BODY MASS INDEX DOCD: CPT | Mod: CPTII,S$GLB,, | Performed by: INTERNAL MEDICINE

## 2022-11-11 PROCEDURE — 84153 ASSAY OF PSA TOTAL: CPT | Performed by: INTERNAL MEDICINE

## 2022-11-11 PROCEDURE — 3079F PR MOST RECENT DIASTOLIC BLOOD PRESSURE 80-89 MM HG: ICD-10-PCS | Mod: CPTII,S$GLB,, | Performed by: INTERNAL MEDICINE

## 2022-11-11 PROCEDURE — 99396 PREV VISIT EST AGE 40-64: CPT | Mod: S$GLB,,, | Performed by: INTERNAL MEDICINE

## 2022-11-11 PROCEDURE — 80061 LIPID PANEL: CPT | Performed by: INTERNAL MEDICINE

## 2022-11-11 RX ORDER — DEXTROAMPHETAMINE SACCHARATE, AMPHETAMINE ASPARTATE MONOHYDRATE, DEXTROAMPHETAMINE SULFATE AND AMPHETAMINE SULFATE 7.5; 7.5; 7.5; 7.5 MG/1; MG/1; MG/1; MG/1
CAPSULE, EXTENDED RELEASE ORAL
Qty: 60 CAPSULE | Refills: 0 | Status: SHIPPED | OUTPATIENT
Start: 2022-12-11 | End: 2023-02-08 | Stop reason: SDUPTHER

## 2022-11-11 RX ORDER — DEXTROAMPHETAMINE SACCHARATE, AMPHETAMINE ASPARTATE MONOHYDRATE, DEXTROAMPHETAMINE SULFATE AND AMPHETAMINE SULFATE 7.5; 7.5; 7.5; 7.5 MG/1; MG/1; MG/1; MG/1
CAPSULE, EXTENDED RELEASE ORAL
Qty: 60 CAPSULE | Refills: 0 | Status: SHIPPED | OUTPATIENT
Start: 2022-11-11 | End: 2023-05-28 | Stop reason: CLARIF

## 2022-11-11 RX ORDER — DEXTROAMPHETAMINE SACCHARATE, AMPHETAMINE ASPARTATE MONOHYDRATE, DEXTROAMPHETAMINE SULFATE AND AMPHETAMINE SULFATE 7.5; 7.5; 7.5; 7.5 MG/1; MG/1; MG/1; MG/1
CAPSULE, EXTENDED RELEASE ORAL
Qty: 60 CAPSULE | Refills: 0 | Status: SHIPPED | OUTPATIENT
Start: 2023-01-11 | End: 2023-02-08 | Stop reason: SDUPTHER

## 2022-11-11 NOTE — PROGRESS NOTES
Chief complaint:  physical, Follow-up on ADD and blood pressure medications,          51-year-old white male with long-standing ADD.  He requires a high-dose of the Adderall and apparently his son does as well as and is a high metabolizer of the medication so patient himself can assumedly is the same.  He finds benefit from medication.  We refilled the 3 prescriptions and I encouraged him to message us with the exact date see needs for the next 3 months and we will see him every 6 months.          He does find significant benefit with his ADD medication and that is confirmed by his wife.  He does not need any dose change.  I will give him refills for 3 months        Blood pressure at home running great 125/70 in that range.    Last labs 2020    Cologuard neg 1/22    ROS:   CONST: weight stable. EYES: no vision change. ENT: no sore throat. CV: no chest pain w/ exertion. RESP: no shortness of breath. GI: no nausea, vomiting, diarrhea. No dysphagia. : no urinary issues. MUSCULOSKELETAL: no new myalgias or arthralgias. SKIN: no new changes. NEURO: no focal deficits. PSYCH: no new issues. ENDOCRINE: no polyuria. HEME: no lymph nodes. ALLERGY: no general pruritis.    PAST MEDICAL HISTORY:                                                        1.  Hypertension.                                                            2.  Depression, sees Psychiatry.                                             3.  ADD, sees Psychiatry.  Dr Emery                                                  4.  Right knee scope.   5.  Low HDL 1999   6.  Insomnia  7.  Sleep eating with Ambien   8.  probable seizure disorder 2015  9. Orthostatic hypotension, he worked up by cardiology 2015, may relate to seizures   10.  Left Colles' fracture with surgical repair 2016  11.  Right medial meniscus injury 2017    12.  Mild erosive gastropathy on EGD 2014  13. Hemoglobin reduction, rising MCV   14. Cologuard neg 1/22                                                                                                                               SOCIAL HISTORY:  Does not smoke, social alcohol, never smoked.                                                                                            FAMILY HISTORY:  Mom with seizures.  Father with hypertension, diabetes, .     He has no siblings.             Gen: no distress  EYES: conjunctiva clear, non-icteric, PERRL  ENT: nose clear, nasal mucosa normal, oropharynx clear and moist, teeth good  NECK:supple, thyroid non-palpable  RESP: effort is good, lungs clear  CV: heart RRR w/o murmur, gallops or rubs; no carotid bruits, no edema  GI: abdomen soft, non-distended, non-tender, no hepatosplenomegaly  MS: gait normal, no clubbing or cyanosis of the digits  SKIN: no rashes, warm to touch      Nirmal was seen today for hand pain.    Diagnoses and all orders for this visit:    Routine medical exam  -     Comprehensive Metabolic Panel; Future  -     Vitamin D; Future  -     TSH; Future  -     Lipid Panel; Future  -     CBC Auto Differential; Future  -     PSA, Screening; Future  -     Type & Screen; Future    Screening for prostate cancer    Screening for colorectal cancer    Attention deficit disorder, unspecified hyperactivity presence  -     dextroamphetamine-amphetamine (ADDERALL XR) 30 MG 24 hr capsule; Take 2 tablets by mouth daily  -     dextroamphetamine-amphetamine (ADDERALL XR) 30 MG 24 hr capsule; 2 a day  -     dextroamphetamine-amphetamine (ADDERALL XR) 30 MG 24 hr capsule; 2 a day    Mood disorder    Seizure disorder    Vitamin D deficiency disease    Essential hypertension    Contusion of left index finger with damage to nail, initial encounter, FROM, clinically likely minor dislocation, no xray

## 2022-12-05 NOTE — TELEPHONE ENCOUNTER
No new care gaps identified.  [facility] Embedded Care Gaps. Reference number: [id number]. 12/05/2022   1:21:06 PM CST

## 2022-12-06 RX ORDER — IRBESARTAN 300 MG/1
TABLET ORAL
Qty: 90 TABLET | Refills: 3 | Status: SHIPPED | OUTPATIENT
Start: 2022-12-06 | End: 2023-08-09

## 2022-12-06 RX ORDER — AMLODIPINE BESYLATE 10 MG/1
TABLET ORAL
Qty: 90 TABLET | Refills: 3 | Status: SHIPPED | OUTPATIENT
Start: 2022-12-06 | End: 2023-08-09

## 2022-12-06 NOTE — TELEPHONE ENCOUNTER
Refill Decision Note   Nirmal Moss  is requesting a refill authorization.  Brief Assessment and Rationale for Refill:  Approve     Medication Therapy Plan:       Medication Reconciliation Completed: No   Comments:     No Care Gaps recommended.     Note composed:12:37 PM 12/06/2022

## 2023-02-13 ENCOUNTER — PATIENT MESSAGE (OUTPATIENT)
Dept: FAMILY MEDICINE | Facility: CLINIC | Age: 53
End: 2023-02-13
Payer: COMMERCIAL

## 2023-02-16 ENCOUNTER — TELEPHONE (OUTPATIENT)
Dept: FAMILY MEDICINE | Facility: CLINIC | Age: 53
End: 2023-02-16
Payer: COMMERCIAL

## 2023-02-16 NOTE — TELEPHONE ENCOUNTER
----- Message from Anna Art sent at 2/16/2023  2:30 PM CST -----  Regarding: Self/  [phone]  Type: RX Refill Request    Who Called:  Patient    Refill or New Rx:  Refill    RX Name and Strength:  dextroamphetamine-amphetamine (ADDERALL XR) 30 MG 24 hr capsule    Preferred Pharmacy with phone number:  [facility] - [place], [place] - [address] [place] [address].    Local or Mail Order:  Local    Ordering Provider:  LEOLA Baird    Would the patient rather a call back or a response via [facility]?   Call back    Best Call Back Number:   [phone]

## 2023-03-04 NOTE — TELEPHONE ENCOUNTER
No new care gaps identified.  [facility] Embedded Care Gaps. Reference number: [id number]. 3/04/2023   1:00:04 PM CST

## 2023-03-05 RX ORDER — PAROXETINE HYDROCHLORIDE 20 MG/1
60 TABLET, FILM COATED ORAL EVERY MORNING
Qty: 270 TABLET | Refills: 2 | Status: SHIPPED | OUTPATIENT
Start: 2023-03-05 | End: 2023-11-07

## 2023-03-05 NOTE — TELEPHONE ENCOUNTER
Refill Decision Note   Nirmal Moss  is requesting a refill authorization.  Brief Assessment and Rationale for Refill:  Approve     Medication Therapy Plan:       Medication Reconciliation Completed: No    Comments:     No Care Gaps recommended.     Note composed:3:20 PM 03/05/2023

## 2023-05-28 ENCOUNTER — HOSPITAL ENCOUNTER (EMERGENCY)
Facility: HOSPITAL | Age: 53
Discharge: HOME | End: 2023-05-29
Attending: EMERGENCY MEDICINE
Payer: COMMERCIAL

## 2023-05-28 VITALS
DIASTOLIC BLOOD PRESSURE: 75 MMHG | HEART RATE: 60 BPM | OXYGEN SATURATION: 98 % | TEMPERATURE: 98 F | RESPIRATION RATE: 17 BRPM | BODY MASS INDEX: 30.68 KG/M2 | WEIGHT: 220 LBS | SYSTOLIC BLOOD PRESSURE: 135 MMHG

## 2023-05-28 DIAGNOSIS — M25.561 CHRONIC PAIN OF RIGHT KNEE: ICD-10-CM

## 2023-05-28 DIAGNOSIS — M17.11 OSTEOARTHRITIS OF RIGHT KNEE, UNSPECIFIED OSTEOARTHRITIS TYPE: ICD-10-CM
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
## 2023-05-28 DIAGNOSIS — R55 SYNCOPE: ICD-10-CM

## 2023-05-28 DIAGNOSIS — G89.29 CHRONIC PAIN OF RIGHT KNEE: ICD-10-CM

## 2023-05-28 DIAGNOSIS — S42.022A CLOSED DISPLACED FRACTURE OF SHAFT OF LEFT CLAVICLE, INITIAL ENCOUNTER: Primary | ICD-10-CM

## 2023-05-28 DIAGNOSIS — I95.1 ORTHOSTASIS: ICD-10-CM

## 2023-05-28 DIAGNOSIS — M25.461 KNEE EFFUSION, RIGHT: ICD-10-CM

## 2023-05-28 DIAGNOSIS — R31.9 HEMATURIA, UNSPECIFIED TYPE: ICD-10-CM

## 2023-05-28 LAB
ALBUMIN SERPL BCP-MCNC: 4.3 G/DL (ref 3.5–5.2)
ALP SERPL-CCNC: 94 U/L (ref 55–135)
ALT SERPL W/O P-5'-P-CCNC: 34 U/L (ref 10–44)
ANION GAP SERPL CALC-SCNC: 9 MMOL/L (ref 8–16)
AST SERPL-CCNC: 23 U/L (ref 10–40)
BACTERIA #/AREA URNS HPF: ABNORMAL /HPF
BASOPHILS # BLD AUTO: 0.04 K/UL (ref 0–0.2)
BASOPHILS NFR BLD: 0.3 % (ref 0–1.9)
BILIRUB SERPL-MCNC: 0.3 MG/DL (ref 0.1–1)
BILIRUB UR QL STRIP: NEGATIVE
BNP SERPL-MCNC: 37 PG/ML (ref 0–99)
BUN SERPL-MCNC: 18 MG/DL (ref 6–20)
CALCIUM SERPL-MCNC: 9.9 MG/DL (ref 8.7–10.5)
CAOX CRY URNS QL MICRO: ABNORMAL
CHLORIDE SERPL-SCNC: 103 MMOL/L (ref 95–110)
CLARITY UR: CLEAR
CO2 SERPL-SCNC: 28 MMOL/L (ref 23–29)
COLOR UR: YELLOW
CREAT SERPL-MCNC: 1 MG/DL (ref 0.5–1.4)
DIFFERENTIAL METHOD: ABNORMAL
EOSINOPHIL # BLD AUTO: 0 K/UL (ref 0–0.5)
EOSINOPHIL NFR BLD: 0.1 % (ref 0–8)
ERYTHROCYTE [DISTWIDTH] IN BLOOD BY AUTOMATED COUNT: 13.8 % (ref 11.5–14.5)
EST. GFR  (NO RACE VARIABLE): >60 ML/MIN/1.73 M^2
GLUCOSE SERPL-MCNC: 85 MG/DL (ref 70–110)
GLUCOSE UR QL STRIP: NEGATIVE
HCT VFR BLD AUTO: 41.1 % (ref 40–54)
HGB BLD-MCNC: 14.5 G/DL (ref 14–18)
HGB UR QL STRIP: ABNORMAL
HYALINE CASTS #/AREA URNS LPF: 3 /LPF
IMM GRANULOCYTES # BLD AUTO: 0.12 K/UL (ref 0–0.04)
IMM GRANULOCYTES NFR BLD AUTO: 1 % (ref 0–0.5)
KETONES UR QL STRIP: NEGATIVE
LEUKOCYTE ESTERASE UR QL STRIP: NEGATIVE
LYMPHOCYTES # BLD AUTO: 1.5 K/UL (ref 1–4.8)
LYMPHOCYTES NFR BLD: 12.8 % (ref 18–48)
MAGNESIUM SERPL-MCNC: 1.8 MG/DL (ref 1.6–2.6)
MCH RBC QN AUTO: 33.1 PG (ref 27–31)
MCHC RBC AUTO-ENTMCNC: 35.3 G/DL (ref 32–36)
MCV RBC AUTO: 94 FL (ref 82–98)
MICROSCOPIC COMMENT: ABNORMAL
MONOCYTES # BLD AUTO: 0.8 K/UL (ref 0.3–1)
MONOCYTES NFR BLD: 6.8 % (ref 4–15)
NEUTROPHILS # BLD AUTO: 9.5 K/UL (ref 1.8–7.7)
NEUTROPHILS NFR BLD: 79 % (ref 38–73)
NITRITE UR QL STRIP: NEGATIVE
NRBC BLD-RTO: 0 /100 WBC
PH UR STRIP: 6 [PH] (ref 5–8)
PLATELET # BLD AUTO: 290 K/UL (ref 150–450)
PMV BLD AUTO: 10.3 FL (ref 9.2–12.9)
POCT GLUCOSE: 79 MG/DL (ref 70–110)
POTASSIUM SERPL-SCNC: 4.1 MMOL/L (ref 3.5–5.1)
PROT SERPL-MCNC: 7.1 G/DL (ref 6–8.4)
PROT UR QL STRIP: ABNORMAL
RBC # BLD AUTO: 4.38 M/UL (ref 4.6–6.2)
RBC #/AREA URNS HPF: 42 /HPF (ref 0–4)
SODIUM SERPL-SCNC: 140 MMOL/L (ref 136–145)
SP GR UR STRIP: 1.02 (ref 1–1.03)
T4 FREE SERPL-MCNC: 0.85 NG/DL (ref 0.71–1.51)
TROPONIN I SERPL DL<=0.01 NG/ML-MCNC: <0.006 NG/ML (ref 0–0.03)
TSH SERPL DL<=0.005 MIU/L-ACNC: 4.73 UIU/ML (ref 0.4–4)
URN SPEC COLLECT METH UR: ABNORMAL
UROBILINOGEN UR STRIP-ACNC: NEGATIVE EU/DL
WBC # BLD AUTO: 12.01 K/UL (ref 3.9–12.7)
WBC #/AREA URNS HPF: 5 /HPF (ref 0–5)

## 2023-05-28 PROCEDURE — 96376 TX/PRO/DX INJ SAME DRUG ADON: CPT

## 2023-05-28 PROCEDURE — 83735 ASSAY OF MAGNESIUM: CPT | Performed by: EMERGENCY MEDICINE

## 2023-05-28 PROCEDURE — 99285 EMERGENCY DEPT VISIT HI MDM: CPT | Mod: 25

## 2023-05-28 PROCEDURE — 93005 ELECTROCARDIOGRAM TRACING: CPT

## 2023-05-28 PROCEDURE — 80053 COMPREHEN METABOLIC PANEL: CPT | Performed by: EMERGENCY MEDICINE

## 2023-05-28 PROCEDURE — 93010 EKG 12-LEAD: ICD-10-PCS | Mod: ,,, | Performed by: INTERNAL MEDICINE

## 2023-05-28 PROCEDURE — 93010 ELECTROCARDIOGRAM REPORT: CPT | Mod: ,,, | Performed by: INTERNAL MEDICINE

## 2023-05-28 PROCEDURE — 96375 TX/PRO/DX INJ NEW DRUG ADDON: CPT

## 2023-05-28 PROCEDURE — 83880 ASSAY OF NATRIURETIC PEPTIDE: CPT | Performed by: EMERGENCY MEDICINE

## 2023-05-28 PROCEDURE — 96361 HYDRATE IV INFUSION ADD-ON: CPT

## 2023-05-28 PROCEDURE — 63600175 PHARM REV CODE 636 W HCPCS: Performed by: EMERGENCY MEDICINE

## 2023-05-28 PROCEDURE — 81000 URINALYSIS NONAUTO W/SCOPE: CPT | Performed by: EMERGENCY MEDICINE

## 2023-05-28 PROCEDURE — 85025 COMPLETE CBC W/AUTO DIFF WBC: CPT | Performed by: EMERGENCY MEDICINE

## 2023-05-28 PROCEDURE — 96374 THER/PROPH/DIAG INJ IV PUSH: CPT

## 2023-05-28 PROCEDURE — 84439 ASSAY OF FREE THYROXINE: CPT | Performed by: EMERGENCY MEDICINE

## 2023-05-28 PROCEDURE — 84484 ASSAY OF TROPONIN QUANT: CPT | Performed by: EMERGENCY MEDICINE

## 2023-05-28 PROCEDURE — 84443 ASSAY THYROID STIM HORMONE: CPT | Performed by: EMERGENCY MEDICINE

## 2023-05-28 RX ORDER — HYDROMORPHONE HYDROCHLORIDE 1 MG/ML
0.5 INJECTION, SOLUTION INTRAMUSCULAR; INTRAVENOUS; SUBCUTANEOUS
Status: COMPLETED | OUTPATIENT
Start: 2023-05-28 | End: 2023-05-28

## 2023-05-28 RX ORDER — NAPROXEN 500 MG/1
500 TABLET ORAL 2 TIMES DAILY WITH MEALS
Qty: 20 TABLET | Refills: 0 | Status: SHIPPED | OUTPATIENT
Start: 2023-05-28 | End: 2024-01-31

## 2023-05-28 RX ORDER — LIDOCAINE 50 MG/G
1 PATCH TOPICAL DAILY PRN
Qty: 10 PATCH | Refills: 0 | Status: SHIPPED | OUTPATIENT
Start: 2023-05-28 | End: 2023-06-07

## 2023-05-28 RX ORDER — KETOROLAC TROMETHAMINE 30 MG/ML
10 INJECTION, SOLUTION INTRAMUSCULAR; INTRAVENOUS
Status: COMPLETED | OUTPATIENT
Start: 2023-05-28 | End: 2023-05-28

## 2023-05-28 RX ADMIN — SODIUM CHLORIDE, POTASSIUM CHLORIDE, SODIUM LACTATE AND CALCIUM CHLORIDE 1000 ML: 600; 310; 30; 20 INJECTION, SOLUTION INTRAVENOUS at 10:05

## 2023-05-28 RX ADMIN — HYDROMORPHONE HYDROCHLORIDE 0.5 MG: 1 INJECTION, SOLUTION INTRAMUSCULAR; INTRAVENOUS; SUBCUTANEOUS at 08:05

## 2023-05-28 RX ADMIN — HYDROMORPHONE HYDROCHLORIDE 0.5 MG: 1 INJECTION, SOLUTION INTRAMUSCULAR; INTRAVENOUS; SUBCUTANEOUS at 09:05

## 2023-05-28 RX ADMIN — KETOROLAC TROMETHAMINE 10 MG: 30 INJECTION, SOLUTION INTRAMUSCULAR; INTRAVENOUS at 09:05

## 2023-05-29 ENCOUNTER — PATIENT MESSAGE (OUTPATIENT)
Dept: FAMILY MEDICINE | Facility: CLINIC | Age: 53
End: 2023-05-29
Payer: COMMERCIAL

## 2023-05-29 DIAGNOSIS — S42.009A CLOSED NONDISPLACED FRACTURE OF CLAVICLE, UNSPECIFIED LATERALITY, UNSPECIFIED PART OF CLAVICLE, INITIAL ENCOUNTER: ICD-10-CM

## 2023-05-29 DIAGNOSIS — R55 SYNCOPE, UNSPECIFIED SYNCOPE TYPE: Primary | ICD-10-CM

## 2023-05-29 RX ORDER — OXYCODONE AND ACETAMINOPHEN 5; 325 MG/1; MG/1
TABLET ORAL
Qty: 56 TABLET | Refills: 0 | Status: SHIPPED | OUTPATIENT
Start: 2023-05-29 | End: 2023-05-29 | Stop reason: SDUPTHER

## 2023-05-29 RX ORDER — OXYCODONE AND ACETAMINOPHEN 5; 325 MG/1; MG/1
TABLET ORAL
Qty: 56 TABLET | Refills: 0 | Status: SHIPPED | OUTPATIENT
Start: 2023-05-29 | End: 2023-06-19 | Stop reason: SDUPTHER

## 2023-05-29 NOTE — TELEPHONE ENCOUNTER
Please advise,      Good Morning ~  I was brought to the ER at [facility] last night after passing out, falling and breaking my left clavicle. The ER doctor prescribed naprosyn 500 mg and lidocaine 5% patches for pain. I did not sleep all night and am currently experiencing extreme pain.      Is there something you could prescribe that would give me pain relief? What I have is doing nothing for the pain. I would be most appreciative.      I will be following up with all the doctors as requested by the ER doctor and I will be seeing you on the 16th for my regular appointment.      Thank you for your consideration.  Nirmal

## 2023-05-29 NOTE — NURSING
MD notified of orthostatic vitals. Unable to obtain standing vitals patient became symptomatic with dizziness and diaphoretic.

## 2023-05-29 NOTE — ED PROVIDER NOTES
Encounter Date: 5/28/2023    SCRIBE #1 NOTE: I, NIDIA PAL, am scribing for, and in the presence of,  Su Aguila MD. I have scribed the following portions of the note - Other sections scribed: HPI, ROS, PE.     History     Chief Complaint   Patient presents with    Loss of Consciousness     Pt reports to the ED with C/O syncopal episode x 1 today lasting about 30 seconds. Pt was siting down and then when he stood up he passed out. Pt has a deformity to the left shoulder. EMS has it splinted at this time. Pt denies any head or neck pain. Pt AAOx4, RESP easy and unlabored. Pt placed in JAMIE bed for eval.      52-year-old male with a PMHx of ADD, anxiety, depression, gastritis, HDL deficiency, HTN, kidney stone, meniscus tear, seizure disorder, and vitamin D deficiency disease, presents to the ED accompanied by his spouse with syncope onset three hours ago. Patient states that he was outside with his wife when he stood up for his seat and was out for thirty seconds. He further states that he is unsure what happened. Additional history obtained from independent historian, patient's spouse, who states that the patient stood up  and was talking on the phone when he developed a blank stare on his face before he fell straight down backwards. She further states that the patient hit his head on a ceramic flower pot and landed down on his left shoulder. She reports that he became conscious again and managed to stand on his own before having another syncopal episode. She further reports that she managed to catch him and brought him to lie down on the sofa. Patient states that he is having left shoulder pain secondary to fall that worsens with movement. Patient states that he has a Hx of similar syncopal episodes in the past with his last episode occurring in 2017. Patient's spouse states that patient typically wakes up confused and combative with yellowed skin and white lips. He further states that he was told by his  "neurologist that it could be secondary to "situations" as he had stress factors of child custody. He previous saw cardiology and had rare PVC on holter and normal stress test. He notes that he was on Keppra for a while with no resolution  or improvement of syncope. He further notes that he has been seen by a cardiologist and had a sleep study done with no abnormal findings. His spouse states that the patient has been sleeping "excessively" for the last three days. Patient reports that he would only wake up to eat and take his medications before going back to sleep. He further reports that he goes on these "runs" once a year. Denies being followed by a psychiatrist or having a depressed mood. Further denies any compliance with anticoagulants. He denies any EtOH, tobacco, or illicit drug use. No other exacerbating or alleviating factors. Denies any CP, SOB, palpitations, neck pain, back pain, abdominal pain, elbow pain, or other associated symptoms. He reports a drug allergy to hydrocodone and states that he breaks out into hives when taken.     The history is provided by the patient and the spouse. No  was used.   Review of patient's allergies indicates:   Allergen Reactions    Hydrocodone Rash     Past Medical History:   Diagnosis Date    ADD (attention deficit disorder)     Anxiety     Depression     Gastritis     EGD 2014    HDL deficiency     Hypertension     Kidney stone     Meniscus tear 09/2013    right    Meniscus tear 01/2017    right    Seizure disorder 1/18/2018    Seizures 06/2014    last seizure in late 2014    Vitamin D deficiency disease 1/18/2018     Past Surgical History:   Procedure Laterality Date    FRACTURE SURGERY Left 05/2016    wrist fracture    INJECTION OF JOINT Left 12/11/2018    Procedure: Injection, Joint, Bio-D Left Knee;  Surgeon: Gwendolyn Jeong MD;  Location: [facility];  Service: Orthopedics;  Laterality: Left;  Bio-D Left Knee    KNEE ARTHROSCOPY W/ MENISCECTOMY Left " 12/11/2018    Procedure: ARTHROSCOPY, KNEE, WITH MENISCECTOMY;  Surgeon: Gwendolyn Jeong MD;  Location: [facility] OR;  Service: Orthopedics;  Laterality: Left;  Cartilage biopsy  and repair or micro-fracture    KNEE SURGERY      SYNOVECTOMY OF KNEE Left 12/11/2018    Procedure: SYNOVECTOMY, KNEE;  Surgeon: Gwendolyn Jeong MD;  Location: [facility] OR;  Service: Orthopedics;  Laterality: Left;     Family History   Problem Relation Age of Onset    Diabetes Father     Cancer Father     Skin cancer Father     Cancer Maternal Grandfather      Social History     Tobacco Use    Smoking status: Never    Smokeless tobacco: Never   Substance Use Topics    Alcohol use: Yes     Alcohol/week: 0.0 standard drinks     Comment: social    Drug use: No     Review of Systems   Constitutional:  Negative for chills, diaphoresis and fever.   HENT:  Negative for drooling, sore throat and voice change.    Eyes:  Negative for photophobia and visual disturbance.   Respiratory:  Negative for cough, shortness of breath and wheezing.    Cardiovascular:  Negative for chest pain, palpitations and leg swelling.   Gastrointestinal:  Negative for abdominal pain, blood in stool, constipation, diarrhea, nausea and vomiting.   Genitourinary:  Negative for dysuria, frequency, hematuria and urgency.   Musculoskeletal:  Positive for arthralgias. Negative for back pain, joint swelling, myalgias, neck pain and neck stiffness.        (-) Elbow pain.    Skin:  Negative for rash and wound.   Neurological:  Positive for syncope. Negative for weakness, light-headedness, numbness and headaches.   Hematological:  Does not bruise/bleed easily.   Psychiatric/Behavioral:  Negative for agitation, confusion and suicidal ideas.    All other systems reviewed and are negative.    Physical Exam     Initial Vitals [05/28/23 1842]   BP Pulse Resp Temp SpO2   130/80 66 16 97.9 °F (36.6 °C) 100 %      MAP       --         Physical Exam    Nursing note and vitals reviewed.  Constitutional: He  appears well-developed and well-nourished. He is not diaphoretic. No distress.   HENT:   Head: Normocephalic and atraumatic.   Right Ear: External ear normal.   Left Ear: External ear normal.   Mouth/Throat: Oropharynx is clear and moist. No oropharyngeal exudate.    No signs of head trauma. No hemotympanum, no Muñoz's sign, no raccoon eyes, no septal hematoma    Eyes: Conjunctivae and EOM are normal. Pupils are equal, round, and reactive to light. Right eye exhibits no discharge. Left eye exhibits no discharge.   Neck: Neck supple. No JVD present.   No midline neck tenderness    Normal range of motion.  Cardiovascular:  Normal rate, regular rhythm, normal heart sounds and intact distal pulses.     Exam reveals no gallop and no friction rub.       No murmur heard.  Pulmonary/Chest: Breath sounds normal. No respiratory distress. He has no wheezes. He has no rhonchi. He has no rales. He exhibits tenderness.   Swelling and pain over the left clavicle, no pain with ROM of shoulder. No deformity of shoulder. No skin tenting. NVI distally.    Abdominal: Abdomen is soft. Bowel sounds are normal. He exhibits no distension. There is no abdominal tenderness. There is no rebound and no guarding.   Musculoskeletal:         General: Tenderness present. No edema. Normal range of motion.      Cervical back: Normal range of motion and neck supple.      Thoracic back: No tenderness.      Lumbar back: No tenderness.      Comments: Tenderness over left clavicle and left axilla. No left shoulder tenderness. Patient able to range left arm. Small abrasion to left shin.     No midline back tenderness      Lymphadenopathy:     He has no cervical adenopathy.   Neurological: He is alert and oriented to person, place, and time. He has normal strength. No cranial nerve deficit or sensory deficit. GCS score is 15. GCS eye subscore is 4. GCS verbal subscore is 5. GCS motor subscore is 6.   Moves all extremities and carries on conversation. CN-  II: PERRL; III/IV/VI: EOMI w/out evidence of nystagmus; V: no deficits appreciated to light touch bilateral face; VII: no facial weakness, no facial asymmetry. Eyebrow raise symmetric. Smile symmetric; IX/X: palate midline, and raises symmetrically; XI: shoulder shrug 5/5 bilaterally; XII: tongue is midline w/out asymmetry. Strength 5/5 to bilateral upper and lower extremities, sensation intact to light touch,    Skin: Skin is warm and dry. Capillary refill takes less than 2 seconds.   Psychiatric: He has a normal mood and affect. Thought content normal.       ED Course   Procedures  Labs Reviewed   CBC W/ AUTO DIFFERENTIAL - Abnormal; Notable for the following components:       Result Value    RBC 4.38 (*)     MCH 33.1 (*)     Immature Granulocytes 1.0 (*)     Gran # (ANC) 9.5 (*)     Immature Grans (Abs) 0.12 (*)     Gran % 79.0 (*)     Lymph % 12.8 (*)     All other components within normal limits   TSH - Abnormal; Notable for the following components:    TSH 4.732 (*)     All other components within normal limits   URINALYSIS, REFLEX TO URINE CULTURE - Abnormal; Notable for the following components:    Protein, UA 1+ (*)     Occult Blood UA 2+ (*)     All other components within normal limits    Narrative:     Specimen Source->Urine   URINALYSIS MICROSCOPIC - Abnormal; Notable for the following components:    RBC, UA 42 (*)     Bacteria Few (*)     Hyaline Casts, UA 3 (*)     All other components within normal limits    Narrative:     Specimen Source->Urine   COMPREHENSIVE METABOLIC PANEL   B-TYPE NATRIURETIC PEPTIDE   MAGNESIUM   TROPONIN I   T4, FREE   POCT GLUCOSE          Imaging Results              X-Ray Shoulder Trauma Left (Final result)  Result time 05/28/23 21:19:03      Final result by Joshua Jorgensen MD (05/28/23 21:19:03)                   Impression:      Displaced fracture of the distal aspect of the left clavicle.    No evidence of a dislocation.      Electronically signed by: Joshua Jorgensen  MD  Date:    05/28/2023  Time:    21:19               Narrative:    EXAMINATION:  XR SHOULDER TRAUMA 3 VIEW LEFT    CLINICAL HISTORY:  Unspecified fall, initial encounter    TECHNIQUE:  Three views of the left shoulder were performed.    COMPARISON  None    FINDINGS:  The bone mineralization is within normal limits.  There is a displaced fracture of the distal aspect of the clavicle.  No additional fractures identified.    The glenohumeral articulation is maintained.  There is arthropathy of the acromioclavicular joint.  The coracoclavicular interval is within normal limits.    The visualized left hemithorax is unremarkable.  There is no evidence of a pneumothorax or pulmonary contusion.                                       X-Ray Clavicle Left (Final result)  Result time 05/28/23 21:15:50      Final result by Nikki Tristan MD (05/28/23 21:15:50)                   Impression:      As above described.      Electronically signed by: Nikki Tristan  Date:    05/28/2023  Time:    21:15               Narrative:    EXAMINATION:  TWO VIEWS OF THE LEFT CLAVICLE    CLINICAL HISTORY:  Unspecified fall, initial encounter    TECHNIQUE:  AP/PA and axial views of the left clavicle    COMPARISON:  None.    FINDINGS:  Two views of the left clavicle demonstrate an overlapping fracture of the mid clavicle.  Bones are diffusely osteopenic.                                       X-Ray Chest 1 View (Edited Result - FINAL)  Result time 05/28/23 21:46:59   Procedure changed from X-Ray Chest PA And Lateral     Addendum (preliminary) 1 of 1 by Nikki Tristan MD (05/28/23 21:46:59)      There is an overlapping fracture involving the left clavicle.      Electronically signed by: Nikki Tristan  Date:    05/28/2023  Time:    21:46                 Final result by Nikki Tristan MD (05/28/23 21:08:47)                   Impression:      No acute intrathoracic abnormality.      Electronically signed by: Nikki  Kun  Date:    05/28/2023  Time:    21:08               Narrative:    EXAMINATION:  CHEST ONE VIEW    CLINICAL HISTORY:  syncope; Syncope and collapse    TECHNIQUE:  One view of the chest.    COMPARISON:  02/01/2019    FINDINGS:  The cardiac silhouette is within normal limits.  There is no focal consolidation, pneumothorax, or pleural effusion. Mild asymmetric elevation left hemidiaphragm is present.  Bones are diffusely osteopenic.                                       CT Head Without Contrast (Final result)  Result time 05/28/23 20:25:02      Final result by Nikki Tristan MD (05/28/23 20:25:02)                   Impression:      No acute intracranial abnormality detected.      Electronically signed by: Nikki Tristan  Date:    05/28/2023  Time:    20:25               Narrative:    EXAMINATION:  CT OF THE HEAD WITHOUT    CLINICAL HISTORY:  Head trauma, moderate-severe;    TECHNIQUE:  5 mm unenhanced axial images were obtained from the skull base to the vertex.    COMPARISON:  MRI brain 09/10/2015    FINDINGS:  The ventricles, basal cisterns, and cortical sulci are within normal limits for patient's stated age. There is no acute intracranial hemorrhage, territorial infarct or mass effect, or midline shift. The visualized paranasal sinuses and mastoid air cells are clear. There is a posterior fossa cyst or arachnoid cyst.  There is a cavum septum pellucidum.                                       Medications   HYDROmorphone injection 0.5 mg (0.5 mg Intravenous Given 5/28/23 2014)   HYDROmorphone injection 0.5 mg (0.5 mg Intravenous Given 5/28/23 2107)   ketorolac injection 9.999 mg (9.999 mg Intravenous Given 5/28/23 2111)   lactated ringers bolus 1,000 mL (0 mLs Intravenous Stopped 5/28/23 2244)   lactated ringers bolus 1,000 mL (0 mLs Intravenous Stopped 5/28/23 2337)     Medical Decision Making:   History:   Old Medical Records: I decided to obtain old medical records.  Independently Interpreted Test(s):    I have ordered and independently interpreted EKG Reading(s) - see summary below       <> Summary of EKG Reading(s): EKG independently interpreted by me reads: see ED course.   Clinical Tests:   Lab Tests: Reviewed and Ordered  Radiological Study: Ordered and Reviewed  Medical Tests: Reviewed and Ordered     MDM  DDx:   Seizure: no witnessed seizure activity, incontinence. Does not sound typical to seizure. Patient with EEG in the past for similar without episode of seizure and reports events did not improve in past when treated for seizure.   Hypoxia and hypoglycemia less likely in this patient with normal sats and BG of 85  Cardiac issue: electrical (dysarrhythmias, brugada, WPW, long QT).  no palpitations, no EKG findings to predispose to arrhythmias. No CP, no STEMI on EKG; NSTEMI unlikely to cause brief cardiogenic shock in absence of other significant findings, so likely does not need full cardiac rule out with troponins and stress.  Mechanical: outflow obstruction like HOCM or aortic stenosis, tamponade-less likely as no murmur, non-exertional, no hypertrophy on EKG.   Vessels: PE, dissection, AAA.  Clinical picture inconsistent, no abd pain, no pulsatile mass, symmetric pulses, no risk factors of PE  Volume issue: dehydration from vomiting/diarrhea/decreased PO, sepsis, GI bleed,  or bleeding AAA. No signs of recent illness to suggest infection, no e/o anemia by history or PE, orthostatics very positive with diaphoresis and lightheadedness with sitting up. 2L of IVF given and repeat orthostatics without symptoms or hypotension.   Neuro: No HA, no personal  h/o cerebral aneurysm, nml neuro exam, so this is unlikely ICH or sentinel bleed  Also: vasovagal, drugs/meds, autonomic insufficiency    Burton Syncope Rule    1. History of CHF? no   2. Hematocrit <30? no   3. Normal ECG? yes   4. History of dyspnea? no   5. SBP <90 at triage? no    Concerning for orthostasis. Patient has not had tilt table  testing. Would benefit from repeat echo, holter monitor and further evaluation for cardiology and neurology. Discussed with patient options including observation for closer evaluation vs outpatient follow up and patient would like to follow up outpatient.     Labs without acute findings to describe cause of his syncope. Patient does reports increased sleep and has not eaten since 1 pm today.     Patient with clavicle fracture from fall. Place in sling. Improved with toradol and diluadid. No skin tenting. Discussed sling for comfort, ranging shoulder and orthopedic follow up. Will send home with naproxen and lidocaine as patient allergic to hydrocodone.      Scribe Attestation:   Scribe #1: I performed the above scribed service and the documentation accurately describes the services I performed. I attest to the accuracy of the note.      ED Course as of 05/29/23 0232   Sun May 28, 2023   2114 EKG 12-lead [JT]   2127 EKG 12-lead  Normal sinus rhythm at 64.  No ST elevation or significant depression.  Normal axis.  QTC is 414.  This EKG is similar to his previous from July of 2015.  Although the voltage is slightly lower in his T-wave inversions in V1 are now present.   [JT]      ED Course User Index  [JT] Su Aguila MD               Scribe attestation: I, Su Aguila, personally performed the services described in this documentation. All medical record entries made by the scribe were at my direction and in my presence.  I have reviewed the chart and agree that the record reflects my personal performance and is accurate and complete.   Clinical Impression:   Final diagnoses:  [R55] Syncope  [W19.XXXA] Fall  [S42.022A] Closed displaced fracture of shaft of left clavicle, initial encounter (Primary)  [I95.1] Orthostasis  [R31.9] Hematuria, unspecified type        ED Disposition Condition    Discharge Stable          ED Prescriptions       Medication Sig Dispense Start Date End Date Auth. Provider    naproxen  (NAPROSYN) 500 MG tablet Take 1 tablet (500 mg total) by mouth 2 (two) times daily with meals. As needed for pain 20 tablet 5/28/2023 -- Su Aguila MD    LIDOcaine (LIDODERM) 5 % Place 1 patch onto the skin daily as needed (pain). Remove & Discard patch within 12 hours, apply only to intact skin. 10 patch 5/28/2023 6/7/2023 Su Aguila MD          Follow-up Information       Follow up With Specialties Details Why Contact Info Additional Information    Ravi Baird MD Internal Medicine Schedule an appointment as soon as possible for a visit in 2 days to establish primary doctor, to discuss recent ED visit [address] [facility] [id number] [phone]       [facility] [place], [place] Emergency Dept Emergency Medicine  As needed, If symptoms worsen [address]  [facility] [id number] [phone]     [facility] ORTHOPEDICS Orthopedics Schedule an appointment as soon as possible for a visit in 2 days to discuss recent ED visit [address] [facility] [id number] [phone]     [facility] [place] [place] Cardiology Cardiology Schedule an appointment as soon as possible for a visit in 2 days to discuss recent ED visit [address]  [facility] [id number] [phone] Please park in garage or Medical Office Bldg. surface lot and use Medical Ofc Bldg elevator. Check in at [address].    [facility] [place] Neurology Neurology Schedule an appointment as soon as possible for a visit in 2 days to discuss recent ED visit [address]  [facility] [address] [phone] Please park in garage or Medical Ofc Bldg surface lot and use Medical Office Bldg elevator. Check in at [address].              Su Aguila MD  05/29/23 5115

## 2023-05-29 NOTE — DISCHARGE INSTRUCTIONS
Do not remove your sling until evaluated by an orthopedist. You can take off multiple times per day to range shoulder to prevent shoulder dislocation.     Return for immediately if you get:   tightness or pain under the cast or splint   Fingers or hand become swollen, cold, blue, numb or tingly     Do the following to improve pain and swelling:     Rest. Limit the use of the injured body part. This helps prevent further damage to the body part and gives it time to heal. In some cases, you may need a sling, brace, splint, or cast to help keep the body part still until it has healed.   Ice. Applying ice right after an injury helps relieve pain and swelling. Wrap a bag of ice in a thin towel. (Frozen peas also works well.) Then, place it over the injured area. Do this for 10 to 15 minutes every 3 to 4 hours. Continue for the next 1 to 2 days or until your symptoms improve. Never put ice directly on your skin or ice an area longer than 15 minutes at a time.   Compression. Putting pressure on an injury helps reduce swelling and provides support. Wrap the injured area firmly with an elastic bandage (ACE wrap). Make sure not to wrap the bandage too tightly or you will cut off blood flow to the injured area. If your bandage loosens, rewrap it. Do not wear an elastic bandage overnight.   Elevation. Keeping an injury raised above the level of your heart reduces swelling, pain, and throbbing. For instance, if you have a broken leg, it may help to rest your leg on several pillows when sitting or lying down.     Please return immediately if you pass out again, any chest pain, shortness of breath, numbness, weakness or any other concerns. Please stay well hydrated and hold blood pressure medications if you blood pressure is less than 140/90. Please follow up closely with primary care doctor, cardiology and neurology for evaluation. You also need a recheck UA in 1 week to re-evaluate small amount of blood in your urine. Increase  the salt slightly in your diet for the next few days.     Thank you for coming to our Emergency Department today. As we discussed, it is important to remember that some problems are difficult to diagnose and may not be found during your Emergency Department visit. Be sure to follow up with your primary care doctor and review all labs/imaging/tests that were performed during this visit with them. Some labs/tests may be outside of the normal range and require non-emergent follow-up and further investigation to help diagnose/exclude/prevent complications or other medical conditions.    If you do not have a primary care doctor, you may contact the one listed on your discharge paperwork or you may also call the [facility] Appointment Desk at [phone] to schedule an appointment and establish care with one. It is important to your health that you have a primary care doctor.    Please take all medications as directed. All medications may potentially have side-effects and it is impossible to predict which medications may give you side-effects or what side-effects (if any) they will give you.. If you feel that you are having a negative effect or side-effect of any medication you should immediately stop taking them and seek medical attention. If you feel that you are having a life-threatening reaction call 911.    Return to the ER with any questions/concerns, new/concerning symptoms, worsening or failure to improve.     Do not drive, swim, climb to height, take a bath or make any important decisions for 24 hours if you have received any pain medications, sedatives or mood altering drugs during your ER visit.

## 2023-05-29 NOTE — ED NOTES
Pt was at home sitting on his swing outside. He got up and passed out injuring left shoulder. Pt does not remember any of this. His wife states that he hit his head but pt denies any pain anywhere on head. There are no lacerations or hematomas. EMS place pt in a sling.    LOC: Pt is awake alert and aware of environment, oriented X3 and speaking appropriately  Appearance: Pt is in no acute distress, Pt is well groomed and clean  Skin: skin is warm and dry with normal turgor, mucus membranes are moist and pink, skin is intact with no bruising or breakdown  Muskuloskeletal: Pt moves all extremities well except for left arm. Decreased ROM to left arm due to pain and possible dislocation, pulses are intact.  Respiratory: Airway is open and patent, respirations are spontaneous and even.  Cardiac: no edema and cap refill is <3sec  Abdomen: soft, non-tender and non-distended  Neuro: Pt follows commands easily and has no obvious deficits

## 2023-05-31 DIAGNOSIS — R55 SYNCOPE, UNSPECIFIED SYNCOPE TYPE: Primary | ICD-10-CM

## 2023-06-01 ENCOUNTER — OFFICE VISIT (OUTPATIENT)
Dept: ORTHOPEDICS | Facility: CLINIC | Age: 53
End: 2023-06-01
Payer: COMMERCIAL

## 2023-06-01 DIAGNOSIS — S42.009A CLOSED NONDISPLACED FRACTURE OF CLAVICLE, UNSPECIFIED LATERALITY, UNSPECIFIED PART OF CLAVICLE, INITIAL ENCOUNTER: Primary | ICD-10-CM

## 2023-06-01 PROCEDURE — 1159F PR MEDICATION LIST DOCUMENTED IN MEDICAL RECORD: ICD-10-PCS | Mod: CPTII,S$GLB,, | Performed by: ORTHOPAEDIC SURGERY

## 2023-06-01 PROCEDURE — 1159F MED LIST DOCD IN RCRD: CPT | Mod: CPTII,S$GLB,, | Performed by: ORTHOPAEDIC SURGERY

## 2023-06-01 PROCEDURE — 1160F RVW MEDS BY RX/DR IN RCRD: CPT | Mod: CPTII,S$GLB,, | Performed by: ORTHOPAEDIC SURGERY

## 2023-06-01 PROCEDURE — 99999 PR PBB SHADOW E&M-EST. PATIENT-LVL IV: ICD-10-PCS | Mod: PBBFAC,,, | Performed by: ORTHOPAEDIC SURGERY

## 2023-06-01 PROCEDURE — 99999 PR PBB SHADOW E&M-EST. PATIENT-LVL IV: CPT | Mod: PBBFAC,,, | Performed by: ORTHOPAEDIC SURGERY

## 2023-06-01 PROCEDURE — 99204 PR OFFICE/OUTPT VISIT, NEW, LEVL IV, 45-59 MIN: ICD-10-PCS | Mod: S$GLB,,, | Performed by: ORTHOPAEDIC SURGERY

## 2023-06-01 PROCEDURE — 4010F ACE/ARB THERAPY RXD/TAKEN: CPT | Mod: CPTII,S$GLB,, | Performed by: ORTHOPAEDIC SURGERY

## 2023-06-01 PROCEDURE — 1160F PR REVIEW ALL MEDS BY PRESCRIBER/CLIN PHARMACIST DOCUMENTED: ICD-10-PCS | Mod: CPTII,S$GLB,, | Performed by: ORTHOPAEDIC SURGERY

## 2023-06-01 PROCEDURE — 99204 OFFICE O/P NEW MOD 45 MIN: CPT | Mod: S$GLB,,, | Performed by: ORTHOPAEDIC SURGERY

## 2023-06-01 PROCEDURE — 4010F PR ACE/ARB THEARPY RXD/TAKEN: ICD-10-PCS | Mod: CPTII,S$GLB,, | Performed by: ORTHOPAEDIC SURGERY

## 2023-06-01 NOTE — PROGRESS NOTES
Assessment: 52 y.o. male with L clavicle fracture     I explained my diagnostic impression and the reasoning behind it in detail, using layman's terms.      Plan:   - Not signficantly displaced, will treat non operatively.  - Sling  - No lifting  - Ok to start PROM now with pendulums, AROM at 4 weeks  - encouraged follow up with PCP, cards for workup of syncope   - Return to clinic in 6 weeks. Return sooner if symptoms worsen or fail to improve.      All questions were answered in detail. The patient is in full agreement with the treatment plan and will proceed accordingly.    Chief Complaint   Patient presents with    Left Shoulder - Injury, Numbness, Pain, Swelling       Initial visit (6/1/23): Nirmal Moss is a 52 y.o. male who presents today complaining of Injury, Numbness, Pain, and Swelling of the Left Shoulder     DOI: 5/28/23  Syncopal episode x2 witnessed by his wife - hit patio with head/left shoulder  Hypotensive in ER  Went to ER vs EMS - found to have clavicle fracture   In sling today       This patient was seen in consultation at the request of Dr. Ravi Baird    This is the extent of the patient's complaints at this time.            Review of patient's allergies indicates:   Allergen Reactions    Hydrocodone Rash         Current Outpatient Medications:     amLODIPine (NORVASC) 10 MG tablet, Take one tablet by mouth daily FOR BLOOD PRESSURE, Disp: 90 tablet, Rfl: 3    dextroamphetamine-amphetamine (ADDERALL XR) 30 MG 24 hr capsule, 2 a day, Disp: 60 capsule, Rfl: 0    dextroamphetamine-amphetamine (ADDERALL XR) 30 MG 24 hr capsule, 2 a day, Disp: 60 capsule, Rfl: 0    dextroamphetamine-amphetamine (ADDERALL XR) 30 MG 24 hr capsule, Take 2 tablets by mouth daily, Disp: 60 capsule, Rfl: 0    dextroamphetamine-amphetamine (ADDERALL XR) 30 MG 24 hr capsule, 2 a day, Disp: 60 capsule, Rfl: 0    hydroCHLOROthiazide (HYDRODIURIL) 25 MG tablet, Take one tablet by mouth daily, Disp: 90 tablet,  Rfl: 12    irbesartan (AVAPRO) 300 MG tablet, Take one tablet by mouth daily FOR BLOOD PRESSURE, Disp: 90 tablet, Rfl: 3    LIDOcaine (LIDODERM) 5 %, Place 1 patch onto the skin daily as needed (pain). Remove & Discard patch within 12 hours, apply only to intact skin., Disp: 10 patch, Rfl: 0    metoprolol succinate (TOPROL-XL) 100 MG 24 hr tablet, Take one tablet by mouth daily FOR BLOOD PRESSURE, Disp: 90 tablet, Rfl: 3    naproxen (NAPROSYN) 500 MG tablet, Take 1 tablet (500 mg total) by mouth 2 (two) times daily with meals. As needed for pain, Disp: 20 tablet, Rfl: 0    oxyCODONE-acetaminophen (PERCOCET) 5-325 mg per tablet, 1-2 pills every 6 hours p.r.n. pain,, Disp: 56 tablet, Rfl: 0    paroxetine (PAXIL) 20 MG tablet, Take 3 tablets (60 mg total) by mouth every morning., Disp: 270 tablet, Rfl: 2    desonide (DESOWEN) 0.05 % cream, Apply topically 2 (two) times daily. (Patient not taking: Reported on 6/1/2023), Disp: 60 g, Rfl: 3    montelukast (SINGULAIR) 10 mg tablet, TAKE ONE TABLET BY MOUTH EVERY EVENING (Patient not taking: Reported on 1/13/2022), Disp: 30 tablet, Rfl: 5    traMADol (ULTRAM) 50 mg tablet, Take 1-2 tablets ( mg total) by mouth every 8 (eight) hours as needed for Pain. (Patient not taking: Reported on 1/13/2022), Disp: 12 tablet, Rfl: 0    Physical Exam:   Vitals:    06/01/23 1254   PainSc:   3   PainLoc: Shoulder       General:  Patient is alert, awake and oriented to time, place and person. Mood and affect are appropriate.  Patient does not appear to be in any distress, denies any constitutional symptoms and appears stated age.   HEENT:  Pupils are equal and round, sclera are not injected. External examination of ears and nose reveals no abnormalities. Cranial nerves II-X are grossly intact  Skin:  no rashes, abrasions or open wounds on the affected extremity   Resp:  No respiratory distress or audible wheezing   CV: 2+  pulses, all extremities warm and well perfused   Left Upper  extremity   Sling in place  Ecchymosis to skin,no tenting/deformity   TTP over the clavicle   Motion deferred  LTSI m/u/r  2+ RP  + EPL, IO, FDS, FDP     Imaging: 3 views of the L shoulder, 2 views clavicle show minimally displaced clavicle fracture, no comminution    I personally reviewed and interpreted the patient's imaging obtained prior to visit        A note notifying Dr. Ravi MACKENZIE Ehrensing of my findings was sent via the electronic medical record     This note was created by combination of typed  and M-Modal dictation. Transcription and phonetic errors may be present.  If there are any questions, please contact me.    Past Medical History:   Diagnosis Date    ADD (attention deficit disorder)     Anxiety     Depression     Gastritis     EGD 2014    HDL deficiency     Hypertension     Kidney stone     Meniscus tear 09/2013    right    Meniscus tear 01/2017    right    Seizure disorder 1/18/2018    Seizures 06/2014    last seizure in late 2014    Vitamin D deficiency disease 1/18/2018       Active Problem List with Overview Notes    Diagnosis Date Noted    Acute medial meniscus tear of left knee 12/11/2018    Mood disorder 01/18/2018    Seizure disorder 01/18/2018    Vitamin D deficiency disease 01/18/2018    Complex tear of lateral meniscus of right knee as current injury 03/28/2017    Colles' fracture 05/15/2016    Closed fracture of left ulnar styloid 05/15/2016    ADD (attention deficit disorder) 07/30/2015    Dyspepsia 04/04/2014    Anxiety disorder 01/15/2014    Insomnia 01/15/2014    Knee pain 12/05/2013    HTN (hypertension) 12/03/2013       Past Surgical History:   Procedure Laterality Date    FRACTURE SURGERY Left 05/2016    wrist fracture    INJECTION OF JOINT Left 12/11/2018    Procedure: Injection, Joint, Bio-D Left Knee;  Surgeon: Gwendolyn Jeong MD;  Location: [facility];  Service: Orthopedics;  Laterality: Left;  Bio-D Left Knee    KNEE ARTHROSCOPY W/ MENISCECTOMY Left 12/11/2018    Procedure:  ARTHROSCOPY, KNEE, WITH MENISCECTOMY;  Surgeon: Gwendolyn Jeong MD;  Location: [facility] OR;  Service: Orthopedics;  Laterality: Left;  Cartilage biopsy  and repair or micro-fracture    KNEE SURGERY      SYNOVECTOMY OF KNEE Left 12/11/2018    Procedure: SYNOVECTOMY, KNEE;  Surgeon: Gwendolyn Jeong MD;  Location: [facility];  Service: Orthopedics;  Laterality: Left;       Family History   Problem Relation Age of Onset    Diabetes Father     Cancer Father     Skin cancer Father     Cancer Maternal Grandfather

## 2023-06-02 ENCOUNTER — PATIENT MESSAGE (OUTPATIENT)
Dept: ORTHOPEDICS | Facility: CLINIC | Age: 53
End: 2023-06-02
Payer: COMMERCIAL

## 2023-06-06 ENCOUNTER — TELEPHONE (OUTPATIENT)
Dept: ORTHOPEDICS | Facility: CLINIC | Age: 53
End: 2023-06-06
Payer: COMMERCIAL

## 2023-06-06 ENCOUNTER — PATIENT MESSAGE (OUTPATIENT)
Dept: ORTHOPEDICS | Facility: CLINIC | Age: 53
End: 2023-06-06
Payer: COMMERCIAL

## 2023-06-06 RX ORDER — CYCLOBENZAPRINE HCL 10 MG
10 TABLET ORAL 3 TIMES DAILY PRN
Qty: 30 TABLET | Refills: 0 | Status: SHIPPED | OUTPATIENT
Start: 2023-06-06 | End: 2023-06-16 | Stop reason: SDUPTHER

## 2023-06-06 NOTE — TELEPHONE ENCOUNTER
Dr. Littlejohn is out this week.  I am sorry you are having trouble with this injury.    We could try sending in a muscle relaxant if you would like- I would just avoid taking it with any other sleep aid because they are sedating.  If you are ok with this then I can send something in for you.  Evette Arias PA-C

## 2023-06-08 ENCOUNTER — PATIENT MESSAGE (OUTPATIENT)
Dept: FAMILY MEDICINE | Facility: CLINIC | Age: 53
End: 2023-06-08
Payer: COMMERCIAL

## 2023-06-08 NOTE — TELEPHONE ENCOUNTER
Please advise,      Good Afternoon,     I have been taking the Percocet, you prescribed, and alternating between ice & heat for the pain related to my broken collarbone. Further increasing my pain level, I sneezed violently on 06/06/2023. It felt like I pulled a muscle on my left side near my pectoral muscle/armpit/top of my rib cage. No matter what I do, I cant seem to get any relief whatsoever. It doesnt matter whether Im sitting, standing or laying down or just breathing. Its sharply painful when trying to get up. I contacted Dr. Littlejohn on 06/06/2023. Shes out for the week. But, her PA prescribed Flexeril to help my nightly sleep issues. This has helped me sleep. However, is there anything else I can do or can take to relieve the pain during the day?

## 2023-06-09 RX ORDER — METHOCARBAMOL 750 MG/1
750 TABLET, FILM COATED ORAL 3 TIMES DAILY PRN
Qty: 40 TABLET | Refills: 4 | Status: SHIPPED | OUTPATIENT
Start: 2023-06-09 | End: 2023-09-20

## 2023-06-13 ENCOUNTER — HOSPITAL ENCOUNTER (OUTPATIENT)
Dept: CARDIOLOGY | Facility: CLINIC | Age: 53
Discharge: HOME | End: 2023-06-13
Payer: COMMERCIAL

## 2023-06-13 ENCOUNTER — OFFICE VISIT (OUTPATIENT)
Dept: ELECTROPHYSIOLOGY | Facility: CLINIC | Age: 53
End: 2023-06-13
Payer: COMMERCIAL

## 2023-06-13 VITALS
DIASTOLIC BLOOD PRESSURE: 80 MMHG | SYSTOLIC BLOOD PRESSURE: 128 MMHG | HEART RATE: 75 BPM | WEIGHT: 234.38 LBS | BODY MASS INDEX: 32.81 KG/M2 | HEIGHT: 71 IN

## 2023-06-13 DIAGNOSIS — R55 SYNCOPE: ICD-10-CM

## 2023-06-13 DIAGNOSIS — R55 SYNCOPE, UNSPECIFIED SYNCOPE TYPE: ICD-10-CM

## 2023-06-13 PROCEDURE — 4010F ACE/ARB THERAPY RXD/TAKEN: CPT | Mod: CPTII,S$GLB,, | Performed by: INTERNAL MEDICINE

## 2023-06-13 PROCEDURE — 4010F PR ACE/ARB THEARPY RXD/TAKEN: ICD-10-PCS | Mod: CPTII,S$GLB,, | Performed by: INTERNAL MEDICINE

## 2023-06-13 PROCEDURE — 1159F PR MEDICATION LIST DOCUMENTED IN MEDICAL RECORD: ICD-10-PCS | Mod: CPTII,S$GLB,, | Performed by: INTERNAL MEDICINE

## 2023-06-13 PROCEDURE — 93005 ELECTROCARDIOGRAM TRACING: CPT | Mod: S$GLB,,, | Performed by: INTERNAL MEDICINE

## 2023-06-13 PROCEDURE — 99203 OFFICE O/P NEW LOW 30 MIN: CPT | Mod: S$GLB,,, | Performed by: INTERNAL MEDICINE

## 2023-06-13 PROCEDURE — 93010 ELECTROCARDIOGRAM REPORT: CPT | Mod: S$GLB,,, | Performed by: INTERNAL MEDICINE

## 2023-06-13 PROCEDURE — 3074F PR MOST RECENT SYSTOLIC BLOOD PRESSURE < 130 MM HG: ICD-10-PCS | Mod: CPTII,S$GLB,, | Performed by: INTERNAL MEDICINE

## 2023-06-13 PROCEDURE — 3008F PR BODY MASS INDEX (BMI) DOCUMENTED: ICD-10-PCS | Mod: CPTII,S$GLB,, | Performed by: INTERNAL MEDICINE

## 2023-06-13 PROCEDURE — 99999 PR PBB SHADOW E&M-EST. PATIENT-LVL V: ICD-10-PCS | Mod: PBBFAC,,, | Performed by: INTERNAL MEDICINE

## 2023-06-13 PROCEDURE — 99999 PR PBB SHADOW E&M-EST. PATIENT-LVL V: CPT | Mod: PBBFAC,,, | Performed by: INTERNAL MEDICINE

## 2023-06-13 PROCEDURE — 3079F DIAST BP 80-89 MM HG: CPT | Mod: CPTII,S$GLB,, | Performed by: INTERNAL MEDICINE

## 2023-06-13 PROCEDURE — 3079F PR MOST RECENT DIASTOLIC BLOOD PRESSURE 80-89 MM HG: ICD-10-PCS | Mod: CPTII,S$GLB,, | Performed by: INTERNAL MEDICINE

## 2023-06-13 PROCEDURE — 93010 RHYTHM STRIP: ICD-10-PCS | Mod: S$GLB,,, | Performed by: INTERNAL MEDICINE

## 2023-06-13 PROCEDURE — 1159F MED LIST DOCD IN RCRD: CPT | Mod: CPTII,S$GLB,, | Performed by: INTERNAL MEDICINE

## 2023-06-13 PROCEDURE — 93005 RHYTHM STRIP: ICD-10-PCS | Mod: S$GLB,,, | Performed by: INTERNAL MEDICINE

## 2023-06-13 PROCEDURE — 3074F SYST BP LT 130 MM HG: CPT | Mod: CPTII,S$GLB,, | Performed by: INTERNAL MEDICINE

## 2023-06-13 PROCEDURE — 3008F BODY MASS INDEX DOCD: CPT | Mod: CPTII,S$GLB,, | Performed by: INTERNAL MEDICINE

## 2023-06-13 PROCEDURE — 99203 PR OFFICE/OUTPT VISIT, NEW, LEVL III, 30-44 MIN: ICD-10-PCS | Mod: S$GLB,,, | Performed by: INTERNAL MEDICINE

## 2023-06-13 RX ORDER — KETOCONAZOLE 20 MG/ML
SHAMPOO, SUSPENSION TOPICAL
COMMUNITY
Start: 2023-02-17

## 2023-06-13 RX ORDER — CLINDAMYCIN HYDROCHLORIDE 300 MG/1
300 CAPSULE ORAL EVERY 8 HOURS
COMMUNITY
Start: 2022-12-16

## 2023-06-13 RX ORDER — LACOSAMIDE 100 MG/1
TABLET ORAL
COMMUNITY
Start: 2023-06-08 | End: 2024-01-31

## 2023-06-13 NOTE — PROGRESS NOTES
Subjective:   Patient ID:  Nirmal Moss is a 52 y.o. male who presents for evaluation of Loss of Consciousness      52 yoM here for evaluation of syncope. He has history of seizure disorder and had been on Keppra in the past. He had a witnessed syncopal event 5/28/23 with shoulder injury and fall, hitting his head. Previously he had events and had a warning that he was going to pass out. This most recent event, there was no warning. He fell and did not protect himself. He was sweating profusely and was somewhat groggy. He broke his collarbone on the fall. His wife witnessed him fall from a standing position. Shortly after he was standing again and had a near syncopal event. He went to the sofa with his wife's assistance. No further events.     Stress echo 8/15:    1 - Normal left ventricular systolic function (EF 55-60%).     No evidence of stress induced myocardial ischemia.     Past Medical History:  No date: ADD (attention deficit disorder)  No date: Anxiety  No date: Depression  No date: Gastritis      Comment:  EGD 2014  No date: HDL deficiency  No date: Hypertension  No date: Kidney stone  09/2013: Meniscus tear      Comment:  right  01/2017: Meniscus tear      Comment:  right  1/18/2018: Seizure disorder  06/2014: Seizures      Comment:  last seizure in late 2014 1/18/2018: Vitamin D deficiency disease    Past Surgical History:  05/2016: FRACTURE SURGERY; Left      Comment:  wrist fracture  12/11/2018: INJECTION OF JOINT; Left      Comment:  Procedure: Injection, Joint, Bio-D Left Knee;  Surgeon:                Gwendolyn Jeong MD;  Location: [facility] OR;  Service:                Orthopedics;  Laterality: Left;  Bio-D Left Knee  12/11/2018: KNEE ARTHROSCOPY W/ MENISCECTOMY; Left      Comment:  Procedure: ARTHROSCOPY, KNEE, WITH MENISCECTOMY;                 Surgeon: Gwendolyn Jeong MD;  Location: [facility] OR;  Service:                Orthopedics;  Laterality: Left;  Cartilage biopsyand                repair or  micro-fracture  No date: KNEE SURGERY  12/11/2018: SYNOVECTOMY OF KNEE; Left      Comment:  Procedure: SYNOVECTOMY, KNEE;  Surgeon: Gwendolyn Jeong MD;                [facility];  Service: Orthopedics;  Laterality:                Left;    Social History    Socioeconomic History      Marital status:       Number of children: 1    Occupational History        Employer: [employer]    Tobacco Use      Smoking status: Never      Smokeless tobacco: Never    Substance and Sexual Activity      Alcohol use: Yes        Alcohol/week: 0.0 standard drinks        Comment: social      Drug use: No      Sexual activity: Yes        Partners: Male    Social History Narrative      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    Social Determinants of Health  Financial Resource Strain: Low Risk       Difficulty of Paying Living Expenses: Not hard at all  Food Insecurity: No Food Insecurity      Worried About Running Out of Food in the Last Year: Never true      Ran Out of Food in the Last Year: Never true  Transportation Needs: No Transportation Needs      Lack of Transportation (Medical): No      Lack of Transportation (Non-Medical): No  Physical Activity: Sufficiently Active      Days of Exercise per Week: 5 days      Minutes of Exercise per Session: 60 min  Stress: Stress Concern Present      Feeling of Stress : To some extent  Social Connections: Unknown      Frequency of Communication with Friends and Family: Once a week      Frequency of Social Gatherings with Friends and Family: Twice a week      Active Member of Clubs or Organizations: Yes      Attends Club or Organization Meetings: More than 4 times per year      Marital Status:   Housing Stability: Low Risk       Unable to Pay for Housing in the Last Year: No      Number of Places Lived in the Last Year: 1      Unstable Housing in the Last Year: No    Review of patient's family history indicates:    Current  Outpatient Medications:  amLODIPine (NORVASC) 10 MG tablet, Take one tablet by mouth daily FOR BLOOD PRESSURE, Disp: 90 tablet, Rfl: 3  cyclobenzaprine (FLEXERIL) 10 MG tablet, Take 1 tablet (10 mg total) by mouth 3 (three) times daily as needed for Muscle spasms., Disp: 30 tablet, Rfl: 0  desonide (DESOWEN) 0.05 % cream, Apply topically 2 (two) times daily. (Patient not taking: Reported on 6/1/2023), Disp: 60 g, Rfl: 3  dextroamphetamine-amphetamine (ADDERALL XR) 30 MG 24 hr capsule, 2 a day, Disp: 60 capsule, Rfl: 0  dextroamphetamine-amphetamine (ADDERALL XR) 30 MG 24 hr capsule, 2 a day, Disp: 60 capsule, Rfl: 0  dextroamphetamine-amphetamine (ADDERALL XR) 30 MG 24 hr capsule, Take 2 tablets by mouth daily, Disp: 60 capsule, Rfl: 0  dextroamphetamine-amphetamine (ADDERALL XR) 30 MG 24 hr capsule, 2 a day, Disp: 60 capsule, Rfl: 0  hydroCHLOROthiazide (HYDRODIURIL) 25 MG tablet, Take one tablet by mouth daily, Disp: 90 tablet, Rfl: 12  irbesartan (AVAPRO) 300 MG tablet, Take one tablet by mouth daily FOR BLOOD PRESSURE, Disp: 90 tablet, Rfl: 3  methocarbamoL (ROBAXIN) 750 MG Tab, Take 1 tablet (750 mg total) by mouth 3 (three) times daily as needed., Disp: 40 tablet, Rfl: 4  metoprolol succinate (TOPROL-XL) 100 MG 24 hr tablet, Take one tablet by mouth daily FOR BLOOD PRESSURE, Disp: 90 tablet, Rfl: 3  montelukast (SINGULAIR) 10 mg tablet, TAKE ONE TABLET BY MOUTH EVERY EVENING (Patient not taking: Reported on 1/13/2022), Disp: 30 tablet, Rfl: 5  naproxen (NAPROSYN) 500 MG tablet, Take 1 tablet (500 mg total) by mouth 2 (two) times daily with meals. As needed for pain, Disp: 20 tablet, Rfl: 0  oxyCODONE-acetaminophen (PERCOCET) 5-325 mg per tablet, 1-2 pills every 6 hours p.r.n. pain,, Disp: 56 tablet, Rfl: 0  paroxetine (PAXIL) 20 MG tablet, Take 3 tablets (60 mg total) by mouth every morning., Disp: 270 tablet, Rfl: 2  traMADol (ULTRAM) 50 mg tablet, Take 1-2 tablets ( mg total) by mouth every 8 (eight)  hours as needed for Pain. (Patient not taking: Reported on 1/13/2022), Disp: 12 tablet, Rfl: 0    No current facility-administered medications for this visit.            Review of Systems   Constitutional: Negative.   HENT: Negative.     Eyes: Negative.    Cardiovascular:  Negative for chest pain, dyspnea on exertion, leg swelling, near-syncope, palpitations and syncope.   Respiratory: Negative.  Negative for shortness of breath.    Endocrine: Negative.    Hematologic/Lymphatic: Negative.    Skin: Negative.    Musculoskeletal: Negative.    Gastrointestinal: Negative.    Genitourinary: Negative.    Neurological: Negative.  Negative for dizziness and light-headedness.   Psychiatric/Behavioral: Negative.     Allergic/Immunologic: Negative.      Objective:   Physical Exam  Vitals reviewed.   Constitutional:       General: He is not in acute distress.     Appearance: He is well-developed.   HENT:      Head: Normocephalic and atraumatic.   Eyes:      Pupils: Pupils are equal, round, and reactive to light.   Neck:      Thyroid: No thyromegaly.      Vascular: No JVD.   Cardiovascular:      Rate and Rhythm: Normal rate and regular rhythm.      Chest Wall: PMI is not displaced.      Heart sounds: Normal heart sounds, S1 normal and S2 normal. No murmur heard.    No friction rub. No gallop.   Pulmonary:      Effort: Pulmonary effort is normal. No respiratory distress.      Breath sounds: Normal breath sounds. No wheezing or rales.   Abdominal:      General: Bowel sounds are normal. There is no distension.      Palpations: Abdomen is soft.      Tenderness: There is no abdominal tenderness. There is no guarding or rebound.   Musculoskeletal:         General: No tenderness. Normal range of motion.      Cervical back: Normal range of motion.   Skin:     General: Skin is warm and dry.      Findings: No erythema or rash.   Neurological:      Mental Status: He is alert and oriented to person, place, and time.      Cranial Nerves: No  cranial nerve deficit.   Psychiatric:         Behavior: Behavior normal.         Thought Content: Thought content normal.         Judgment: Judgment normal.     Assessment:      1. Syncope    2. Syncope, unspecified syncope type        Plan:     52 yoM here for evaluation of syncope. His event had features of a severe vasovagal event, but seizure disorder cannot be excluded. Will assess with echo and 30d event monitor. ILR if no arrhythmias on event monitor. RTC 6w

## 2023-06-16 ENCOUNTER — HOSPITAL ENCOUNTER (OUTPATIENT)
Dept: RADIOLOGY | Facility: HOSPITAL | Age: 53
Discharge: HOME | End: 2023-06-16
Attending: PSYCHIATRY & NEUROLOGY
Payer: COMMERCIAL

## 2023-06-16 ENCOUNTER — OFFICE VISIT (OUTPATIENT)
Dept: FAMILY MEDICINE | Facility: CLINIC | Age: 53
End: 2023-06-16
Payer: COMMERCIAL

## 2023-06-16 VITALS
SYSTOLIC BLOOD PRESSURE: 118 MMHG | WEIGHT: 232.38 LBS | HEART RATE: 79 BPM | DIASTOLIC BLOOD PRESSURE: 82 MMHG | TEMPERATURE: 98 F | BODY MASS INDEX: 32.53 KG/M2 | HEIGHT: 71 IN | OXYGEN SATURATION: 95 %

## 2023-06-16 DIAGNOSIS — Z12.12 SCREENING FOR COLORECTAL CANCER: ICD-10-CM

## 2023-06-16 DIAGNOSIS — S42.009A CLOSED NONDISPLACED FRACTURE OF CLAVICLE, UNSPECIFIED LATERALITY, UNSPECIFIED PART OF CLAVICLE, INITIAL ENCOUNTER: ICD-10-CM

## 2023-06-16 DIAGNOSIS — F98.8 ATTENTION DEFICIT DISORDER, UNSPECIFIED HYPERACTIVITY PRESENCE: Primary | ICD-10-CM

## 2023-06-16 DIAGNOSIS — R55 SYNCOPE AND COLLAPSE: ICD-10-CM

## 2023-06-16 DIAGNOSIS — I10 ESSENTIAL HYPERTENSION: ICD-10-CM

## 2023-06-16 DIAGNOSIS — F39 MOOD DISORDER: ICD-10-CM

## 2023-06-16 DIAGNOSIS — E55.9 VITAMIN D DEFICIENCY DISEASE: ICD-10-CM

## 2023-06-16 DIAGNOSIS — G40.909 SEIZURE DISORDER: ICD-10-CM

## 2023-06-16 DIAGNOSIS — R55 SYNCOPE, UNSPECIFIED SYNCOPE TYPE: ICD-10-CM

## 2023-06-16 DIAGNOSIS — Z12.11 SCREENING FOR COLORECTAL CANCER: ICD-10-CM

## 2023-06-16 PROCEDURE — 1159F PR MEDICATION LIST DOCUMENTED IN MEDICAL RECORD: ICD-10-PCS | Mod: CPTII,S$GLB,, | Performed by: INTERNAL MEDICINE

## 2023-06-16 PROCEDURE — 99999 PR PBB SHADOW E&M-EST. PATIENT-LVL IV: ICD-10-PCS | Mod: PBBFAC,,, | Performed by: INTERNAL MEDICINE

## 2023-06-16 PROCEDURE — 99215 OFFICE O/P EST HI 40 MIN: CPT | Mod: S$GLB,,, | Performed by: INTERNAL MEDICINE

## 2023-06-16 PROCEDURE — 99999 PR PBB SHADOW E&M-EST. PATIENT-LVL IV: CPT | Mod: PBBFAC,,, | Performed by: INTERNAL MEDICINE

## 2023-06-16 PROCEDURE — 4010F ACE/ARB THERAPY RXD/TAKEN: CPT | Mod: CPTII,S$GLB,, | Performed by: INTERNAL MEDICINE

## 2023-06-16 PROCEDURE — 25500020 PHARM REV CODE 255: Performed by: PSYCHIATRY & NEUROLOGY

## 2023-06-16 PROCEDURE — 3079F PR MOST RECENT DIASTOLIC BLOOD PRESSURE 80-89 MM HG: ICD-10-PCS | Mod: CPTII,S$GLB,, | Performed by: INTERNAL MEDICINE

## 2023-06-16 PROCEDURE — 3008F BODY MASS INDEX DOCD: CPT | Mod: CPTII,S$GLB,, | Performed by: INTERNAL MEDICINE

## 2023-06-16 PROCEDURE — 3074F PR MOST RECENT SYSTOLIC BLOOD PRESSURE < 130 MM HG: ICD-10-PCS | Mod: CPTII,S$GLB,, | Performed by: INTERNAL MEDICINE

## 2023-06-16 PROCEDURE — 3079F DIAST BP 80-89 MM HG: CPT | Mod: CPTII,S$GLB,, | Performed by: INTERNAL MEDICINE

## 2023-06-16 PROCEDURE — 3074F SYST BP LT 130 MM HG: CPT | Mod: CPTII,S$GLB,, | Performed by: INTERNAL MEDICINE

## 2023-06-16 PROCEDURE — A9585 GADOBUTROL INJECTION: HCPCS | Performed by: PSYCHIATRY & NEUROLOGY

## 2023-06-16 PROCEDURE — 70553 MRI BRAIN W WO CONTRAST: ICD-10-PCS | Mod: 26,,, | Performed by: RADIOLOGY

## 2023-06-16 PROCEDURE — 4010F PR ACE/ARB THEARPY RXD/TAKEN: ICD-10-PCS | Mod: CPTII,S$GLB,, | Performed by: INTERNAL MEDICINE

## 2023-06-16 PROCEDURE — 99215 PR OFFICE/OUTPT VISIT, EST, LEVL V, 40-54 MIN: ICD-10-PCS | Mod: S$GLB,,, | Performed by: INTERNAL MEDICINE

## 2023-06-16 PROCEDURE — 1159F MED LIST DOCD IN RCRD: CPT | Mod: CPTII,S$GLB,, | Performed by: INTERNAL MEDICINE

## 2023-06-16 PROCEDURE — 70553 MRI BRAIN STEM W/O & W/DYE: CPT | Mod: TC

## 2023-06-16 PROCEDURE — 70553 MRI BRAIN STEM W/O & W/DYE: CPT | Mod: 26,,, | Performed by: RADIOLOGY

## 2023-06-16 PROCEDURE — 3008F PR BODY MASS INDEX (BMI) DOCUMENTED: ICD-10-PCS | Mod: CPTII,S$GLB,, | Performed by: INTERNAL MEDICINE

## 2023-06-16 RX ORDER — DEXTROAMPHETAMINE SACCHARATE, AMPHETAMINE ASPARTATE MONOHYDRATE, DEXTROAMPHETAMINE SULFATE AND AMPHETAMINE SULFATE 7.5; 7.5; 7.5; 7.5 MG/1; MG/1; MG/1; MG/1
CAPSULE, EXTENDED RELEASE ORAL
Qty: 60 CAPSULE | Refills: 0 | Status: CANCELLED | OUTPATIENT
Start: 2023-07-15

## 2023-06-16 RX ORDER — DEXTROAMPHETAMINE SACCHARATE, AMPHETAMINE ASPARTATE MONOHYDRATE, DEXTROAMPHETAMINE SULFATE AND AMPHETAMINE SULFATE 7.5; 7.5; 7.5; 7.5 MG/1; MG/1; MG/1; MG/1
CAPSULE, EXTENDED RELEASE ORAL
Qty: 60 CAPSULE | Refills: 0 | Status: SHIPPED | OUTPATIENT
Start: 2023-08-15 | End: 2023-09-13 | Stop reason: SDUPTHER

## 2023-06-16 RX ORDER — DEXTROAMPHETAMINE SACCHARATE, AMPHETAMINE ASPARTATE MONOHYDRATE, DEXTROAMPHETAMINE SULFATE AND AMPHETAMINE SULFATE 7.5; 7.5; 7.5; 7.5 MG/1; MG/1; MG/1; MG/1
CAPSULE, EXTENDED RELEASE ORAL
Qty: 60 CAPSULE | Refills: 0 | Status: CANCELLED | OUTPATIENT
Start: 2023-06-16

## 2023-06-16 RX ORDER — DEXTROAMPHETAMINE SACCHARATE, AMPHETAMINE ASPARTATE MONOHYDRATE, DEXTROAMPHETAMINE SULFATE AND AMPHETAMINE SULFATE 7.5; 7.5; 7.5; 7.5 MG/1; MG/1; MG/1; MG/1
CAPSULE, EXTENDED RELEASE ORAL
Qty: 60 CAPSULE | Refills: 0 | Status: SHIPPED | OUTPATIENT
Start: 2023-07-15 | End: 2023-09-13 | Stop reason: SDUPTHER

## 2023-06-16 RX ORDER — DEXTROAMPHETAMINE SACCHARATE, AMPHETAMINE ASPARTATE MONOHYDRATE, DEXTROAMPHETAMINE SULFATE AND AMPHETAMINE SULFATE 7.5; 7.5; 7.5; 7.5 MG/1; MG/1; MG/1; MG/1
CAPSULE, EXTENDED RELEASE ORAL
Qty: 60 CAPSULE | Refills: 0 | Status: CANCELLED | OUTPATIENT
Start: 2023-08-15

## 2023-06-16 RX ORDER — DEXTROAMPHETAMINE SACCHARATE, AMPHETAMINE ASPARTATE MONOHYDRATE, DEXTROAMPHETAMINE SULFATE AND AMPHETAMINE SULFATE 7.5; 7.5; 7.5; 7.5 MG/1; MG/1; MG/1; MG/1
CAPSULE, EXTENDED RELEASE ORAL
Qty: 60 CAPSULE | Refills: 0 | Status: SHIPPED | OUTPATIENT
Start: 2023-06-16 | End: 2023-09-13 | Stop reason: SDUPTHER

## 2023-06-16 RX ORDER — GADOBUTROL 604.72 MG/ML
10 INJECTION INTRAVENOUS
Status: COMPLETED | OUTPATIENT
Start: 2023-06-16 | End: 2023-06-16

## 2023-06-16 RX ORDER — CYCLOBENZAPRINE HCL 10 MG
10 TABLET ORAL 3 TIMES DAILY PRN
Qty: 30 TABLET | Refills: 12 | Status: SHIPPED | OUTPATIENT
Start: 2023-06-16 | End: 2024-01-31

## 2023-06-16 RX ADMIN — GADOBUTROL 10 ML: 604.72 INJECTION INTRAVENOUS at 06:06

## 2023-06-16 NOTE — PROGRESS NOTES
Chief complaint:  Follow-up on ADD and blood pressure medications,  syncope    physical 11/22,       52-year-old white male with long-standing ADD.  He requires a high-dose of the Adderall and apparently his son does as well as and is a high metabolizer of the medication so patient himself can assumedly is the same.  He finds benefit from medication.  We refilled the 3 prescriptions and I encouraged him to message us with the exact date see needs for the next 3 months and we will see him every 6 months.      He does find significant benefit with his ADD medication and that is confirmed by his wife.  He does not need any dose change.  I will give him refills for 3 months    52 yoM here for evaluation of syncope. He has history of seizure disorder and had been on Keppra in the past. He had a witnessed syncopal event 5/28/23 with shoulder injury and fall, hitting his head. Previously he had events and had a warning that he was going to pass out. This most recent event, there was no warning. He fell and did not protect himself. He was sweating profusely and was somewhat groggy. He broke his collarbone on the fall. His wife witnessed him fall from a standing position. Shortly after he was standing again and had a near syncopal event that does sound like his usual vagal syncope and that may have been from the pain from the clavicle. He went to the sofa with his wife's assistance. No further events.     Discussed at length and wife was witness and described. Cardiac vs seizure needs to be figured out for the initial syncope and looks like he has all the right tests in progress.  Over 70 min  minutes of total time spent on the encounter, which includes face to face time and non-face to face time preparing to see the patient (eg, review of tests), Obtaining and/or reviewing separately obtained history, Documenting clinical information in the electronic or other health record, Independently interpreting results (not  separately reported) and communicating results to the patient/family/caregiver, or Care coordination (not separately reported).     Blood pressure at home running great     Last labs all rev, TSH sl up and will follow but no bearing on recent issues    Cologuard neg 1/22    ROS:   CONST: weight stable. EYES: no vision change. ENT: no sore throat. CV: no chest pain w/ exertion. RESP: no shortness of breath. GI: no nausea, vomiting, diarrhea. No dysphagia. : no urinary issues. MUSCULOSKELETAL: no new myalgias or arthralgias. SKIN: no new changes. NEURO: no focal deficits. PSYCH: no new issues. ENDOCRINE: no polyuria. HEME: no lymph nodes. ALLERGY: no general pruritis.    PAST MEDICAL HISTORY:                                                        1.  Hypertension.                                                            2.  Depression, sees Psychiatry.                                             3.  ADD, sees Psychiatry.  Dr Emery                                                  4.  Right knee scope.   5.  Low HDL 1999   6.  Insomnia  7.  Sleep eating with Ambien   8.  probable seizure disorder 2015  9. Orthostatic hypotension, he worked up by cardiology 2015, may relate to seizures   10.  Left Colles' fracture with surgical repair 2016  11.  Right medial meniscus injury 2017    12.  Mild erosive gastropathy on EGD 2014  13. Hemoglobin reduction, rising MCV   14. Cologuard neg 1/22    15. Syncope history, some vagal                                                                                                                            SOCIAL HISTORY:  Does not smoke, social alcohol, never smoked.                                                                                            FAMILY HISTORY:  Mom with seizures.  Father with hypertension, diabetes, .     He has no siblings.             Gen: no distress, expected lump over left clavicle    Diagnoses and all orders for this visit:    Attention deficit  disorder, unspecified hyperactivity presence, high risk med issues discussed, printed scripts due to availability issues  -     dextroamphetamine-amphetamine (ADDERALL XR) 30 MG 24 hr capsule; 2 a day  -     dextroamphetamine-amphetamine (ADDERALL XR) 30 MG 24 hr capsule; Take 2 tablets by mouth daily  -     dextroamphetamine-amphetamine (ADDERALL XR) 30 MG 24 hr capsule; 2 a day    Essential hypertension ,chronic condition and stable.     Seizure disorder    Mood disorder ,chronic condition and stable.     Vitamin D deficiency disease ,chronic condition and stable.     Screening for colorectal cancer, UTD    Syncope, unspecified syncope type    Closed nondisplaced fracture of clavicle, unspecified laterality, unspecified part of clavicle, initial encounter    Other orders  -     cyclobenzaprine (FLEXERIL) 10 MG tablet; Take 1 tablet (10 mg total) by mouth 3 (three) times daily as needed for Muscle spasms.  The following orders have not been finalized:  -     Cancel: dextroamphetamine-amphetamine (ADDERALL XR) 30 MG 24 hr capsule  -     Cancel: dextroamphetamine-amphetamine (ADDERALL XR) 30 MG 24 hr capsule  -     Cancel: dextroamphetamine-amphetamine (ADDERALL XR) 30 MG 24 hr capsule

## 2023-06-19 ENCOUNTER — PATIENT MESSAGE (OUTPATIENT)
Dept: ORTHOPEDICS | Facility: CLINIC | Age: 53
End: 2023-06-19
Payer: COMMERCIAL

## 2023-06-19 DIAGNOSIS — S42.009A CLOSED NONDISPLACED FRACTURE OF CLAVICLE, UNSPECIFIED LATERALITY, UNSPECIFIED PART OF CLAVICLE, INITIAL ENCOUNTER: Primary | ICD-10-CM

## 2023-06-20 RX ORDER — OXYCODONE AND ACETAMINOPHEN 5; 325 MG/1; MG/1
1 TABLET ORAL EVERY 6 HOURS PRN
Qty: 30 TABLET | Refills: 0 | Status: SHIPPED | OUTPATIENT
Start: 2023-06-20 | End: 2023-08-15

## 2023-06-26 ENCOUNTER — HOSPITAL ENCOUNTER (OUTPATIENT)
Dept: CARDIOLOGY | Facility: HOSPITAL | Age: 53
Discharge: HOME | End: 2023-06-26
Attending: INTERNAL MEDICINE
Payer: COMMERCIAL

## 2023-06-26 VITALS
DIASTOLIC BLOOD PRESSURE: 83 MMHG | HEIGHT: 71 IN | HEART RATE: 77 BPM | SYSTOLIC BLOOD PRESSURE: 138 MMHG | WEIGHT: 234 LBS | BODY MASS INDEX: 32.76 KG/M2

## 2023-06-26 LAB
ASCENDING AORTA: 2.86 CM
AV INDEX (PROSTH): 0.89
AV MEAN GRADIENT: 4 MMHG
AV PEAK GRADIENT: 7 MMHG
AV VALVE AREA: 3.26 CM2
AV VELOCITY RATIO: 0.86
BSA FOR ECHO PROCEDURE: 2.31 M2
CV ECHO LV RWT: 0.42 CM
DOP CALC AO PEAK VEL: 1.33 M/S
DOP CALC AO VTI: 26.95 CM
DOP CALC LVOT AREA: 3.7 CM2
DOP CALC LVOT DIAMETER: 2.16 CM
DOP CALC LVOT PEAK VEL: 1.15 M/S
DOP CALC LVOT STROKE VOLUME: 87.79 CM3
DOP CALCLVOT PEAK VEL VTI: 23.97 CM
E WAVE DECELERATION TIME: 221.95 MSEC
E/A RATIO: 1.48
E/E' RATIO: 6.41 M/S
ECHO LV POSTERIOR WALL: 1.01 CM (ref 0.6–1.1)
EJECTION FRACTION: 60 %
FRACTIONAL SHORTENING: 29 % (ref 28–44)
INTERVENTRICULAR SEPTUM: 1.01 CM (ref 0.6–1.1)
LA MAJOR: 5.41 CM
LA MINOR: 5.21 CM
LA WIDTH: 3.87 CM
LEFT ATRIUM SIZE: 3.73 CM
LEFT ATRIUM VOLUME INDEX MOD: 35.1 ML/M2
LEFT ATRIUM VOLUME INDEX: 28.9 ML/M2
LEFT ATRIUM VOLUME MOD: 78.9 CM3
LEFT ATRIUM VOLUME: 65.13 CM3
LEFT INTERNAL DIMENSION IN SYSTOLE: 3.45 CM (ref 2.1–4)
LEFT VENTRICLE DIASTOLIC VOLUME INDEX: 48.9 ML/M2
LEFT VENTRICLE DIASTOLIC VOLUME: 110.02 ML
LEFT VENTRICLE MASS INDEX: 78 G/M2
LEFT VENTRICLE SYSTOLIC VOLUME INDEX: 21.8 ML/M2
LEFT VENTRICLE SYSTOLIC VOLUME: 48.99 ML
LEFT VENTRICULAR INTERNAL DIMENSION IN DIASTOLE: 4.85 CM (ref 3.5–6)
LEFT VENTRICULAR MASS: 175.45 G
LV LATERAL E/E' RATIO: 6.2 M/S
LV SEPTAL E/E' RATIO: 6.64 M/S
MV A" WAVE DURATION": 9.13 MSEC
MV PEAK A VEL: 0.63 M/S
MV PEAK E VEL: 0.93 M/S
MV STENOSIS PRESSURE HALF TIME: 64.37 MS
MV VALVE AREA P 1/2 METHOD: 3.42 CM2
PISA TR MAX VEL: 2.59 M/S
PULM VEIN S/D RATIO: 1.26
PV PEAK D VEL: 0.47 M/S
PV PEAK S VEL: 0.59 M/S
RA MAJOR: 5.19 CM
RA PRESSURE: 3 MMHG
RA WIDTH: 3.67 CM
RIGHT VENTRICULAR END-DIASTOLIC DIMENSION: 3.39 CM
SINUS: 3.11 CM
STJ: 2.48 CM
TDI LATERAL: 0.15 M/S
TDI SEPTAL: 0.14 M/S
TDI: 0.15 M/S
TR MAX PG: 27 MMHG
TRICUSPID ANNULAR PLANE SYSTOLIC EXCURSION: 2.43 CM
TV REST PULMONARY ARTERY PRESSURE: 30 MMHG

## 2023-06-26 PROCEDURE — 93306 TTE W/DOPPLER COMPLETE: CPT

## 2023-06-26 PROCEDURE — 93306 TTE W/DOPPLER COMPLETE: CPT | Mod: 26,,, | Performed by: INTERNAL MEDICINE

## 2023-06-26 PROCEDURE — 93306 ECHO (CUPID ONLY): ICD-10-PCS | Mod: 26,,, | Performed by: INTERNAL MEDICINE

## 2023-07-03 ENCOUNTER — CLINICAL SUPPORT (OUTPATIENT)
Dept: CARDIOLOGY | Facility: HOSPITAL | Age: 53
End: 2023-07-03
Attending: INTERNAL MEDICINE
Payer: COMMERCIAL

## 2023-07-03 DIAGNOSIS — R55 SYNCOPE: ICD-10-CM

## 2023-07-03 DIAGNOSIS — R55 SYNCOPE, UNSPECIFIED SYNCOPE TYPE: ICD-10-CM

## 2023-07-03 PROCEDURE — 93270 REMOTE 30 DAY ECG REV/REPORT: CPT

## 2023-07-03 PROCEDURE — 93271 ECG/MONITORING AND ANALYSIS: CPT

## 2023-07-03 PROCEDURE — 93272 CARDIAC EVENT MONITOR (CUPID ONLY): ICD-10-PCS | Mod: ,,, | Performed by: INTERNAL MEDICINE

## 2023-07-03 PROCEDURE — 93272 ECG/REVIEW INTERPRET ONLY: CPT | Mod: ,,, | Performed by: INTERNAL MEDICINE

## 2023-07-13 ENCOUNTER — HOSPITAL ENCOUNTER (OUTPATIENT)
Dept: RADIOLOGY | Facility: HOSPITAL | Age: 53
Discharge: HOME | End: 2023-07-13
Attending: ORTHOPAEDIC SURGERY
Payer: COMMERCIAL

## 2023-07-13 ENCOUNTER — OFFICE VISIT (OUTPATIENT)
Dept: ORTHOPEDICS | Facility: CLINIC | Age: 53
End: 2023-07-13
Payer: COMMERCIAL

## 2023-07-13 DIAGNOSIS — S42.009A CLOSED NONDISPLACED FRACTURE OF CLAVICLE, UNSPECIFIED LATERALITY, UNSPECIFIED PART OF CLAVICLE, INITIAL ENCOUNTER: Primary | ICD-10-CM

## 2023-07-13 DIAGNOSIS — S42.009A CLOSED NONDISPLACED FRACTURE OF CLAVICLE, UNSPECIFIED LATERALITY, UNSPECIFIED PART OF CLAVICLE, INITIAL ENCOUNTER: ICD-10-CM

## 2023-07-13 PROCEDURE — 1159F PR MEDICATION LIST DOCUMENTED IN MEDICAL RECORD: ICD-10-PCS | Mod: CPTII,S$GLB,, | Performed by: ORTHOPAEDIC SURGERY

## 2023-07-13 PROCEDURE — 1159F MED LIST DOCD IN RCRD: CPT | Mod: CPTII,S$GLB,, | Performed by: ORTHOPAEDIC SURGERY

## 2023-07-13 PROCEDURE — 1160F RVW MEDS BY RX/DR IN RCRD: CPT | Mod: CPTII,S$GLB,, | Performed by: ORTHOPAEDIC SURGERY

## 2023-07-13 PROCEDURE — 4010F ACE/ARB THERAPY RXD/TAKEN: CPT | Mod: CPTII,S$GLB,, | Performed by: ORTHOPAEDIC SURGERY

## 2023-07-13 PROCEDURE — 4010F PR ACE/ARB THEARPY RXD/TAKEN: ICD-10-PCS | Mod: CPTII,S$GLB,, | Performed by: ORTHOPAEDIC SURGERY

## 2023-07-13 PROCEDURE — 73000 X-RAY EXAM OF COLLAR BONE: CPT | Mod: TC,FY,LT

## 2023-07-13 PROCEDURE — 73000 X-RAY EXAM OF COLLAR BONE: CPT | Mod: 26,LT,, | Performed by: RADIOLOGY

## 2023-07-13 PROCEDURE — 99999 PR PBB SHADOW E&M-EST. PATIENT-LVL IV: CPT | Mod: PBBFAC,,, | Performed by: ORTHOPAEDIC SURGERY

## 2023-07-13 PROCEDURE — 73000 XR CLAVICLE LEFT: ICD-10-PCS | Mod: 26,LT,, | Performed by: RADIOLOGY

## 2023-07-13 PROCEDURE — 99999 PR PBB SHADOW E&M-EST. PATIENT-LVL IV: ICD-10-PCS | Mod: PBBFAC,,, | Performed by: ORTHOPAEDIC SURGERY

## 2023-07-13 PROCEDURE — 99213 PR OFFICE/OUTPT VISIT, EST, LEVL III, 20-29 MIN: ICD-10-PCS | Mod: S$GLB,,, | Performed by: ORTHOPAEDIC SURGERY

## 2023-07-13 PROCEDURE — 99213 OFFICE O/P EST LOW 20 MIN: CPT | Mod: S$GLB,,, | Performed by: ORTHOPAEDIC SURGERY

## 2023-07-13 PROCEDURE — 1160F PR REVIEW ALL MEDS BY PRESCRIBER/CLIN PHARMACIST DOCUMENTED: ICD-10-PCS | Mod: CPTII,S$GLB,, | Performed by: ORTHOPAEDIC SURGERY

## 2023-07-13 NOTE — PROGRESS NOTES
Assessment: 52 y.o. male with L clavicle fracture     I explained my diagnostic impression and the reasoning behind it in detail, using layman's terms.      Plan:   - No lifting greater than 5 lb, no painful activity  - Ok to start PROM now with pendulums, AROM at 4 weeks  - Return to clinic in 6 weeks with x-rays. Return sooner if symptoms worsen or fail to improve.      All questions were answered in detail. The patient is in full agreement with the treatment plan and will proceed accordingly.    Chief Complaint   Patient presents with    Left Shoulder - Pain, Swelling       Initial visit (6/1/23): Nirmal Moss is a 52 y.o. male who presents today complaining of Pain and Swelling of the Left Shoulder     DOI: 5/28/23  Syncopal episode x2 witnessed by his wife - hit patio with head/left shoulder  Hypotensive in ER  Went to ER vs EMS - found to have clavicle fracture   In sling today     07/13/2023  Here for follow-up of left midshaft clavicle fracture that was treated nonoperatively in a sling.  He is doing fine with active range of motion  No pain with range of motion  Has discontinued use of the sling with no problems   No numbness or tingling      This is the extent of the patient's complaints at this time.            Review of patient's allergies indicates:   Allergen Reactions    Hydrocodone Rash         Current Outpatient Medications:     amLODIPine (NORVASC) 10 MG tablet, Take one tablet by mouth daily FOR BLOOD PRESSURE, Disp: 90 tablet, Rfl: 3    clindamycin (CLEOCIN) 300 MG capsule, Take 300 mg by mouth every 8 (eight) hours., Disp: , Rfl:     cyclobenzaprine (FLEXERIL) 10 MG tablet, Take 1 tablet (10 mg total) by mouth 3 (three) times daily as needed for Muscle spasms., Disp: 30 tablet, Rfl: 12    desonide (DESOWEN) 0.05 % cream, Apply topically 2 (two) times daily., Disp: 60 g, Rfl: 3    dextroamphetamine-amphetamine (ADDERALL XR) 30 MG 24 hr capsule, 2 a day, Disp: 60 capsule, Rfl: 0    [START  ON 8/15/2023] dextroamphetamine-amphetamine (ADDERALL XR) 30 MG 24 hr capsule, 2 a day, Disp: 60 capsule, Rfl: 0    [START ON 7/15/2023] dextroamphetamine-amphetamine (ADDERALL XR) 30 MG 24 hr capsule, Take 2 tablets by mouth daily, Disp: 60 capsule, Rfl: 0    dextroamphetamine-amphetamine (ADDERALL XR) 30 MG 24 hr capsule, 2 a day, Disp: 60 capsule, Rfl: 0    hydroCHLOROthiazide (HYDRODIURIL) 25 MG tablet, Take one tablet by mouth daily, Disp: 90 tablet, Rfl: 12    irbesartan (AVAPRO) 300 MG tablet, Take one tablet by mouth daily FOR BLOOD PRESSURE, Disp: 90 tablet, Rfl: 3    ketoconazole (NIZORAL) 2 % shampoo, Apply topically., Disp: , Rfl:     lacosamide (VIMPAT) 100 mg Tab, Take by mouth., Disp: , Rfl:     methocarbamoL (ROBAXIN) 750 MG Tab, Take 1 tablet (750 mg total) by mouth 3 (three) times daily as needed., Disp: 40 tablet, Rfl: 4    metoprolol succinate (TOPROL-XL) 100 MG 24 hr tablet, Take one tablet by mouth daily FOR BLOOD PRESSURE, Disp: 90 tablet, Rfl: 3    montelukast (SINGULAIR) 10 mg tablet, TAKE ONE TABLET BY MOUTH EVERY EVENING, Disp: 30 tablet, Rfl: 5    naproxen (NAPROSYN) 500 MG tablet, Take 1 tablet (500 mg total) by mouth 2 (two) times daily with meals. As needed for pain, Disp: 20 tablet, Rfl: 0    paroxetine (PAXIL) 20 MG tablet, Take 3 tablets (60 mg total) by mouth every morning., Disp: 270 tablet, Rfl: 2    oxyCODONE-acetaminophen (PERCOCET) 5-325 mg per tablet, Take 1 tablet by mouth every 6 (six) hours as needed for Pain. (Patient not taking: Reported on 7/13/2023), Disp: 30 tablet, Rfl: 0    Physical Exam:   Vitals:    07/13/23 1255   PainSc:   2   PainLoc: Shoulder       General:  Patient is alert, awake and oriented to time, place and person. Mood and affect are appropriate.  Patient does not appear to be in any distress, denies any constitutional symptoms and appears stated age.   HEENT:  Pupils are equal and round, sclera are not injected. External examination of ears and nose  reveals no abnormalities. Cranial nerves II-X are grossly intact  Skin:  no rashes, abrasions or open wounds on the affected extremity   Resp:  No respiratory distress or audible wheezing   CV: 2+  pulses, all extremities warm and well perfused   Left Upper extremity   Ecchymosis resolved   No skin tenting  Nontender over the midshaft clavicle  Deformity noted over fracture site  Full range of motion of the shoulder without pain  LTSI m/u/r  2+ RP  + EPL, IO, FDS, FDP     Imaging: 3 views of the L shoulder, 2 views clavicle show minimally displaced clavicle fracture, no significant callus formation noted    I personally reviewed and interpreted the patient's imaging obtained today in clinic        This note was created by combination of typed  and M-Modal dictation. Transcription and phonetic errors may be present.  If there are any questions, please contact me.    Past Medical History:   Diagnosis Date    ADD (attention deficit disorder)     Anxiety     Depression     Gastritis     EGD 2014    HDL deficiency     Hypertension     Kidney stone     Meniscus tear 09/2013    right    Meniscus tear 01/2017    right    Seizure disorder 1/18/2018    Seizures 06/2014    last seizure in late 2014    Vitamin D deficiency disease 1/18/2018       Active Problem List with Overview Notes    Diagnosis Date Noted    Acute medial meniscus tear of left knee 12/11/2018    Mood disorder 01/18/2018    Seizure disorder 01/18/2018    Vitamin D deficiency disease 01/18/2018    Complex tear of lateral meniscus of right knee as current injury 03/28/2017    Colles' fracture 05/15/2016    Closed fracture of left ulnar styloid 05/15/2016    ADD (attention deficit disorder) 07/30/2015    Dyspepsia 04/04/2014    Anxiety disorder 01/15/2014    Insomnia 01/15/2014    Knee pain 12/05/2013    HTN (hypertension) 12/03/2013       Past Surgical History:   Procedure Laterality Date    FRACTURE SURGERY Left 05/2016    wrist fracture    INJECTION  OF JOINT Left 12/11/2018    Procedure: Injection, Joint, Bio-D Left Knee;  Surgeon: Gwendolyn Jeong MD;  Location: [facility] OR;  Service: Orthopedics;  Laterality: Left;  Bio-D Left Knee    KNEE ARTHROSCOPY W/ MENISCECTOMY Left 12/11/2018    Procedure: ARTHROSCOPY, KNEE, WITH MENISCECTOMY;  Surgeon: Gwendolyn Jeong MD;  Location: [facility] OR;  Service: Orthopedics;  Laterality: Left;  Cartilage biopsy  and repair or micro-fracture    KNEE SURGERY      SYNOVECTOMY OF KNEE Left 12/11/2018    Procedure: SYNOVECTOMY, KNEE;  Surgeon: Gwendolyn Jeong MD;  Location: [facility];  Service: Orthopedics;  Laterality: Left;       Family History   Problem Relation Age of Onset    Diabetes Father     Cancer Father     Skin cancer Father     Cancer Maternal Grandfather

## 2023-07-14 ENCOUNTER — PATIENT MESSAGE (OUTPATIENT)
Dept: ORTHOPEDICS | Facility: CLINIC | Age: 53
End: 2023-07-14
Payer: COMMERCIAL

## 2023-08-08 NOTE — TELEPHONE ENCOUNTER
No care due was identified.  [facility] Embedded Care Due Messages. Reference number: [id number].   8/08/2023 12:20:41 PM CDT

## 2023-08-09 RX ORDER — AMLODIPINE BESYLATE 10 MG/1
TABLET ORAL
Qty: 90 TABLET | Refills: 3 | Status: SHIPPED | OUTPATIENT
Start: 2023-08-09

## 2023-08-09 RX ORDER — IRBESARTAN 300 MG/1
TABLET ORAL
Qty: 90 TABLET | Refills: 3 | Status: SHIPPED | OUTPATIENT
Start: 2023-08-09

## 2023-08-09 NOTE — TELEPHONE ENCOUNTER
Refill Decision Note   Nirmal Moss  is requesting a refill authorization.  Brief Assessment and Rationale for Refill:  Approve     Medication Therapy Plan:         Comments:     Note composed:8:34 AM 08/09/2023

## 2023-08-11 RX ORDER — DESONIDE 0.5 MG/G
CREAM TOPICAL 2 TIMES DAILY
Qty: 60 G | Refills: 3 | Status: SHIPPED | OUTPATIENT
Start: 2023-08-11

## 2023-08-15 ENCOUNTER — HOSPITAL ENCOUNTER (OUTPATIENT)
Dept: CARDIOLOGY | Facility: CLINIC | Age: 53
Discharge: HOME | End: 2023-08-15
Payer: COMMERCIAL

## 2023-08-15 ENCOUNTER — OFFICE VISIT (OUTPATIENT)
Dept: ELECTROPHYSIOLOGY | Facility: CLINIC | Age: 53
End: 2023-08-15
Payer: COMMERCIAL

## 2023-08-15 VITALS
DIASTOLIC BLOOD PRESSURE: 76 MMHG | SYSTOLIC BLOOD PRESSURE: 140 MMHG | BODY MASS INDEX: 32.47 KG/M2 | HEART RATE: 71 BPM | WEIGHT: 231.94 LBS | HEIGHT: 71 IN

## 2023-08-15 DIAGNOSIS — R55 SYNCOPE, UNSPECIFIED SYNCOPE TYPE: Primary | ICD-10-CM

## 2023-08-15 DIAGNOSIS — R55 SYNCOPE, UNSPECIFIED SYNCOPE TYPE: ICD-10-CM

## 2023-08-15 PROCEDURE — 93010 RHYTHM STRIP: ICD-10-PCS | Mod: S$GLB,,, | Performed by: INTERNAL MEDICINE

## 2023-08-15 PROCEDURE — 1159F PR MEDICATION LIST DOCUMENTED IN MEDICAL RECORD: ICD-10-PCS | Mod: CPTII,S$GLB,, | Performed by: INTERNAL MEDICINE

## 2023-08-15 PROCEDURE — 99214 OFFICE O/P EST MOD 30 MIN: CPT | Mod: S$GLB,,, | Performed by: INTERNAL MEDICINE

## 2023-08-15 PROCEDURE — 1160F PR REVIEW ALL MEDS BY PRESCRIBER/CLIN PHARMACIST DOCUMENTED: ICD-10-PCS | Mod: CPTII,S$GLB,, | Performed by: INTERNAL MEDICINE

## 2023-08-15 PROCEDURE — 93010 ELECTROCARDIOGRAM REPORT: CPT | Mod: S$GLB,,, | Performed by: INTERNAL MEDICINE

## 2023-08-15 PROCEDURE — 4010F ACE/ARB THERAPY RXD/TAKEN: CPT | Mod: CPTII,S$GLB,, | Performed by: INTERNAL MEDICINE

## 2023-08-15 PROCEDURE — 3078F PR MOST RECENT DIASTOLIC BLOOD PRESSURE < 80 MM HG: ICD-10-PCS | Mod: CPTII,S$GLB,, | Performed by: INTERNAL MEDICINE

## 2023-08-15 PROCEDURE — 99214 PR OFFICE/OUTPT VISIT, EST, LEVL IV, 30-39 MIN: ICD-10-PCS | Mod: S$GLB,,, | Performed by: INTERNAL MEDICINE

## 2023-08-15 PROCEDURE — 93005 RHYTHM STRIP: ICD-10-PCS | Mod: S$GLB,,, | Performed by: INTERNAL MEDICINE

## 2023-08-15 PROCEDURE — 93005 ELECTROCARDIOGRAM TRACING: CPT | Mod: S$GLB,,, | Performed by: INTERNAL MEDICINE

## 2023-08-15 PROCEDURE — 3078F DIAST BP <80 MM HG: CPT | Mod: CPTII,S$GLB,, | Performed by: INTERNAL MEDICINE

## 2023-08-15 PROCEDURE — 99999 PR PBB SHADOW E&M-EST. PATIENT-LVL IV: ICD-10-PCS | Mod: PBBFAC,,, | Performed by: INTERNAL MEDICINE

## 2023-08-15 PROCEDURE — 3077F SYST BP >= 140 MM HG: CPT | Mod: CPTII,S$GLB,, | Performed by: INTERNAL MEDICINE

## 2023-08-15 PROCEDURE — 3077F PR MOST RECENT SYSTOLIC BLOOD PRESSURE >= 140 MM HG: ICD-10-PCS | Mod: CPTII,S$GLB,, | Performed by: INTERNAL MEDICINE

## 2023-08-15 PROCEDURE — 99999 PR PBB SHADOW E&M-EST. PATIENT-LVL IV: CPT | Mod: PBBFAC,,, | Performed by: INTERNAL MEDICINE

## 2023-08-15 PROCEDURE — 3008F BODY MASS INDEX DOCD: CPT | Mod: CPTII,S$GLB,, | Performed by: INTERNAL MEDICINE

## 2023-08-15 PROCEDURE — 4010F PR ACE/ARB THEARPY RXD/TAKEN: ICD-10-PCS | Mod: CPTII,S$GLB,, | Performed by: INTERNAL MEDICINE

## 2023-08-15 PROCEDURE — 1159F MED LIST DOCD IN RCRD: CPT | Mod: CPTII,S$GLB,, | Performed by: INTERNAL MEDICINE

## 2023-08-15 PROCEDURE — 1160F RVW MEDS BY RX/DR IN RCRD: CPT | Mod: CPTII,S$GLB,, | Performed by: INTERNAL MEDICINE

## 2023-08-15 PROCEDURE — 3008F PR BODY MASS INDEX (BMI) DOCUMENTED: ICD-10-PCS | Mod: CPTII,S$GLB,, | Performed by: INTERNAL MEDICINE

## 2023-08-15 NOTE — PROGRESS NOTES
Subjective:   Patient ID:    Patient of Dr. Wise.  Last seen in clinic 6/13/23 as new patient referral for syncope.    Nirmal Moss is a 52 y.o. male who presents for follow-up evaluation of syncope. He has history of seizure disorder and had been on Keppra in the past. He had a witnessed syncopal event 5/28/23 with shoulder injury and fall, hitting his head. Previously he had events and had a warning that he was going to pass out. This most recent event, there was no warning. He fell and did not protect himself. He was sweating profusely and was somewhat groggy. He broke his collarbone on the fall. His wife witnessed him fall from a standing position. Shortly after he was standing again and had a near syncopal event. He went to the sofa with his wife's assistance. No further events.      Interval (08/15/2023):  Here with wife-- 6 week f/u with Event monitor and Echo results which are normal--reports ambulatory Home EEG x 3 days was normal.  Has not had any events since last visit.  Feels fine. Is not interested in ILR implant at this time unless he experiences another syncopal episode. Still not driving per neurologist-- plans to address at f/u visit.     Cardiac event monitor  Result Date: 8/11/2023  ·  Negative event monitor with no clinical arrhythmias.·  Symptoms corresponding with normal sinus rhythm.       Echo  Addendum Date: 6/26/2023    · The left ventricle is normal in size with normal systolic function.· The estimated ejection fraction is 60%.· Normal left ventricular diastolic function.· Normal right ventricular size with normal right ventricular systolic function.· Normal central venous pressure (3 mmHg).· The estimated PA systolic pressure is 30 mmHg.     Past Medical History:  No date: ADD (attention deficit disorder)  No date: Anxiety  No date: Depression  No date: Gastritis      Comment:  EGD 2014  No date: HDL deficiency  No date: Hypertension  No date: Kidney stone  09/2013: Meniscus  tear      Comment:  right  01/2017: Meniscus tear      Comment:  right  1/18/2018: Seizure disorder  06/2014: Seizures      Comment:  last seizure in late 2014 1/18/2018: Vitamin D deficiency disease    Past Surgical History:  05/2016: FRACTURE SURGERY; Left      Comment:  wrist fracture  12/11/2018: INJECTION OF JOINT; Left      Comment:  Procedure: Injection, Joint, Bio-D Left Knee;  Surgeon:                Gwendolyn Jeong MD;  Location: [facility] OR;  Service:                Orthopedics;  Laterality: Left;  Bio-D Left Knee  12/11/2018: KNEE ARTHROSCOPY W/ MENISCECTOMY; Left      Comment:  Procedure: ARTHROSCOPY, KNEE, WITH MENISCECTOMY;                 Surgeon: Gwendolyn Jeong MD;  Location: [facility] OR;  Service:                Orthopedics;  Laterality: Left;  Cartilage biopsyand                repair or micro-fracture  No date: KNEE SURGERY  12/11/2018: SYNOVECTOMY OF KNEE; Left      Comment:  Procedure: SYNOVECTOMY, KNEE;  Surgeon: Gwendolyn Jeong MD;                Location: [facility] OR;  Service: Orthopedics;  Laterality:                Left;    Outpatient Medications as of 8/15/2023:  amLODIPine (NORVASC) 10 MG tablet, Take one tablet by mouth daily FOR BLOOD PRESSURE, Disp: 90 tablet, Rfl: 3  clindamycin (CLEOCIN) 300 MG capsule, Take 300 mg by mouth every 8 (eight) hours., Disp: , Rfl:   cyclobenzaprine (FLEXERIL) 10 MG tablet, Take 1 tablet (10 mg total) by mouth 3 (three) times daily as needed for Muscle spasms., Disp: 30 tablet, Rfl: 12  desonide (DESOWEN) 0.05 % cream, Apply topically 2 (two) times daily., Disp: 60 g, Rfl: 3  dextroamphetamine-amphetamine (ADDERALL XR) 30 MG 24 hr capsule, 2 a day, Disp: 60 capsule, Rfl: 0  dextroamphetamine-amphetamine (ADDERALL XR) 30 MG 24 hr capsule, 2 a day, Disp: 60 capsule, Rfl: 0  dextroamphetamine-amphetamine (ADDERALL XR) 30 MG 24 hr capsule, Take 2 tablets by mouth daily, Disp: 60 capsule, Rfl: 0  dextroamphetamine-amphetamine (ADDERALL XR) 30 MG 24 hr capsule, 2 a day, Disp: 60  capsule, Rfl: 0  hydroCHLOROthiazide (HYDRODIURIL) 25 MG tablet, Take one tablet by mouth daily, Disp: 90 tablet, Rfl: 12  irbesartan (AVAPRO) 300 MG tablet, Take one tablet by mouth daily FOR BLOOD PRESSURE, Disp: 90 tablet, Rfl: 3  ketoconazole (NIZORAL) 2 % shampoo, Apply topically., Disp: , Rfl:   lacosamide (VIMPAT) 100 mg Tab, Take by mouth., Disp: , Rfl:   methocarbamoL (ROBAXIN) 750 MG Tab, Take 1 tablet (750 mg total) by mouth 3 (three) times daily as needed., Disp: 40 tablet, Rfl: 4  metoprolol succinate (TOPROL-XL) 100 MG 24 hr tablet, Take one tablet by mouth daily FOR BLOOD PRESSURE, Disp: 90 tablet, Rfl: 3  montelukast (SINGULAIR) 10 mg tablet, TAKE ONE TABLET BY MOUTH EVERY EVENING, Disp: 30 tablet, Rfl: 5  naproxen (NAPROSYN) 500 MG tablet, Take 1 tablet (500 mg total) by mouth 2 (two) times daily with meals. As needed for pain, Disp: 20 tablet, Rfl: 0  oxyCODONE-acetaminophen (PERCOCET) 5-325 mg per tablet, Take 1 tablet by mouth every 6 (six) hours as needed for Pain. (Patient not taking: Reported on 7/13/2023), Disp: 30 tablet, Rfl: 0  paroxetine (PAXIL) 20 MG tablet, Take 3 tablets (60 mg total) by mouth every morning., Disp: 270 tablet, Rfl: 2             Review of Systems   Constitutional: Negative for fever and malaise/fatigue.   Eyes:  Negative for visual disturbance.   Cardiovascular:  Negative for chest pain, dyspnea on exertion, irregular heartbeat, leg swelling, near-syncope and syncope.   Respiratory:  Negative for cough and shortness of breath.    Skin:  Negative for rash.   Neurological:  Negative for focal weakness and light-headedness.       Objective:   Physical Exam  Vitals and nursing note reviewed. Exam conducted with a chaperone present (here with wife).   Constitutional:       General: He is not in acute distress.     Appearance: He is not ill-appearing.   Cardiovascular:      Rate and Rhythm: Normal rate and regular rhythm.      Pulses:           Radial pulses are 2+ on the  right side and 2+ on the left side.   Pulmonary:      Effort: Pulmonary effort is normal. No respiratory distress.   Musculoskeletal:      Right lower leg: Normal. No swelling. No edema.      Left lower leg: Normal. No swelling. No edema.   Neurological:      Mental Status: He is alert and oriented to person, place, and time.      Gait: Gait normal.        EKG: NSR; 71 bpm; TX, QRS, QTc: normal  Assessment:      1. Syncope, unspecified syncope type        Plan:     52 y.o. male with syncopal episode x 1 a few months ago-- no new syncopal episodes since. Echo and 30 Day cardiac event monitor normal-- discussed with patient next steps which include 1) no further arrhythmia workup 2) possible ILR implant. Patient states that he is comfortable thus far with unremarkable cardiac workup and would like to defer any further workup at this time.     1. Syncope, unspecified syncope type  Assessment & Plan:  Echo and 30 Day Cardiac event monitor normal  1) Patient would like to defer consideration of ILR implant unless has another syncopal episode

## 2023-08-15 NOTE — ASSESSMENT & PLAN NOTE
Echo and 30 Day Cardiac event monitor normal  1) Patient would like to defer consideration of ILR implant unless has another syncopal episode

## 2023-08-21 DIAGNOSIS — S42.009A CLOSED NONDISPLACED FRACTURE OF CLAVICLE, UNSPECIFIED LATERALITY, UNSPECIFIED PART OF CLAVICLE, INITIAL ENCOUNTER: Primary | ICD-10-CM

## 2023-08-24 ENCOUNTER — OFFICE VISIT (OUTPATIENT)
Dept: ORTHOPEDICS | Facility: CLINIC | Age: 53
End: 2023-08-24
Attending: ORTHOPAEDIC SURGERY
Payer: COMMERCIAL

## 2023-08-24 DIAGNOSIS — S42.009A CLOSED NONDISPLACED FRACTURE OF CLAVICLE, UNSPECIFIED LATERALITY, UNSPECIFIED PART OF CLAVICLE, INITIAL ENCOUNTER: Primary | ICD-10-CM

## 2023-08-24 PROCEDURE — 99999 PR PBB SHADOW E&M-EST. PATIENT-LVL III: ICD-10-PCS | Mod: PBBFAC,,, | Performed by: ORTHOPAEDIC SURGERY

## 2023-08-24 PROCEDURE — 1160F PR REVIEW ALL MEDS BY PRESCRIBER/CLIN PHARMACIST DOCUMENTED: ICD-10-PCS | Mod: CPTII,S$GLB,, | Performed by: ORTHOPAEDIC SURGERY

## 2023-08-24 PROCEDURE — 99999 PR PBB SHADOW E&M-EST. PATIENT-LVL III: CPT | Mod: PBBFAC,,, | Performed by: ORTHOPAEDIC SURGERY

## 2023-08-24 PROCEDURE — 1160F RVW MEDS BY RX/DR IN RCRD: CPT | Mod: CPTII,S$GLB,, | Performed by: ORTHOPAEDIC SURGERY

## 2023-08-24 PROCEDURE — 4010F ACE/ARB THERAPY RXD/TAKEN: CPT | Mod: CPTII,S$GLB,, | Performed by: ORTHOPAEDIC SURGERY

## 2023-08-24 PROCEDURE — 1159F MED LIST DOCD IN RCRD: CPT | Mod: CPTII,S$GLB,, | Performed by: ORTHOPAEDIC SURGERY

## 2023-08-24 PROCEDURE — 1159F PR MEDICATION LIST DOCUMENTED IN MEDICAL RECORD: ICD-10-PCS | Mod: CPTII,S$GLB,, | Performed by: ORTHOPAEDIC SURGERY

## 2023-08-24 PROCEDURE — 99213 PR OFFICE/OUTPT VISIT, EST, LEVL III, 20-29 MIN: ICD-10-PCS | Mod: S$GLB,,, | Performed by: ORTHOPAEDIC SURGERY

## 2023-08-24 PROCEDURE — 99213 OFFICE O/P EST LOW 20 MIN: CPT | Mod: S$GLB,,, | Performed by: ORTHOPAEDIC SURGERY

## 2023-08-24 PROCEDURE — 4010F PR ACE/ARB THEARPY RXD/TAKEN: ICD-10-PCS | Mod: CPTII,S$GLB,, | Performed by: ORTHOPAEDIC SURGERY

## 2023-08-24 NOTE — PROGRESS NOTES
Assessment: 52 y.o. male with L clavicle fracture     I explained my diagnostic impression and the reasoning behind it in detail, using layman's terms.      Plan:   - Activity as tolerated   - Discussed risk of re fracture with fall  - Return to clinic in 8 weeks with x-rays. Return sooner if symptoms worsen or fail to improve.      All questions were answered in detail. The patient is in full agreement with the treatment plan and will proceed accordingly.    Chief Complaint   Patient presents with    Left Shoulder - Pain, Injury       Initial visit (6/1/23): Nirmal Moss is a 52 y.o. male who presents today complaining of Pain and Injury of the Left Shoulder     DOI: 5/28/23  Syncopal episode x2 witnessed by his wife - hit patio with head/left shoulder  Hypotensive in ER  Went to ER vs EMS - found to have clavicle fracture   In sling today     07/13/2023  Here for follow-up of left midshaft clavicle fracture that was treated nonoperatively in a sling.  He is doing fine with active range of motion  No pain with range of motion  Has discontinued use of the sling with no problems   No numbness or tingling    8/24/23   No pain   Has full ROM    This is the extent of the patient's complaints at this time.            Review of patient's allergies indicates:   Allergen Reactions    Hydrocodone Rash         Current Outpatient Medications:     amLODIPine (NORVASC) 10 MG tablet, Take one tablet by mouth daily FOR BLOOD PRESSURE, Disp: 90 tablet, Rfl: 3    clindamycin (CLEOCIN) 300 MG capsule, Take 300 mg by mouth every 8 (eight) hours., Disp: , Rfl:     cyclobenzaprine (FLEXERIL) 10 MG tablet, Take 1 tablet (10 mg total) by mouth 3 (three) times daily as needed for Muscle spasms., Disp: 30 tablet, Rfl: 12    desonide (DESOWEN) 0.05 % cream, Apply topically 2 (two) times daily., Disp: 60 g, Rfl: 3    dextroamphetamine-amphetamine (ADDERALL XR) 30 MG 24 hr capsule, 2 a day, Disp: 60 capsule, Rfl: 0     dextroamphetamine-amphetamine (ADDERALL XR) 30 MG 24 hr capsule, 2 a day, Disp: 60 capsule, Rfl: 0    dextroamphetamine-amphetamine (ADDERALL XR) 30 MG 24 hr capsule, Take 2 tablets by mouth daily, Disp: 60 capsule, Rfl: 0    dextroamphetamine-amphetamine (ADDERALL XR) 30 MG 24 hr capsule, 2 a day, Disp: 60 capsule, Rfl: 0    hydroCHLOROthiazide (HYDRODIURIL) 25 MG tablet, Take one tablet by mouth daily, Disp: 90 tablet, Rfl: 12    irbesartan (AVAPRO) 300 MG tablet, Take one tablet by mouth daily FOR BLOOD PRESSURE, Disp: 90 tablet, Rfl: 3    ketoconazole (NIZORAL) 2 % shampoo, Apply topically., Disp: , Rfl:     lacosamide (VIMPAT) 100 mg Tab, Take by mouth., Disp: , Rfl:     methocarbamoL (ROBAXIN) 750 MG Tab, Take 1 tablet (750 mg total) by mouth 3 (three) times daily as needed., Disp: 40 tablet, Rfl: 4    metoprolol succinate (TOPROL-XL) 100 MG 24 hr tablet, Take one tablet by mouth daily FOR BLOOD PRESSURE, Disp: 90 tablet, Rfl: 3    naproxen (NAPROSYN) 500 MG tablet, Take 1 tablet (500 mg total) by mouth 2 (two) times daily with meals. As needed for pain, Disp: 20 tablet, Rfl: 0    paroxetine (PAXIL) 20 MG tablet, Take 3 tablets (60 mg total) by mouth every morning., Disp: 270 tablet, Rfl: 2    Physical Exam:   Vitals:    08/24/23 1257   PainSc: 0-No pain   PainLoc: Shoulder     General:  Patient is alert, awake and oriented to time, place and person. Mood and affect are appropriate.  Patient does not appear to be in any distress, denies any constitutional symptoms and appears stated age.   HEENT:  Pupils are equal and round, sclera are not injected. External examination of ears and nose reveals no abnormalities. Cranial nerves II-X are grossly intact  Skin:  no rashes, abrasions or open wounds on the affected extremity   Resp:  No respiratory distress or audible wheezing   CV: 2+  pulses, all extremities warm and well perfused   Left Upper extremity   NTTP over clavicle  Full range of motion of the shoulder  without pain  LTSI m/u/r  2+ RP  + EPL, IO, FDS, FDP     Imaging: 3 views of the L shoulder, 2 views clavicle show minimally displaced clavicle fracture, + interval callus formation    I personally reviewed and interpreted the patient's imaging obtained today in clinic        This note was created by combination of typed  and M-Modal dictation. Transcription and phonetic errors may be present.  If there are any questions, please contact me.    Past Medical History:   Diagnosis Date    ADD (attention deficit disorder)     Anxiety     Depression     Gastritis     EGD 2014    HDL deficiency     Hypertension     Kidney stone     Meniscus tear 09/2013    right    Meniscus tear 01/2017    right    Seizure disorder 1/18/2018    Seizures 06/2014    last seizure in late 2014    Vitamin D deficiency disease 1/18/2018       Active Problem List with Overview Notes    Diagnosis Date Noted    Syncope 08/15/2023    Acute medial meniscus tear of left knee 12/11/2018    Mood disorder 01/18/2018    Seizure disorder 01/18/2018    Vitamin D deficiency disease 01/18/2018    Complex tear of lateral meniscus of right knee as current injury 03/28/2017    Colles' fracture 05/15/2016    Closed fracture of left ulnar styloid 05/15/2016    ADD (attention deficit disorder) 07/30/2015    Dyspepsia 04/04/2014    Anxiety disorder 01/15/2014    Insomnia 01/15/2014    Knee pain 12/05/2013    HTN (hypertension) 12/03/2013       Past Surgical History:   Procedure Laterality Date    FRACTURE SURGERY Left 05/2016    wrist fracture    INJECTION OF JOINT Left 12/11/2018    Procedure: Injection, Joint, Bio-D Left Knee;  Surgeon: Gwendolyn Jeong MD;  Location: [facility] OR;  Service: Orthopedics;  Laterality: Left;  Bio-D Left Knee    KNEE ARTHROSCOPY W/ MENISCECTOMY Left 12/11/2018    Procedure: ARTHROSCOPY, KNEE, WITH MENISCECTOMY;  Surgeon: Gwendolyn Jeong MD;  Location: [facility] OR;  Service: Orthopedics;  Laterality: Left;  Cartilage biopsy  and repair or  micro-fracture    KNEE SURGERY      SYNOVECTOMY OF KNEE Left 12/11/2018    Procedure: SYNOVECTOMY, KNEE;  Surgeon: Gwendolyn Jeong MD;  Location: [facility];  Service: Orthopedics;  Laterality: Left;       Family History   Problem Relation Age of Onset    Diabetes Father     Cancer Father     Skin cancer Father     Cancer Maternal Grandfather

## 2023-09-13 DIAGNOSIS — F98.8 ATTENTION DEFICIT DISORDER, UNSPECIFIED HYPERACTIVITY PRESENCE: ICD-10-CM

## 2023-09-13 NOTE — TELEPHONE ENCOUNTER
No care due was identified.  [facility] Embedded Care Due Messages. Reference number: [id number].   9/13/2023 9:07:33 AM CDT

## 2023-09-14 RX ORDER — DEXTROAMPHETAMINE SACCHARATE, AMPHETAMINE ASPARTATE MONOHYDRATE, DEXTROAMPHETAMINE SULFATE AND AMPHETAMINE SULFATE 7.5; 7.5; 7.5; 7.5 MG/1; MG/1; MG/1; MG/1
CAPSULE, EXTENDED RELEASE ORAL
Qty: 60 CAPSULE | Refills: 0 | Status: SHIPPED | OUTPATIENT
Start: 2023-09-14 | End: 2023-10-13 | Stop reason: SDUPTHER

## 2023-09-18 ENCOUNTER — PATIENT MESSAGE (OUTPATIENT)
Dept: FAMILY MEDICINE | Facility: CLINIC | Age: 53
End: 2023-09-18
Payer: COMMERCIAL

## 2023-09-18 DIAGNOSIS — F98.8 ATTENTION DEFICIT DISORDER, UNSPECIFIED HYPERACTIVITY PRESENCE: ICD-10-CM

## 2023-09-18 NOTE — TELEPHONE ENCOUNTER
----- Message from Mireya Walker sent at 9/18/2023  2:50 PM CDT -----  Type: RX Refill Request    Who Called: pt     Have you contacted your pharmacy:    Refill or New Rx:dextroamphetamine-amphetamine (ADDERALL XR) 30 MG 24 hr capsule    RX Name and Strength:    How is the patient currently taking it? (ex. 1XDay):    Is this a 30 day or 90 day RX:    Preferred Pharmacy with phone number:.   [facility] - [place], [place] - [address]  [address] [place] [address]  Phone: [phone] Fax: [phone]        Local or Mail Order:    Ordering Provider:    Would the patient rather a call back or a response via My [facility]? call    Best Call Back Number:[phone] (home)       Additional Information:

## 2023-09-18 NOTE — TELEPHONE ENCOUNTER
No care due was identified.  [facility] Embedded Care Due Messages. Reference number: [id number].   9/18/2023 11:01:07 AM CDT

## 2023-09-19 RX ORDER — DEXTROAMPHETAMINE SACCHARATE, AMPHETAMINE ASPARTATE MONOHYDRATE, DEXTROAMPHETAMINE SULFATE AND AMPHETAMINE SULFATE 7.5; 7.5; 7.5; 7.5 MG/1; MG/1; MG/1; MG/1
CAPSULE, EXTENDED RELEASE ORAL
Qty: 60 CAPSULE | Refills: 0 | OUTPATIENT
Start: 2023-09-19

## 2023-09-19 NOTE — TELEPHONE ENCOUNTER
No care due was identified.  Health system Embedded Care Due Messages. Reference number: [id number].   9/19/2023 9:51:47 AM CDT

## 2023-09-20 RX ORDER — DEXTROAMPHETAMINE SACCHARATE, AMPHETAMINE ASPARTATE MONOHYDRATE, DEXTROAMPHETAMINE SULFATE AND AMPHETAMINE SULFATE 7.5; 7.5; 7.5; 7.5 MG/1; MG/1; MG/1; MG/1
CAPSULE, EXTENDED RELEASE ORAL
Qty: 60 CAPSULE | Refills: 0 | Status: SHIPPED | OUTPATIENT
Start: 2023-09-20 | End: 2024-01-19

## 2023-09-20 RX ORDER — METHOCARBAMOL 750 MG/1
750 TABLET, FILM COATED ORAL 3 TIMES DAILY PRN
Qty: 40 TABLET | Refills: 4 | Status: SHIPPED | OUTPATIENT
Start: 2023-09-20

## 2023-10-13 ENCOUNTER — PATIENT MESSAGE (OUTPATIENT)
Dept: FAMILY MEDICINE | Facility: CLINIC | Age: 53
End: 2023-10-13
Payer: COMMERCIAL

## 2023-10-13 DIAGNOSIS — F98.8 ATTENTION DEFICIT DISORDER, UNSPECIFIED HYPERACTIVITY PRESENCE: ICD-10-CM

## 2023-10-13 RX ORDER — DEXTROAMPHETAMINE SACCHARATE, AMPHETAMINE ASPARTATE MONOHYDRATE, DEXTROAMPHETAMINE SULFATE AND AMPHETAMINE SULFATE 7.5; 7.5; 7.5; 7.5 MG/1; MG/1; MG/1; MG/1
CAPSULE, EXTENDED RELEASE ORAL
Qty: 60 CAPSULE | Refills: 0 | Status: SHIPPED | OUTPATIENT
Start: 2023-11-13 | End: 2024-01-31 | Stop reason: SDUPTHER

## 2023-10-13 RX ORDER — DEXTROAMPHETAMINE SACCHARATE, AMPHETAMINE ASPARTATE MONOHYDRATE, DEXTROAMPHETAMINE SULFATE AND AMPHETAMINE SULFATE 7.5; 7.5; 7.5; 7.5 MG/1; MG/1; MG/1; MG/1
CAPSULE, EXTENDED RELEASE ORAL
Qty: 60 CAPSULE | Refills: 0 | Status: SHIPPED | OUTPATIENT
Start: 2023-10-13 | End: 2024-01-31 | Stop reason: SDUPTHER

## 2023-10-13 RX ORDER — DEXTROAMPHETAMINE SACCHARATE, AMPHETAMINE ASPARTATE MONOHYDRATE, DEXTROAMPHETAMINE SULFATE AND AMPHETAMINE SULFATE 7.5; 7.5; 7.5; 7.5 MG/1; MG/1; MG/1; MG/1
CAPSULE, EXTENDED RELEASE ORAL
Qty: 60 CAPSULE | Refills: 0 | Status: SHIPPED | OUTPATIENT
Start: 2023-12-13 | End: 2024-01-31 | Stop reason: SDUPTHER

## 2023-10-17 DIAGNOSIS — S42.009A CLOSED NONDISPLACED FRACTURE OF CLAVICLE, UNSPECIFIED LATERALITY, UNSPECIFIED PART OF CLAVICLE, INITIAL ENCOUNTER: Primary | ICD-10-CM

## 2023-10-19 ENCOUNTER — OFFICE VISIT (OUTPATIENT)
Dept: ORTHOPEDICS | Facility: CLINIC | Age: 53
End: 2023-10-19
Payer: COMMERCIAL

## 2023-10-19 DIAGNOSIS — S42.022K CLOSED DISPLACED FRACTURE OF SHAFT OF LEFT CLAVICLE WITH NONUNION, SUBSEQUENT ENCOUNTER: ICD-10-CM

## 2023-10-19 DIAGNOSIS — S42.022G CLOSED DISPLACED FRACTURE OF SHAFT OF LEFT CLAVICLE WITH DELAYED HEALING, SUBSEQUENT ENCOUNTER: Primary | ICD-10-CM

## 2023-10-19 PROCEDURE — 99999 PR PBB SHADOW E&M-EST. PATIENT-LVL III: CPT | Mod: PBBFAC,,, | Performed by: ORTHOPAEDIC SURGERY

## 2023-10-19 PROCEDURE — 99213 OFFICE O/P EST LOW 20 MIN: CPT | Mod: S$GLB,,, | Performed by: ORTHOPAEDIC SURGERY

## 2023-10-19 PROCEDURE — 1160F RVW MEDS BY RX/DR IN RCRD: CPT | Mod: CPTII,S$GLB,, | Performed by: ORTHOPAEDIC SURGERY

## 2023-10-19 PROCEDURE — 1160F PR REVIEW ALL MEDS BY PRESCRIBER/CLIN PHARMACIST DOCUMENTED: ICD-10-PCS | Mod: CPTII,S$GLB,, | Performed by: ORTHOPAEDIC SURGERY

## 2023-10-19 PROCEDURE — 1159F MED LIST DOCD IN RCRD: CPT | Mod: CPTII,S$GLB,, | Performed by: ORTHOPAEDIC SURGERY

## 2023-10-19 PROCEDURE — 1159F PR MEDICATION LIST DOCUMENTED IN MEDICAL RECORD: ICD-10-PCS | Mod: CPTII,S$GLB,, | Performed by: ORTHOPAEDIC SURGERY

## 2023-10-19 PROCEDURE — 99999 PR PBB SHADOW E&M-EST. PATIENT-LVL III: ICD-10-PCS | Mod: PBBFAC,,, | Performed by: ORTHOPAEDIC SURGERY

## 2023-10-19 PROCEDURE — 99213 PR OFFICE/OUTPT VISIT, EST, LEVL III, 20-29 MIN: ICD-10-PCS | Mod: S$GLB,,, | Performed by: ORTHOPAEDIC SURGERY

## 2023-10-19 PROCEDURE — 4010F ACE/ARB THERAPY RXD/TAKEN: CPT | Mod: CPTII,S$GLB,, | Performed by: ORTHOPAEDIC SURGERY

## 2023-10-19 PROCEDURE — 4010F PR ACE/ARB THEARPY RXD/TAKEN: ICD-10-PCS | Mod: CPTII,S$GLB,, | Performed by: ORTHOPAEDIC SURGERY

## 2023-10-19 NOTE — PROGRESS NOTES
Assessment: 52 y.o. male with L clavicle fracture     I explained my diagnostic impression and the reasoning behind it in detail, using layman's terms.      Plan:   - Activity as tolerated - I want to see how he feels without self imposed limitations  - Labs - had elevated TSH in the past   - Bone stim  - Return to clinic in 8 weeks with x-rays. Return sooner if symptoms worsen or fail to improve.      All questions were answered in detail. The patient is in full agreement with the treatment plan and will proceed accordingly.    Chief Complaint   Patient presents with    Left Shoulder - Pain       Initial visit (6/1/23): Nirmal Moss is a 52 y.o. male who presents today complaining of No chief complaint on file.     DOI: 5/28/23  Syncopal episode x2 witnessed by his wife - hit patio with head/left shoulder  Hypotensive in ER  Went to ER vs EMS - found to have clavicle fracture   In sling today     07/13/2023  Here for follow-up of left midshaft clavicle fracture that was treated nonoperatively in a sling.  He is doing fine with active range of motion  No pain with range of motion  Has discontinued use of the sling with no problems   No numbness or tingling    8/24/23   No pain   Has full ROM    10/19/23  No pain over fracture site   Still limiting activity because he is concerned about causing pain   Some AC tenderness, only hurts if he pushes on it     This is the extent of the patient's complaints at this time.       Review of patient's allergies indicates:   Allergen Reactions    Hydrocodone Rash         Current Outpatient Medications:     amLODIPine (NORVASC) 10 MG tablet, Take one tablet by mouth daily FOR BLOOD PRESSURE, Disp: 90 tablet, Rfl: 3    clindamycin (CLEOCIN) 300 MG capsule, Take 300 mg by mouth every 8 (eight) hours., Disp: , Rfl:     cyclobenzaprine (FLEXERIL) 10 MG tablet, Take 1 tablet (10 mg total) by mouth 3 (three) times daily as needed for Muscle spasms., Disp: 30 tablet, Rfl: 12     desonide (DESOWEN) 0.05 % cream, Apply topically 2 (two) times daily., Disp: 60 g, Rfl: 3    dextroamphetamine-amphetamine (ADDERALL XR) 30 MG 24 hr capsule, 2 a day, Disp: 60 capsule, Rfl: 0    [START ON 12/13/2023] dextroamphetamine-amphetamine (ADDERALL XR) 30 MG 24 hr capsule, 2 a day, Disp: 60 capsule, Rfl: 0    [START ON 11/13/2023] dextroamphetamine-amphetamine (ADDERALL XR) 30 MG 24 hr capsule, Take 2 tablets by mouth daily, Disp: 60 capsule, Rfl: 0    dextroamphetamine-amphetamine (ADDERALL XR) 30 MG 24 hr capsule, 2 a day, Disp: 60 capsule, Rfl: 0    hydroCHLOROthiazide (HYDRODIURIL) 25 MG tablet, Take one tablet by mouth daily, Disp: 90 tablet, Rfl: 12    irbesartan (AVAPRO) 300 MG tablet, Take one tablet by mouth daily FOR BLOOD PRESSURE, Disp: 90 tablet, Rfl: 3    ketoconazole (NIZORAL) 2 % shampoo, Apply topically., Disp: , Rfl:     lacosamide (VIMPAT) 100 mg Tab, Take by mouth., Disp: , Rfl:     methocarbamoL (ROBAXIN) 750 MG Tab, Take 1 tablet (750 mg total) by mouth 3 (three) times daily as needed., Disp: 40 tablet, Rfl: 4    metoprolol succinate (TOPROL-XL) 100 MG 24 hr tablet, Take one tablet by mouth daily FOR BLOOD PRESSURE, Disp: 90 tablet, Rfl: 3    naproxen (NAPROSYN) 500 MG tablet, Take 1 tablet (500 mg total) by mouth 2 (two) times daily with meals. As needed for pain, Disp: 20 tablet, Rfl: 0    paroxetine (PAXIL) 20 MG tablet, Take 3 tablets (60 mg total) by mouth every morning., Disp: 270 tablet, Rfl: 2    Physical Exam:   Vitals:    10/19/23 1009   PainSc:   1   PainLoc: Shoulder     General:  Patient is alert, awake and oriented to time, place and person. Mood and affect are appropriate.  Patient does not appear to be in any distress, denies any constitutional symptoms and appears stated age.   HEENT:  Pupils are equal and round, sclera are not injected. External examination of ears and nose reveals no abnormalities. Cranial nerves II-X are grossly intact  Skin:  no rashes, abrasions or  open wounds on the affected extremity   Resp:  No respiratory distress or audible wheezing   CV: 2+  pulses, all extremities warm and well perfused   Left Upper extremity   NTTP over clavicle fracture site, bone ends palpable  Full range of motion of the shoulder without pain  Mild tenderness over AC joint  LTSI m/u/r  2+ RP  + EPL, IO, FDS, FDP     Imaging: 3 views of the L shoulder, 2 views clavicle show displaced clavicle fracture, no change in callus formation    I personally reviewed and interpreted the patient's imaging obtained today in clinic      This note was created by combination of typed  and M-Modal dictation. Transcription and phonetic errors may be present.  If there are any questions, please contact me.    Past Medical History:   Diagnosis Date    ADD (attention deficit disorder)     Anxiety     Depression     Gastritis     EGD 2014    HDL deficiency     Hypertension     Kidney stone     Meniscus tear 09/2013    right    Meniscus tear 01/2017    right    Seizure disorder 1/18/2018    Seizures 06/2014    last seizure in late 2014    Vitamin D deficiency disease 1/18/2018       Active Problem List with Overview Notes    Diagnosis Date Noted    Syncope 08/15/2023    Acute medial meniscus tear of left knee 12/11/2018    Mood disorder 01/18/2018    Seizure disorder 01/18/2018    Vitamin D deficiency disease 01/18/2018    Complex tear of lateral meniscus of right knee as current injury 03/28/2017    Colles' fracture 05/15/2016    Closed fracture of left ulnar styloid 05/15/2016    ADD (attention deficit disorder) 07/30/2015    Dyspepsia 04/04/2014    Anxiety disorder 01/15/2014    Insomnia 01/15/2014    Knee pain 12/05/2013    HTN (hypertension) 12/03/2013       Past Surgical History:   Procedure Laterality Date    FRACTURE SURGERY Left 05/2016    wrist fracture    INJECTION OF JOINT Left 12/11/2018    Procedure: Injection, Joint, Bio-D Left Knee;  Surgeon: Gwendolyn Jeong MD;  Location: [facility] OR;   Service: Orthopedics;  Laterality: Left;  Bio-D Left Knee    KNEE ARTHROSCOPY W/ MENISCECTOMY Left 12/11/2018    Procedure: ARTHROSCOPY, KNEE, WITH MENISCECTOMY;  Surgeon: Gwendolyn Jeong MD;  Location: [facility];  Service: Orthopedics;  Laterality: Left;  Cartilage biopsy  and repair or micro-fracture    KNEE SURGERY      SYNOVECTOMY OF KNEE Left 12/11/2018    Procedure: SYNOVECTOMY, KNEE;  Surgeon: Gwendolyn Jeong MD;  Location: [facility];  Service: Orthopedics;  Laterality: Left;       Family History   Problem Relation Age of Onset    Diabetes Father     Cancer Father     Skin cancer Father     Cancer Maternal Grandfather

## 2023-10-20 ENCOUNTER — LAB VISIT (OUTPATIENT)
Dept: LAB | Facility: HOSPITAL | Age: 53
End: 2023-10-20
Attending: ORTHOPAEDIC SURGERY
Payer: COMMERCIAL

## 2023-10-20 DIAGNOSIS — S42.022G CLOSED DISPLACED FRACTURE OF SHAFT OF LEFT CLAVICLE WITH DELAYED HEALING, SUBSEQUENT ENCOUNTER: ICD-10-CM

## 2023-10-20 LAB
ALBUMIN SERPL BCP-MCNC: 4.2 G/DL (ref 3.5–5.2)
ALP SERPL-CCNC: 108 U/L (ref 55–135)
ALT SERPL W/O P-5'-P-CCNC: 36 U/L (ref 10–44)
ANION GAP SERPL CALC-SCNC: 9 MMOL/L (ref 8–16)
AST SERPL-CCNC: 24 U/L (ref 10–40)
BILIRUB SERPL-MCNC: 0.4 MG/DL (ref 0.1–1)
BUN SERPL-MCNC: 15 MG/DL (ref 6–20)
CALCIUM SERPL-MCNC: 9.6 MG/DL (ref 8.7–10.5)
CHLORIDE SERPL-SCNC: 106 MMOL/L (ref 95–110)
CO2 SERPL-SCNC: 25 MMOL/L (ref 23–29)
CREAT SERPL-MCNC: 1 MG/DL (ref 0.5–1.4)
EST. GFR  (NO RACE VARIABLE): >60 ML/MIN/1.73 M^2
ESTIMATED AVG GLUCOSE: 103 MG/DL (ref 68–131)
GLUCOSE SERPL-MCNC: 107 MG/DL (ref 70–110)
HBA1C MFR BLD: 5.2 % (ref 4–5.6)
POTASSIUM SERPL-SCNC: 4.6 MMOL/L (ref 3.5–5.1)
PROT SERPL-MCNC: 7.2 G/DL (ref 6–8.4)
SODIUM SERPL-SCNC: 140 MMOL/L (ref 136–145)

## 2023-10-20 PROCEDURE — 80053 COMPREHEN METABOLIC PANEL: CPT | Performed by: ORTHOPAEDIC SURGERY

## 2023-10-20 PROCEDURE — 83036 HEMOGLOBIN GLYCOSYLATED A1C: CPT | Performed by: ORTHOPAEDIC SURGERY

## 2023-10-24 DIAGNOSIS — I10 ESSENTIAL HYPERTENSION: ICD-10-CM

## 2023-10-24 NOTE — TELEPHONE ENCOUNTER
No care due was identified.  [facility] Embedded Care Due Messages. Reference number: [id number].   10/24/2023 2:22:50 PM CDT

## 2023-10-25 NOTE — TELEPHONE ENCOUNTER
Refill Routing Note   Medication(s) are not appropriate for processing by [facility] for the following reason(s):      Required vitals abnormal    [facility] action(s):  Defer Care Due:  None identified            Appointments  past 12m or future 3m with PCP    Date Provider   Last Visit   6/16/2023 Ravi Baird MD   Next Visit   Visit date not found Ravi Baird MD   ED visits in past 90 days: 0        Note composed:11:27 AM 10/25/2023

## 2023-10-25 NOTE — TELEPHONE ENCOUNTER
No care due was identified.  [facility] Embedded Care Due Messages. Reference number: [id number].   10/25/2023 10:01:34 AM CDT

## 2023-10-25 NOTE — TELEPHONE ENCOUNTER
Refill Routing Note   Medication(s) are not appropriate for processing by [facility] for the following reason(s):      Required labs abnormal    [facility] action(s):  Defer Care Due:  None identified            Appointments  past 12m or future 3m with PCP    Date Provider   Last Visit   6/16/2023 Ravi Baird MD   Next Visit   10/25/2023 Ravi Baird MD   ED visits in past 90 days: 0        Note composed:12:58 PM 10/25/2023

## 2023-10-26 RX ORDER — METOPROLOL SUCCINATE 100 MG/1
TABLET, EXTENDED RELEASE ORAL
Qty: 90 TABLET | Refills: 2 | Status: SHIPPED | OUTPATIENT
Start: 2023-10-26 | End: 2024-01-31

## 2023-10-26 RX ORDER — HYDROCHLOROTHIAZIDE 25 MG/1
TABLET ORAL
Qty: 90 TABLET | Refills: 2 | Status: SHIPPED | OUTPATIENT
Start: 2023-10-26 | End: 2024-03-27

## 2023-11-05 ENCOUNTER — PATIENT MESSAGE (OUTPATIENT)
Dept: ORTHOPEDICS | Facility: CLINIC | Age: 53
End: 2023-11-05
Payer: COMMERCIAL

## 2023-11-06 NOTE — TELEPHONE ENCOUNTER
No care due was identified.  [facility] Embedded Care Due Messages. Reference number: [id number].   11/06/2023 12:33:27 PM CST

## 2023-11-07 RX ORDER — PAROXETINE HYDROCHLORIDE 20 MG/1
TABLET, FILM COATED ORAL
Qty: 270 TABLET | Refills: 2 | Status: SHIPPED | OUTPATIENT
Start: 2023-11-07

## 2023-11-07 NOTE — TELEPHONE ENCOUNTER
Refill Decision Note   Nirmal Moss  is requesting a refill authorization.  Brief Assessment and Rationale for Refill:  Approve     Medication Therapy Plan:         Comments:     Note composed:1:29 AM 11/07/2023

## 2023-12-08 DIAGNOSIS — M25.512 LEFT SHOULDER PAIN, UNSPECIFIED CHRONICITY: Primary | ICD-10-CM

## 2023-12-14 ENCOUNTER — OFFICE VISIT (OUTPATIENT)
Dept: ORTHOPEDICS | Facility: CLINIC | Age: 53
End: 2023-12-14
Attending: ORTHOPAEDIC SURGERY
Payer: COMMERCIAL

## 2023-12-14 DIAGNOSIS — S42.022G CLOSED DISPLACED FRACTURE OF SHAFT OF LEFT CLAVICLE WITH DELAYED HEALING, SUBSEQUENT ENCOUNTER: Primary | ICD-10-CM

## 2023-12-14 PROCEDURE — 99213 PR OFFICE/OUTPT VISIT, EST, LEVL III, 20-29 MIN: ICD-10-PCS | Mod: S$GLB,,, | Performed by: ORTHOPAEDIC SURGERY

## 2023-12-14 PROCEDURE — 1160F RVW MEDS BY RX/DR IN RCRD: CPT | Mod: CPTII,S$GLB,, | Performed by: ORTHOPAEDIC SURGERY

## 2023-12-14 PROCEDURE — 3044F HG A1C LEVEL LT 7.0%: CPT | Mod: CPTII,S$GLB,, | Performed by: ORTHOPAEDIC SURGERY

## 2023-12-14 PROCEDURE — 4010F PR ACE/ARB THEARPY RXD/TAKEN: ICD-10-PCS | Mod: CPTII,S$GLB,, | Performed by: ORTHOPAEDIC SURGERY

## 2023-12-14 PROCEDURE — 99999 PR PBB SHADOW E&M-EST. PATIENT-LVL III: ICD-10-PCS | Mod: PBBFAC,,, | Performed by: ORTHOPAEDIC SURGERY

## 2023-12-14 PROCEDURE — 99213 OFFICE O/P EST LOW 20 MIN: CPT | Mod: S$GLB,,, | Performed by: ORTHOPAEDIC SURGERY

## 2023-12-14 PROCEDURE — 3044F PR MOST RECENT HEMOGLOBIN A1C LEVEL <7.0%: ICD-10-PCS | Mod: CPTII,S$GLB,, | Performed by: ORTHOPAEDIC SURGERY

## 2023-12-14 PROCEDURE — 1159F MED LIST DOCD IN RCRD: CPT | Mod: CPTII,S$GLB,, | Performed by: ORTHOPAEDIC SURGERY

## 2023-12-14 PROCEDURE — 4010F ACE/ARB THERAPY RXD/TAKEN: CPT | Mod: CPTII,S$GLB,, | Performed by: ORTHOPAEDIC SURGERY

## 2023-12-14 PROCEDURE — 99999 PR PBB SHADOW E&M-EST. PATIENT-LVL III: CPT | Mod: PBBFAC,,, | Performed by: ORTHOPAEDIC SURGERY

## 2023-12-14 PROCEDURE — 1159F PR MEDICATION LIST DOCUMENTED IN MEDICAL RECORD: ICD-10-PCS | Mod: CPTII,S$GLB,, | Performed by: ORTHOPAEDIC SURGERY

## 2023-12-14 PROCEDURE — 1160F PR REVIEW ALL MEDS BY PRESCRIBER/CLIN PHARMACIST DOCUMENTED: ICD-10-PCS | Mod: CPTII,S$GLB,, | Performed by: ORTHOPAEDIC SURGERY

## 2023-12-14 NOTE — PROGRESS NOTES
Assessment: 52 y.o. male with L clavicle fracture     I explained my diagnostic impression and the reasoning behind it in detail, using layman's terms.      Plan:   - Continue activity as tolerated  - Return to clinic in 8 weeks with x-rays - L clavicle series. Return sooner if symptoms worsen or fail to improve.      All questions were answered in detail. The patient is in full agreement with the treatment plan and will proceed accordingly.    Chief Complaint   Patient presents with    Left Shoulder - Injury, Pain       Initial visit (6/1/23): Nirmal Moss is a 52 y.o. male who presents today complaining of Injury and Pain of the Left Shoulder     DOI: 5/28/23  Syncopal episode x2 witnessed by his wife - hit patio with head/left shoulder  Hypotensive in ER  Went to ER vs EMS - found to have clavicle fracture   In sling today     07/13/2023  Here for follow-up of left midshaft clavicle fracture that was treated nonoperatively in a sling.  He is doing fine with active range of motion  No pain with range of motion  Has discontinued use of the sling with no problems   No numbness or tingling    8/24/23   No pain   Has full ROM    10/19/23  No pain over fracture site   Still limiting activity because he is concerned about causing pain   Some AC tenderness, only hurts if he pushes on it     12/14/23  Full activity without pain    This is the extent of the patient's complaints at this time.       Review of patient's allergies indicates:   Allergen Reactions    Hydrocodone Rash         Current Outpatient Medications:     amLODIPine (NORVASC) 10 MG tablet, Take one tablet by mouth daily FOR BLOOD PRESSURE, Disp: 90 tablet, Rfl: 3    clindamycin (CLEOCIN) 300 MG capsule, Take 300 mg by mouth every 8 (eight) hours., Disp: , Rfl:     cyclobenzaprine (FLEXERIL) 10 MG tablet, Take 1 tablet (10 mg total) by mouth 3 (three) times daily as needed for Muscle spasms., Disp: 30 tablet, Rfl: 12    desonide (DESOWEN) 0.05 %  cream, Apply topically 2 (two) times daily., Disp: 60 g, Rfl: 3    dextroamphetamine-amphetamine (ADDERALL XR) 30 MG 24 hr capsule, 2 a day, Disp: 60 capsule, Rfl: 0    dextroamphetamine-amphetamine (ADDERALL XR) 30 MG 24 hr capsule, 2 a day, Disp: 60 capsule, Rfl: 0    dextroamphetamine-amphetamine (ADDERALL XR) 30 MG 24 hr capsule, Take 2 tablets by mouth daily, Disp: 60 capsule, Rfl: 0    dextroamphetamine-amphetamine (ADDERALL XR) 30 MG 24 hr capsule, 2 a day, Disp: 60 capsule, Rfl: 0    hydroCHLOROthiazide (HYDRODIURIL) 25 MG tablet, Take one tablet by mouth daily, Disp: 90 tablet, Rfl: 2    irbesartan (AVAPRO) 300 MG tablet, Take one tablet by mouth daily FOR BLOOD PRESSURE, Disp: 90 tablet, Rfl: 3    ketoconazole (NIZORAL) 2 % shampoo, Apply topically., Disp: , Rfl:     lacosamide (VIMPAT) 100 mg Tab, Take by mouth., Disp: , Rfl:     methocarbamoL (ROBAXIN) 750 MG Tab, Take 1 tablet (750 mg total) by mouth 3 (three) times daily as needed., Disp: 40 tablet, Rfl: 4    metoprolol succinate (TOPROL-XL) 100 MG 24 hr tablet, Take one tablet by mouth daily FOR BLOOD PRESSURE, Disp: 90 tablet, Rfl: 2    naproxen (NAPROSYN) 500 MG tablet, Take 1 tablet (500 mg total) by mouth 2 (two) times daily with meals. As needed for pain, Disp: 20 tablet, Rfl: 0    paroxetine (PAXIL) 20 MG tablet, take 3 TABLET(s) BY MOUTH EVERY MORNING, Disp: 270 tablet, Rfl: 2    Physical Exam:   Vitals:    12/14/23 0922   PainSc: 0-No pain   PainLoc: Shoulder     General:  Patient is alert, awake and oriented to time, place and person. Mood and affect are appropriate.  Patient does not appear to be in any distress, denies any constitutional symptoms and appears stated age.   HEENT:  Pupils are equal and round, sclera are not injected. External examination of ears and nose reveals no abnormalities. Cranial nerves II-X are grossly intact  Skin:  no rashes, abrasions or open wounds on the affected extremity   Resp:  No respiratory distress or  audible wheezing   CV: 2+  pulses, all extremities warm and well perfused   Left Upper extremity   NTTP over clavicle fracture site, bone ends palpable  Full range of motion of the shoulder without pain  Mild tenderness over AC joint  LTSI m/u/r  2+ RP  + EPL, IO, FDS, FDP     Imaging: 3 views of the L shoulder, 2 views clavicle show displaced clavicle fracture,+ progression of callus formation medially but no change laterally    I personally reviewed and interpreted the patient's imaging obtained today in clinic      This note was created by combination of typed  and M-Modal dictation. Transcription and phonetic errors may be present.  If there are any questions, please contact me.    Past Medical History:   Diagnosis Date    ADD (attention deficit disorder)     Anxiety     Depression     Gastritis     EGD 2014    HDL deficiency     Hypertension     Kidney stone     Meniscus tear 09/2013    right    Meniscus tear 01/2017    right    Seizure disorder 1/18/2018    Seizures 06/2014    last seizure in late 2014    Vitamin D deficiency disease 1/18/2018       Active Problem List with Overview Notes    Diagnosis Date Noted    Syncope 08/15/2023    Acute medial meniscus tear of left knee 12/11/2018    Mood disorder 01/18/2018    Seizure disorder 01/18/2018    Vitamin D deficiency disease 01/18/2018    Complex tear of lateral meniscus of right knee as current injury 03/28/2017    Colles' fracture 05/15/2016    Closed fracture of left ulnar styloid 05/15/2016    ADD (attention deficit disorder) 07/30/2015    Dyspepsia 04/04/2014    Anxiety disorder 01/15/2014    Insomnia 01/15/2014    Knee pain 12/05/2013    HTN (hypertension) 12/03/2013       Past Surgical History:   Procedure Laterality Date    FRACTURE SURGERY Left 05/2016    wrist fracture    INJECTION OF JOINT Left 12/11/2018    Procedure: Injection, Joint, Bio-D Left Knee;  Surgeon: Gwendolyn Jeong MD;  Location: [facility];  Service: Orthopedics;  Laterality:  Left;  Bio-D Left Knee    KNEE ARTHROSCOPY W/ MENISCECTOMY Left 12/11/2018    Procedure: ARTHROSCOPY, KNEE, WITH MENISCECTOMY;  Surgeon: Gwendolyn Jeong MD;  Location: [facility] [place] OR;  Service: Orthopedics;  Laterality: Left;  Cartilage biopsy  and repair or micro-fracture    KNEE SURGERY      SYNOVECTOMY OF KNEE Left 12/11/2018    Procedure: SYNOVECTOMY, KNEE;  Surgeon: Gwendolyn Jeong MD;  Location: [facility] [place] [facility];  Service: Orthopedics;  Laterality: Left;       Family History   Problem Relation Age of Onset    Diabetes Father     Cancer Father     Skin cancer Father     Cancer Maternal Grandfather

## 2024-01-19 ENCOUNTER — PATIENT MESSAGE (OUTPATIENT)
Dept: FAMILY MEDICINE | Facility: CLINIC | Age: 54
End: 2024-01-19
Payer: COMMERCIAL

## 2024-01-19 RX ORDER — DEXTROAMPHETAMINE SACCHARATE, AMPHETAMINE ASPARTATE MONOHYDRATE, DEXTROAMPHETAMINE SULFATE AND AMPHETAMINE SULFATE 7.5; 7.5; 7.5; 7.5 MG/1; MG/1; MG/1; MG/1
CAPSULE, EXTENDED RELEASE ORAL
Qty: 60 CAPSULE | Refills: 0 | Status: SHIPPED | OUTPATIENT
Start: 2024-01-19

## 2024-01-19 NOTE — TELEPHONE ENCOUNTER
No care due was identified.  [facility] Embedded Care Due Messages. Reference number: [id number].   1/19/2024 9:08:43 AM CST

## 2024-01-31 ENCOUNTER — HOSPITAL ENCOUNTER (OUTPATIENT)
Dept: RADIOLOGY | Facility: HOSPITAL | Age: 54
Discharge: HOME | End: 2024-01-31
Attending: INTERNAL MEDICINE
Payer: COMMERCIAL

## 2024-01-31 ENCOUNTER — OFFICE VISIT (OUTPATIENT)
Dept: FAMILY MEDICINE | Facility: CLINIC | Age: 54
End: 2024-01-31
Payer: COMMERCIAL

## 2024-01-31 VITALS
OXYGEN SATURATION: 99 % | BODY MASS INDEX: 31.3 KG/M2 | HEART RATE: 66 BPM | DIASTOLIC BLOOD PRESSURE: 88 MMHG | SYSTOLIC BLOOD PRESSURE: 138 MMHG | HEIGHT: 71 IN | WEIGHT: 223.56 LBS | TEMPERATURE: 98 F

## 2024-01-31 DIAGNOSIS — E78.6 HIGH-DENSITY LIPOPROTEIN DEFICIENCY: ICD-10-CM

## 2024-01-31 DIAGNOSIS — R60.9 EDEMA, UNSPECIFIED TYPE: ICD-10-CM

## 2024-01-31 DIAGNOSIS — F98.8 ATTENTION DEFICIT DISORDER, UNSPECIFIED HYPERACTIVITY PRESENCE: ICD-10-CM

## 2024-01-31 DIAGNOSIS — M79.605 LEFT LEG PAIN: ICD-10-CM

## 2024-01-31 DIAGNOSIS — Z12.11 SCREENING FOR COLORECTAL CANCER: ICD-10-CM

## 2024-01-31 DIAGNOSIS — Z12.12 SCREENING FOR COLORECTAL CANCER: ICD-10-CM

## 2024-01-31 DIAGNOSIS — G40.909 SEIZURE DISORDER: ICD-10-CM

## 2024-01-31 DIAGNOSIS — E55.9 VITAMIN D DEFICIENCY DISEASE: ICD-10-CM

## 2024-01-31 DIAGNOSIS — F39 MOOD DISORDER: ICD-10-CM

## 2024-01-31 DIAGNOSIS — I10 ESSENTIAL HYPERTENSION: ICD-10-CM

## 2024-01-31 DIAGNOSIS — Z00.00 ROUTINE MEDICAL EXAM: Primary | ICD-10-CM

## 2024-01-31 DIAGNOSIS — Z12.5 SCREENING FOR PROSTATE CANCER: ICD-10-CM

## 2024-01-31 PROCEDURE — 93971 EXTREMITY STUDY: CPT | Mod: TC,LT

## 2024-01-31 PROCEDURE — 3079F DIAST BP 80-89 MM HG: CPT | Mod: CPTII,S$GLB,, | Performed by: INTERNAL MEDICINE

## 2024-01-31 PROCEDURE — 99999 PR PBB SHADOW E&M-EST. PATIENT-LVL IV: CPT | Mod: PBBFAC,,, | Performed by: INTERNAL MEDICINE

## 2024-01-31 PROCEDURE — 3008F BODY MASS INDEX DOCD: CPT | Mod: CPTII,S$GLB,, | Performed by: INTERNAL MEDICINE

## 2024-01-31 PROCEDURE — 3075F SYST BP GE 130 - 139MM HG: CPT | Mod: CPTII,S$GLB,, | Performed by: INTERNAL MEDICINE

## 2024-01-31 PROCEDURE — 4010F ACE/ARB THERAPY RXD/TAKEN: CPT | Mod: CPTII,S$GLB,, | Performed by: INTERNAL MEDICINE

## 2024-01-31 PROCEDURE — 99396 PREV VISIT EST AGE 40-64: CPT | Mod: S$GLB,,, | Performed by: INTERNAL MEDICINE

## 2024-01-31 PROCEDURE — 1159F MED LIST DOCD IN RCRD: CPT | Mod: CPTII,S$GLB,, | Performed by: INTERNAL MEDICINE

## 2024-01-31 PROCEDURE — 93971 EXTREMITY STUDY: CPT | Mod: 26,LT,, | Performed by: RADIOLOGY

## 2024-01-31 RX ORDER — DEXTROAMPHETAMINE SACCHARATE, AMPHETAMINE ASPARTATE MONOHYDRATE, DEXTROAMPHETAMINE SULFATE AND AMPHETAMINE SULFATE 7.5; 7.5; 7.5; 7.5 MG/1; MG/1; MG/1; MG/1
CAPSULE, EXTENDED RELEASE ORAL
Qty: 60 CAPSULE | Refills: 0 | Status: SHIPPED | OUTPATIENT
Start: 2024-03-29 | End: 2024-05-15 | Stop reason: SDUPTHER

## 2024-01-31 RX ORDER — CARVEDILOL 25 MG/1
25 TABLET ORAL 2 TIMES DAILY WITH MEALS
Qty: 60 TABLET | Refills: 11 | Status: SHIPPED | OUTPATIENT
Start: 2024-01-31 | End: 2025-01-30

## 2024-01-31 RX ORDER — FLUCONAZOLE 200 MG/1
200 TABLET ORAL
COMMUNITY
Start: 2024-01-08

## 2024-01-31 RX ORDER — DEXTROAMPHETAMINE SACCHARATE, AMPHETAMINE ASPARTATE MONOHYDRATE, DEXTROAMPHETAMINE SULFATE AND AMPHETAMINE SULFATE 7.5; 7.5; 7.5; 7.5 MG/1; MG/1; MG/1; MG/1
CAPSULE, EXTENDED RELEASE ORAL
Qty: 60 CAPSULE | Refills: 0 | Status: SHIPPED | OUTPATIENT
Start: 2024-02-29 | End: 2024-05-15 | Stop reason: SDUPTHER

## 2024-01-31 RX ORDER — LACOSAMIDE 150 MG/1
150 TABLET ORAL 2 TIMES DAILY
COMMUNITY
Start: 2024-01-22

## 2024-01-31 RX ORDER — DEXTROAMPHETAMINE SACCHARATE, AMPHETAMINE ASPARTATE MONOHYDRATE, DEXTROAMPHETAMINE SULFATE AND AMPHETAMINE SULFATE 7.5; 7.5; 7.5; 7.5 MG/1; MG/1; MG/1; MG/1
CAPSULE, EXTENDED RELEASE ORAL
Qty: 60 CAPSULE | Refills: 0 | Status: SHIPPED | OUTPATIENT
Start: 2024-01-31 | End: 2024-05-15 | Stop reason: SDUPTHER

## 2024-01-31 NOTE — PROGRESS NOTES
Chief complaint:  Follow-up on ADD and blood pressure medications,  physical        53-year-old white male with long-standing ADD.  He requires a high-dose of the Adderall and apparently his son does as well as and is a high metabolizer of the medication so patient himself can assumedly is the same.  He finds benefit from medication.  We refilled the 3 prescriptions and I encouraged him to message us with the exact date see needs for the next 3 months and we will see him every 6 months.      He does find significant benefit with his ADD medication and that is confirmed by his wife.  He does not need any dose change.  I will give him refills for 3 months    Patient also notes persistent blood pressure elevations at time.  He is on several medications.  It could be some of the effects of the Adderall and he needs to monitor for that.  Sometimes it is higher in the morning so will have him do his amlodipine in the evening.  He also is on Toprol and will switch that to carvedilol 25 mg twice a day which may offer better blood pressure control.    Sz issues reviewed:  52 yoM here for evaluation of syncope. He has history of seizure disorder and had been on Keppra in the past. He had a witnessed syncopal event 5/28/23 with shoulder injury and fall, hitting his head. Previously he had events and had a warning that he was going to pass out. This most recent event, there was no warning. He fell and did not protect himself. He was sweating profusely and was somewhat groggy. He broke his collarbone on the fall. His wife witnessed him fall from a standing position. Shortly after he was standing again and had a near syncopal event that does sound like his usual vagal syncope and that may have been from the pain from the clavicle. He went to the sofa with his wife's assistance. No further events.     He does bring in outside labs from his neurologist which are essentially normal with normal metanephrine levels.  Presumably due to  the episodes of sweating serotonin level was checked and that was low in the blood.  Protein electrophoresis unremarkable.  Dopamine level slightly elevated at 26 but that is just slightly above range and not sure if that is clinically significant.    Last labs all rev, TSH sl up and will follow but no bearing on recent issues    Cologuard neg 1/22    PSA 11/22, other labs ok 10/23. Lipids normal 2022    Also several weeks ago patient was lifting up his left leg onto the back of his truck and try to pull himself up into the truck.  He had some acute pain behind the left knee and subsequent to that he had gradual edema developed to the left lower extremity which has persisted.  No real pain except for some tenderness behind the left knee.  He does plan to travel to [place] so wife was concerned about blood clot and this could be a ruptured Baker's cyst but given the persistence of the edema I think it worthwhile to rule out DVT and a stat ultrasound will be obtained today.    ROS:   CONST: weight stable. EYES: no vision change. ENT: no sore throat. CV: no chest pain w/ exertion. RESP: no shortness of breath. GI: no nausea, vomiting, diarrhea. No dysphagia. : no urinary issues. MUSCULOSKELETAL: no new myalgias or arthralgias. SKIN: no new changes. NEURO: no focal deficits. PSYCH: no new issues. ENDOCRINE: no polyuria. HEME: no lymph nodes. ALLERGY: no general pruritis.    PAST MEDICAL HISTORY:                                                        1.  Hypertension.                                                            2.  Depression, sees Psychiatry.                                             3.  ADD, sees Psychiatry.  Dr Emery                                                  4.  Right knee scope.   5.  Low HDL 1999   6.  Insomnia  7.  Sleep eating with Ambien   8.  probable seizure disorder 2015  9. Orthostatic hypotension, he worked up by cardiology 2015, may relate to seizures   10.  Left Colles'  fracture with surgical repair 2016 11.  Right medial meniscus injury 2017    12.  Mild erosive gastropathy on EGD 2014  13. Hemoglobin reduction, rising MCV   14. Cologuard neg 1/22    15. Syncope history, some vagal                                                                                                                            SOCIAL HISTORY:  Does not smoke, social alcohol, never smoked.                                                                                            FAMILY HISTORY:  Mom with seizures.  Father with hypertension, diabetes, .     He has no siblings.             Gen: no distress  EYES: conjunctiva clear, non-icteric, PERRL  ENT: nose clear, nasal mucosa normal, oropharynx clear and moist, teeth good  NECK:supple, thyroid non-palpable  RESP: effort is good, lungs clear  CV: heart RRR w/o murmur, gallops or rubs; no carotid bruits, circumferential plus two edema to the left lower extremity from the knee down.  No abnormalities behind the left knee but there is some slight tenderness there.  GI: abdomen soft, non-distended, non-tender, no hepatosplenomegaly  MS: gait normal, no clubbing or cyanosis of the digits  SKIN: no rashes, warm to touch    Diagnoses and all orders for this visit:    Routine medical exam, will add PSA to next blood work    Screening for prostate cancer  -     Cancel: PSA, Screening; Future  -     PSA, Screening; Future  -     PSA, Screening    Screening for colorectal cancer    Attention deficit disorder, unspecified hyperactivity presence  -     dextroamphetamine-amphetamine (ADDERALL XR) 30 MG 24 hr capsule; Take 2 tablets by mouth daily  -     dextroamphetamine-amphetamine (ADDERALL XR) 30 MG 24 hr capsule; 2 a day  -     dextroamphetamine-amphetamine (ADDERALL XR) 30 MG 24 hr capsule; 2 a day    Essential hypertension, now uncontrolled, switch medications as above  -     Cancel: Lipid Panel; Future  -     Lipid Panel; Future  -     Lipid Panel    Seizure  disorder, still under workup and on antiseizure medication    Mood disorder, chronic and stable    Vitamin D deficiency disease, chronic and stable    High-density lipoprotein deficiency    Left leg pain, leg ultrasound reviewed and negative so could well have had a ruptured Baker cyst, muscle injury causing some compression and edema and so forth.  Will have him use frequent elevation, stockings possibly  -     US Lower Extremity Veins Left; Future    Edema, unspecified type  -     US Lower Extremity Veins Left; Future    Other orders  -     carvediloL (COREG) 25 MG tablet; Take 1 tablet (25 mg total) by mouth 2 (two) times daily with meals.

## 2024-02-04 ENCOUNTER — PATIENT MESSAGE (OUTPATIENT)
Dept: FAMILY MEDICINE | Facility: CLINIC | Age: 54
End: 2024-02-04
Payer: COMMERCIAL

## 2024-02-05 RX ORDER — FUROSEMIDE 20 MG/1
20 TABLET ORAL DAILY PRN
Qty: 30 TABLET | Refills: 1 | Status: SHIPPED | OUTPATIENT
Start: 2024-02-05 | End: 2024-03-27

## 2024-02-05 NOTE — TELEPHONE ENCOUNTER
Please advise,    Good Morning,     I have been wearing compression stockings on my left leg as recommended. Additionally, I have been icing and elevating it in the evening. There doesnt appear to be a significant decrease in the swelling.  My concern is we are preparing to go to [place] and stay there for 5 days. Is there anything else I should/could be doing to help?   Thank You,   Nirmal

## 2024-02-06 RX ORDER — FUROSEMIDE 20 MG/1
20 TABLET ORAL DAILY PRN
Qty: 30 TABLET | Refills: 1 | OUTPATIENT
Start: 2024-02-06

## 2024-02-06 NOTE — TELEPHONE ENCOUNTER
Refill Decision Note   Nirmal Moss  is requesting a refill authorization.  Brief Assessment and Rationale for Refill:  Quick Discontinue     Medication Therapy Plan:         Comments:     Note composed:9:00 AM 02/06/2024

## 2024-02-06 NOTE — TELEPHONE ENCOUNTER
No care due was identified.  [facility] Embedded Care Due Messages. Reference number: [id number].   2/06/2024 8:47:34 AM CST

## 2024-03-05 DIAGNOSIS — M25.512 LEFT SHOULDER PAIN, UNSPECIFIED CHRONICITY: Primary | ICD-10-CM

## 2024-03-07 LAB
CHOLEST SERPL-MCNC: 136 MG/DL
CHOLEST/HDLC SERPL: 2.6 (CALC)
HDLC SERPL-MCNC: 52 MG/DL
LDLC SERPL CALC-MCNC: 65 MG/DL (CALC)
NONHDLC SERPL-MCNC: 84 MG/DL (CALC)
PSA SERPL-MCNC: 3.15 NG/ML
TRIGL SERPL-MCNC: 111 MG/DL

## 2024-03-11 ENCOUNTER — OFFICE VISIT (OUTPATIENT)
Dept: ORTHOPEDICS | Facility: CLINIC | Age: 54
End: 2024-03-11
Payer: COMMERCIAL

## 2024-03-11 DIAGNOSIS — M25.512 LEFT SHOULDER PAIN, UNSPECIFIED CHRONICITY: Primary | ICD-10-CM

## 2024-03-11 DIAGNOSIS — S42.022G CLOSED DISPLACED FRACTURE OF SHAFT OF LEFT CLAVICLE WITH DELAYED HEALING, SUBSEQUENT ENCOUNTER: ICD-10-CM

## 2024-03-11 PROCEDURE — 99999 PR PBB SHADOW E&M-EST. PATIENT-LVL III: CPT | Mod: PBBFAC,,, | Performed by: ORTHOPAEDIC SURGERY

## 2024-03-11 PROCEDURE — 99212 OFFICE O/P EST SF 10 MIN: CPT | Mod: S$GLB,,, | Performed by: ORTHOPAEDIC SURGERY

## 2024-03-11 PROCEDURE — 1159F MED LIST DOCD IN RCRD: CPT | Mod: CPTII,S$GLB,, | Performed by: ORTHOPAEDIC SURGERY

## 2024-03-11 PROCEDURE — 4010F ACE/ARB THERAPY RXD/TAKEN: CPT | Mod: CPTII,S$GLB,, | Performed by: ORTHOPAEDIC SURGERY

## 2024-03-11 PROCEDURE — 1160F RVW MEDS BY RX/DR IN RCRD: CPT | Mod: CPTII,S$GLB,, | Performed by: ORTHOPAEDIC SURGERY

## 2024-03-11 NOTE — PROGRESS NOTES
Assessment: 53 y.o. male with L clavicle fracture     I explained my diagnostic impression and the reasoning behind it in detail, using layman's terms.      Plan:   -Well healed medially, lateralf racture line still visible without callus. No limitations to activity, no pain - will observe  - Activity as tolerated   - RTC PRN      All questions were answered in detail. The patient is in full agreement with the treatment plan and will proceed accordingly.    Chief Complaint   Patient presents with    Left Shoulder - Pain, Injury       Initial visit (6/1/23): Nirmal Moss is a 53 y.o. male who presents today complaining of Pain and Injury of the Left Shoulder     DOI: 5/28/23  Syncopal episode x2 witnessed by his wife - hit patio with head/left shoulder  Hypotensive in ER  Went to ER vs EMS - found to have clavicle fracture   In sling today     07/13/2023  Here for follow-up of left midshaft clavicle fracture that was treated nonoperatively in a sling.  He is doing fine with active range of motion  No pain with range of motion  Has discontinued use of the sling with no problems   No numbness or tingling    8/24/23   No pain   Has full ROM    10/19/23  No pain over fracture site   Still limiting activity because he is concerned about causing pain   Some AC tenderness, only hurts if he pushes on it     12/14/23  Full activity without pain    3/11/24  No pain  No activity limitations  Went deep sea fishing recently without issues    This is the extent of the patient's complaints at this time.       Review of patient's allergies indicates:   Allergen Reactions    Hydrocodone Rash         Current Outpatient Medications:     amLODIPine (NORVASC) 10 MG tablet, Take one tablet by mouth daily FOR BLOOD PRESSURE, Disp: 90 tablet, Rfl: 3    carvediloL (COREG) 25 MG tablet, Take 1 tablet (25 mg total) by mouth 2 (two) times daily with meals., Disp: 60 tablet, Rfl: 11    desonide (DESOWEN) 0.05 % cream, Apply topically 2  (two) times daily., Disp: 60 g, Rfl: 3    dextroamphetamine-amphetamine (ADDERALL XR) 30 MG 24 hr capsule, TAKE TWO CAPSULES BY MOUTH every day, Disp: 60 capsule, Rfl: 0    dextroamphetamine-amphetamine (ADDERALL XR) 30 MG 24 hr capsule, Take 2 tablets by mouth daily, Disp: 60 capsule, Rfl: 0    dextroamphetamine-amphetamine (ADDERALL XR) 30 MG 24 hr capsule, 2 a day, Disp: 60 capsule, Rfl: 0    [START ON 3/29/2024] dextroamphetamine-amphetamine (ADDERALL XR) 30 MG 24 hr capsule, 2 a day, Disp: 60 capsule, Rfl: 0    furosemide (LASIX) 20 MG tablet, Take 1 tablet (20 mg total) by mouth daily as needed., Disp: 30 tablet, Rfl: 1    hydroCHLOROthiazide (HYDRODIURIL) 25 MG tablet, Take one tablet by mouth daily, Disp: 90 tablet, Rfl: 2    irbesartan (AVAPRO) 300 MG tablet, Take one tablet by mouth daily FOR BLOOD PRESSURE, Disp: 90 tablet, Rfl: 3    ketoconazole (NIZORAL) 2 % shampoo, Apply topically., Disp: , Rfl:     lacosamide (VIMPAT) 150 mg Tab tablet, Take 150 mg by mouth 2 (two) times daily., Disp: , Rfl:     methocarbamoL (ROBAXIN) 750 MG Tab, Take 1 tablet (750 mg total) by mouth 3 (three) times daily as needed., Disp: 40 tablet, Rfl: 4    paroxetine (PAXIL) 20 MG tablet, take 3 TABLET(s) BY MOUTH EVERY MORNING, Disp: 270 tablet, Rfl: 2    clindamycin (CLEOCIN) 300 MG capsule, Take 300 mg by mouth every 8 (eight) hours., Disp: , Rfl:     fluconazole (DIFLUCAN) 200 MG Tab, Take 200 mg by mouth every 7 days., Disp: , Rfl:     Physical Exam:   Vitals:    03/11/24 0845   PainSc: 0-No pain   PainLoc: Shoulder     General:  Patient is alert, awake and oriented to time, place and person. Mood and affect are appropriate.  Patient does not appear to be in any distress, denies any constitutional symptoms and appears stated age.   HEENT:  Pupils are equal and round, sclera are not injected. External examination of ears and nose reveals no abnormalities. Cranial nerves II-X are grossly intact  Skin:  no rashes, abrasions or  open wounds on the affected extremity   Resp:  No respiratory distress or audible wheezing   CV: 2+  pulses, all extremities warm and well perfused   Left Upper extremity   NTTP over clavicle fracture site, bone ends palpable  Full range of motion of the shoulder without pain  Mild tenderness over AC joint  LTSI m/u/r  2+ RP  + EPL, IO, FDS, FDP     Imaging: 3 views of the L shoulder, 2 views clavicle show displaced clavicle fracture,+ progression of callus formation medially but no change laterally    I personally reviewed and interpreted the patient's imaging obtained today in clinic      This note was created by combination of typed  and M-Modal dictation. Transcription and phonetic errors may be present.  If there are any questions, please contact me.    Past Medical History:   Diagnosis Date    ADD (attention deficit disorder)     Anxiety     Depression     Gastritis     EGD 2014    HDL deficiency     Hypertension     Kidney stone     Meniscus tear 09/2013    right    Meniscus tear 01/2017    right    Seizure disorder 1/18/2018    Seizures 06/2014    last seizure in late 2014    Vitamin D deficiency disease 1/18/2018       Active Problem List with Overview Notes    Diagnosis Date Noted    Syncope 08/15/2023    Acute medial meniscus tear of left knee 12/11/2018    Mood disorder 01/18/2018    Seizure disorder 01/18/2018    Vitamin D deficiency disease 01/18/2018    Complex tear of lateral meniscus of right knee as current injury 03/28/2017    Colles' fracture 05/15/2016    Closed fracture of left ulnar styloid 05/15/2016    ADD (attention deficit disorder) 07/30/2015    Dyspepsia 04/04/2014    Anxiety disorder 01/15/2014    Insomnia 01/15/2014    Knee pain 12/05/2013    HTN (hypertension) 12/03/2013       Past Surgical History:   Procedure Laterality Date    FRACTURE SURGERY Left 05/2016    wrist fracture    INJECTION OF JOINT Left 12/11/2018    Procedure: Injection, Joint, Bio-D Left Knee;  Surgeon:  Gwendolyn Jeong MD;  Location: [facility];  Service: Orthopedics;  Laterality: Left;  Bio-D Left Knee    KNEE ARTHROSCOPY W/ MENISCECTOMY Left 12/11/2018    Procedure: ARTHROSCOPY, KNEE, WITH MENISCECTOMY;  Surgeon: Gwendolyn Jeong MD;  Location: [facility];  Service: Orthopedics;  Laterality: Left;  Cartilage biopsy  and repair or micro-fracture    KNEE SURGERY      SYNOVECTOMY OF KNEE Left 12/11/2018    Procedure: SYNOVECTOMY, KNEE;  Surgeon: Gwendolyn Jeong MD;  Location: [facility];  Service: Orthopedics;  Laterality: Left;       Family History   Problem Relation Age of Onset    Diabetes Father     Cancer Father     Skin cancer Father     Cancer Maternal Grandfather

## 2024-03-25 DIAGNOSIS — I10 ESSENTIAL HYPERTENSION: ICD-10-CM

## 2024-03-25 NOTE — TELEPHONE ENCOUNTER
No care due was identified.  [facility] Embedded Care Due Messages. Reference number: [id number].   3/25/2024 3:26:56 PM CDT

## 2024-03-27 RX ORDER — FUROSEMIDE 20 MG/1
20 TABLET ORAL DAILY PRN
Qty: 90 TABLET | Refills: 1 | Status: SHIPPED | OUTPATIENT
Start: 2024-03-27 | End: 2024-06-13

## 2024-03-27 RX ORDER — HYDROCHLOROTHIAZIDE 25 MG/1
TABLET ORAL
Qty: 90 TABLET | Refills: 1 | Status: SHIPPED | OUTPATIENT
Start: 2024-03-27

## 2024-03-27 NOTE — TELEPHONE ENCOUNTER
Refill Routing Note   Medication(s) are not appropriate for processing by [facility] for the following reason(s):        New or recently adjusted medication  Drug-drug interaction    [facility] action(s):  Defer        Medication Therapy Plan: Drug drug: furosemide and hydroCHLOROthiazide; Furosemide new start (2/5/24)    Pharmacist review requested: Yes     Appointments  past 12m or future 3m with PCP    Date Provider   Last Visit   1/31/2024 Ravi Baird MD   Next Visit   Visit date not found Ravi Baird MD   ED visits in past 90 days: 0        Note composed:9:36 PM 03/26/2024

## 2024-03-27 NOTE — TELEPHONE ENCOUNTER
Refill Routing Note   Medication(s) are not appropriate for processing by [facility] for the following reason(s):        New or recently adjusted medication    [facility] action(s):  Defer  Approve             Pharmacist review requested: Yes   Extended chart review required: Yes     Appointments  past 12m or future 3m with PCP    Date Provider   Last Visit   1/31/2024 Ravi Baird MD   Next Visit   Visit date not found Ravi Baird MD   ED visits in past 90 days: 0        Note composed:10:37 AM 03/27/2024

## 2024-05-15 DIAGNOSIS — F98.8 ATTENTION DEFICIT DISORDER, UNSPECIFIED HYPERACTIVITY PRESENCE: ICD-10-CM

## 2024-05-15 NOTE — TELEPHONE ENCOUNTER
No care due was identified.  [facility] Embedded Care Due Messages. Reference number: [id number].   5/15/2024 1:01:49 PM CDT

## 2024-05-17 RX ORDER — DEXTROAMPHETAMINE SACCHARATE, AMPHETAMINE ASPARTATE MONOHYDRATE, DEXTROAMPHETAMINE SULFATE AND AMPHETAMINE SULFATE 7.5; 7.5; 7.5; 7.5 MG/1; MG/1; MG/1; MG/1
CAPSULE, EXTENDED RELEASE ORAL
Qty: 60 CAPSULE | Refills: 0 | Status: SHIPPED | OUTPATIENT
Start: 2024-05-17

## 2024-06-13 RX ORDER — FUROSEMIDE 20 MG/1
20 TABLET ORAL DAILY PRN
Qty: 90 TABLET | Refills: 1 | Status: SHIPPED | OUTPATIENT
Start: 2024-06-13

## 2024-06-13 RX ORDER — METOPROLOL SUCCINATE 100 MG/1
TABLET, EXTENDED RELEASE ORAL
Qty: 90 TABLET | Refills: 2 | OUTPATIENT
Start: 2024-06-13

## 2024-06-13 RX ORDER — IRBESARTAN 300 MG/1
TABLET ORAL
Qty: 90 TABLET | Refills: 1 | Status: SHIPPED | OUTPATIENT
Start: 2024-06-13

## 2024-06-13 RX ORDER — AMLODIPINE BESYLATE 10 MG/1
TABLET ORAL
Qty: 90 TABLET | Refills: 2 | Status: SHIPPED | OUTPATIENT
Start: 2024-06-13

## 2024-06-13 RX ORDER — PAROXETINE HYDROCHLORIDE 20 MG/1
TABLET, FILM COATED ORAL
Qty: 270 TABLET | Refills: 2 | Status: SHIPPED | OUTPATIENT
Start: 2024-06-13

## 2024-06-13 NOTE — TELEPHONE ENCOUNTER
No care due was identified.  [facility] Embedded Care Due Messages. Reference number: [id number].   6/13/2024 10:54:32 AM CDT

## 2024-06-13 NOTE — TELEPHONE ENCOUNTER
Refill Routing Note   Medication(s) are not appropriate for processing by [facility] for the following reason(s):        Drug-drug interaction:  furosemide, hydroCHLOROthiazide     [facility] action(s):  Approve  Defer  Quick Discontinue      Medication Therapy Plan: Metoprolol dc'd 1/31/24-Pt on Carvedilol    Pharmacist review requested: Yes     Appointments  past 12m or future 3m with PCP    Date Provider   Last Visit   1/31/2024 Ravi Baird MD   Next Visit   Visit date not found Ravi Baird MD   ED visits in past 90 days: 0        Note composed:12:44 PM 06/13/2024

## 2024-06-13 NOTE — TELEPHONE ENCOUNTER
Refill Decision Note   Nirmal Isa  is requesting a refill authorization.  Brief Assessment and Rationale for Refill:  Approve     Medication Therapy Plan:        Pharmacist review requested: Yes   Comments:     Note composed:3:37 PM 06/13/2024

## 2024-06-25 NOTE — TELEPHONE ENCOUNTER
No care due was identified.  [facility] Embedded Care Due Messages. Reference number: [id number].   6/25/2024 4:37:13 PM CDT

## 2024-06-26 RX ORDER — CYCLOBENZAPRINE HCL 10 MG
10 TABLET ORAL 3 TIMES DAILY PRN
Qty: 30 TABLET | Refills: 12 | Status: SHIPPED | OUTPATIENT
Start: 2024-06-26

## 2024-08-06 RX ORDER — METHOCARBAMOL 750 MG/1
750 TABLET, FILM COATED ORAL
Qty: 40 TABLET | Refills: 4 | Status: SHIPPED | OUTPATIENT
Start: 2024-08-06

## 2024-08-23 ENCOUNTER — OFFICE VISIT (OUTPATIENT)
Dept: FAMILY MEDICINE | Facility: CLINIC | Age: 54
End: 2024-08-23
Payer: COMMERCIAL

## 2024-08-23 VITALS
DIASTOLIC BLOOD PRESSURE: 78 MMHG | OXYGEN SATURATION: 97 % | HEART RATE: 74 BPM | WEIGHT: 216.94 LBS | HEIGHT: 71 IN | SYSTOLIC BLOOD PRESSURE: 136 MMHG | TEMPERATURE: 98 F | BODY MASS INDEX: 30.37 KG/M2

## 2024-08-23 DIAGNOSIS — F39 MOOD DISORDER: ICD-10-CM

## 2024-08-23 DIAGNOSIS — F98.8 ATTENTION DEFICIT DISORDER, UNSPECIFIED HYPERACTIVITY PRESENCE: Primary | ICD-10-CM

## 2024-08-23 DIAGNOSIS — G40.909 SEIZURE DISORDER: ICD-10-CM

## 2024-08-23 DIAGNOSIS — I10 ESSENTIAL HYPERTENSION: ICD-10-CM

## 2024-08-23 PROCEDURE — 1159F MED LIST DOCD IN RCRD: CPT | Mod: CPTII,S$GLB,, | Performed by: INTERNAL MEDICINE

## 2024-08-23 PROCEDURE — 3008F BODY MASS INDEX DOCD: CPT | Mod: CPTII,S$GLB,, | Performed by: INTERNAL MEDICINE

## 2024-08-23 PROCEDURE — 99214 OFFICE O/P EST MOD 30 MIN: CPT | Mod: S$GLB,,, | Performed by: INTERNAL MEDICINE

## 2024-08-23 PROCEDURE — 3075F SYST BP GE 130 - 139MM HG: CPT | Mod: CPTII,S$GLB,, | Performed by: INTERNAL MEDICINE

## 2024-08-23 PROCEDURE — 4010F ACE/ARB THERAPY RXD/TAKEN: CPT | Mod: CPTII,S$GLB,, | Performed by: INTERNAL MEDICINE

## 2024-08-23 PROCEDURE — 3078F DIAST BP <80 MM HG: CPT | Mod: CPTII,S$GLB,, | Performed by: INTERNAL MEDICINE

## 2024-08-23 PROCEDURE — 99999 PR PBB SHADOW E&M-EST. PATIENT-LVL III: CPT | Mod: PBBFAC,,, | Performed by: INTERNAL MEDICINE

## 2024-08-23 RX ORDER — METOPROLOL SUCCINATE 100 MG/1
1 TABLET, EXTENDED RELEASE ORAL DAILY
COMMUNITY
Start: 2024-06-13

## 2024-08-23 RX ORDER — DEXTROAMPHETAMINE SACCHARATE, AMPHETAMINE ASPARTATE MONOHYDRATE, DEXTROAMPHETAMINE SULFATE AND AMPHETAMINE SULFATE 7.5; 7.5; 7.5; 7.5 MG/1; MG/1; MG/1; MG/1
CAPSULE, EXTENDED RELEASE ORAL
Qty: 60 CAPSULE | Refills: 0 | Status: SHIPPED | OUTPATIENT
Start: 2024-09-23

## 2024-08-23 RX ORDER — CLINDAMYCIN PHOSPHATE 11.9 MG/ML
SOLUTION TOPICAL
COMMUNITY
Start: 2024-03-11

## 2024-08-23 RX ORDER — DEXTROAMPHETAMINE SACCHARATE, AMPHETAMINE ASPARTATE MONOHYDRATE, DEXTROAMPHETAMINE SULFATE AND AMPHETAMINE SULFATE 7.5; 7.5; 7.5; 7.5 MG/1; MG/1; MG/1; MG/1
CAPSULE, EXTENDED RELEASE ORAL
Qty: 60 CAPSULE | Refills: 0 | Status: SHIPPED | OUTPATIENT
Start: 2024-08-23

## 2024-08-23 RX ORDER — DEXTROAMPHETAMINE SACCHARATE, AMPHETAMINE ASPARTATE MONOHYDRATE, DEXTROAMPHETAMINE SULFATE AND AMPHETAMINE SULFATE 7.5; 7.5; 7.5; 7.5 MG/1; MG/1; MG/1; MG/1
CAPSULE, EXTENDED RELEASE ORAL
Qty: 60 CAPSULE | Refills: 0 | Status: SHIPPED | OUTPATIENT
Start: 2024-10-23

## 2024-08-23 NOTE — PROGRESS NOTES
Chief complaint:  Follow-up on ADD and blood pressure medications,      physical  1/24      53-year-old white male with long-standing ADD.  He requires a high-dose of the Adderall and apparently his son does as well as and is a high metabolizer of the medication so patient himself can assumedly is the same.  He finds benefit from medication.  We refilled the 3 prescriptions and I encouraged him to message us with the exact date see needs for the next 3 months and we will see him every 6 months.      He does find significant benefit with his ADD medication and that is confirmed by his wife.  He does not need any dose change.  I will give him refills for 3 months        Patient also notes persistent blood pressure elevations at time.  He is on several medications.  It could be some of the effects of the Adderall and he needs to monitor for that..  He also is on Toprol and will switch that to carvedilol 25 mg twice a day which may offer better blood pressure control.  Patient will continue to monitor.  His neurologist took him off Norvasc and HCTZ thinking that some of his syncope might have been orthostatic we would have to assume.  He is not recorded any orthostatic blood pressure readings but blood pressure does appear to be doing well off these medications but he will need to monitor.    Sz issues reviewed:  52 yoM here for evaluation of syncope. He has history of seizure disorder and had been on Keppra in the past. He had a witnessed syncopal event 5/28/23 with shoulder injury and fall, hitting his head. Previously he had events and had a warning that he was going to pass out. This most recent event, there was no warning. He fell and did not protect himself. He was sweating profusely and was somewhat groggy. He broke his collarbone on the fall. His wife witnessed him fall from a standing position. Shortly after he was standing again and had a near syncopal event that does sound like his usual vagal syncope and  that may have been from the pain from the clavicle. He went to the sofa with his wife's assistance. No further events.     He does bring in outside labs from his neurologist which are essentially normal with normal metanephrine levels.  Presumably due to the episodes of sweating serotonin level was checked and that was low in the blood.  Protein electrophoresis unremarkable.  Dopamine level slightly elevated at 26 but that is just slightly above range and not sure if that is clinically significant.    Last labs all rev, TSH sl up and will follow but no bearing on recent issues    Cologuard neg 1/22    PSA 6/24, other labs ok  Lipids normal     2023 patient was lifting up his left leg onto the back of his truck and try to pull himself up into the truck.  He had some acute pain behind the left knee and subsequent to that he had gradual edema developed to the left lower extremity which has persisted.  No real pain except for some tenderness behind the left knee.  He was going to travel to [place] so wife was concerned about blood clot and this could be a ruptured Baker's cyst but given the persistence of the edema I think it worthwhile to rule out DVT and a stat ultrasound done prior -neg    ROS:   CONST: weight stable. EYES: no vision change. ENT: no sore throat. CV: no chest pain w/ exertion. RESP: no shortness of breath. GI: no nausea, vomiting, diarrhea. No dysphagia. : no urinary issues. MUSCULOSKELETAL: no new myalgias or arthralgias. SKIN: no new changes. NEURO: no focal deficits. PSYCH: no new issues. ENDOCRINE: no polyuria. HEME: no lymph nodes. ALLERGY: no general pruritis.    PAST MEDICAL HISTORY:                                                        1.  Hypertension.                                                            2.  Depression, sees Psychiatry.                                             3.  ADD, sees Psychiatry.  Dr Emery                                                  4.  Right  knee scope.   5.  Low HDL 1999   6.  Insomnia  7.  Sleep eating with Ambien   8.  probable seizure disorder 2015  9. Orthostatic hypotension, he worked up by cardiology 2015, may relate to seizures   10.  Left Colles' fracture with surgical repair 2016  11.  Right medial meniscus injury 2017    12.  Mild erosive gastropathy on EGD 2014  13. Hemoglobin reduction, rising MCV   14. Cologuard neg 1/22    15. Syncope history, some vagal                                                                                                                            SOCIAL HISTORY:  Does not smoke, social alcohol, never smoked.                                                                                            FAMILY HISTORY:  Mom with seizures.  Father with hypertension, diabetes, .     He has no siblings.             Gen: no distress        Diagnoses and all orders for this visit:    Attention deficit disorder, unspecified hyperactivity presence, high risk medications with high risk complications and side effects address today.  -     dextroamphetamine-amphetamine (ADDERALL XR) 30 MG 24 hr capsule; Take 2 tablets by mouth daily  -     dextroamphetamine-amphetamine (ADDERALL XR) 30 MG 24 hr capsule; 2 a day    Essential hypertension, neurology held Norvasc and HCTZ, he will need to monitor blood pressure response    Seizure disorder, continues on an anticonvulsant    Mood disorder, chronic and stable    Other orders  -     dextroamphetamine-amphetamine (ADDERALL XR) 30 MG 24 hr capsule; TAKE TWO CAPSULES BY MOUTH every day

## 2024-11-18 DIAGNOSIS — F98.8 ATTENTION DEFICIT DISORDER, UNSPECIFIED TYPE: ICD-10-CM

## 2024-11-18 DIAGNOSIS — F98.8 ATTENTION DEFICIT DISORDER, UNSPECIFIED HYPERACTIVITY PRESENCE: ICD-10-CM

## 2024-11-18 NOTE — TELEPHONE ENCOUNTER
Unable to retrieve patient chart and identify care due.  [facility] Embedded Care Due Messages. Reference number: [id number].   11/18/2024 8:44:55 AM CST

## 2024-11-18 NOTE — TELEPHONE ENCOUNTER
Care Due:                  Date            Visit Type   Department     Provider  --------------------------------------------------------------------------------                                MYCHART                              ANNUAL       [facility]                              CHECKUP/PHY  MED/ INTERNAL  Ravi Cosme  Last Visit: 08-      S            MED/ PEDS      [place]  Next Visit: None Scheduled  None         None Found                                                            Last  Test          Frequency    Reason                     Performed    Due Date  --------------------------------------------------------------------------------    CMP.........  12 months..  furosemide, irbesartan...  10-   10-    [facility] Embedded Care Due Messages. Reference number: [id number].   11/18/2024 2:47:02 PM CST

## 2024-11-19 RX ORDER — DEXTROAMPHETAMINE SACCHARATE, AMPHETAMINE ASPARTATE MONOHYDRATE, DEXTROAMPHETAMINE SULFATE AND AMPHETAMINE SULFATE 7.5; 7.5; 7.5; 7.5 MG/1; MG/1; MG/1; MG/1
CAPSULE, EXTENDED RELEASE ORAL
Qty: 60 CAPSULE | Refills: 0 | Status: SHIPPED | OUTPATIENT
Start: 2024-12-19

## 2024-11-19 RX ORDER — IRBESARTAN 300 MG/1
300 TABLET ORAL
Qty: 90 TABLET | Refills: 12 | Status: SHIPPED | OUTPATIENT
Start: 2024-11-19

## 2024-11-19 RX ORDER — DEXTROAMPHETAMINE SACCHARATE, AMPHETAMINE ASPARTATE MONOHYDRATE, DEXTROAMPHETAMINE SULFATE AND AMPHETAMINE SULFATE 7.5; 7.5; 7.5; 7.5 MG/1; MG/1; MG/1; MG/1
CAPSULE, EXTENDED RELEASE ORAL
Qty: 60 CAPSULE | Refills: 0 | Status: SHIPPED | OUTPATIENT
Start: 2024-11-19

## 2024-11-19 RX ORDER — DEXTROAMPHETAMINE SACCHARATE, AMPHETAMINE ASPARTATE MONOHYDRATE, DEXTROAMPHETAMINE SULFATE AND AMPHETAMINE SULFATE 7.5; 7.5; 7.5; 7.5 MG/1; MG/1; MG/1; MG/1
CAPSULE, EXTENDED RELEASE ORAL
Qty: 60 CAPSULE | Refills: 0 | Status: SHIPPED | OUTPATIENT
Start: 2025-01-19

## 2024-11-19 NOTE — TELEPHONE ENCOUNTER
Refill Routing Note   Medication(s) are not appropriate for processing by [facility] for the following reason(s):        Required labs outdated    [facility] action(s):  Defer   Requires labs : Yes             Appointments  past 12m or future 3m with PCP    Date Provider   Last Visit   8/23/2024 Ravi Baird MD   Next Visit   Visit date not found Ravi Baird MD   ED visits in past 90 days: 0        Note composed:9:44 PM 11/18/2024

## 2025-02-17 DIAGNOSIS — F98.8 ATTENTION DEFICIT DISORDER: ICD-10-CM

## 2025-02-17 NOTE — TELEPHONE ENCOUNTER
Care Due:                  Date            Visit Type   Department     Provider  --------------------------------------------------------------------------------                                MYCHART                              ANNUAL       [facility]                              CHECKUP/PHY  MED/ INTERNAL  Ravi Cosme  Last Visit: 08-      S            MED/ PEDS      [place]  Next Visit: None Scheduled  None         None Found                                                            Last  Test          Frequency    Reason                     Performed    Due Date  --------------------------------------------------------------------------------    CMP.........  12 months..  furosemide, irbesartan...  10-   10-    [facility] Embedded Care Due Messages. Reference number: [id number].   2/17/2025 4:14:40 PM CST

## 2025-02-18 RX ORDER — DEXTROAMPHETAMINE SACCHARATE, AMPHETAMINE ASPARTATE MONOHYDRATE, DEXTROAMPHETAMINE SULFATE AND AMPHETAMINE SULFATE 7.5; 7.5; 7.5; 7.5 MG/1; MG/1; MG/1; MG/1
CAPSULE, EXTENDED RELEASE ORAL
Qty: 60 CAPSULE | Refills: 0 | Status: SHIPPED | OUTPATIENT
Start: 2025-02-18

## 2025-02-19 ENCOUNTER — OFFICE VISIT (OUTPATIENT)
Dept: FAMILY MEDICINE | Facility: CLINIC | Age: 55
End: 2025-02-19
Payer: COMMERCIAL

## 2025-02-19 VITALS
BODY MASS INDEX: 31.11 KG/M2 | HEART RATE: 87 BPM | HEIGHT: 71 IN | DIASTOLIC BLOOD PRESSURE: 76 MMHG | TEMPERATURE: 98 F | WEIGHT: 222.25 LBS | SYSTOLIC BLOOD PRESSURE: 148 MMHG | OXYGEN SATURATION: 96 %

## 2025-02-19 DIAGNOSIS — F98.8 ATTENTION DEFICIT DISORDER, UNSPECIFIED TYPE: ICD-10-CM

## 2025-02-19 DIAGNOSIS — R55 SYNCOPE, UNSPECIFIED SYNCOPE TYPE: ICD-10-CM

## 2025-02-19 DIAGNOSIS — I10 ESSENTIAL HYPERTENSION: ICD-10-CM

## 2025-02-19 DIAGNOSIS — Z00.00 ROUTINE MEDICAL EXAM: Primary | ICD-10-CM

## 2025-02-19 DIAGNOSIS — E78.6 HIGH-DENSITY LIPOPROTEIN DEFICIENCY: ICD-10-CM

## 2025-02-19 DIAGNOSIS — Z12.11 SCREENING FOR COLORECTAL CANCER: ICD-10-CM

## 2025-02-19 DIAGNOSIS — Z12.12 SCREENING FOR COLORECTAL CANCER: ICD-10-CM

## 2025-02-19 DIAGNOSIS — Z12.5 SCREENING FOR PROSTATE CANCER: ICD-10-CM

## 2025-02-19 DIAGNOSIS — G40.909 SEIZURE DISORDER: ICD-10-CM

## 2025-02-19 DIAGNOSIS — F39 MOOD DISORDER: ICD-10-CM

## 2025-02-19 DIAGNOSIS — E55.9 VITAMIN D DEFICIENCY DISEASE: ICD-10-CM

## 2025-02-19 RX ORDER — DEXTROAMPHETAMINE SACCHARATE, AMPHETAMINE ASPARTATE MONOHYDRATE, DEXTROAMPHETAMINE SULFATE AND AMPHETAMINE SULFATE 7.5; 7.5; 7.5; 7.5 MG/1; MG/1; MG/1; MG/1
CAPSULE, EXTENDED RELEASE ORAL
Qty: 60 CAPSULE | Refills: 0 | Status: SHIPPED | OUTPATIENT
Start: 2025-03-19

## 2025-02-19 RX ORDER — CHLORHEXIDINE GLUCONATE ORAL RINSE 1.2 MG/ML
SOLUTION DENTAL
COMMUNITY
Start: 2024-09-19

## 2025-02-19 RX ORDER — CARVEDILOL 25 MG/1
25 TABLET ORAL 2 TIMES DAILY WITH MEALS
Qty: 60 TABLET | Refills: 11 | Status: SHIPPED | OUTPATIENT
Start: 2025-02-19 | End: 2026-02-19

## 2025-02-19 RX ORDER — DEXTROAMPHETAMINE SACCHARATE, AMPHETAMINE ASPARTATE MONOHYDRATE, DEXTROAMPHETAMINE SULFATE AND AMPHETAMINE SULFATE 7.5; 7.5; 7.5; 7.5 MG/1; MG/1; MG/1; MG/1
CAPSULE, EXTENDED RELEASE ORAL
Qty: 60 CAPSULE | Refills: 0 | Status: SHIPPED | OUTPATIENT
Start: 2025-02-19

## 2025-02-19 RX ORDER — DEXTROAMPHETAMINE SACCHARATE, AMPHETAMINE ASPARTATE MONOHYDRATE, DEXTROAMPHETAMINE SULFATE AND AMPHETAMINE SULFATE 7.5; 7.5; 7.5; 7.5 MG/1; MG/1; MG/1; MG/1
CAPSULE, EXTENDED RELEASE ORAL
Qty: 60 CAPSULE | Refills: 0 | Status: SHIPPED | OUTPATIENT
Start: 2025-04-19

## 2025-02-19 RX ORDER — FLUCONAZOLE 200 MG/1
200 TABLET ORAL
COMMUNITY
Start: 2024-10-08

## 2025-02-19 NOTE — PROGRESS NOTES
Chief complaint:  Follow-up on ADD and blood pressure medications,   physical  1/24      54-year-old white male with long-standing ADD.  He requires a high-dose of the Adderall and apparently his son does as well as and is a high metabolizer of the medication so patient himself can assumedly is the same.  He finds benefit from medication.  We refilled the 3 prescriptions and I encouraged him to message us with the exact date see needs for the next 3 months and we will see him every 6 months.      He does find significant benefit with his ADD medication and that is confirmed by his wife.  He does not need any dose change.  I will give him refills for 3 months        Patient also notes persistent blood pressure elevations at time.  He is on several medications.  It could be some of the effects of the Adderall and he needs to monitor for that..  He also is on Toprol and will switch that to carvedilol 25 mg twice a day which may offer better blood pressure control.  Patient will continue to monitor.  His neurologist took him off Norvasc and HCTZ thinking that some of his syncope might have been orthostatic we would have to assume.  He is not recorded any orthostatic blood pressure readings but blood pressure does appear to be doing well off these medications but he will need to monitor.    Sz issues reviewed:  52 yoM here for evaluation of syncope. He has history of seizure disorder and had been on Keppra in the past. He had a witnessed syncopal event 5/28/23 with shoulder injury and fall, hitting his head. Previously he had events and had a warning that he was going to pass out. This most recent event, there was no warning. He fell and did not protect himself. He was sweating profusely and was somewhat groggy. He broke his collarbone on the fall. His wife witnessed him fall from a standing position. Shortly after he was standing again and had a near syncopal event that does sound like his usual vagal syncope and that  may have been from the pain from the clavicle. He went to the sofa with his wife's assistance. No further events.     He does bring in outside labs from his neurologist which are essentially normal with normal metanephrine levels.  Presumably due to the episodes of sweating serotonin level was checked and that was low in the blood.  Protein electrophoresis unremarkable.  Dopamine level slightly elevated at 26 but that is just slightly above range and not sure if that is clinically significant.    Last labs all rev, TSH sl up and then ok 10/23    Cologuard neg 1/22    PSA 3/24, other labs ok  Lipids normal     2023 patient was lifting up his left leg onto the back of his truck and try to pull himself up into the truck.  He had some acute pain behind the left knee and subsequent to that he had gradual edema developed to the left lower extremity which has persisted.  No real pain except for some tenderness behind the left knee.  He was going to travel to [place] so wife was concerned about blood clot and this could be a ruptured Baker's cyst but given the persistence of the edema I think it worthwhile to rule out DVT and a stat ultrasound done prior -neg    ROS:   CONST: weight stable. EYES: no vision change. ENT: no sore throat. CV: no chest pain w/ exertion. RESP: no shortness of breath. GI: no nausea, vomiting, diarrhea. No dysphagia. : no urinary issues. MUSCULOSKELETAL: no new myalgias or arthralgias. SKIN: no new changes. NEURO: no focal deficits. PSYCH: no new issues. ENDOCRINE: no polyuria. HEME: no lymph nodes. ALLERGY: no general pruritis.    PAST MEDICAL HISTORY:                                                        1.  Hypertension.                                                            2.  Depression, sees Psychiatry.                                             3.  ADD, sees Psychiatry.  Dr Emery                                                  4.  Right knee scope.   5.  Low HDL 1999   6.   Insomnia  7.  Sleep eating with Ambien   8.  probable seizure disorder 2015  9. Orthostatic hypotension, he worked up by cardiology 2015, may relate to seizures   10.  Left Colles' fracture with surgical repair 2016  11.  Right medial meniscus injury 2017    12.  Mild erosive gastropathy on EGD 2014  13. Hemoglobin reduction, rising MCV   14. Cologuard neg 1/22    15. Syncope history, some vagal                                                                                                                            SOCIAL HISTORY:  Does not smoke, social alcohol, never smoked.                                                                                            FAMILY HISTORY:  Mom with seizures.  Father with hypertension, diabetes, .     He has no siblings.             Gen: no distress  EYES: conjunctiva clear, non-icteric, PERRL  ENT: nose clear, nasal mucosa normal, oropharynx clear and moist, teeth good  NECK:supple, thyroid non-palpable  RESP: effort is good, lungs clear  CV: heart RRR w/o murmur, gallops or rubs; no carotid bruits, no edema  GI: abdomen soft, non-distended, non-tender, no hepatosplenomegaly  MS: gait normal, no clubbing or cyanosis of the digits  SKIN: no rashes, warm to touch      Diagnoses and all orders for this visit:    Routine medical exam  -     PSA, Screening; Future  -     Vitamin D; Future  -     Comprehensive Metabolic Panel; Future  -     Hemoglobin A1C; Future  -     CBC Auto Differential; Future  -     TSH; Future  -     Lipid Panel; Future    Screening for prostate cancer  -     PSA, Screening; Future    Screening for colorectal cancer  -     Cologuard Screening (Multitarget Stool DNA); Future  -     Cologuard Screening (Multitarget Stool DNA)    Attention deficit disorder, unspecified type ,chronic condition and stable.  -     dextroamphetamine-amphetamine (ADDERALL XR) 30 MG 24 hr capsule; 2 a day  -     dextroamphetamine-amphetamine (ADDERALL XR) 30 MG 24 hr capsule; 2  a day  -     dextroamphetamine-amphetamine (ADDERALL XR) 30 MG 24 hr capsule; TAKE TWO CAPSULES BY MOUTH every day    Essential hypertension, restart coreg one a day then maybe BID    Seizure disorder ,chronic condition and stable.    Mood disorder ,chronic condition and stable.    Vitamin D deficiency disease  -     Vitamin D; Future    High-density lipoprotein deficiency  -     Lipid Panel; Future    Syncope, unspecified syncope type, sees neuro,  ,chronic condition and stable.    Other orders  -     carvediloL (COREG) 25 MG tablet; Take 1 tablet (25 mg total) by mouth 2 (two) times daily with meals.

## 2025-02-21 ENCOUNTER — LAB VISIT (OUTPATIENT)
Dept: LAB | Facility: HOSPITAL | Age: 55
End: 2025-02-21
Attending: INTERNAL MEDICINE
Payer: COMMERCIAL

## 2025-02-21 DIAGNOSIS — Z00.00 ROUTINE MEDICAL EXAM: ICD-10-CM

## 2025-02-21 DIAGNOSIS — E55.9 VITAMIN D DEFICIENCY DISEASE: ICD-10-CM

## 2025-02-21 DIAGNOSIS — Z12.5 SCREENING FOR PROSTATE CANCER: ICD-10-CM

## 2025-02-21 DIAGNOSIS — E78.6 HIGH-DENSITY LIPOPROTEIN DEFICIENCY: ICD-10-CM

## 2025-02-21 LAB
25(OH)D3+25(OH)D2 SERPL-MCNC: 72 NG/ML (ref 30–96)
ALBUMIN SERPL BCP-MCNC: 3.8 G/DL (ref 3.5–5.2)
ALP SERPL-CCNC: 103 U/L (ref 40–150)
ALT SERPL W/O P-5'-P-CCNC: 27 U/L (ref 10–44)
ANION GAP SERPL CALC-SCNC: 7 MMOL/L (ref 8–16)
AST SERPL-CCNC: 39 U/L (ref 10–40)
BASOPHILS # BLD AUTO: 0.03 K/UL (ref 0–0.2)
BASOPHILS NFR BLD: 0.7 % (ref 0–1.9)
BILIRUB SERPL-MCNC: 0.6 MG/DL (ref 0.1–1)
BUN SERPL-MCNC: 16 MG/DL (ref 6–20)
CALCIUM SERPL-MCNC: 8.8 MG/DL (ref 8.7–10.5)
CHLORIDE SERPL-SCNC: 106 MMOL/L (ref 95–110)
CHOLEST SERPL-MCNC: 117 MG/DL (ref 120–199)
CHOLEST/HDLC SERPL: 2.5 {RATIO} (ref 2–5)
CO2 SERPL-SCNC: 25 MMOL/L (ref 23–29)
COMPLEXED PSA SERPL-MCNC: 2.4 NG/ML (ref 0–4)
CREAT SERPL-MCNC: 0.8 MG/DL (ref 0.5–1.4)
DIFFERENTIAL METHOD BLD: ABNORMAL
EOSINOPHIL # BLD AUTO: 0 K/UL (ref 0–0.5)
EOSINOPHIL NFR BLD: 0.2 % (ref 0–8)
ERYTHROCYTE [DISTWIDTH] IN BLOOD BY AUTOMATED COUNT: 14.1 % (ref 11.5–14.5)
EST. GFR  (NO RACE VARIABLE): >60 ML/MIN/1.73 M^2
ESTIMATED AVG GLUCOSE: 103 MG/DL (ref 68–131)
GLUCOSE SERPL-MCNC: 100 MG/DL (ref 70–110)
HBA1C MFR BLD: 5.2 % (ref 4–5.6)
HCT VFR BLD AUTO: 38.3 % (ref 40–54)
HDLC SERPL-MCNC: 47 MG/DL (ref 40–75)
HDLC SERPL: 40.2 % (ref 20–50)
HGB BLD-MCNC: 12.6 G/DL (ref 14–18)
IMM GRANULOCYTES # BLD AUTO: 0.01 K/UL (ref 0–0.04)
IMM GRANULOCYTES NFR BLD AUTO: 0.2 % (ref 0–0.5)
LDLC SERPL CALC-MCNC: 61.2 MG/DL (ref 63–159)
LYMPHOCYTES # BLD AUTO: 1 K/UL (ref 1–4.8)
LYMPHOCYTES NFR BLD: 24 % (ref 18–48)
MCH RBC QN AUTO: 32.8 PG (ref 27–31)
MCHC RBC AUTO-ENTMCNC: 32.9 G/DL (ref 32–36)
MCV RBC AUTO: 100 FL (ref 82–98)
MONOCYTES # BLD AUTO: 0.5 K/UL (ref 0.3–1)
MONOCYTES NFR BLD: 11.8 % (ref 4–15)
NEUTROPHILS # BLD AUTO: 2.6 K/UL (ref 1.8–7.7)
NEUTROPHILS NFR BLD: 63.1 % (ref 38–73)
NONHDLC SERPL-MCNC: 70 MG/DL
NRBC BLD-RTO: 0 /100 WBC
PLATELET # BLD AUTO: 219 K/UL (ref 150–450)
PMV BLD AUTO: 10.9 FL (ref 9.2–12.9)
POTASSIUM SERPL-SCNC: 4.6 MMOL/L (ref 3.5–5.1)
PROT SERPL-MCNC: 6.3 G/DL (ref 6–8.4)
RBC # BLD AUTO: 3.84 M/UL (ref 4.6–6.2)
SODIUM SERPL-SCNC: 138 MMOL/L (ref 136–145)
TRIGL SERPL-MCNC: 44 MG/DL (ref 30–150)
TSH SERPL DL<=0.005 MIU/L-ACNC: 2 UIU/ML (ref 0.4–4)
WBC # BLD AUTO: 4.08 K/UL (ref 3.9–12.7)

## 2025-02-21 PROCEDURE — 84153 ASSAY OF PSA TOTAL: CPT | Performed by: INTERNAL MEDICINE

## 2025-02-21 PROCEDURE — 36415 COLL VENOUS BLD VENIPUNCTURE: CPT | Mod: PO | Performed by: INTERNAL MEDICINE

## 2025-02-21 PROCEDURE — 80061 LIPID PANEL: CPT | Performed by: INTERNAL MEDICINE

## 2025-02-21 PROCEDURE — 82306 VITAMIN D 25 HYDROXY: CPT | Performed by: INTERNAL MEDICINE

## 2025-02-21 PROCEDURE — 85025 COMPLETE CBC W/AUTO DIFF WBC: CPT | Performed by: INTERNAL MEDICINE

## 2025-02-21 PROCEDURE — 80053 COMPREHEN METABOLIC PANEL: CPT | Performed by: INTERNAL MEDICINE

## 2025-02-21 PROCEDURE — 84443 ASSAY THYROID STIM HORMONE: CPT | Performed by: INTERNAL MEDICINE

## 2025-02-21 PROCEDURE — 83036 HEMOGLOBIN GLYCOSYLATED A1C: CPT | Performed by: INTERNAL MEDICINE

## 2025-03-03 NOTE — TELEPHONE ENCOUNTER
No care due was identified.  [facility] Embedded Care Due Messages. Reference number: [id number].   3/03/2025 12:23:34 PM CST

## 2025-03-04 RX ORDER — PAROXETINE HYDROCHLORIDE 20 MG/1
TABLET, FILM COATED ORAL
Qty: 270 TABLET | Refills: 3 | Status: SHIPPED | OUTPATIENT
Start: 2025-03-04

## 2025-03-04 NOTE — TELEPHONE ENCOUNTER
Refill Decision Note   Nirmal Moss  is requesting a refill authorization.  Brief Assessment and Rationale for Refill:  Approve     Medication Therapy Plan:        Comments:     Note composed:2:06 PM 03/04/2025

## 2025-03-21 ENCOUNTER — LAB VISIT (OUTPATIENT)
Dept: LAB | Facility: HOSPITAL | Age: 55
End: 2025-03-21
Attending: PSYCHIATRY & NEUROLOGY
Payer: COMMERCIAL

## 2025-03-21 DIAGNOSIS — R41.82 ALTERED MENTAL STATUS: ICD-10-CM

## 2025-03-21 DIAGNOSIS — D64.9 ANEMIA, UNSPECIFIED: ICD-10-CM

## 2025-03-21 DIAGNOSIS — E78.5 HYPERLIPEMIA: ICD-10-CM

## 2025-03-21 DIAGNOSIS — I95.9 HYPOTENSION, UNSPECIFIED: ICD-10-CM

## 2025-03-21 DIAGNOSIS — R76.8 FALSE POSITIVE SEROLOGICAL TEST FOR SYPHILIS: ICD-10-CM

## 2025-03-21 DIAGNOSIS — R55 SYNCOPE AND COLLAPSE: ICD-10-CM

## 2025-03-21 DIAGNOSIS — R89.1 ABNORMAL LEVEL OF HORMONES IN SPECIMENS FROM OTH ORG/TISS: ICD-10-CM

## 2025-03-21 DIAGNOSIS — G47.13 RECURRENT HYPERSOMNIA: ICD-10-CM

## 2025-03-21 DIAGNOSIS — I10 ESSENTIAL HYPERTENSION, MALIGNANT: ICD-10-CM

## 2025-03-21 DIAGNOSIS — R61 GENERALIZED HYPERHIDROSIS: ICD-10-CM

## 2025-03-21 DIAGNOSIS — D64.9 ANEMIA, UNSPECIFIED: Primary | ICD-10-CM

## 2025-03-21 DIAGNOSIS — G47.33 OBSTRUCTIVE SLEEP APNEA (ADULT) (PEDIATRIC): ICD-10-CM

## 2025-03-21 DIAGNOSIS — R90.89 ABNORMAL COMPUTERIZED TOMOGRAPHY OF BRAIN: ICD-10-CM

## 2025-03-21 LAB
BASOPHILS # BLD AUTO: 0.02 K/UL (ref 0–0.2)
BASOPHILS NFR BLD: 0.4 % (ref 0–1.9)
DIFFERENTIAL METHOD BLD: ABNORMAL
EOSINOPHIL # BLD AUTO: 0 K/UL (ref 0–0.5)
EOSINOPHIL NFR BLD: 0.2 % (ref 0–8)
ERYTHROCYTE [DISTWIDTH] IN BLOOD BY AUTOMATED COUNT: 14.3 % (ref 11.5–14.5)
FERRITIN SERPL-MCNC: 16 NG/ML (ref 20–300)
FOLATE SERPL-MCNC: 13.1 NG/ML (ref 4–24)
HCT VFR BLD AUTO: 40.1 % (ref 40–54)
HCYS SERPL-SCNC: 10.9 UMOL/L (ref 4–16.5)
HGB BLD-MCNC: 13.4 G/DL (ref 14–18)
IMM GRANULOCYTES # BLD AUTO: 0.01 K/UL (ref 0–0.04)
IMM GRANULOCYTES NFR BLD AUTO: 0.2 % (ref 0–0.5)
IRON SERPL-MCNC: 78 UG/DL (ref 45–160)
LYMPHOCYTES # BLD AUTO: 1.1 K/UL (ref 1–4.8)
LYMPHOCYTES NFR BLD: 24.2 % (ref 18–48)
MAGNESIUM SERPL-MCNC: 1.9 MG/DL (ref 1.6–2.6)
MCH RBC QN AUTO: 32.9 PG (ref 27–31)
MCHC RBC AUTO-ENTMCNC: 33.4 G/DL (ref 32–36)
MCV RBC AUTO: 99 FL (ref 82–98)
MONOCYTES # BLD AUTO: 0.4 K/UL (ref 0.3–1)
MONOCYTES NFR BLD: 8.7 % (ref 4–15)
NEUTROPHILS # BLD AUTO: 3.1 K/UL (ref 1.8–7.7)
NEUTROPHILS NFR BLD: 66.3 % (ref 38–73)
NRBC BLD-RTO: 0 /100 WBC
PLATELET # BLD AUTO: 215 K/UL (ref 150–450)
PMV BLD AUTO: 9.5 FL (ref 9.2–12.9)
RBC # BLD AUTO: 4.07 M/UL (ref 4.6–6.2)
SATURATED IRON: 16 % (ref 20–50)
TOTAL IRON BINDING CAPACITY: 500 UG/DL (ref 250–450)
TRANSFERRIN SERPL-MCNC: 338 MG/DL (ref 200–375)
URATE SERPL-MCNC: 5.2 MG/DL (ref 3.4–7)
VIT B12 SERPL-MCNC: 305 PG/ML (ref 210–950)
WBC # BLD AUTO: 4.72 K/UL (ref 3.9–12.7)

## 2025-03-21 PROCEDURE — 84466 ASSAY OF TRANSFERRIN: CPT | Performed by: PSYCHIATRY & NEUROLOGY

## 2025-03-21 PROCEDURE — 84550 ASSAY OF BLOOD/URIC ACID: CPT | Performed by: PSYCHIATRY & NEUROLOGY

## 2025-03-21 PROCEDURE — 83921 ORGANIC ACID SINGLE QUANT: CPT | Performed by: PSYCHIATRY & NEUROLOGY

## 2025-03-21 PROCEDURE — 82728 ASSAY OF FERRITIN: CPT | Performed by: PSYCHIATRY & NEUROLOGY

## 2025-03-21 PROCEDURE — 83090 ASSAY OF HOMOCYSTEINE: CPT | Performed by: PSYCHIATRY & NEUROLOGY

## 2025-03-21 PROCEDURE — 36415 COLL VENOUS BLD VENIPUNCTURE: CPT | Performed by: PSYCHIATRY & NEUROLOGY

## 2025-03-21 PROCEDURE — 82607 VITAMIN B-12: CPT | Performed by: PSYCHIATRY & NEUROLOGY

## 2025-03-21 PROCEDURE — 82746 ASSAY OF FOLIC ACID SERUM: CPT | Performed by: PSYCHIATRY & NEUROLOGY

## 2025-03-21 PROCEDURE — 85025 COMPLETE CBC W/AUTO DIFF WBC: CPT | Performed by: PSYCHIATRY & NEUROLOGY

## 2025-03-21 PROCEDURE — 83735 ASSAY OF MAGNESIUM: CPT | Performed by: PSYCHIATRY & NEUROLOGY

## 2025-03-27 LAB — METHYLMALONATE SERPL-SCNC: 0.22 UMOL/L

## 2025-03-31 ENCOUNTER — PATIENT MESSAGE (OUTPATIENT)
Dept: ADMINISTRATIVE | Facility: HOSPITAL | Age: 55
End: 2025-03-31
Payer: COMMERCIAL

## 2025-04-08 ENCOUNTER — PATIENT OUTREACH (OUTPATIENT)
Dept: ADMINISTRATIVE | Facility: HOSPITAL | Age: 55
End: 2025-04-08
Payer: COMMERCIAL

## 2025-05-02 ENCOUNTER — HOSPITAL ENCOUNTER (EMERGENCY)
Facility: HOSPITAL | Age: 55
Discharge: HOME | End: 2025-05-02
Attending: EMERGENCY MEDICINE
Payer: COMMERCIAL

## 2025-05-02 VITALS
OXYGEN SATURATION: 98 % | TEMPERATURE: 99 F | BODY MASS INDEX: 30.8 KG/M2 | HEART RATE: 79 BPM | HEIGHT: 71 IN | SYSTOLIC BLOOD PRESSURE: 147 MMHG | WEIGHT: 220 LBS | RESPIRATION RATE: 17 BRPM | DIASTOLIC BLOOD PRESSURE: 84 MMHG

## 2025-05-02 DIAGNOSIS — R10.9 RIGHT FLANK PAIN: Primary | ICD-10-CM

## 2025-05-02 DIAGNOSIS — R10.31 RIGHT GROIN PAIN: ICD-10-CM

## 2025-05-02 LAB
ALBUMIN SERPL-MCNC: 3.5 G/DL (ref 3.3–5.5)
ALP SERPL-CCNC: 102 U/L (ref 42–141)
BILIRUB SERPL-MCNC: 0.5 MG/DL (ref 0.2–1.6)
BILIRUBIN, POC UA: NEGATIVE
BLOOD, POC UA: NEGATIVE
BUN SERPL-MCNC: 19 MG/DL (ref 7–22)
CALCIUM SERPL-MCNC: 10 MG/DL (ref 8–10.3)
CHLORIDE SERPL-SCNC: 109 MMOL/L (ref 98–108)
CLARITY, UA: CLEAR
COLOR, UA: YELLOW
CREAT SERPL-MCNC: 1 MG/DL (ref 0.6–1.2)
GLUCOSE SERPL-MCNC: 91 MG/DL (ref 73–118)
GLUCOSE, POC UA: NEGATIVE
HCT, POC: NORMAL
HGB, POC: NORMAL (ref 14–18)
KETONES, POC UA: NEGATIVE
LEUKOCYTE EST, POC UA: NEGATIVE
MCH, POC: NORMAL
MCHC, POC: NORMAL
MCV, POC: NORMAL
MPV, POC: NORMAL
NITRITE, POC UA: NEGATIVE
PH UR STRIP: 5.5 [PH] (ref 5–8)
POC ALT (SGPT): 27 U/L (ref 10–47)
POC AST (SGOT): 30 U/L (ref 11–38)
POC PLATELET COUNT: NORMAL
POC TCO2: 27 MMOL/L (ref 18–33)
POTASSIUM BLD-SCNC: 4.7 MMOL/L (ref 3.6–5.1)
PROTEIN, POC UA: NEGATIVE
PROTEIN, POC: 6.5 G/DL (ref 6.4–8.1)
RBC, POC: NORMAL
RDW, POC: NORMAL
SODIUM BLD-SCNC: 143 MMOL/L (ref 128–145)
SPECIFIC GRAVITY, POC UA: >=1.03 (ref 1–1.03)
UROBILINOGEN, POC UA: 0.2 E.U./DL
WBC, POC: NORMAL

## 2025-05-02 PROCEDURE — 96361 HYDRATE IV INFUSION ADD-ON: CPT | Mod: ER

## 2025-05-02 PROCEDURE — 80053 COMPREHEN METABOLIC PANEL: CPT | Mod: ER

## 2025-05-02 PROCEDURE — 96374 THER/PROPH/DIAG INJ IV PUSH: CPT | Mod: ER

## 2025-05-02 PROCEDURE — 25000003 PHARM REV CODE 250: Mod: ER | Performed by: EMERGENCY MEDICINE

## 2025-05-02 PROCEDURE — 85025 COMPLETE CBC W/AUTO DIFF WBC: CPT | Mod: ER

## 2025-05-02 PROCEDURE — 99285 EMERGENCY DEPT VISIT HI MDM: CPT | Mod: 25,ER

## 2025-05-02 PROCEDURE — 63600175 PHARM REV CODE 636 W HCPCS: Mod: JZ,TB,ER | Performed by: EMERGENCY MEDICINE

## 2025-05-02 RX ORDER — KETOROLAC TROMETHAMINE 30 MG/ML
15 INJECTION, SOLUTION INTRAMUSCULAR; INTRAVENOUS
Status: COMPLETED | OUTPATIENT
Start: 2025-05-02 | End: 2025-05-02

## 2025-05-02 RX ORDER — METHOCARBAMOL 500 MG/1
1000 TABLET, FILM COATED ORAL 3 TIMES DAILY
Qty: 30 TABLET | Refills: 0 | Status: SHIPPED | OUTPATIENT
Start: 2025-05-02 | End: 2025-05-07

## 2025-05-02 RX ORDER — KETOROLAC TROMETHAMINE 10 MG/1
TABLET, FILM COATED ORAL
Qty: 15 TABLET | Refills: 0 | Status: SHIPPED | OUTPATIENT
Start: 2025-05-02

## 2025-05-02 RX ADMIN — KETOROLAC TROMETHAMINE 15 MG: 30 INJECTION, SOLUTION INTRAMUSCULAR; INTRAVENOUS at 02:05

## 2025-05-02 RX ADMIN — SODIUM CHLORIDE 1000 ML: 9 INJECTION, SOLUTION INTRAVENOUS at 02:05

## 2025-05-02 NOTE — DISCHARGE INSTRUCTIONS
Your ER workup did not reveal the cause of your pain.  It is most likely musculoskeletal.  Take medications as directed.  Take it easy until you feel better.  Follow-up with your doctor if you are not better in 1 week.

## 2025-05-02 NOTE — ED PROVIDER NOTES
Encounter Date: 5/2/2025       History     Chief Complaint   Patient presents with    Flank Pain     Right flank pain radiating to right groin x 8 days.      54M with HTN and ADD presents with right lower back pain radiating to the right groin x 1 week. Pain is constant since onset but waxes and wanes in intensity. It is incapacitating at times. No associated symptoms. He was seen at  on Monday and prescribed medications for muscle strain. He reports no relief with this treatment. He does have remote hx of kidney stones.       Review of patient's allergies indicates:   Allergen Reactions    Hydrocodone Rash     Past Medical History:   Diagnosis Date    ADD (attention deficit disorder)     Anxiety     Depression     Gastritis     EGD 2014    HDL deficiency     Hypertension     Kidney stone     Meniscus tear 09/2013    right    Meniscus tear 01/2017    right    Seizure disorder 1/18/2018    Seizures 06/2014    last seizure in late 2014    Vitamin D deficiency disease 1/18/2018     Past Surgical History:   Procedure Laterality Date    FRACTURE SURGERY Left 05/2016    wrist fracture    INJECTION OF JOINT Left 12/11/2018    Procedure: Injection, Joint, Bio-D Left Knee;  Surgeon: Gwendolyn Jeong MD;  Location: [facility];  Service: Orthopedics;  Laterality: Left;  Bio-D Left Knee    KNEE ARTHROSCOPY W/ MENISCECTOMY Left 12/11/2018    Procedure: ARTHROSCOPY, KNEE, WITH MENISCECTOMY;  Surgeon: Gwendolyn Jeong MD;  Location: [facility], [place], operated by [facility] OR;  Service: Orthopedics;  Laterality: Left;  Cartilage biopsy  and repair or micro-fracture    KNEE SURGERY      SYNOVECTOMY OF KNEE Left 12/11/2018    Procedure: SYNOVECTOMY, KNEE;  Surgeon: Gwendolyn Jeong MD;  Location: [facility], [place], operated by [facility] OR;  Service: Orthopedics;  Laterality: Left;     Family History   Problem Relation Name Age of Onset    Diabetes Father      Cancer Father      Skin cancer Father      Cancer Maternal Grandfather       Social History[1]  Review of Systems   Constitutional:  Negative for activity  change, appetite change, chills and fever.   HENT:  Negative for congestion, rhinorrhea, sneezing and sore throat.    Respiratory:  Negative for cough, chest tightness, shortness of breath and wheezing.    Cardiovascular:  Negative for chest pain and palpitations.   Gastrointestinal:  Negative for abdominal pain, diarrhea, nausea and vomiting.   Genitourinary:  Positive for flank pain. Negative for decreased urine volume, difficulty urinating, dysuria, hematuria, scrotal swelling and testicular pain.   Musculoskeletal:  Positive for back pain.   Skin:  Negative for rash.   Neurological:  Negative for dizziness, weakness, light-headedness, numbness and headaches.   All other systems reviewed and are negative.      Physical Exam     Initial Vitals [05/02/25 1338]   BP Pulse Resp Temp SpO2   (!) 173/103 77 16 98.6 °F (37 °C) 97 %      MAP       --         Physical Exam    Nursing note and vitals reviewed.  Constitutional: He appears well-developed and well-nourished. No distress.   HENT:   Head: Normocephalic and atraumatic.   Eyes: Conjunctivae are normal.   Neck:   Normal range of motion.  Cardiovascular:  Normal rate and regular rhythm.           No murmur heard.  Pulmonary/Chest: Breath sounds normal. No respiratory distress.   Abdominal: Bowel sounds are normal. He exhibits no distension. There is no abdominal tenderness.   Musculoskeletal:         General: No tenderness or edema. Normal range of motion.      Cervical back: Normal range of motion.     Neurological: He is alert and oriented to person, place, and time. He has normal strength. No sensory deficit. Coordination and gait normal. GCS eye subscore is 4. GCS verbal subscore is 5. GCS motor subscore is 6.   Skin: Skin is warm and dry. No rash noted.   Psychiatric: He has a normal mood and affect. His behavior is normal.         ED Course   Procedures  Labs Reviewed   POCT URINALYSIS W/O SCOPE - Abnormal       Result Value    Glucose, UA Negative       Bilirubin, UA Negative      Ketones, UA Negative      Spec Grav UA >=1.030 (*)     Blood, UA Negative      PH, UA 5.5      Protein, UA Negative      Urobilinogen, UA 0.2      Nitrite, UA Negative      Leukocytes, UA Negative      Color, UA POC Yellow      Clarity, UA, POC Clear     POCT CMP - Abnormal    Albumin, POC 3.5      Alkaline Phosphatase,       ALT (SGPT), POC 27      AST (SGOT), POC 30      POC BUN 19      Calcium, POC 10.0      POC Chloride 109 (*)     POC Creatinine 1.0      POC Glucose 91      POC Potassium 4.7      POC Sodium 143      Bilirubin, POC 0.5      POC TCO2 27      Protein, POC 6.5     POCT CBC    Hematocrit        Hemoglobin        RBC        WBC        MCV        MCH, POC        MCHC        RDW-CV        Platelet Count, POC        MPV       POCT CMP          Imaging Results              CT Renal Stone Study ABD Pelvis WO (Final result)  Result time 05/02/25 14:40:26      Final result by Giovanny Soto MD (05/02/25 14:40:26)                   Impression:      1. Bilateral nonobstructing nephrolithiasis, more in burden on the left.  2. Right renal subcentimeter hypoattenuating parenchymal focus which is too small to characterize.  3. Moderate to large colonic stool burden may reflect constipation.  No evidence of bowel obstruction or acute bowel inflammation.  4. Other incidental/nonemergent findings in the body of the report.      Electronically signed by: Giovanny Soto MD  Date:    05/02/2025  Time:    14:40               Narrative:    EXAMINATION:  CT RENAL STONE STUDY ABD PELVIS WO    CLINICAL HISTORY:  Flank pain, kidney stone suspected;    TECHNIQUE:  Low dose axial images, sagittal and coronal reformations were obtained from the lung bases to the pubic symphysis.  Contrast was not administered.    COMPARISON:  Abdominal radiograph 04/09/2014, abdominal ultrasound 04/07/2014; reports only from outside facility MRI abdomen 01/02/2025 and CT abdomen and pelvis  10/02/2024    FINDINGS:  Lack of IV contrast limits evaluation of soft tissue and vascular structures.    Base of the heart is within normal limits.  Calcified right hilar lymph nodes noted.  Imaged lung bases show minimal dependent atelectasis with right lower lobe few subcentimeter calcified granulomas.  No consolidation or pleural effusion.    Noncontrast appearance of the liver, gallbladder, pancreas, spleen, stomach, duodenum and bilateral adrenal glands are within normal limits.  No significant biliary ductal dilatation.    Bilateral kidneys are normal in overall size, shape and location.  Questioned punctate nephrolith at the right renal lower pole.  5 mm calculus at the left renal lower pole.  Punctate nephrolith at the left renal upper pole.  No radiopaque calculus seen within the ureters or urinary bladder.  No hydronephrosis.  Subcentimeter hypoattenuating parenchymal focus at the posteromedial aspect of the right renal interpolar region which is too small to characterize.  Bilateral relatively symmetric nonspecific minimal perinephric stranding.  Ureters are nondilated.  Urinary bladder is within normal limits.  Prostate and seminal vesicles are within normal limits.    Moderate to large amount of scattered fecal material throughout the colon.  No evidence of bowel obstruction or acute bowel inflammation.  Appendix and terminal ileum are within normal limits.  No bowel pneumatosis or portal venous gas.    No ascites, free air or lymphadenopathy by CT criteria.  No significant calcific atherosclerosis.  Aorta tapers normally.    Small fat containing umbilical hernia.  Dextrocurvature of the thoracolumbar spine.  Age-related degenerative change most prominent at L5-S1.  Degenerative related minimal grade 1 retrolisthesis of L1 on 2 and L2 on 3.  Degenerative related minimal grade 1 anterolisthesis of L3 on 4.  Bilateral L5 remote pars defects with associated grade 1 anterolisthesis of L5 on S1.  Multilevel  chronic appearing minimal to mild anterior wedge deformity of a few lower thoracic vertebral bodies and L4 vertebral body.  Age-related degenerative change most prominent at the mid to lower lumbar spine.  No acute or destructive osseous process seen.                                       Medications   ketorolac injection 15 mg (15 mg Intravenous Given 5/2/25 1414)   sodium chloride 0.9% bolus 1,000 mL 1,000 mL (1,000 mLs Intravenous New Bag 5/2/25 1414)     Medical Decision Making  54M with HTN and ADD presents with right lower back pain radiating to the right groin x 1 week. Pain is constant since onset but waxes and wanes in intensity. It is incapacitating at times. No associated symptoms. He was seen at  on Monday and prescribed medications for muscle strain. He reports no relief with this treatment. He does have remote hx of kidney stones.     On exam, he is neurologically intact. Normal ROM, normal gait. In shared decision making with pt, will check labs and CT scan. Work up shows no signs of UTI or blood in urine. No leukocytosis. CT with kidney stones, no ureteral or bladder stones. No other signs of infection or acute process. Most likely MSK. Will change current regimen to toradol and robaxin. Return precautions given.    Amount and/or Complexity of Data Reviewed  Radiology: ordered.    Risk  Prescription drug management.                                      Clinical Impression:  Final diagnoses:  [R10.9] Right flank pain (Primary)  [R10.31] Right groin pain          ED Disposition Condition    Discharge Stable          ED Prescriptions       Medication Sig Dispense Start Date End Date Auth. Provider    ketorolac (TORADOL) 10 mg tablet Take 1 tablet 3 times daily after meals.  Take on a full stomach. 15 tablet 5/2/2025 -- Vero Mercedes MD    methocarbamoL (ROBAXIN) 500 MG Tab Take 2 tablets (1,000 mg total) by mouth 3 (three) times daily. for 5 days 30 tablet 5/2/2025 5/7/2025 Vero Mercedes MD           Follow-up Information       Follow up With Specialties Details Why Contact Info    Ravi Baird MD Internal Medicine In 1 week If not better [id number] [facility]  [place] [address]  [phone]                 [1]   Social History  Tobacco Use    Smoking status: Never     Passive exposure: Never    Smokeless tobacco: Never   Substance Use Topics    Alcohol use: Yes     Alcohol/week: 0.0 standard drinks of alcohol     Comment: social    Drug use: No        Vero Mercedes MD  05/02/25 0401

## 2025-05-14 ENCOUNTER — OFFICE VISIT (OUTPATIENT)
Dept: FAMILY MEDICINE | Facility: CLINIC | Age: 55
End: 2025-05-14
Payer: COMMERCIAL

## 2025-05-14 VITALS
BODY MASS INDEX: 31.57 KG/M2 | SYSTOLIC BLOOD PRESSURE: 144 MMHG | HEART RATE: 75 BPM | OXYGEN SATURATION: 97 % | TEMPERATURE: 98 F | WEIGHT: 225.5 LBS | HEIGHT: 71 IN | DIASTOLIC BLOOD PRESSURE: 84 MMHG

## 2025-05-14 DIAGNOSIS — Z87.19 HISTORY OF ACUTE GASTRITIS: ICD-10-CM

## 2025-05-14 DIAGNOSIS — D50.9 IRON DEFICIENCY ANEMIA, UNSPECIFIED IRON DEFICIENCY ANEMIA TYPE: ICD-10-CM

## 2025-05-14 DIAGNOSIS — I10 ESSENTIAL HYPERTENSION: ICD-10-CM

## 2025-05-14 DIAGNOSIS — F39 MOOD DISORDER: ICD-10-CM

## 2025-05-14 DIAGNOSIS — F98.8 ATTENTION DEFICIT DISORDER, UNSPECIFIED TYPE: ICD-10-CM

## 2025-05-14 DIAGNOSIS — M54.31 RIGHT SIDED SCIATICA: Primary | ICD-10-CM

## 2025-05-14 DIAGNOSIS — Z12.12 SCREENING FOR COLORECTAL CANCER: ICD-10-CM

## 2025-05-14 DIAGNOSIS — F98.8 ATTENTION DEFICIT DISORDER: ICD-10-CM

## 2025-05-14 DIAGNOSIS — Z12.11 SCREENING FOR COLORECTAL CANCER: ICD-10-CM

## 2025-05-14 DIAGNOSIS — G40.909 SEIZURE DISORDER: ICD-10-CM

## 2025-05-14 PROCEDURE — G2211 COMPLEX E/M VISIT ADD ON: HCPCS | Mod: S$GLB,,, | Performed by: INTERNAL MEDICINE

## 2025-05-14 PROCEDURE — 4010F ACE/ARB THERAPY RXD/TAKEN: CPT | Mod: CPTII,S$GLB,, | Performed by: INTERNAL MEDICINE

## 2025-05-14 PROCEDURE — 3044F HG A1C LEVEL LT 7.0%: CPT | Mod: CPTII,S$GLB,, | Performed by: INTERNAL MEDICINE

## 2025-05-14 PROCEDURE — 99999 PR PBB SHADOW E&M-EST. PATIENT-LVL III: CPT | Mod: PBBFAC,,, | Performed by: INTERNAL MEDICINE

## 2025-05-14 PROCEDURE — 99215 OFFICE O/P EST HI 40 MIN: CPT | Mod: S$GLB,,, | Performed by: INTERNAL MEDICINE

## 2025-05-14 PROCEDURE — 3077F SYST BP >= 140 MM HG: CPT | Mod: CPTII,S$GLB,, | Performed by: INTERNAL MEDICINE

## 2025-05-14 PROCEDURE — 3008F BODY MASS INDEX DOCD: CPT | Mod: CPTII,S$GLB,, | Performed by: INTERNAL MEDICINE

## 2025-05-14 PROCEDURE — 3079F DIAST BP 80-89 MM HG: CPT | Mod: CPTII,S$GLB,, | Performed by: INTERNAL MEDICINE

## 2025-05-14 RX ORDER — DEXTROAMPHETAMINE SACCHARATE, AMPHETAMINE ASPARTATE MONOHYDRATE, DEXTROAMPHETAMINE SULFATE AND AMPHETAMINE SULFATE 7.5; 7.5; 7.5; 7.5 MG/1; MG/1; MG/1; MG/1
30 CAPSULE, EXTENDED RELEASE ORAL EVERY MORNING
Qty: 60 CAPSULE | Refills: 0 | Status: SHIPPED | OUTPATIENT
Start: 2025-07-14

## 2025-05-14 RX ORDER — IBUPROFEN 800 MG/1
800 TABLET, FILM COATED ORAL EVERY 8 HOURS PRN
Qty: 90 TABLET | Refills: 1 | Status: CANCELLED | OUTPATIENT
Start: 2025-05-14

## 2025-05-14 RX ORDER — HYDROCHLOROTHIAZIDE 12.5 MG/1
12.5 TABLET ORAL DAILY
Qty: 90 TABLET | Refills: 11 | Status: SHIPPED | OUTPATIENT
Start: 2025-05-14 | End: 2026-05-14

## 2025-05-14 RX ORDER — DEXTROAMPHETAMINE SACCHARATE, AMPHETAMINE ASPARTATE MONOHYDRATE, DEXTROAMPHETAMINE SULFATE AND AMPHETAMINE SULFATE 7.5; 7.5; 7.5; 7.5 MG/1; MG/1; MG/1; MG/1
CAPSULE, EXTENDED RELEASE ORAL
Qty: 60 CAPSULE | Refills: 0 | Status: SHIPPED | OUTPATIENT
Start: 2025-06-14

## 2025-05-14 RX ORDER — IBUPROFEN 800 MG/1
800 TABLET, FILM COATED ORAL EVERY 8 HOURS PRN
COMMUNITY
Start: 2025-03-20

## 2025-05-14 RX ORDER — DEXTROAMPHETAMINE SACCHARATE, AMPHETAMINE ASPARTATE MONOHYDRATE, DEXTROAMPHETAMINE SULFATE AND AMPHETAMINE SULFATE 7.5; 7.5; 7.5; 7.5 MG/1; MG/1; MG/1; MG/1
CAPSULE, EXTENDED RELEASE ORAL
Qty: 60 CAPSULE | Refills: 0 | Status: SHIPPED | OUTPATIENT
Start: 2025-05-14

## 2025-05-14 RX ORDER — METHYLPREDNISOLONE 4 MG/1
TABLET ORAL
Qty: 1 EACH | Refills: 0 | Status: SHIPPED | OUTPATIENT
Start: 2025-05-14

## 2025-05-14 RX ORDER — CYCLOBENZAPRINE HCL 10 MG
10 TABLET ORAL NIGHTLY
COMMUNITY
Start: 2025-04-29

## 2025-05-14 NOTE — PROGRESS NOTES
Chief complaint:  Follow-up on ADD and blood pressure medications,  right LBP, hypertension         physical  2/25      54-year-old white male with long-standing ADD.  He requires a high-dose of the Adderall and apparently his son does as well as and is a high metabolizer of the medication so patient himself can assumedly is the same.  He finds benefit from medication.  We refilled the 3 prescriptions and I encouraged him to message us with the exact date see needs for the next 3 months and we will see him every 6 months.      He does find significant benefit with his ADD medication and that is confirmed by his wife.  He does not need any dose change.  I will give him refills for 3 months    Patient all so has had about two weeks of some pain radiating from the right lower back to the groin.  It does not seem to be a muscle although he tried a groin brace.  2 weeks ago he went to urgent care and got a steroid shot and some meds and he has been taking lots of ibuprofen.  It is worse with movement.  Sometimes with walking it will radiate around to the groin and make him feel like his legs going to give out.  It is overall not as bad as before over two weeks.  Reviewed ER visit where he had renal CT with no obstructing stones but the report on the spine as copied below.  We discussed probably this maybe an upper lumbar nerve impingement that can take 6-8 weeks to resolve.  Will retreat with steroid shot noting his history of a mild erosive gastritis in the past and probably should go bad stopped the ibuprofen.  He can use Tylenol.  Discussed MRI if symptoms persist and referral to pain management or surgery    Also blood pressure running high at home.  In the past looks like he was probably taken off HCTZ when he was being worked up for orthostasis many years ago but that turned out to be seizures.  He had no history of gout or potassium issue so it is time to restart HCTZ 12.5 in addition to the carvedilol and  Avapro.        Dextrocurvature of the thoracolumbar spine. Age-related degenerative change most prominent at L5-S1. Degenerative related minimal grade 1 retrolisthesis of L1 on 2 and L2 on 3. Degenerative related minimal grade 1 anterolisthesis of L3 on 4. Bilateral L5 remote pars defects with associated grade 1 anterolisthesis of L5 on S1. Multilevel chronic appearing minimal to mild anterior wedge deformity of a few lower thoracic vertebral bodies and L4 vertebral body. Age-related degenerative change most prominent at the mid to lower lumbar spine. No acute or destructive osseous process seen.         Sz issues reviewed:  52 yoM here for evaluation of syncope. He has history of seizure disorder and had been on Keppra in the past. He had a witnessed syncopal event 5/28/23 with shoulder injury and fall, hitting his head. Previously he had events and had a warning that he was going to pass out. This most recent event, there was no warning. He fell and did not protect himself. He was sweating profusely and was somewhat groggy. He broke his collarbone on the fall. His wife witnessed him fall from a standing position. Shortly after he was standing again and had a near syncopal event that does sound like his usual vagal syncope and that may have been from the pain from the clavicle. He went to the sofa with his wife's assistance. No further events.     He does bring in outside labs from his neurologist which are essentially normal with normal metanephrine levels.  Presumably due to the episodes of sweating serotonin level was checked and that was low in the blood.  Protein electrophoresis unremarkable.  Dopamine level slightly elevated at 26 but that is just slightly above range and not sure if that is clinically significant.    Last labs all rev, TSH sl up and then ok    Sl anemia w ferritin 16    Cologuard neg 1/22, 3/25    PSA 2/25      Lipids normal     2023 patient was lifting up his left leg onto the back of his  truck and try to pull himself up into the truck.  He had some acute pain behind the left knee and subsequent to that he had gradual edema developed to the left lower extremity which has persisted.  No real pain except for some tenderness behind the left knee.  He was going to travel to [place] so wife was concerned about blood clot and this could be a ruptured Baker's cyst but given the persistence of the edema I think it worthwhile to rule out DVT and a stat ultrasound done prior -neg    ROS:   CONST: weight stable. EYES: no vision change. ENT: no sore throat. CV: no chest pain w/ exertion. RESP: no shortness of breath. GI: no nausea, vomiting, diarrhea. No dysphagia. : no urinary issues. MUSCULOSKELETAL: no new myalgias or arthralgias. SKIN: no new changes. NEURO: no focal deficits. PSYCH: no new issues. ENDOCRINE: no polyuria. HEME: no lymph nodes. ALLERGY: no general pruritis.    PAST MEDICAL HISTORY:                                                        1.  Hypertension.                                                            2.  Depression, sees Psychiatry.                                             3.  ADD, sees Psychiatry.  Dr Emery                                                  4.  Right knee scope.   5.  Low HDL 1999   6.  Insomnia  7.  Sleep eating with Ambien   8.  probable seizure disorder 2015  9. Orthostatic hypotension, he worked up by cardiology 2015, may relate to seizures   10.  Left Colles' fracture with surgical repair 2016  11.  Right medial meniscus injury 2017    12.  Mild erosive gastropathy on EGD 2014  13. Hemoglobin reduction, rising MCV   14. Cologuard neg 1/22, 3/25  15. Syncope history, some vagal                                                                                                                            SOCIAL HISTORY:  Does not smoke, social alcohol, never smoked.                                                                                             FAMILY HISTORY:  Mom with seizures.  Father with hypertension, diabetes, .     He has no siblings.             Gen: no distress  Musculoskeletal: No lower back pain, hips full range motion with no pain.  No resisted muscle pain to flexion and extension of the hip and or palpable pain in the right thigh.  No gluteal pain or low back pain reproducible.  Negative straight leg testing.  Gait normal.  Skin no rashes, warm to touch.         Over 70 min  minutes of total time spent on the encounter, which includes face to face time and non-face to face time preparing to see the patient (eg, review of tests), Obtaining and/or reviewing separately obtained history, Documenting clinical information in the electronic or other health record, Independently interpreting results (not separately reported) and communicating results to the patient/family/caregiver, or Care coordination (not separately reported).    Diagnoses and all orders for this visit:    Right sided sciatica, new problem, suspect a right upper lumbar nerve impingement.  Will re-treat with a Medrol Dosepak and assess response with plan as above.  Hopefully improves over time    Attention deficit disorder, unspecified type, high risk medications addressed and refilled  -     dextroamphetamine-amphetamine (ADDERALL XR) 30 MG 24 hr capsule; 2 a day  -     dextroamphetamine-amphetamine (ADDERALL XR) 30 MG 24 hr capsule; 2 a day    Essential hypertension, uncontrolled, add HCTZ 12.5    Iron deficiency anemia, unspecified iron deficiency anemia type, monitor clinically, Cologuard negative    Mood disorder, chronic and stable    Seizure disorder, chronic and stable on meds    History of acute gastritis, stop NSAIDs    Screening for colorectal cancer, Cologuard up-to-date but may need colonoscopy if iron-deficiency anemia continues    Attention deficit disorder  -     dextroamphetamine-amphetamine (ADDERALL XR) 30 MG 24 hr capsule; Take 1 capsule (30 mg total) by mouth  every morning.    Other orders  -     methylPREDNISolone (MEDROL DOSEPACK) 4 mg tablet; use as directed  -     hydroCHLOROthiazide 12.5 MG Tab; Take 1 tablet (12.5 mg total) by mouth once daily.  The following orders have not been finalized:  -     Cancel: ibuprofen (ADVIL,MOTRIN) 800 MG tablet     This is a patient with chronic and complex diagnoses whose overall, ongoing care is being managed and monitored by me and our primary care clinic. As such,   is the appropriate add-on code to accompany the other E/M billing for this visit.

## 2025-05-20 ENCOUNTER — PATIENT MESSAGE (OUTPATIENT)
Dept: FAMILY MEDICINE | Facility: CLINIC | Age: 55
End: 2025-05-20
Payer: COMMERCIAL

## 2025-05-20 DIAGNOSIS — M54.31 RIGHT SIDED SCIATICA: ICD-10-CM

## 2025-05-20 DIAGNOSIS — M54.16 LUMBAR RADICULOPATHY, CHRONIC: ICD-10-CM

## 2025-05-23 ENCOUNTER — HOSPITAL ENCOUNTER (OUTPATIENT)
Dept: RADIOLOGY | Facility: HOSPITAL | Age: 55
Discharge: HOME | End: 2025-05-23
Attending: INTERNAL MEDICINE
Payer: COMMERCIAL

## 2025-05-23 DIAGNOSIS — M54.16 LUMBAR RADICULOPATHY, CHRONIC: ICD-10-CM

## 2025-05-23 PROCEDURE — 72148 MRI LUMBAR SPINE W/O DYE: CPT | Mod: TC

## 2025-05-23 PROCEDURE — 72148 MRI LUMBAR SPINE W/O DYE: CPT | Mod: 26,,, | Performed by: RADIOLOGY

## 2025-05-23 RX ORDER — OXYCODONE AND ACETAMINOPHEN 5; 325 MG/1; MG/1
1 TABLET ORAL EVERY 6 HOURS PRN
Qty: 20 EACH | Refills: 0 | Status: SHIPPED | OUTPATIENT
Start: 2025-05-23

## 2025-05-23 RX ORDER — OXYCODONE AND ACETAMINOPHEN 5; 325 MG/1; MG/1
1 TABLET ORAL EVERY 6 HOURS PRN
COMMUNITY
End: 2025-05-23 | Stop reason: SDUPTHER

## 2025-05-26 ENCOUNTER — RESULTS FOLLOW-UP (OUTPATIENT)
Dept: FAMILY MEDICINE | Facility: CLINIC | Age: 55
End: 2025-05-26
Payer: COMMERCIAL

## 2025-05-27 RX ORDER — METHOCARBAMOL 750 MG/1
750 TABLET, FILM COATED ORAL 3 TIMES DAILY
Qty: 90 TABLET | Refills: 3 | Status: SHIPPED | OUTPATIENT
Start: 2025-05-27

## 2025-05-27 NOTE — TELEPHONE ENCOUNTER
Please advise,    Thank you for the explanation of my MRI. I will keep my appointment with Pain Management. In the meantime, is it possible for me to get a prescription for a muscle relaxer? My back and groin have been seizing up on me despite my ice/heat and massage therapy that I have been doing daily. I thank you in advance for your help.

## 2025-06-09 ENCOUNTER — PATIENT MESSAGE (OUTPATIENT)
Dept: ADMINISTRATIVE | Facility: HOSPITAL | Age: 55
End: 2025-06-09
Payer: COMMERCIAL

## 2025-06-13 ENCOUNTER — APPOINTMENT (OUTPATIENT)
Dept: RADIOLOGY | Facility: HOSPITAL | Age: 55
End: 2025-06-13
Attending: STUDENT IN AN ORGANIZED HEALTH CARE EDUCATION/TRAINING PROGRAM
Payer: COMMERCIAL

## 2025-06-13 ENCOUNTER — OFFICE VISIT (OUTPATIENT)
Dept: PAIN MEDICINE | Facility: CLINIC | Age: 55
End: 2025-06-13
Payer: COMMERCIAL

## 2025-06-13 VITALS
SYSTOLIC BLOOD PRESSURE: 140 MMHG | WEIGHT: 221.81 LBS | HEART RATE: 86 BPM | DIASTOLIC BLOOD PRESSURE: 92 MMHG | OXYGEN SATURATION: 98 % | BODY MASS INDEX: 31.05 KG/M2 | HEIGHT: 71 IN

## 2025-06-13 DIAGNOSIS — M25.851 RIGHT HIP IMPINGEMENT SYNDROME: ICD-10-CM

## 2025-06-13 DIAGNOSIS — M54.31 RIGHT SIDED SCIATICA: ICD-10-CM

## 2025-06-13 DIAGNOSIS — M25.551 PAIN OF RIGHT HIP: Primary | ICD-10-CM

## 2025-06-13 DIAGNOSIS — M25.551 PAIN OF RIGHT HIP: ICD-10-CM

## 2025-06-13 PROCEDURE — 99999 PR PBB SHADOW E&M-EST. PATIENT-LVL V: CPT | Mod: PBBFAC,,, | Performed by: STUDENT IN AN ORGANIZED HEALTH CARE EDUCATION/TRAINING PROGRAM

## 2025-06-13 PROCEDURE — 73502 X-RAY EXAM HIP UNI 2-3 VIEWS: CPT | Mod: 26,RT,, | Performed by: RADIOLOGY

## 2025-06-13 PROCEDURE — 73502 X-RAY EXAM HIP UNI 2-3 VIEWS: CPT | Mod: TC,FY,PN,RT

## 2025-06-13 RX ORDER — METHOCARBAMOL 750 MG/1
1500 TABLET, FILM COATED ORAL 3 TIMES DAILY
Qty: 180 TABLET | Refills: 2 | Status: SHIPPED | OUTPATIENT
Start: 2025-06-13 | End: 2025-09-11

## 2025-06-13 RX ORDER — NABUMENTONE 750 MG/1
750 TABLET ORAL 2 TIMES DAILY PRN
Qty: 60 TABLET | Refills: 2 | Status: SHIPPED | OUTPATIENT
Start: 2025-06-13 | End: 2025-09-11

## 2025-06-13 NOTE — PROGRESS NOTES
Chronic Pain - New Consult    Referring Physician: Ravi Baird MD    Date: 06/13/2025     Re: Nirmal Moss  MR#: [id number]  YOB: 1970  Age: 54 y.o.    Chief Complaint:   Chief Complaint   Patient presents with    Consult     Right side sciatica pain/ groin pain          **This note is dictated using the [employer] Fluency Direct word recognition program. There are word recognition mistakes that are occasionally missed on review. This note was generated with the assistance of ambient listening technology. Verbal consent was obtained by the patient and accompanying visitor(s) for the recording of patient appointment to facilitate this note. I attest to having reviewed and edited the generated note for accuracy, though some syntax or spelling errors may persist. Please contact the author of this note for any clarification.   **    ASSESSMENT: 54 y.o. year old male with right hip/back pain, consistent with     1. Pain of right hip  X-Ray Hip 2 or 3 views Right with Pelvis when performed    nabumetone (RELAFEN) 750 MG tablet    methocarbamoL (ROBAXIN) 750 MG Tab    Ambulatory Referral/Consult to Physical Therapy    MRI Hip Without Contrast Right    CANCELED: MRI Hip Without Contrast Right      2. Right sided sciatica  Ambulatory referral/consult to Pain Clinic    nabumetone (RELAFEN) 750 MG tablet    methocarbamoL (ROBAXIN) 750 MG Tab      3. Right hip impingement syndrome  Ambulatory Referral/Consult to Physical Therapy        **Assessment & Plan      PLAN:     PAIN OF RIGHT HIP:  - Discussed obtaining an X-ray and MRI of the right hip to evaluate for potential hip pathology.  -patient has severe right hip pain that limits mobility.  He is not able to walk very well. We will order a STAT MRI  - Recommend a diagnostic right hip injection to determine the source of pain.  - Order PT  - Ordered XR Right Hip.  - Ordered MRI Right Hip.  - Follow up after MRI is completed.  - Will call patient to  discuss MRI results.    RIGHT SIDED SCIATICA:  - Started nabumetone, twice daily as needed, safer for stomach than ibuprofen or Aleve.  - Increased Methocarbamol (Robaxin) dosage to 1500mg, up to 3 times daily.  - Double dose of Methocarbamol (Robaxin) to 1500mg as needed, noting potential drowsiness.    RIGHT HIP IMPINGEMENT SYNDROME:  - Discussed obtaining an X-ray and MRI of the right hip to evaluate for potential hip pathology.  - Recommend a diagnostic right hip injection to determine the source of pain.  - Ordered XR Right Hip.  - Ordered MRI Right Hip.  - Follow up after MRI is completed.  - Will call patient to discuss MRI results.    DIFFICULTY IN WALKING, NOT ELSEWHERE CLASSIFIED:  - Use cane for walking if needed for safety.    LONG TERM (CURRENT) USE OF NON-STEROIDAL ANTI-INFLAMMATORIES (NSAID):  - Started nabumetone, twice daily as needed, safer for stomach than ibuprofen or Aleve.     - RTC Will call patient after MRI  - Counseled patient regarding the importance of activity modification and physical therapy.    The above plan and management options were discussed at length with patient. Patient is in agreement with the above and verbalized understanding. It will be communicated with the referring physician via electronic record, fax, or mail.  Lab/study reports reviewed were important and necessary because subsequent medical and treatment recommendations required review of the above lab/study reports. Images viewed/reviewed above were important and necessary because subsequent medical and treatment recommendations required review of the reviewed image(s).     Electronically signed by:  Clif Daly DO  06/13/2025    =========================================================================================================    SUBJECTIVE:    Nirmal Moss is a 54 y.o. male presents to the clinic for the evaluation of right low back/buttock pain. The pain started 1 month ago following no  inciting event and symptoms have been worsening.  He states that he is having pain from the back to his groin.  He tried tylenol and ibuprofen and not improving.  He went to urgent care.  Before that pain was pretty intense. He went to the ED on 5/2/25.  Over the last month, the pain can go from 5/10 at best to a 10/10.  It has been worsening.  He saw chiropractic and they did not think they could help.  He cannot take NSAIDs due to risk for gastric bleed.  Pain with with weight bearing. Better with rest.     Patient presents with right-sided lower back and groin pain that started about a month ago. Pain radiates from the back to the groin area, fluctuating in intensity between 10/10 at worst and 5-6/10 at best over the last month, with no improvement noted. Pain is exacerbated by standing and walking, with some relief when sitting, though it does not completely resolve. It transitions from sharp to dull when seated. His right leg gives out intermittently while walking, prompting him to use a cane for stability and to avoid shuffling.    He initially thought it was a pinched nerve but noted it was different from previous sciatica. He first managed symptoms with Tylenol and ibuprofen without improvement. He then visited urgent care, which led to a referral to his GP. An ER visit on May 2nd included an MRI, but no clear answers were provided. A subsequent, more extensive MRI ordered by his GP revealed issues with the left spinal cord, which did not explain the right-sided pain.    Current pain management includes Percocet, Tylenol, Aleve (switched from Advil), and muscle relaxants (Methocarbamol, Robaxin, and Flexeril at night). He received a steroid injection at urgent care and a round of oral steroids from Dr. Menezes, but neither provided significant relief. He has attempted home stretching exercises but has not undergone formal PT.    He reports feeling depressed due to his inability to perform his usual activities  as a  at his [employer] and around his home. His wife reports observing worsening symptoms and increasing frustration due to these limitations.    He denies bowel or urinary incontinence, numbness, or tingling in the affected area. He denies any history of back surgery or hip replacement.    MEDICATIONS:  Patient is on Percocet, Tylenol, and Aleve for pain management. He is also taking Methocarbamol (Robaxin) 750 mg 3 times daily and Flexeril at night.    WORK STATUS:  Patient works as a  at [employer]. He is very active in home, garden, and [employer] projects. Pain has severely impacted his ability to perform work activities. Patient can no longer do many tasks without experiencing severe pain afterwards. He is experiencing frustration and depression due to these limitations.     Physical Therapy/Home Exercise: Yes, currently has a home exercise program. He has been doing home stretching / exercises.     Current Pain Medications:    - percocet (stopped), Tylenol, Aleve, methocarbamol and flexeril    Failed Pain Medications:    - nothing helps that much    Pain Treatment Therapies:    Pain procedures: none  Physical Therapy: none  Chiropractor: yes  Acupuncture: none  TENS unit: none  Spinal decompression: none  Joint replacement: none    Patient denies urinary incontinence, bowel incontinence, significant motor weakness, and loss of sensations.  Patient denies any suicidal or homicidal ideations     report:  Reviewed and consistent with medication use as prescribed.    Imaging:   LUMBAR MRI  Results for orders placed during the hospital encounter of 05/23/25    MRI Lumbar Spine Without Contrast    Narrative  EXAMINATION:  MRI LUMBAR SPINE WITHOUT CONTRAST    CLINICAL HISTORY:  Lumbar radiculopathy, symptoms persist with conservative treatment; Radiculopathy, lumbar region    TECHNIQUE:  Multiplanar, multisequence MR images were acquired from the thoracolumbar junction to the sacrum without  the administration of contrast.    COMPARISON:  CT 05/13/2016    FINDINGS:  Alignment: Grade 1 spondylolisthesis of L3 on L4 and L5 on S1.    Vertebrae: Normal marrow signal. No acute fracture.  Mild chronic compression fracture of the superior endplate of L1.    Discs: Normal height and signal.    Cord: Normal.  Conus terminates at L1    Degenerative findings:    T12-L1: Mild diffuse posterior disc osteophyte complex.  Mild thinning of the anterior thecal sac with no significant central canal or foraminal narrowing.    L1-L2: Mild diffuse posterior disc osteophyte complex.  No significant central canal narrowing.  Mild foraminal narrowing on the left.    L2-L3: Mild diffuse posterior disc osteophyte complex.  No significant central canal or foraminal narrowing.    L3-L4: Minimal grade 1 spondylolisthesis.  Mild diffuse posterior disc osteophyte complex.  Mild facet arthropathy and ligamentum flavum hypertrophy.  Moderate central canal narrowing.  There is disc protrusion at the left neural foramen.  Moderate-severe left foraminal narrowing.    L4-L5: Mild diffuse disc bulge.  Moderate bilateral facet arthropathy.  Mild ligamentum flavum hypertrophy.  Moderate central canal narrowing.  Mild disc protrusion at the right neural foramen.  Moderate right foraminal narrowing.    L5-S1: Grade 1 spondylolisthesis.  Bilateral spondylolysis of L5.  Moderate bilateral facet arthropathy.  Mild disc protrusion at the neural foramen bilaterally.  Moderate right foraminal narrowing and mild left foraminal narrowing.  Central canal is adequately maintained.    Paraspinal muscles & soft tissues: Unremarkable.    Impression  1. No acute abnormality  2. Multilevel chronic and degenerative changes.  See above comments.      Electronically signed by: Daniel Peralta  Date:    05/23/2025  Time:    18:48            6/13/2025    11:39 AM   Pain Disability Index (PDI)   Family/Home Responsibilities: 7   Recreation: 9   Occupation: 7    Sexual Behavior: 9   Self Care: 4   Life-Support Activities: 0   Pain Disability Index (PDI) 40        Past Medical History:   Diagnosis Date    ADD (attention deficit disorder)     Anxiety     Depression     Gastritis     EGD 2014    HDL deficiency     Hypertension     Kidney stone     Meniscus tear 09/2013    right    Meniscus tear 01/2017    right    Seizure disorder 1/18/2018    Seizures 06/2014    last seizure in late 2014    Vitamin D deficiency disease 1/18/2018     Past Surgical History:   Procedure Laterality Date    FRACTURE SURGERY Left 05/2016    wrist fracture    INJECTION OF JOINT Left 12/11/2018    Procedure: Injection, Joint, Bio-D Left Knee;  Surgeon: Gwendolyn Jeong MD;  Location: [facility];  Service: Orthopedics;  Laterality: Left;  Bio-D Left Knee    KNEE ARTHROSCOPY W/ MENISCECTOMY Left 12/11/2018    Procedure: ARTHROSCOPY, KNEE, WITH MENISCECTOMY;  Surgeon: Gwendolyn Jeong MD;  Location: [facility] OR;  Service: Orthopedics;  Laterality: Left;  Cartilage biopsy  and repair or micro-fracture    KNEE SURGERY      SYNOVECTOMY OF KNEE Left 12/11/2018    Procedure: SYNOVECTOMY, KNEE;  Surgeon: Gwendolyn Jeong MD;  Location: [facility];  Service: Orthopedics;  Laterality: Left;     Social History[1]  Family History   Problem Relation Name Age of Onset    Diabetes Father      Cancer Father      Skin cancer Father      Cancer Maternal Grandfather         Review of patient's allergies indicates:   Allergen Reactions    Hydrocodone Rash       Current Outpatient Medications   Medication Sig    carvediloL (COREG) 25 MG tablet Take 1 tablet (25 mg total) by mouth 2 (two) times daily with meals.    clindamycin (CLEOCIN T) 1 % external solution Apply topically TWICE DAILY. apply TO inferior cutaneous LIP (right)    cyclobenzaprine (FLEXERIL) 10 MG tablet Take 10 mg by mouth every evening.    dextroamphetamine-amphetamine (ADDERALL XR) 30 MG 24 hr capsule TAKE TWO CAPSULES BY MOUTH every day    [START ON 7/14/2025]  dextroamphetamine-amphetamine (ADDERALL XR) 30 MG 24 hr capsule Take 1 capsule (30 mg total) by mouth every morning.    dextroamphetamine-amphetamine (ADDERALL XR) 30 MG 24 hr capsule 2 a day    [START ON 6/14/2025] dextroamphetamine-amphetamine (ADDERALL XR) 30 MG 24 hr capsule 2 a day    hydroCHLOROthiazide 12.5 MG Tab Take 1 tablet (12.5 mg total) by mouth once daily.    irbesartan (AVAPRO) 300 MG tablet take ONE tablet BY MOUTH every day FOR BLOOD PRESSURE    lacosamide (VIMPAT) 150 mg Tab tablet Take 150 mg by mouth 2 (two) times daily.    oxyCODONE-acetaminophen (PERCOCET) 5-325 mg per tablet Take 1 tablet by mouth every 6 (six) hours as needed for Pain.    paroxetine (PAXIL) 20 MG tablet take 3 TABLET(s) BY MOUTH EVERY MORNING    chlorhexidine (PERIDEX) 0.12 % solution RINSE ONE-HALF OUNCE TWICE DAILY AFTER breakfast & BEFORE BEDTIME    methocarbamoL (ROBAXIN) 750 MG Tab Take 2 tablets (1,500 mg total) by mouth 3 (three) times daily.    methylPREDNISolone (MEDROL DOSEPACK) 4 mg tablet use as directed    nabumetone (RELAFEN) 750 MG tablet Take 1 tablet (750 mg total) by mouth 2 (two) times daily as needed for Pain.     No current facility-administered medications for this visit.       REVIEW OF SYSTEMS:    GENERAL:  No weight loss, malaise or fevers.  HEENT:   No recent changes in vision or hearing  NECK:  Negative for lumps, no difficulty with swallowing.  RESPIRATORY:  Negative for cough, wheezing or shortness of breath, patient denies any recent URI.  CARDIOVASCULAR:  Negative for chest pain, leg swelling or palpitations.  GI:  Negative for abdominal discomfort, blood in stools or black stools or change in bowel habits.  MUSCULOSKELETAL:  See HPI.  SKIN:  Negative for lesions, rash, and itching.  PSYCH:  No mood disorder or recent psychosocial stressors.  Patients sleep is not disturbed secondary to pain.  HEMATOLOGY/LYMPHOLOGY:  Negative for prolonged bleeding, bruising easily or swollen nodes.  Patient is  "not currently taking any anti-coagulants  NEURO:   No history of headaches, syncope, paralysis, seizures or tremors.  All other reviewed and negative other than HPI.    OBJECTIVE:    BP (!) 140/92   Pulse 86   Ht 5' 11" (1.803 m)   Wt 100.6 kg (221 lb 12.5 oz)   SpO2 98%   BMI 30.93 kg/m²     PHYSICAL EXAMINATION:    GENERAL: Well appearing, in no acute distress, alert and oriented x3.  PSYCH:  Mood and affect appropriate.  SKIN: Skin color, texture, turgor normal, no rashes or lesions.  HEAD/FACE:  Normocephalic, atraumatic. Cranial nerves grossly intact.    NECK:   - Did not perform pain to palpation over the cervical paraspinous muscles.   - Spurling  Did not perform.  - Did not perform pain with neck flexion, extension, or lateral flexion.     CV: RRR with palpation of the radial artery.  PULM: CTAB. No evidence of respiratory difficulty, symmetric chest rise.  GI:  Soft and non-tender.    BACK:   - No obvious deformity or signs of trauma, Normal lumbar lordotic curve  - Negative spinous process tenderness  - Positive paravertebral tenderness  - Positive pain to palpation over the facet joints of the lumbar spine.   - Positive QL / Iliac crest / Glut tenderness  - Slump test is Negative for radicular pain  - Slump test is Negative for back pain  - Supine Straight leg raising is Negative for radicular pain  - Supine Straight leg raising is Negative for back pain  - Lumbar ROM is normal in Flexion without pain  - Lumbar ROM is diminished in Extension with pain  - Lumbar ROM is diminished in Lateral Flexion with pain  - Did not perform Sustained Hip Flexion test (for discogenic pain)  - Positive Altered Gait, Posture  - Axial facet loading test Positive on the right side(s)    SI Joint exam:  - Positive SI joint tenderness to palpation  - Juan Luis's sign Positive  - Yeoman's Test: Did not perform for SI joint pain indicating anterior SI ligament involvement. Did not perform for anterior thigh pain/paresthesia " which indicates femoral nerve stretch.  - Gaenslen's Test:Positive  - Finger Hoang's Sign:Negative  - SI compression test:Negative  - SI distraction test:Negative  - Thigh Thrust: Negative  - SI Thrust: Did not perform    MUSKULOSKELETAL:    EXTREMITIES:   Hip Exam:  - Log Roll Positive  - FADIR Positive  - Stinchfield Positive  - Hip Scour Positive  - GTB Tenderness Positive    NEUROLOGICAL EXAM:  MENTAL STATUS: A x O x 3, good concentration, speech is fluent and goal directed  MEMORY: recent and remote are intact  CN: CN2-12 grossly intact  MOTOR: 5/5 in all muscle groups  DTRs: 2+ intact symmetric  Sensation:    -no Loss of sensation in a left lower and right lower leg distribution.    GAIT: antalgic         [1]   Social History  Socioeconomic History    Marital status:     Number of children: 1   Occupational History     Employer: [employer]   Tobacco Use    Smoking status: Never     Passive exposure: Never    Smokeless tobacco: Never   Substance and Sexual Activity    Alcohol use: Yes     Alcohol/week: 0.0 standard drinks of alcohol     Comment: social    Drug use: No    Sexual activity: Yes     Partners: Male   Social History Narrative    [employer] company out of [employer],  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     Social Drivers of Health     Financial Resource Strain: Low Risk  (5/13/2025)    Overall Financial Resource Strain (CARDIA)     Difficulty of Paying Living Expenses: Not hard at all   Food Insecurity: No Food Insecurity (5/13/2025)    Hunger Vital Sign     Worried About Running Out of Food in the Last Year: Never true     Ran Out of Food in the Last Year: Never true   Transportation Needs: No Transportation Needs (5/13/2025)    PRAPARE - Transportation     Lack of Transportation (Medical): No     Lack of Transportation (Non-Medical): No   Physical Activity: Sufficiently Active (5/13/2025)    Exercise Vital Sign     Days of Exercise per Week: 7 days     Minutes of Exercise  per Session: 60 min   Stress: No Stress Concern Present (5/13/2025)    [employer] [employer] - Occupational Stress Questionnaire     Feeling of Stress : Not at all   Housing Stability: Low Risk  (5/13/2025)    Housing Stability Vital Sign     Unable to Pay for Housing in the Last Year: No     Number of Times Moved in the Last Year: 0     Homeless in the Last Year: No

## 2025-06-16 ENCOUNTER — RESULTS FOLLOW-UP (OUTPATIENT)
Dept: PAIN MEDICINE | Facility: CLINIC | Age: 55
End: 2025-06-16

## 2025-06-17 ENCOUNTER — PATIENT MESSAGE (OUTPATIENT)
Dept: PAIN MEDICINE | Facility: CLINIC | Age: 55
End: 2025-06-17
Payer: COMMERCIAL

## 2025-06-17 ENCOUNTER — HOSPITAL ENCOUNTER (OUTPATIENT)
Dept: RADIOLOGY | Facility: HOSPITAL | Age: 55
Discharge: HOME | End: 2025-06-17
Attending: STUDENT IN AN ORGANIZED HEALTH CARE EDUCATION/TRAINING PROGRAM
Payer: COMMERCIAL

## 2025-06-17 DIAGNOSIS — M25.551 PAIN OF RIGHT HIP: ICD-10-CM

## 2025-06-17 DIAGNOSIS — M16.11 PRIMARY OSTEOARTHRITIS OF RIGHT HIP: Primary | ICD-10-CM

## 2025-06-17 PROCEDURE — 73721 MRI JNT OF LWR EXTRE W/O DYE: CPT | Mod: TC,RT

## 2025-06-17 PROCEDURE — 73721 MRI JNT OF LWR EXTRE W/O DYE: CPT | Mod: 26,RT,, | Performed by: RADIOLOGY

## 2025-06-17 NOTE — TELEPHONE ENCOUNTER
XR and MRI show severe OA with insufficiency fracture.  Suspect he will need hip replacement.  Sent message to ortho to see if they can get him in to discuss options. Patient aware of results and plan    Clif Montes

## 2025-06-20 ENCOUNTER — PATIENT MESSAGE (OUTPATIENT)
Dept: FAMILY MEDICINE | Facility: CLINIC | Age: 55
End: 2025-06-20
Payer: COMMERCIAL

## 2025-06-23 ENCOUNTER — TELEPHONE (OUTPATIENT)
Dept: FAMILY MEDICINE | Facility: CLINIC | Age: 55
End: 2025-06-23
Payer: COMMERCIAL

## 2025-06-23 NOTE — TELEPHONE ENCOUNTER
On your desk is a fax from [facility] for a Medical Clearance for a Right LAUREL on 07/16/25. The patient's last appointment was 05/24/25. Please advise if the patient needs an appointment.

## 2025-06-23 NOTE — TELEPHONE ENCOUNTER
Looks like last appointment really was not a preop assessment so if possible we probably do need to get him into an appointment before July 16th

## 2025-06-24 ENCOUNTER — TELEPHONE (OUTPATIENT)
Dept: FAMILY MEDICINE | Facility: CLINIC | Age: 55
End: 2025-06-24
Payer: COMMERCIAL

## 2025-06-27 ENCOUNTER — HOSPITAL ENCOUNTER (EMERGENCY)
Facility: HOSPITAL | Age: 55
Discharge: HOME | End: 2025-06-27
Attending: EMERGENCY MEDICINE
Payer: COMMERCIAL

## 2025-06-27 VITALS
WEIGHT: 190 LBS | HEART RATE: 61 BPM | TEMPERATURE: 98 F | DIASTOLIC BLOOD PRESSURE: 68 MMHG | BODY MASS INDEX: 26.6 KG/M2 | OXYGEN SATURATION: 96 % | RESPIRATION RATE: 12 BRPM | HEIGHT: 71 IN | SYSTOLIC BLOOD PRESSURE: 126 MMHG

## 2025-06-27 DIAGNOSIS — N17.9 AKI (ACUTE KIDNEY INJURY): Primary | ICD-10-CM

## 2025-06-27 DIAGNOSIS — R55 SYNCOPE: ICD-10-CM

## 2025-06-27 DIAGNOSIS — D64.9 ANEMIA, UNSPECIFIED TYPE: ICD-10-CM

## 2025-06-27 LAB
ABSOLUTE EOSINOPHIL (OHS): 0.02 K/UL
ABSOLUTE MONOCYTE (OHS): 1.18 K/UL (ref 0.3–1)
ABSOLUTE NEUTROPHIL COUNT (OHS): 12.76 K/UL (ref 1.8–7.7)
ALBUMIN SERPL BCP-MCNC: 3.7 G/DL (ref 3.5–5.2)
ALP SERPL-CCNC: 88 UNIT/L (ref 40–150)
ALT SERPL W/O P-5'-P-CCNC: 18 UNIT/L (ref 10–44)
ANION GAP (OHS): 12 MMOL/L (ref 8–16)
AST SERPL-CCNC: 11 UNIT/L (ref 11–45)
BASOPHILS # BLD AUTO: 0.07 K/UL
BASOPHILS NFR BLD AUTO: 0.5 %
BILIRUB SERPL-MCNC: 0.1 MG/DL (ref 0.1–1)
BUN SERPL-MCNC: 46 MG/DL (ref 6–20)
CALCIUM SERPL-MCNC: 9.4 MG/DL (ref 8.7–10.5)
CHLORIDE SERPL-SCNC: 106 MMOL/L (ref 95–110)
CO2 SERPL-SCNC: 20 MMOL/L (ref 23–29)
CREAT SERPL-MCNC: 1.2 MG/DL (ref 0.5–1.4)
ERYTHROCYTE [DISTWIDTH] IN BLOOD BY AUTOMATED COUNT: 14.6 % (ref 11.5–14.5)
GFR SERPLBLD CREATININE-BSD FMLA CKD-EPI: >60 ML/MIN/1.73/M2
GLUCOSE SERPL-MCNC: 100 MG/DL (ref 70–110)
HCT VFR BLD AUTO: 34.9 % (ref 40–54)
HCV AB SERPL QL IA: NORMAL
HGB BLD-MCNC: 11.8 GM/DL (ref 14–18)
HIV 1+2 AB+HIV1 P24 AG SERPL QL IA: NORMAL
IMM GRANULOCYTES # BLD AUTO: 0.09 K/UL (ref 0–0.04)
IMM GRANULOCYTES NFR BLD AUTO: 0.6 % (ref 0–0.5)
LYMPHOCYTES # BLD AUTO: 1.2 K/UL (ref 1–4.8)
MAGNESIUM SERPL-MCNC: 2 MG/DL (ref 1.6–2.6)
MCH RBC QN AUTO: 33 PG (ref 27–31)
MCHC RBC AUTO-ENTMCNC: 33.8 G/DL (ref 32–36)
MCV RBC AUTO: 98 FL (ref 82–98)
NUCLEATED RBC (/100WBC) (OHS): 0 /100 WBC
PHOSPHATE SERPL-MCNC: 4.6 MG/DL (ref 2.7–4.5)
PLATELET # BLD AUTO: 254 K/UL (ref 150–450)
PMV BLD AUTO: 9.8 FL (ref 9.2–12.9)
POTASSIUM SERPL-SCNC: 4 MMOL/L (ref 3.5–5.1)
PROT SERPL-MCNC: 6.4 GM/DL (ref 6–8.4)
RBC # BLD AUTO: 3.58 M/UL (ref 4.6–6.2)
RELATIVE EOSINOPHIL (OHS): 0.1 %
RELATIVE LYMPHOCYTE (OHS): 7.8 % (ref 18–48)
RELATIVE MONOCYTE (OHS): 7.7 % (ref 4–15)
RELATIVE NEUTROPHIL (OHS): 83.3 % (ref 38–73)
SODIUM SERPL-SCNC: 138 MMOL/L (ref 136–145)
WBC # BLD AUTO: 15.32 K/UL (ref 3.9–12.7)

## 2025-06-27 PROCEDURE — 84100 ASSAY OF PHOSPHORUS: CPT | Performed by: EMERGENCY MEDICINE

## 2025-06-27 PROCEDURE — 63600175 PHARM REV CODE 636 W HCPCS: Performed by: EMERGENCY MEDICINE

## 2025-06-27 PROCEDURE — 85025 COMPLETE CBC W/AUTO DIFF WBC: CPT | Performed by: EMERGENCY MEDICINE

## 2025-06-27 PROCEDURE — 80235 DRUG ASSAY LACOSAMIDE: CPT | Performed by: EMERGENCY MEDICINE

## 2025-06-27 PROCEDURE — 83735 ASSAY OF MAGNESIUM: CPT | Performed by: EMERGENCY MEDICINE

## 2025-06-27 PROCEDURE — 94761 N-INVAS EAR/PLS OXIMETRY MLT: CPT

## 2025-06-27 PROCEDURE — 93005 ELECTROCARDIOGRAM TRACING: CPT

## 2025-06-27 PROCEDURE — 99284 EMERGENCY DEPT VISIT MOD MDM: CPT | Mod: 25

## 2025-06-27 PROCEDURE — 86803 HEPATITIS C AB TEST: CPT | Performed by: STUDENT IN AN ORGANIZED HEALTH CARE EDUCATION/TRAINING PROGRAM

## 2025-06-27 PROCEDURE — 96360 HYDRATION IV INFUSION INIT: CPT

## 2025-06-27 PROCEDURE — 80053 COMPREHEN METABOLIC PANEL: CPT | Performed by: EMERGENCY MEDICINE

## 2025-06-27 PROCEDURE — 93010 ELECTROCARDIOGRAM REPORT: CPT | Mod: ,,, | Performed by: INTERNAL MEDICINE

## 2025-06-27 PROCEDURE — 87389 HIV-1 AG W/HIV-1&-2 AB AG IA: CPT | Performed by: STUDENT IN AN ORGANIZED HEALTH CARE EDUCATION/TRAINING PROGRAM

## 2025-06-27 PROCEDURE — 96361 HYDRATE IV INFUSION ADD-ON: CPT

## 2025-06-27 RX ADMIN — SODIUM CHLORIDE, POTASSIUM CHLORIDE, SODIUM LACTATE AND CALCIUM CHLORIDE 1000 ML: 600; 310; 30; 20 INJECTION, SOLUTION INTRAVENOUS at 09:06

## 2025-06-28 LAB
OHS QRS DURATION: 94 MS
OHS QTC CALCULATION: 407 MS

## 2025-06-28 NOTE — ED TRIAGE NOTES
Nirmal Sukumar Moss, a 54 y.o. male presents to the ED w/ complaint of LOC in parking lot. Reports seizure hx. On Vimpat     Triage note:  Chief Complaint   Patient presents with    Loss of Consciousness     Multiple syncopal episodes in parking lot. EMS found pt to be hypotensive. A/O X4 upon arrival. - blood thinners.      Review of patient's allergies indicates:   Allergen Reactions    Hydrocodone Rash     Past Medical History:   Diagnosis Date    ADD (attention deficit disorder)     Anxiety     Depression     Gastritis     EGD 2014    HDL deficiency     Hypertension     Kidney stone     Meniscus tear 09/2013    right    Meniscus tear 01/2017    right    Seizure disorder 1/18/2018    Seizures 06/2014    last seizure in late 2014    Vitamin D deficiency disease 1/18/2018

## 2025-06-28 NOTE — ED PROVIDER NOTES
Encounter Date: 6/27/2025       History     Chief Complaint   Patient presents with    Loss of Consciousness     Multiple syncopal episodes in parking lot. EMS found pt to be hypotensive. A/O X4 upon arrival. - blood thinners.      HPI  54-year-old male with past medical history as noted below, history of seizure-like activity on Vimpat last episode in 2023, ED were associated with syncopal like episodes in the past, coming in with syncope today x3.    Patient was feeling well earlier in the day with no acute symptoms in his usual state of health, he was getting out of his vehicle when he felt woozy and syncopized, falling to the ground hitting his head, arms, knees.  He subsequently got up a few minutes later and passed out again but this time was caught by nearby friend.  EMS was called in the stretcher when they lifted the stretcher up he has syncopized a 3rd time.  This happened within a span of 20 minutes.  He got 700 cc of fluids with EMS and currently feels back to normal, asymptomatic.  Reportedly per EMS he was hypotensive at the scene but I do not see be numbers documented (reviewed ambulance record).     Currently he denies headache, chest pain, shortness breath, abdominal pain, nausea, vomiting, diarrhea, urinary symptoms, blood in his urine, blood in his stool, black stool, that has all other sources of external bleeding, no vision changes, numbness, tingling, weakness.    He has some mild right hip pain which is baseline for him as he says he is due for a right hip replacement    Denies being on blood thinners.  No antiplatelets.    Review of patient's allergies indicates:   Allergen Reactions    Hydrocodone Rash     Past Medical History:   Diagnosis Date    ADD (attention deficit disorder)     Anxiety     Depression     Gastritis     EGD 2014    HDL deficiency     Hypertension     Kidney stone     Meniscus tear 09/2013    right    Meniscus tear 01/2017    right    Seizure disorder 1/18/2018     Seizures 06/2014    last seizure in late 2014    Vitamin D deficiency disease 1/18/2018     Past Surgical History:   Procedure Laterality Date    FRACTURE SURGERY Left 05/2016    wrist fracture    INJECTION OF JOINT Left 12/11/2018    Procedure: Injection, Joint, Bio-D Left Knee;  Surgeon: Gwendolyn Jeong MD;  Location: [facility];  Service: Orthopedics;  Laterality: Left;  Bio-D Left Knee    KNEE ARTHROSCOPY W/ MENISCECTOMY Left 12/11/2018    Procedure: ARTHROSCOPY, KNEE, WITH MENISCECTOMY;  Surgeon: Gwendolyn Jeong MD;  Location: [facility] OR;  Service: Orthopedics;  Laterality: Left;  Cartilage biopsy  and repair or micro-fracture    KNEE SURGERY      SYNOVECTOMY OF KNEE Left 12/11/2018    Procedure: SYNOVECTOMY, KNEE;  Surgeon: Gwendolyn Jeong MD;  Location: [facility] OR;  Service: Orthopedics;  Laterality: Left;     Family History   Problem Relation Name Age of Onset    Diabetes Father      Cancer Father      Skin cancer Father      Cancer Maternal Grandfather       Social History[1]  Review of Systems    Physical Exam     Initial Vitals [06/27/25 1910]   BP Pulse Resp Temp SpO2   128/79 78 18 98.1 °F (36.7 °C) 99 %      MAP       --         Physical Exam    Physical Exam:  CONSTITUTIONAL: Well developed, well nourished, in no acute distress.  HENT: Normocephalic, abrasions to the right forehead    EYES: Sclerae anicteric, pupils equal round reactive, extraocular is are intact, no nystagmus.  NECK: Supple, no thyroid enlargement  CARDIOVASCULAR: Regular rate and rhythm without any murmurs, gallops, rubs.  RESPIRATORY: Speaking in full sentences. Breathing comfortably. Auscultation of the lungs revealed normal breath sounds b/l, no wheezing, no rales, no rhonchi.  ABDOMEN: Soft and nontender, no masses, no rebound or guarding   NEUROLOGIC: Alert, interacting normally. No facial droop. Voice is clear. Speech is fluent.  Moving all extremities with no difficulty and full strength throughout.  Normal finger-nose bilaterally, normal  sensation to light touch bilaterally upper and lower extremities.  MSK:  There is no C, T or L-spine tenderness, there is no bilateral upper or lower extremity deformity or tenderness to palpation.  Normal range motion throughout bilateral upper and lower extremities.  SKIN:  Scattered abrasions on the hands and knees. Warm and dry. No visible rash on exposed areas of skin.    Psych: Mood and affect normal.       ED Course   Procedures  Labs Reviewed   COMPREHENSIVE METABOLIC PANEL - Abnormal       Result Value    Sodium 138      Potassium 4.0      Chloride 106      CO2 20 (*)     Glucose 100      BUN 46 (*)     Creatinine 1.2      Calcium 9.4      Protein Total 6.4      Albumin 3.7      Bilirubin Total 0.1      ALP 88      AST 11      ALT 18      Anion Gap 12      eGFR >60     PHOSPHORUS - Abnormal    Phosphorus Level 4.6 (*)    CBC WITH DIFFERENTIAL - Abnormal    WBC 15.32 (*)     RBC 3.58 (*)     HGB 11.8 (*)     HCT 34.9 (*)     MCV 98      MCH 33.0 (*)     MCHC 33.8      RDW 14.6 (*)     Platelet Count 254      MPV 9.8      Nucleated RBC 0      Neut % 83.3 (*)     Lymph % 7.8 (*)     Mono % 7.7      Eos % 0.1      Basophil % 0.5      Imm Grans % 0.6 (*)     Neut # 12.76 (*)     Lymph # 1.20      Mono # 1.18 (*)     Eos # 0.02      Baso # 0.07      Imm Grans # 0.09 (*)    HEPATITIS C ANTIBODY - Normal    Hep C Ab Interp Non-Reactive     HIV 1 / 2 ANTIBODY - Normal    HIV 1/2 Ag/Ab Non-Reactive     MAGNESIUM - Normal    Magnesium  2.0     CBC W/ AUTO DIFFERENTIAL    Narrative:     The following orders were created for panel order CBC auto differential.  Procedure                               Abnormality         Status                     ---------                               -----------         ------                     CBC with Differential[[id number]]       Abnormal            Final result                 Please view results for these tests on the individual orders.   HEP C VIRUS HOLD SPECIMEN   LACOSAMIDE  (VIMPAT)     EKG Readings: (Independently Interpreted)   Normal sinus rhythm at a rate of 69 with no ischemic changes, normal axis, intervals are unremarkable.     ECG Results              EKG 12-lead (Final result)        Collection Time Result Time QRS Duration OHS QTC Calculation    06/27/25 19:21:40 06/28/25 09:39:12 94 407                     Final result by Interface, Lab In [facility] (06/28/25 09:39:23)                   Narrative:    Test Reason : R55,    Vent. Rate :  69 BPM     Atrial Rate :  69 BPM     P-R Int : 186 ms          QRS Dur :  94 ms      QT Int : 380 ms       P-R-T Axes :  55   3  35 degrees    QTcB Int : 407 ms    Normal sinus rhythm  Normal ECG  When compared with ECG of 15-Aug-2023 13:04,  No significant change was found  Confirmed by Serjio Moore ([id number]) on 6/28/2025 9:39:07 AM    Referred By: AAAREFERRAL SELF           Confirmed By: Serjio Moore                                  Imaging Results    None          Medications   lactated ringers bolus 1,000 mL (0 mLs Intravenous Stopped 6/27/25 5271)     Medical Decision Making  Amount and/or Complexity of Data Reviewed  External Data Reviewed: notes.  Labs: ordered.  ECG/medicine tests: ordered and independent interpretation performed.      [age] old male with past medical history as noted coming in with syncopal episode x3 in the setting of trying to get up after syncope, also history of seizures and some of the seizures have had syncopal like components, so atypical seizures/seizure-like activity remains possible.    Currently he is entirely back to baseline and feels well, asymptomatic.  He was in his usual state of health prior to the event.  He did feel like coming on.    Will workup for arrhythmia, metabolic hematologic abnormalities, with labs, EKG.  Monitor.  Will finish EMS IV fluids for total of 1 L.    Suspect orthostatic syncope in the setting of low blood pressure.  Will check orthostatics although he already got fluids.    He  has no infectious symptoms, no infectious findings on exam, no indication for septic workup or infection as the trigger for this episode.    He has a history of seizure-like activity on Vimpat reports compliance with his medications, no shaking on these episodes, did have prodromal symptom, reported low blood pressure with EMS, at this time not consistent with true epileptiform seizure will continue to monitor in the emergency department.  Reports that he has had large outpatient workup for seizures and no epileptiform seizures were found.  Last brain imaging available is in June 2023.  No headache, no focal neurologic findings or complaints, not on blood thinners and only minor head trauma, at this time will defer brain imaging.    Disposition based on ED workup and patient's symptomatology.    Update:  In the ED he remains well-appearing here, hemodynamically stable, asymptomatic.  Labs show very mild SOFY possibly in the setting of dehydration given elevated BUN, suspect plain repletion.  There is mild new anemia of unclear etiology and elevated white count which is nonspecific likely reactive.  About 2 L of fluids, was ambulated in the ED with no difficulty and no symptoms.  Monitored for several hours.  EKG monitoring system reviewed no arrhythmia or concerning abnormalities.    At this time discussion held with the patient regarding disposition.  Patient would like to go home given the fact that he is asymptomatic workup without acutely dangerous pathology exam without focal findings, no reproducible symptoms in the emergency department and monitor in the emergency department for several hours with no events, this time this is a reasonable option for outpatient follow up    Lives with wife who will keep a close eye on him.      Will advise outpatient follow-up with primary care doctor continued outpatient workup, as well as repeat labs to recheck kidney function and blood counts,, ED return precautions for any  new or recurrent symptoms.    Findings of ED work up and return precautions verbally explained to patient. Patient agrees with discharge plan, return instructions and verbalizes understanding of return precautions.                                     Clinical Impression:  Final diagnoses:  [R55] Syncope  [N17.9] SOFY (acute kidney injury) (Primary)  [D64.9] Anemia, unspecified type          ED Disposition Condition    Discharge Stable          ED Prescriptions    None       Follow-up Information       Follow up With Specialties Details Why Contact Info    Ravi Baird MD Internal Medicine In 1 week  [address] [facility] [address]  [phone]                   Fady West MD  06/28/25 1006         [1]   Social History  Tobacco Use    Smoking status: Never     Passive exposure: Never    Smokeless tobacco: Never   Substance Use Topics    Alcohol use: Yes     Alcohol/week: 0.0 standard drinks of alcohol     Comment: social    Drug use: No        Fady West MD  06/28/25 1007

## 2025-06-28 NOTE — DISCHARGE INSTRUCTIONS
Your labs, physical exam did not show any signs of immediately dangerous medical conditions.    You do have a slight worsening of your kidney function but you were given IV fluids.  Your blood counts are slightly lower than previously.    Please follow-up with your primary care doctor within 1 week for repeat kidney function testing and repeat blood count testing, as well as continued workup for your passing out.    If you develop any bleeding black or bloody stool blood in your urine, new passing out, shaking or seizure-like activity, please return to the emergency department for re-evaluation.

## 2025-06-30 LAB — HOLD SPECIMEN: NORMAL

## 2025-07-01 LAB — LACOSAMIDE SERPL-MCNC: 8.5 MCG/ML (ref 1–10)

## 2025-07-02 ENCOUNTER — PATIENT MESSAGE (OUTPATIENT)
Dept: FAMILY MEDICINE | Facility: CLINIC | Age: 55
End: 2025-07-02
Payer: COMMERCIAL

## 2025-07-10 ENCOUNTER — OFFICE VISIT (OUTPATIENT)
Dept: FAMILY MEDICINE | Facility: CLINIC | Age: 55
End: 2025-07-10
Payer: COMMERCIAL

## 2025-07-10 ENCOUNTER — PATIENT MESSAGE (OUTPATIENT)
Dept: FAMILY MEDICINE | Facility: CLINIC | Age: 55
End: 2025-07-10

## 2025-07-10 VITALS
BODY MASS INDEX: 30.71 KG/M2 | OXYGEN SATURATION: 97 % | HEART RATE: 74 BPM | DIASTOLIC BLOOD PRESSURE: 78 MMHG | SYSTOLIC BLOOD PRESSURE: 126 MMHG | WEIGHT: 219.38 LBS | HEIGHT: 71 IN | TEMPERATURE: 98 F

## 2025-07-10 DIAGNOSIS — I10 PRIMARY HYPERTENSION: ICD-10-CM

## 2025-07-10 DIAGNOSIS — Z23 NEED FOR SHINGLES VACCINE: ICD-10-CM

## 2025-07-10 DIAGNOSIS — R55 SYNCOPE, UNSPECIFIED SYNCOPE TYPE: ICD-10-CM

## 2025-07-10 DIAGNOSIS — Z01.818 PRE-OP EXAM: Primary | ICD-10-CM

## 2025-07-10 DIAGNOSIS — G40.909 SEIZURE DISORDER: ICD-10-CM

## 2025-07-10 DIAGNOSIS — F41.9 ANXIETY DISORDER, UNSPECIFIED TYPE: ICD-10-CM

## 2025-07-10 DIAGNOSIS — M25.551 PAIN OF RIGHT HIP: ICD-10-CM

## 2025-07-10 PROCEDURE — 4010F ACE/ARB THERAPY RXD/TAKEN: CPT | Mod: CPTII,S$GLB,, | Performed by: NURSE PRACTITIONER

## 2025-07-10 PROCEDURE — 3074F SYST BP LT 130 MM HG: CPT | Mod: CPTII,S$GLB,, | Performed by: NURSE PRACTITIONER

## 2025-07-10 PROCEDURE — 3078F DIAST BP <80 MM HG: CPT | Mod: CPTII,S$GLB,, | Performed by: NURSE PRACTITIONER

## 2025-07-10 PROCEDURE — 99999 PR PBB SHADOW E&M-EST. PATIENT-LVL V: CPT | Mod: PBBFAC,,, | Performed by: NURSE PRACTITIONER

## 2025-07-10 PROCEDURE — 3008F BODY MASS INDEX DOCD: CPT | Mod: CPTII,S$GLB,, | Performed by: NURSE PRACTITIONER

## 2025-07-10 PROCEDURE — 1159F MED LIST DOCD IN RCRD: CPT | Mod: CPTII,S$GLB,, | Performed by: NURSE PRACTITIONER

## 2025-07-10 PROCEDURE — 99214 OFFICE O/P EST MOD 30 MIN: CPT | Mod: S$GLB,,, | Performed by: NURSE PRACTITIONER

## 2025-07-10 PROCEDURE — 3044F HG A1C LEVEL LT 7.0%: CPT | Mod: CPTII,S$GLB,, | Performed by: NURSE PRACTITIONER

## 2025-07-10 NOTE — PROGRESS NOTES
HPI     Nirmal Moss is a 54 y.o. male with multiple medical diagnoses as listed in the medical history and problem list that presents for No chief complaint on file.      HPI    See below pain management note from 6/17/25:    XR and MRI show severe OA with insufficiency fracture.  Suspect he will need hip replacement.  Sent message to ortho to see if they can get him in to discuss options. Patient aware of results and plan     Clif Montes          Pt was seen by pain management on 6/13/25:    PLAN:      PAIN OF RIGHT HIP:  - Discussed obtaining an X-ray and MRI of the right hip to evaluate for potential hip pathology.  -patient has severe right hip pain that limits mobility.  He is not able to walk very well. We will order a STAT MRI  - Recommend a diagnostic right hip injection to determine the source of pain.  - Order PT  - Ordered XR Right Hip.  - Ordered MRI Right Hip.  - Follow up after MRI is completed.  - Will call patient to discuss MRI results.     RIGHT SIDED SCIATICA:  - Started nabumetone, twice daily as needed, safer for stomach than ibuprofen or Aleve.  - Increased Methocarbamol (Robaxin) dosage to 1500mg, up to 3 times daily.  - Double dose of Methocarbamol (Robaxin) to 1500mg as needed, noting potential drowsiness.     RIGHT HIP IMPINGEMENT SYNDROME:  - Discussed obtaining an X-ray and MRI of the right hip to evaluate for potential hip pathology.  - Recommend a diagnostic right hip injection to determine the source of pain.  - Ordered XR Right Hip.  - Ordered MRI Right Hip.  - Follow up after MRI is completed.  - Will call patient to discuss MRI results.     DIFFICULTY IN WALKING, NOT ELSEWHERE CLASSIFIED:  - Use cane for walking if needed for safety.     LONG TERM (CURRENT) USE OF NON-STEROIDAL ANTI-INFLAMMATORIES (NSAID):  - Started nabumetone, twice daily as needed, safer for stomach than ibuprofen or Aleve.      - RTC Will call patient after MRI  - Counseled patient regarding  the importance of activity modification and physical therapy.     The above plan and management options were discussed at length with patient. Patient is in agreement with the above and verbalized understanding. It will be communicated with the referring physician via electronic record, fax, or mail.  Lab/study reports reviewed were important and necessary because subsequent medical and treatment recommendations required review of the above lab/study reports. Images viewed/reviewed above were important and necessary because subsequent medical and treatment recommendations required review of the reviewed image(s).      Electronically signed by:  Clif Daly DO  06/13/2025       Assessment & Plan     1. Pre-op exam    Pre Operative Assessment:    Procedure to be performed: right total hip replacement. [facility].    The ASCVD Risk score (Elen CAMPOVERDE, et al., 2019) failed to calculate for the following reasons:    The valid total cholesterol range is 130 to 320 mg/dL     Wt Readings from Last 3 Encounters:   07/10/25 99.5 kg (219 lb 5.7 oz)   06/27/25 86.2 kg (190 lb)   06/13/25 100.6 kg (221 lb 12.5 oz)     Temp Readings from Last 3 Encounters:   07/10/25 97.7 °F (36.5 °C) (Oral)   06/27/25 98.1 °F (36.7 °C) (Oral)   05/14/25 98.3 °F (36.8 °C) (Oral)     BP Readings from Last 3 Encounters:   07/10/25 126/78   06/27/25 126/68   06/13/25 (!) 140/92     Pulse Readings from Last 3 Encounters:   07/10/25 74   06/27/25 61   06/13/25 86     Resp Readings from Last 3 Encounters:   06/27/25 12   05/02/25 17   05/28/23 17     PF Readings from Last 3 Encounters:   No data found for PF     SpO2 Readings from Last 3 Encounters:   07/10/25 97%   06/27/25 96%   06/13/25 98%        Lab Results   Component Value Date    HGBA1C 5.2 02/21/2025    HGBA1C 5.2 10/20/2023    HGBA1C 5.0 11/18/2020     Lab Results   Component Value Date    LDLCALC 61.2 (L) 02/21/2025    CREATININE 1.2 06/27/2025       Pt  states that he does have limitations in activities due to hip pain  he has had prior surgery without any perioperative complications.    is able to walk 2 blocks with a cane    without getting CP/SOB/ARTHUR.  Denies F/C/N/V/palpitations/claudication.  Denies weakness/tingling/numbness  He did experience an episode of syncope on 6/27/25 for which he was treated in the ED. See note below:  Update:  In the ED he remains well-appearing here, hemodynamically stable, asymptomatic.  Labs show very mild SOFY possibly in the setting of dehydration given elevated BUN, suspect plain repletion.  There is mild new anemia of unclear etiology and elevated white count which is nonspecific likely reactive.  About 2 L of fluids, was ambulated in the ED with no difficulty and no symptoms.  Monitored for several hours.  EKG monitoring system reviewed no arrhythmia or concerning abnormalities.     At this time discussion held with the patient regarding disposition.  Patient would like to go home given the fact that he is asymptomatic workup without acutely dangerous pathology exam without focal findings, no reproducible symptoms in the emergency department and monitor in the emergency department for several hours with no events, this time this is a reasonable option for outpatient follow up    Patient denies any symptoms (as per HPI) concerning for undiagnosed lung disease including GLENN. Patient is a non-smoker. We discussed the benefits of early mobilization and deep breathing after surgery.      Screened patient for alcohol misuse, use of illicit drugs, and personal or family history of anesthetic complications or bleeding diathesis and no substantial concerns were identified.     Able to achieve 4 METs. RSI Class III (6.6% risk). Patient has acceptable exercise capacity as demonstrated in the office today.      RCRI risk factors include: no known RCRI risk factors. As such, per RCRI 30-day risk of death, MI, or cardiac arrest is  calculated to be 0.5% From [place] 2017, based on pooled data from 5 high quality external validations (4 prospective). These numbers are higher than those often quoted from the now-outdated original study (Suraj 1999).     Overall this patient can be considered low risk for this low risk procedure. No further cardiac testing is recommended at this time.     EKG today from 6/27/25 revealed normal sinus rhythm.     Labs reviewed:  Creatinine is below 2 mg/dl.    I recommend use of standard pre-op and post-op precautions for this patient. In my opinion, he is medically optimized for this procedure from a primary care standpoint, and can proceed without further evaluation.     - Comprehensive Metabolic Panel; Future  - CBC Auto Differential; Future    2. Pain of right hip  Pt [facility]    3. Primary hypertension  BP Readings from Last 3 Encounters:   07/10/25 126/78   06/27/25 126/68   06/13/25 (!) 140/92     -continue current medication regimen  -DASH diet, regular cardiovascular exercises, portion control  -weight loss  -f/u with BP logs in 2 weeks      4. Need for shingles vaccine  - Ambulatory referral/consult to [facility]  - varicella-zoster gE-AS01B, PF, (SHINGRIX) 50 mcg/0.5 mL injection; Inject 0.5 mLs into the muscle once. for 1 dose  Dispense: 1 each; Refill: 0    5. Syncope, unspecified syncope type  Denies syncopal episodes since ED encounter.     6. Anxiety disorder, unspecified type  Encouraged the patient to perform self-calming techniques, such as deep breathing/relaxation techniques and exercise.     7. Seizure disorder  The current medical regimen is effective;  continue present plan               --------------------------------------------      Health Maintenance:  Health Maintenance         Date Due Completion Date    Shingles Vaccine (1 of 2) Never done ---    Pneumococcal Vaccines (Age 50+) (1 of 1 - PCV) Never done ---    COVID-19  "Vaccine (3 - 2024-25 season) 09/01/2024 4/20/2021    TETANUS VACCINE 02/21/2025 2/21/2015    Influenza Vaccine (1) 09/01/2025 ---    Hemoglobin A1c (Diabetic Prevention Screening) 02/21/2028 2/21/2025    Colorectal Cancer Screening 03/12/2028 3/12/2025    Lipid Panel 02/21/2030 2/21/2025    RSV Vaccine (Age 60+ and Pregnant patients) (1 - 1-dose 75+ series) 12/24/2044 ---            Advised patient on the importance of completing overdue health maintenance items    Follow Up:  Follow up in about 2 weeks (around 7/24/2025), or if symptoms worsen or fail to improve.    Exam     Review of Systems:  (as noted above)  Review of Systems   Constitutional:  Negative for fever.   HENT:  Negative for trouble swallowing.    Eyes:  Negative for visual disturbance.   Respiratory:  Negative for chest tightness and shortness of breath.    Cardiovascular:  Negative for chest pain.   Gastrointestinal:  Negative for blood in stool.   Musculoskeletal:  Positive for arthralgias and gait problem (cane).       Physical Exam:   Physical Exam  Constitutional:       General: He is not in acute distress.     Appearance: He is not ill-appearing or diaphoretic.   HENT:      Head: Normocephalic and atraumatic.   Eyes:      General: No scleral icterus.  Cardiovascular:      Rate and Rhythm: Normal rate and regular rhythm.   Pulmonary:      Effort: No respiratory distress.   Chest:      Chest wall: No tenderness.   Neurological:      General: No focal deficit present.      Mental Status: He is alert and oriented to person, place, and time.      Gait: Gait abnormal.       Vitals:    07/10/25 1303   BP: 126/78   Patient Position: Sitting   Pulse: 74   Temp: 97.7 °F (36.5 °C)   TempSrc: Oral   SpO2: 97%   Weight: 99.5 kg (219 lb 5.7 oz)   Height: 5' 11" (1.803 m)      Body mass index is 30.59 kg/m².        History     Past Medical History:  Past Medical History:   Diagnosis Date    ADD (attention deficit disorder)     Anxiety     Depression     " Gastritis     EGD 2014    HDL deficiency     Hypertension     Kidney stone     Meniscus tear 09/2013    right    Meniscus tear 01/2017    right    Seizure disorder 1/18/2018    Seizures 06/2014    last seizure in late 2014    Vitamin D deficiency disease 1/18/2018       Past Surgical History:  Past Surgical History:   Procedure Laterality Date    FRACTURE SURGERY Left 05/2016    wrist fracture    INJECTION OF JOINT Left 12/11/2018    Procedure: Injection, Joint, Bio-D Left Knee;  Surgeon: Gwendolyn Jeong MD;  Location: [facility];  Service: Orthopedics;  Laterality: Left;  Bio-D Left Knee    KNEE ARTHROSCOPY W/ MENISCECTOMY Left 12/11/2018    Procedure: ARTHROSCOPY, KNEE, WITH MENISCECTOMY;  Surgeon: Gwendolyn Jeong MD;  [facility];  Service: Orthopedics;  Laterality: Left;  Cartilage biopsy  and repair or micro-fracture    KNEE SURGERY      SYNOVECTOMY OF KNEE Left 12/11/2018    Procedure: SYNOVECTOMY, KNEE;  Surgeon: Gwendolyn Jeong MD;  Location: [facility];  Service: Orthopedics;  Laterality: Left;       Social History:  Social History[1]    Family History:  Family History   Problem Relation Name Age of Onset    Diabetes Father      Cancer Father      Skin cancer Father      Cancer Maternal Grandfather         Allergies and Medications: (updated and reviewed)  Review of patient's allergies indicates:   Allergen Reactions    Hydrocodone Rash     Current Medications[2]    Patient Care Team:  Ravi Baird MD as PCP - General (Internal Medicine)         - The patient is given an After Visit Summary that lists all medications with directions, allergies, education, orders placed during this encounter and follow-up instructions.      - I have reviewed the patient's medical information including past medical, family, and social history sections including the medications and allergies.      - We discussed the patient's current medications.     This note was created by combination of typed  and MModal dictation.   Transcription errors may be present.  If there are any questions, please contact me.                        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     Social Drivers of Health     Financial Resource Strain: Low Risk  (5/13/2025)    Overall Financial Resource Strain (CARDIA)     Difficulty of Paying Living Expenses: Not hard at all   Food Insecurity: No Food Insecurity (5/13/2025)    Hunger Vital Sign     Worried About Running Out of Food in the Last Year: Never true     Ran Out of Food in the Last Year: Never true   Transportation Needs: No Transportation Needs (5/13/2025)    PRAPARE - Transportation     Lack of Transportation (Medical): No     Lack of Transportation (Non-Medical): No   Physical Activity: Sufficiently Active (5/13/2025)    Exercise Vital Sign     Days of Exercise per Week: 7 days     Minutes of Exercise per Session: 60 min   Stress: No Stress Concern Present (5/13/2025)    [employer] [place] [employer] - Occupational Stress Questionnaire     Feeling of Stress : Not at all   Housing Stability: Low Risk  (5/13/2025)    Housing Stability Vital Sign     Unable to Pay for Housing in the Last Year: No     Number of Times Moved in the Last Year: 0     Homeless in the Last Year: No   [2]   Current Outpatient Medications   Medication Sig Dispense Refill    carvediloL (COREG) 25 MG tablet Take 1 tablet (25 mg total) by mouth 2 (two) times daily with meals. 60 tablet 11    clindamycin (CLEOCIN T) 1 %  external solution Apply topically TWICE DAILY. apply TO inferior cutaneous LIP (right)      cyclobenzaprine (FLEXERIL) 10 MG tablet Take 10 mg by mouth every evening.      dextroamphetamine-amphetamine (ADDERALL XR) 30 MG 24 hr capsule TAKE TWO CAPSULES BY MOUTH every day 60 capsule 0    [START ON 7/14/2025] dextroamphetamine-amphetamine (ADDERALL XR) 30 MG 24 hr capsule Take 1 capsule (30 mg total) by mouth every morning. 60 capsule 0    dextroamphetamine-amphetamine (ADDERALL XR) 30 MG 24 hr capsule 2 a day 60 capsule 0    dextroamphetamine-amphetamine (ADDERALL XR) 30 MG 24 hr capsule 2 a day 60 capsule 0    hydroCHLOROthiazide 12.5 MG Tab Take 1 tablet (12.5 mg total) by mouth once daily. 90 tablet 11    irbesartan (AVAPRO) 300 MG tablet take ONE tablet BY MOUTH every day FOR BLOOD PRESSURE 90 tablet 12    lacosamide (VIMPAT) 150 mg Tab tablet Take 150 mg by mouth 2 (two) times daily.      methocarbamoL (ROBAXIN) 750 MG Tab Take 2 tablets (1,500 mg total) by mouth 3 (three) times daily. 180 tablet 2    nabumetone (RELAFEN) 750 MG tablet Take 1 tablet (750 mg total) by mouth 2 (two) times daily as needed for Pain. 60 tablet 2    oxyCODONE-acetaminophen (PERCOCET) 5-325 mg per tablet Take 1 tablet by mouth every 6 (six) hours as needed for Pain. 20 each 0    paroxetine (PAXIL) 20 MG tablet take 3 TABLET(s) BY MOUTH EVERY MORNING 270 tablet 3    varicella-zoster gE-AS01B, PF, (SHINGRIX) 50 mcg/0.5 mL injection Inject 0.5 mLs into the muscle once. for 1 dose 1 each 0     No current facility-administered medications for this visit.

## 2025-07-10 NOTE — PATIENT INSTRUCTIONS
[facility]--[phone]  Imaging, Xray, CT, MRI, Ultrasound---[phone]  Bariatrics---[phone]  Breast Surgery---[phone]  Case Management---[phone]  Colonoscopy---[phone]  DME---[phone]  Infectious Disease---[phone]  Interventional Radiology---[phone]  Medical Records---[phone]  [facility]---[phone]  Optometry/Ophthalmology---[phone]  [facility]---[phone]  Physical Therapy---[phone]  Psychiatry---[phone] or [phone]  Plastic Surgery---[phone]  Recovery--[phone] option 2, or [phone].  Sleep Study---[phone]  Smoking Cessation---[phone]  Wound Care---[phone]  Referral Desk---[phone]

## 2025-07-16 ENCOUNTER — TELEPHONE (OUTPATIENT)
Dept: FAMILY MEDICINE | Facility: CLINIC | Age: 55
End: 2025-07-16
Payer: COMMERCIAL

## 2025-07-16 NOTE — TELEPHONE ENCOUNTER
Copied from CRM #[id number]. Topic: Medications - Pharmacy  >> Jul 16, 2025 10:09 AM Ronny Botello wrote:  Type:  Pharmacy Calling to Clarify an RX    Name of Caller:Mireya    Pharmacy Name:.  [facility] - [facility] - [address]  [address]  [place] [address]  Fax: [phone]    Prescription Name:ADDERALL    What do they need to clarify?:directions     Best Call Back Number:[phone]    Additional Information:

## (undated) DEVICE — CLOSURE SKIN STERI STRIP 1/2X4

## (undated) DEVICE — UNDERGLOVES BIOGEL PI SIZE 7.5

## (undated) DEVICE — Device

## (undated) DEVICE — GOWN SMART IMP BREATHABLE XXLG

## (undated) DEVICE — SEE MEDLINE ITEM 157131

## (undated) DEVICE — PAD KNEE POLAR XL

## (undated) DEVICE — PAD ABD 8X10 STERILE

## (undated) DEVICE — DRESSING XEROFORM FOIL PK 1X8

## (undated) DEVICE — TOURNIQUET SB QC SP 34X4IN

## (undated) DEVICE — PAD CAST SPECIALIST STRL 6

## (undated) DEVICE — SEE MEDLINE ITEM 152530

## (undated) DEVICE — SEE MEDLINE ITEM 157169

## (undated) DEVICE — PAD COLD THERAPY KNEE WRAP ON

## (undated) DEVICE — DRAPE STERI 7IN

## (undated) DEVICE — SOL IRR NACL .9% 3000ML

## (undated) DEVICE — PADDING CAST 6IN DELTA ROLL

## (undated) DEVICE — NDL HYPO REG 25G X 1 1/2

## (undated) DEVICE — GLOVE SURGEON SYN PF SZ 9

## (undated) DEVICE — BLADE 4.2MM PREBENT ULTRACUT

## (undated) DEVICE — SUT MCRYL PLUS 4-0 PS2 27IN

## (undated) DEVICE — ADHESIVE MASTISOL VIAL 48/BX

## (undated) DEVICE — TUBE SET INFLOW/OUTFLOW

## (undated) DEVICE — COVER MAYO STAND REINFRCD 30

## (undated) DEVICE — PADDING CAST 6IN(OR)

## (undated) DEVICE — SEE MEDLINE ITEM 157117

## (undated) DEVICE — GAUZE SPONGE 4X4 12PLY

## (undated) DEVICE — PAD ELECTRODE STER 1.5X3

## (undated) DEVICE — SEE MEDLINE ITEM 157150

## (undated) DEVICE — GLOVE BIOGEL SKINSENSE PI 7.0

## (undated) DEVICE — NDL 18GA X1 1/2 REG BEVEL

## (undated) DEVICE — SLEEVE PROTECTIVE 6X9 NON STER

## (undated) DEVICE — GLOVE ORTHO PF SZ 8.5

## (undated) DEVICE — APPLICATOR CHLORAPREP ORN 26ML

## (undated) DEVICE — DRESSING XEROFORM 1X8IN

## (undated) DEVICE — SYR 10CC LUER LOCK

## (undated) DEVICE — SOL 9P NACL IRR PIC IL

## (undated) DEVICE — SHAVER SYS 5.5 ULRAFRR

## (undated) DEVICE — NDL HYPO SAFETY 25 X 5/8